# Patient Record
Sex: FEMALE | Race: WHITE | NOT HISPANIC OR LATINO
[De-identification: names, ages, dates, MRNs, and addresses within clinical notes are randomized per-mention and may not be internally consistent; named-entity substitution may affect disease eponyms.]

---

## 2022-10-25 PROBLEM — Z00.00 ENCOUNTER FOR PREVENTIVE HEALTH EXAMINATION: Status: ACTIVE | Noted: 2022-10-25

## 2022-11-02 ENCOUNTER — APPOINTMENT (OUTPATIENT)
Dept: SURGICAL ONCOLOGY | Facility: CLINIC | Age: 47
End: 2022-11-02

## 2022-11-02 VITALS
HEIGHT: 69 IN | RESPIRATION RATE: 16 BRPM | WEIGHT: 144 LBS | SYSTOLIC BLOOD PRESSURE: 120 MMHG | DIASTOLIC BLOOD PRESSURE: 88 MMHG | HEART RATE: 87 BPM | OXYGEN SATURATION: 98 % | BODY MASS INDEX: 21.33 KG/M2

## 2022-11-02 PROCEDURE — 99205 OFFICE O/P NEW HI 60 MIN: CPT

## 2022-11-03 ENCOUNTER — NON-APPOINTMENT (OUTPATIENT)
Age: 47
End: 2022-11-03

## 2022-11-09 ENCOUNTER — APPOINTMENT (OUTPATIENT)
Dept: MRI IMAGING | Facility: CLINIC | Age: 47
End: 2022-11-09

## 2022-11-09 ENCOUNTER — TRANSCRIPTION ENCOUNTER (OUTPATIENT)
Age: 47
End: 2022-11-09

## 2022-11-09 ENCOUNTER — OUTPATIENT (OUTPATIENT)
Dept: OUTPATIENT SERVICES | Facility: HOSPITAL | Age: 47
LOS: 1 days | Discharge: HOME | End: 2022-11-09

## 2022-11-09 DIAGNOSIS — C50.919 MALIGNANT NEOPLASM OF UNSPECIFIED SITE OF UNSPECIFIED FEMALE BREAST: ICD-10-CM

## 2022-11-09 PROCEDURE — 77049 MRI BREAST C-+ W/CAD BI: CPT

## 2022-11-09 PROCEDURE — A9585: CPT | Mod: NC,JW

## 2022-11-10 ENCOUNTER — NON-APPOINTMENT (OUTPATIENT)
Age: 47
End: 2022-11-10

## 2022-11-10 ENCOUNTER — APPOINTMENT (OUTPATIENT)
Dept: HEMATOLOGY ONCOLOGY | Facility: CLINIC | Age: 47
End: 2022-11-10

## 2022-11-10 ENCOUNTER — RESULT REVIEW (OUTPATIENT)
Age: 47
End: 2022-11-10

## 2022-11-10 ENCOUNTER — APPOINTMENT (OUTPATIENT)
Dept: MULTI SPECIALTY CLINIC | Facility: CLINIC | Age: 47
End: 2022-11-10

## 2022-11-10 ENCOUNTER — LABORATORY RESULT (OUTPATIENT)
Age: 47
End: 2022-11-10

## 2022-11-10 DIAGNOSIS — Z87.891 PERSONAL HISTORY OF NICOTINE DEPENDENCE: ICD-10-CM

## 2022-11-10 DIAGNOSIS — Z78.9 OTHER SPECIFIED HEALTH STATUS: ICD-10-CM

## 2022-11-10 LAB
BASOPHILS # BLD AUTO: 0.03 K/UL — SIGNIFICANT CHANGE UP (ref 0–0.2)
BASOPHILS NFR BLD AUTO: 0.7 % — SIGNIFICANT CHANGE UP (ref 0–2)
EOSINOPHIL # BLD AUTO: 0.08 K/UL — SIGNIFICANT CHANGE UP (ref 0–0.5)
EOSINOPHIL NFR BLD AUTO: 1.9 % — SIGNIFICANT CHANGE UP (ref 0–6)
HCT VFR BLD CALC: 40.8 % — SIGNIFICANT CHANGE UP (ref 34.5–45)
HGB BLD-MCNC: 13.1 G/DL — SIGNIFICANT CHANGE UP (ref 11.5–15.5)
IMM GRANULOCYTES NFR BLD AUTO: 0.5 % — SIGNIFICANT CHANGE UP (ref 0–0.9)
LYMPHOCYTES # BLD AUTO: 0.95 K/UL — LOW (ref 1–3.3)
LYMPHOCYTES # BLD AUTO: 23 % — SIGNIFICANT CHANGE UP (ref 13–44)
MCHC RBC-ENTMCNC: 26.7 PG — LOW (ref 27–34)
MCHC RBC-ENTMCNC: 32.1 G/DL — SIGNIFICANT CHANGE UP (ref 32–36)
MCV RBC AUTO: 83.1 FL — SIGNIFICANT CHANGE UP (ref 80–100)
MONOCYTES # BLD AUTO: 0.26 K/UL — SIGNIFICANT CHANGE UP (ref 0–0.9)
MONOCYTES NFR BLD AUTO: 6.3 % — SIGNIFICANT CHANGE UP (ref 2–14)
NEUTROPHILS # BLD AUTO: 2.79 K/UL — SIGNIFICANT CHANGE UP (ref 1.8–7.4)
NEUTROPHILS NFR BLD AUTO: 67.6 % — SIGNIFICANT CHANGE UP (ref 43–77)
NRBC # BLD: 0 /100 WBCS — SIGNIFICANT CHANGE UP (ref 0–0)
PLATELET # BLD AUTO: 262 K/UL — SIGNIFICANT CHANGE UP (ref 150–400)
RBC # BLD: 4.91 M/UL — SIGNIFICANT CHANGE UP (ref 3.8–5.2)
RBC # FLD: 14.7 % — HIGH (ref 10.3–14.5)
WBC # BLD: 4.13 K/UL — SIGNIFICANT CHANGE UP (ref 3.8–10.5)
WBC # FLD AUTO: 4.13 K/UL — SIGNIFICANT CHANGE UP (ref 3.8–10.5)

## 2022-11-10 PROCEDURE — 99205 OFFICE O/P NEW HI 60 MIN: CPT

## 2022-11-10 PROCEDURE — 88321 CONSLTJ&REPRT SLD PREP ELSWR: CPT

## 2022-11-10 PROCEDURE — 99203 OFFICE O/P NEW LOW 30 MIN: CPT | Mod: 25

## 2022-11-11 ENCOUNTER — RESULT REVIEW (OUTPATIENT)
Age: 47
End: 2022-11-11

## 2022-11-14 LAB
ALBUMIN SERPL ELPH-MCNC: 4.9 G/DL
ALP BLD-CCNC: 72 U/L
ALT SERPL-CCNC: 6 U/L
ANION GAP SERPL CALC-SCNC: 11 MMOL/L
AST SERPL-CCNC: 15 U/L
BASOPHILS # BLD AUTO: 0.03 K/UL
BASOPHILS NFR BLD AUTO: 0.7 %
BILIRUB SERPL-MCNC: 0.3 MG/DL
BUN SERPL-MCNC: 8 MG/DL
CALCIUM SERPL-MCNC: 10.1 MG/DL
CHLORIDE SERPL-SCNC: 102 MMOL/L
CO2 SERPL-SCNC: 27 MMOL/L
CREAT SERPL-MCNC: 0.72 MG/DL
EGFR: 103 ML/MIN/1.73M2
EOSINOPHIL # BLD AUTO: 0.09 K/UL
EOSINOPHIL NFR BLD AUTO: 2.1 %
GLUCOSE SERPL-MCNC: 90 MG/DL
HBV CORE IGG+IGM SER QL: NONREACTIVE
HBV CORE IGM SER QL: NONREACTIVE
HBV E AB SER QL: NONREACTIVE
HBV E AG SER QL: NONREACTIVE
HBV SURFACE AB SER QL: NONREACTIVE
HBV SURFACE AG SER QL: NONREACTIVE
HCT VFR BLD CALC: 42.3 %
HCV AB SER QL: NONREACTIVE
HCV S/CO RATIO: 0.25 S/CO
HEPB DNA PCR INT: NOT DETECTED
HEPB DNA PCR LOG: NOT DETECTED LOGIU/ML
HGB BLD-MCNC: 13.3 G/DL
IMM GRANULOCYTES NFR BLD AUTO: 0.2 %
INR PPP: 0.97 RATIO
LYMPHOCYTES # BLD AUTO: 0.95 K/UL
LYMPHOCYTES NFR BLD AUTO: 22.3 %
MAN DIFF?: NORMAL
MCHC RBC-ENTMCNC: 27.1 PG
MCHC RBC-ENTMCNC: 31.4 GM/DL
MCV RBC AUTO: 86.3 FL
MONOCYTES # BLD AUTO: 0.26 K/UL
MONOCYTES NFR BLD AUTO: 6.1 %
NEUTROPHILS # BLD AUTO: 2.92 K/UL
NEUTROPHILS NFR BLD AUTO: 68.6 %
PLATELET # BLD AUTO: 274 K/UL
POTASSIUM SERPL-SCNC: 4 MMOL/L
PROT SERPL-MCNC: 7.5 G/DL
PT BLD: 11.3 SEC
RBC # BLD: 4.9 M/UL
RBC # FLD: 15.4 %
SODIUM SERPL-SCNC: 140 MMOL/L
TSH SERPL-ACNC: 1.21 UIU/ML
WBC # FLD AUTO: 4.26 K/UL

## 2022-11-20 PROBLEM — Z87.891 FORMER SMOKER: Status: ACTIVE | Noted: 2022-11-20

## 2022-11-20 PROBLEM — Z78.9 NO FAMILY HISTORY OF CANCER: Status: ACTIVE | Noted: 2022-11-20

## 2022-11-20 NOTE — HISTORY OF PRESENT ILLNESS
[FreeTextEntry1] : Ms. Sauer is a 46 yo female with newly diagnosed triple negative left breast cancer, who presents to breast multidisciplinary clinic for treatment recommendations.\par \par Her history is significant for benign right breast biopsy in 2018. She palpated a mass in the left breast in May 2022, which she attributed to a strain from yoga. She was evaluated by her doctor in September, who referred her for further work up. \par \par Diagnostic mammogram on 9/19/22 demonstrated a new spiculated mass in the far posterior upper inner quadrant of the left breast, compared to a prior mammogram from 2014. Loosely grouped coarse calcifications were noted in the right breast near a biopsy clip, in the posterior upper central right breast, which appeared increased and indeterminate. There was no axillary adenopathy bilaterally. \par \par Breast ultrasound showed an irregular hypoechoic left breast mass measuring up to 4.1 cm at 10:00-12:00, 7 cm FN. \par Adjacent nodules were present measuring 9 and 1 mm. Multiple cysts were seen in the right breast, with one isoechoic nodule appearing likely benign. \par \par She underwent biopsy of left breast mass on 10/17/22. Pathology showed invasive mammary ductal carcinoma, poorly differentiated, grade 3, involves multiple cores, measuring up to 10 mm. DCIS was also present, high nuclear grade and solid type. The invasive tumor was ER 0%, GA 0%, HER2 1+ negative by FISH, and KI 67 50%. Right breast posterior upper central calcifications biopsy showed benign breast tissue. \par \par MRI breast on 11/9/22 showed an irregular enhancing mass in the left upper inner quadrant with multiple surrounding satellite masses spanning 6.7 cm AP. Satellite masses extended into the far medial breast and abutted the pectoralis major, without enhancement of the muscle. Prominent axillary lymph nodes were noted in the left axilla. There was no suspicious enhancement or adenopathy on the right side.

## 2022-11-20 NOTE — LETTER CLOSING
[Consult Closing:] : Thank you for allowing me to participate in the care of this patient.  If you have any questions, please do not hesitate to contact me. [Sincerely yours,] : Sincerely yours, [FreeTextEntry3] : Olga Pierce MD\par

## 2022-11-20 NOTE — PHYSICAL EXAM
[Sclera] : the sclera and conjunctiva were normal [] : no respiratory distress [Heart Rate And Rhythm] : heart rate and rhythm were normal [No UE Edema] : there is no upper extremity edema [Abdomen Soft] : soft [Nondistended] : nondistended [Supraclavicular Lymph Nodes Enlarged Bilaterally] : supraclavicular [Axillary Lymph Nodes Enlarged Bilaterally] : axillary [Normal] : oriented to person, place and time, the affect was normal, the mood was normal and not anxious [de-identified] : palpable mass in left upper inner quadrant, mobile, no skin involvement, no tethering, approx 4 cm

## 2022-11-21 ENCOUNTER — RESULT REVIEW (OUTPATIENT)
Age: 47
End: 2022-11-21

## 2022-11-21 ENCOUNTER — APPOINTMENT (OUTPATIENT)
Dept: ULTRASOUND IMAGING | Facility: IMAGING CENTER | Age: 47
End: 2022-11-21

## 2022-11-21 ENCOUNTER — OUTPATIENT (OUTPATIENT)
Dept: OUTPATIENT SERVICES | Facility: HOSPITAL | Age: 47
LOS: 1 days | End: 2022-11-21
Payer: COMMERCIAL

## 2022-11-21 DIAGNOSIS — C50.919 MALIGNANT NEOPLASM OF UNSPECIFIED SITE OF UNSPECIFIED FEMALE BREAST: ICD-10-CM

## 2022-11-21 PROCEDURE — 88173 CYTOPATH EVAL FNA REPORT: CPT | Mod: 26

## 2022-11-21 PROCEDURE — 76882 US LMTD JT/FCL EVL NVASC XTR: CPT | Mod: 26,LT

## 2022-11-21 PROCEDURE — 88305 TISSUE EXAM BY PATHOLOGIST: CPT | Mod: 26

## 2022-11-21 PROCEDURE — 19000 PUNCTURE ASPIR CYST BREAST: CPT | Mod: RT

## 2022-11-21 PROCEDURE — 76882 US LMTD JT/FCL EVL NVASC XTR: CPT

## 2022-11-21 PROCEDURE — 76942 ECHO GUIDE FOR BIOPSY: CPT

## 2022-11-21 PROCEDURE — 76942 ECHO GUIDE FOR BIOPSY: CPT | Mod: 26

## 2022-11-21 PROCEDURE — 19000 PUNCTURE ASPIR CYST BREAST: CPT

## 2022-11-21 PROCEDURE — 88305 TISSUE EXAM BY PATHOLOGIST: CPT

## 2022-11-21 PROCEDURE — 88173 CYTOPATH EVAL FNA REPORT: CPT

## 2022-11-23 ENCOUNTER — APPOINTMENT (OUTPATIENT)
Dept: NUCLEAR MEDICINE | Facility: IMAGING CENTER | Age: 47
End: 2022-11-23

## 2022-11-23 ENCOUNTER — APPOINTMENT (OUTPATIENT)
Dept: CT IMAGING | Facility: IMAGING CENTER | Age: 47
End: 2022-11-23

## 2022-11-23 ENCOUNTER — OUTPATIENT (OUTPATIENT)
Dept: OUTPATIENT SERVICES | Facility: HOSPITAL | Age: 47
LOS: 1 days | End: 2022-11-23
Payer: COMMERCIAL

## 2022-11-23 DIAGNOSIS — C50.919 MALIGNANT NEOPLASM OF UNSPECIFIED SITE OF UNSPECIFIED FEMALE BREAST: ICD-10-CM

## 2022-11-23 DIAGNOSIS — Z00.8 ENCOUNTER FOR OTHER GENERAL EXAMINATION: ICD-10-CM

## 2022-11-23 LAB — NON-GYNECOLOGICAL CYTOLOGY STUDY: SIGNIFICANT CHANGE UP

## 2022-11-23 PROCEDURE — 78306 BONE IMAGING WHOLE BODY: CPT | Mod: 26

## 2022-11-23 PROCEDURE — 71260 CT THORAX DX C+: CPT | Mod: 26

## 2022-11-23 PROCEDURE — 74177 CT ABD & PELVIS W/CONTRAST: CPT | Mod: 26

## 2022-11-23 PROCEDURE — 74177 CT ABD & PELVIS W/CONTRAST: CPT

## 2022-11-23 PROCEDURE — 71260 CT THORAX DX C+: CPT

## 2022-11-23 PROCEDURE — A9561: CPT

## 2022-11-23 PROCEDURE — 78306 BONE IMAGING WHOLE BODY: CPT

## 2022-11-25 LAB
DPYD GENOTYPE: NORMAL
DPYD PHENOTYPE: NORMAL
DPYD SPECIMEN: NORMAL
FULL GENE SEQUENCE RESULT: NORMAL
HCV GENTYP BLD NAA+PROBE: NOT DETECTED
INTERPRETATION PGDFRB: NORMAL
Lab: NORMAL
REF LAB TEST METHOD: NORMAL
REVIEWED BY: NORMAL
TA REPEAT RESULT: NORMAL
TEST PERFORMANCE INFO SPEC: NORMAL

## 2022-11-29 ENCOUNTER — APPOINTMENT (OUTPATIENT)
Dept: HEMATOLOGY ONCOLOGY | Facility: CLINIC | Age: 47
End: 2022-11-29

## 2022-11-29 ENCOUNTER — LABORATORY RESULT (OUTPATIENT)
Age: 47
End: 2022-11-29

## 2022-11-29 VITALS
WEIGHT: 142.64 LBS | HEART RATE: 80 BPM | TEMPERATURE: 97.9 F | SYSTOLIC BLOOD PRESSURE: 120 MMHG | RESPIRATION RATE: 16 BRPM | BODY MASS INDEX: 21.06 KG/M2 | OXYGEN SATURATION: 97 % | DIASTOLIC BLOOD PRESSURE: 78 MMHG

## 2022-11-29 PROCEDURE — 99214 OFFICE O/P EST MOD 30 MIN: CPT

## 2022-11-29 PROCEDURE — 93010 ELECTROCARDIOGRAM REPORT: CPT

## 2022-11-29 NOTE — ASSESSMENT
[FreeTextEntry1] : A discussion took place regarding the recommended therapy for the patient who has what appears to be locally invasive left breast triple negative breast cancer which appears to be involving the muscle along with satellite lesion.  The patient has no definite evidence of lymph node involvement.  She also underwent reevaluation of the right breast lesion which on biopsy was benign.\par \par The reason to recommend neoadjuvant therapy is at the tumor mass on imaging measures 4.1 cm as noted on ultrasound and MRI.  There are also satellite nodules measuring 9 mm and 1 mm and the total area of disease measures 6.7 cm.  There is also abutment of the pectoralis major muscle and prominent left axillary lymph nodes.  The biopsy revealed multiple cores of poorly differentiated grade 3 triple negative breast cancer FISH negative and Ki-67 50%.\par \par The plan is to administer combination chemo and immunotherapy as neoadjuvant for her triple negative breast cancer.  This will consist of Taxol and carbo given IV weekly 2 of 12 weeks along with pembrolizumab.  This will be followed with AC and continued immunotherapy.  Side effects were discussed in detail and informed consent was obtained.  The patient will return monthly during therapy or sooner as needed. [Curative] : Goals of care discussed with patient: Curative [Palliative Care Plan] : not applicable at this time

## 2022-11-29 NOTE — HISTORY OF PRESENT ILLNESS
[Disease: _____________________] : Disease: [unfilled] [de-identified] : This is a  47-year-old woman with history of left breast carcinoma who presents for evaluation.  The patient's history dates back to May 2022 when she noted a mass in the left breast.   In 2022  a mammogram and sonogram were performed.  These revealed a mass in the left breast along with calcifications in the right breast.  The biopsy revealed poorly differentiated infiltrating duct carcinoma with Isaias score 8/9, ER [place] -0% and HER2/roxann 1+ negative consistent with triple negative breast cancer.  The Ki-67 was 50%.\par \par There was no obvious left axillary lymph nodes.  The right breast calcifications were biopsied with benign results including fibrocystic changes.  An MRI revealed that the left breast mass with touching the pectoral muscle to the lateral breast area along with satellite.  There was prominence in the left axilla but no definite enlargement of nodes.  The patient has undergone surgical oncology evaluation and the plan is to evaluate patient for neoadjuvant chemotherapy and she is considering bilateral mastectomies.  The patient has undergone genetic testing which is pending.  The patient did have a previous biopsy of the breast in 2018 involving the right breast which showed a benign biopsy.\par \par The patient will undergo targeted reevaluation of the nonspecific foci on the right breast and also left axillary lymph nodes with biopsy to assess for bilateral disease and possible dayna metastases.\par \par Patient's menarche was age 12 and her periods have been regular.  She is  1 para 1 age at first pregnancy was 19.  She denies hormonal therapy or fertility treatments.  The patient will undergo CAT scans and bone scans to evaluate any evidence for systemic disease. [de-identified] : poorly diff IDC ERneg, [place] neg, Her2 neg 1+, Ki67-50%  [de-identified] : Plan neoadjuvant chemo/immunotherapy, Taxol/ carbo/ Pembro AC  [de-identified] : The patient returns today to discuss beginning neoadjuvant chemotherapy for her triple negative breast cancer after having undergone repeat MRI of the right breast which was biopsied because of a previous lesion and this was benign.  The patient also underwent reevaluation of the left axillary lymph node which were felt to be benign.  She returns to discuss side effects and toxicity of the therapy.

## 2022-11-30 NOTE — HISTORY OF PRESENT ILLNESS
[de-identified] : Patient is a 46 y/o female who presents an initial consultation for newly diagnosed triple negative left breast cancer. \par \par Biopsy (10/17/22):\par Site 1: Left ultrasound biopsy irregular mass at 10-12:00 \par -Invasive carcinoma consistent with invasive mammary ductal carcinoma\par Site 2: Right breast posterior upper central calcifications, stereotactic biopsy\par -Benign breast tissue with apocrine/papillary apocrine cysts\par -Calcification associated with ductules, lobules, cystic change, and stroma\par \par Patient underwent sonogram on 9/19/22 which showed highly suspicious palpable hypoechoic left breast mass at 10:00-12:00 for which an us-guided biopsy is recommended. Indeterminate calcifications in the posterior upper central right breast for which stereotactic biopsy is recommended. Probably benign right 3:00 sonographic nodule for which a 6 month follow up targeted right breast ultrasound is recommended. (BI-RADS 5)\par \par Denies any family history of breast cancer.

## 2022-11-30 NOTE — ADDENDUM
[FreeTextEntry1] : I, Lisa Culver, acted solely as a scribe for Dr. Edward Jean on this date 11/02/2022.\par \par

## 2022-11-30 NOTE — PHYSICAL EXAM
[Normal] : supple, no neck mass and thyroid not enlarged [Normal Neck Lymph Nodes] : normal neck lymph nodes  [Normal Supraclavicular Lymph Nodes] : normal supraclavicular lymph nodes [Normal Groin Lymph Nodes] : normal groin lymph nodes [Normal Axillary Lymph Nodes] : normal axillary lymph nodes [Normal] : oriented to person, place and time, with appropriate affect [de-identified] : 4cm mass LUIQ, no other masses, no adenopathy bilterally

## 2022-11-30 NOTE — CONSULT LETTER
[Dear  ___] : Dear  [unfilled], [Consult Letter:] : I had the pleasure of evaluating your patient, [unfilled]. [Please see my note below.] : Please see my note below. [Sincerely,] : Sincerely, [FreeTextEntry3] : Edward Jean MD FACS\par

## 2022-11-30 NOTE — ASSESSMENT
[FreeTextEntry1] : T2 triple negative breast cancer\par I had a long discussion w the pt. regarding her diagnosis, prognosis and all mgmt options\par I have recommended neoadjuvant chemotherapy given T2 triple negative tumor\par Pt. will be seen in [facility]\par Will get breast MRI and staging studies\par Genetic testing performed\par All questions answered\par

## 2022-12-01 ENCOUNTER — OUTPATIENT (OUTPATIENT)
Dept: OUTPATIENT SERVICES | Facility: HOSPITAL | Age: 47
LOS: 1 days | Discharge: HOME | End: 2022-12-01

## 2022-12-01 DIAGNOSIS — C50.919 MALIGNANT NEOPLASM OF UNSPECIFIED SITE OF UNSPECIFIED FEMALE BREAST: ICD-10-CM

## 2022-12-02 ENCOUNTER — TRANSCRIPTION ENCOUNTER (OUTPATIENT)
Age: 47
End: 2022-12-02

## 2022-12-02 ENCOUNTER — OUTPATIENT (OUTPATIENT)
Dept: OUTPATIENT SERVICES | Facility: HOSPITAL | Age: 47
LOS: 1 days | End: 2022-12-02
Payer: COMMERCIAL

## 2022-12-02 ENCOUNTER — RESULT REVIEW (OUTPATIENT)
Age: 47
End: 2022-12-02

## 2022-12-02 VITALS
RESPIRATION RATE: 15 BRPM | HEART RATE: 70 BPM | WEIGHT: 139.99 LBS | TEMPERATURE: 98 F | OXYGEN SATURATION: 99 % | HEIGHT: 69 IN | DIASTOLIC BLOOD PRESSURE: 82 MMHG | SYSTOLIC BLOOD PRESSURE: 108 MMHG

## 2022-12-02 VITALS
RESPIRATION RATE: 19 BRPM | OXYGEN SATURATION: 100 % | SYSTOLIC BLOOD PRESSURE: 104 MMHG | DIASTOLIC BLOOD PRESSURE: 77 MMHG | HEART RATE: 69 BPM

## 2022-12-02 DIAGNOSIS — C50.919 MALIGNANT NEOPLASM OF UNSPECIFIED SITE OF UNSPECIFIED FEMALE BREAST: ICD-10-CM

## 2022-12-02 PROCEDURE — 76937 US GUIDE VASCULAR ACCESS: CPT | Mod: 26

## 2022-12-02 PROCEDURE — C1894: CPT

## 2022-12-02 PROCEDURE — 77001 FLUOROGUIDE FOR VEIN DEVICE: CPT | Mod: 26

## 2022-12-02 PROCEDURE — 77001 FLUOROGUIDE FOR VEIN DEVICE: CPT

## 2022-12-02 PROCEDURE — 76937 US GUIDE VASCULAR ACCESS: CPT

## 2022-12-02 PROCEDURE — C1769: CPT

## 2022-12-02 PROCEDURE — 36561 INSERT TUNNELED CV CATH: CPT

## 2022-12-02 PROCEDURE — C1788: CPT

## 2022-12-02 RX ORDER — FENTANYL CITRATE 50 UG/ML
25 INJECTION INTRAVENOUS
Refills: 0 | Status: DISCONTINUED | OUTPATIENT
Start: 2022-12-02 | End: 2022-12-02

## 2022-12-02 RX ORDER — ONDANSETRON 8 MG/1
4 TABLET, FILM COATED ORAL ONCE
Refills: 0 | Status: DISCONTINUED | OUTPATIENT
Start: 2022-12-02 | End: 2022-12-16

## 2022-12-02 NOTE — PRE-ANESTHESIA EVALUATION ADULT - NSANTHOSAYNRD_GEN_A_CORE
No. ANIBAL screening performed.  STOP BANG Legend: 0-2 = LOW Risk; 3-4 = INTERMEDIATE Risk; 5-8 = HIGH Risk

## 2022-12-02 NOTE — ASU PATIENT PROFILE, ADULT - FALL HARM RISK - UNIVERSAL INTERVENTIONS
Bed in lowest position, wheels locked, appropriate side rails in place/Call bell, personal items and telephone in reach/Instruct patient to call for assistance before getting out of bed or chair/Non-slip footwear when patient is out of bed/North to call system/Physically safe environment - no spills, clutter or unnecessary equipment/Purposeful Proactive Rounding/Room/bathroom lighting operational, light cord in reach

## 2022-12-02 NOTE — PRE PROCEDURE NOTE - PRE PROCEDURE EVALUATION
HPI: 47y Female with left breast cancer requiring port for chemotherapy.    Allergies:   Medications (Abx/Cardiac/Anticoagulation/Blood Products)      Data:    T(C): --  HR: --  BP: --  RR: --  SpO2: --    Imaging: Reviewed    Plan:   -47y Female presents for port placement for chemotherapy.  -Risks/Benefits/alternatives explained with the patient and/or healthcare proxy and witnessed informed consent obtained.

## 2022-12-02 NOTE — ASU DISCHARGE PLAN (ADULT/PEDIATRIC) - NS MD DC FALL RISK RISK
For information on Fall & Injury Prevention, visit: [email or web address] OR  [email or web address] OR  https://www.cdc.gov/steadi/patient.html

## 2022-12-02 NOTE — ASU DISCHARGE PLAN (ADULT/PEDIATRIC) - NURSING INSTRUCTIONS
Please feel free to contact us at [phone] if any problems arise. After 6PM, Monday through Friday, on weekends and on holidays, please call [phone] and ask for the radiology resident on call to be paged.

## 2022-12-06 ENCOUNTER — RESULT REVIEW (OUTPATIENT)
Age: 47
End: 2022-12-06

## 2022-12-06 ENCOUNTER — APPOINTMENT (OUTPATIENT)
Dept: INFUSION THERAPY | Facility: HOSPITAL | Age: 47
End: 2022-12-06

## 2022-12-06 ENCOUNTER — NON-APPOINTMENT (OUTPATIENT)
Age: 47
End: 2022-12-06

## 2022-12-06 LAB
ALBUMIN SERPL ELPH-MCNC: 4.3 G/DL — SIGNIFICANT CHANGE UP (ref 3.3–5)
ALP SERPL-CCNC: 66 U/L — SIGNIFICANT CHANGE UP (ref 40–120)
ALT FLD-CCNC: 8 U/L — LOW (ref 10–45)
ANION GAP SERPL CALC-SCNC: 12 MMOL/L — SIGNIFICANT CHANGE UP (ref 5–17)
APTT BLD: 31.1 SEC
AST SERPL-CCNC: 13 U/L — SIGNIFICANT CHANGE UP (ref 10–40)
BASOPHILS # BLD AUTO: 0 K/UL — SIGNIFICANT CHANGE UP (ref 0–0.2)
BASOPHILS NFR BLD AUTO: 0 % — SIGNIFICANT CHANGE UP (ref 0–2)
BILIRUB SERPL-MCNC: 0.3 MG/DL — SIGNIFICANT CHANGE UP (ref 0.2–1.2)
BUN SERPL-MCNC: 17 MG/DL — SIGNIFICANT CHANGE UP (ref 7–23)
CALCIUM SERPL-MCNC: 9.9 MG/DL — SIGNIFICANT CHANGE UP (ref 8.4–10.5)
CANCER AG27-29 SERPL-ACNC: 27.5 U/ML
CHLORIDE SERPL-SCNC: 107 MMOL/L — SIGNIFICANT CHANGE UP (ref 96–108)
CO2 SERPL-SCNC: 22 MMOL/L — SIGNIFICANT CHANGE UP (ref 22–31)
CREAT SERPL-MCNC: 0.55 MG/DL — SIGNIFICANT CHANGE UP (ref 0.5–1.3)
EGFR: 113 ML/MIN/1.73M2 — SIGNIFICANT CHANGE UP
EOSINOPHIL # BLD AUTO: 0 K/UL — SIGNIFICANT CHANGE UP (ref 0–0.5)
EOSINOPHIL NFR BLD AUTO: 0 % — SIGNIFICANT CHANGE UP (ref 0–6)
GLUCOSE SERPL-MCNC: 140 MG/DL — HIGH (ref 70–99)
HCT VFR BLD CALC: 38 % — SIGNIFICANT CHANGE UP (ref 34.5–45)
HGB BLD-MCNC: 12.4 G/DL — SIGNIFICANT CHANGE UP (ref 11.5–15.5)
IMM GRANULOCYTES NFR BLD AUTO: 0.6 % — SIGNIFICANT CHANGE UP (ref 0–0.9)
LYMPHOCYTES # BLD AUTO: 0.37 K/UL — LOW (ref 1–3.3)
LYMPHOCYTES # BLD AUTO: 10.4 % — LOW (ref 13–44)
MCHC RBC-ENTMCNC: 26.4 PG — LOW (ref 27–34)
MCHC RBC-ENTMCNC: 32.6 G/DL — SIGNIFICANT CHANGE UP (ref 32–36)
MCV RBC AUTO: 81 FL — SIGNIFICANT CHANGE UP (ref 80–100)
MONOCYTES # BLD AUTO: 0.04 K/UL — SIGNIFICANT CHANGE UP (ref 0–0.9)
MONOCYTES NFR BLD AUTO: 1.1 % — LOW (ref 2–14)
NEUTROPHILS # BLD AUTO: 3.12 K/UL — SIGNIFICANT CHANGE UP (ref 1.8–7.4)
NEUTROPHILS NFR BLD AUTO: 87.9 % — HIGH (ref 43–77)
NRBC # BLD: 0 /100 WBCS — SIGNIFICANT CHANGE UP (ref 0–0)
PLATELET # BLD AUTO: 194 K/UL — SIGNIFICANT CHANGE UP (ref 150–400)
POTASSIUM SERPL-MCNC: 4.4 MMOL/L — SIGNIFICANT CHANGE UP (ref 3.5–5.3)
POTASSIUM SERPL-SCNC: 4.4 MMOL/L — SIGNIFICANT CHANGE UP (ref 3.5–5.3)
PROT SERPL-MCNC: 7 G/DL — SIGNIFICANT CHANGE UP (ref 6–8.3)
RBC # BLD: 4.69 M/UL — SIGNIFICANT CHANGE UP (ref 3.8–5.2)
RBC # FLD: 14.3 % — SIGNIFICANT CHANGE UP (ref 10.3–14.5)
SODIUM SERPL-SCNC: 141 MMOL/L — SIGNIFICANT CHANGE UP (ref 135–145)
T3 SERPL-MCNC: 80 NG/DL — SIGNIFICANT CHANGE UP (ref 80–200)
T4 FREE SERPL-MCNC: 1 NG/DL — SIGNIFICANT CHANGE UP (ref 0.9–1.8)
TSH SERPL-MCNC: 0.83 UIU/ML — SIGNIFICANT CHANGE UP (ref 0.27–4.2)
WBC # BLD: 3.55 K/UL — LOW (ref 3.8–10.5)
WBC # FLD AUTO: 3.55 K/UL — LOW (ref 3.8–10.5)

## 2022-12-07 DIAGNOSIS — Z51.11 ENCOUNTER FOR ANTINEOPLASTIC CHEMOTHERAPY: ICD-10-CM

## 2022-12-07 DIAGNOSIS — R11.2 NAUSEA WITH VOMITING, UNSPECIFIED: ICD-10-CM

## 2022-12-07 LAB — CANCER AG27-29 SERPL-ACNC: 26.1 U/ML — SIGNIFICANT CHANGE UP (ref 0–38.6)

## 2022-12-12 DIAGNOSIS — Z45.2 ENCOUNTER FOR ADJUSTMENT AND MANAGEMENT OF VASCULAR ACCESS DEVICE: ICD-10-CM

## 2022-12-12 DIAGNOSIS — C50.919 MALIGNANT NEOPLASM OF UNSPECIFIED SITE OF UNSPECIFIED FEMALE BREAST: ICD-10-CM

## 2022-12-13 ENCOUNTER — APPOINTMENT (OUTPATIENT)
Dept: INFUSION THERAPY | Facility: HOSPITAL | Age: 47
End: 2022-12-13

## 2022-12-13 ENCOUNTER — RESULT REVIEW (OUTPATIENT)
Age: 47
End: 2022-12-13

## 2022-12-13 LAB
ALBUMIN SERPL ELPH-MCNC: 4.1 G/DL — SIGNIFICANT CHANGE UP (ref 3.3–5)
ALP SERPL-CCNC: 56 U/L — SIGNIFICANT CHANGE UP (ref 40–120)
ALT FLD-CCNC: 11 U/L — SIGNIFICANT CHANGE UP (ref 10–45)
ANION GAP SERPL CALC-SCNC: 9 MMOL/L — SIGNIFICANT CHANGE UP (ref 5–17)
AST SERPL-CCNC: 15 U/L — SIGNIFICANT CHANGE UP (ref 10–40)
BASOPHILS # BLD AUTO: 0.03 K/UL — SIGNIFICANT CHANGE UP (ref 0–0.2)
BASOPHILS NFR BLD AUTO: 0.9 % — SIGNIFICANT CHANGE UP (ref 0–2)
BILIRUB SERPL-MCNC: 0.2 MG/DL — SIGNIFICANT CHANGE UP (ref 0.2–1.2)
BUN SERPL-MCNC: 12 MG/DL — SIGNIFICANT CHANGE UP (ref 7–23)
CALCIUM SERPL-MCNC: 9.1 MG/DL — SIGNIFICANT CHANGE UP (ref 8.4–10.5)
CHLORIDE SERPL-SCNC: 102 MMOL/L — SIGNIFICANT CHANGE UP (ref 96–108)
CO2 SERPL-SCNC: 26 MMOL/L — SIGNIFICANT CHANGE UP (ref 22–31)
CREAT SERPL-MCNC: 0.6 MG/DL — SIGNIFICANT CHANGE UP (ref 0.5–1.3)
EGFR: 111 ML/MIN/1.73M2 — SIGNIFICANT CHANGE UP
EOSINOPHIL # BLD AUTO: 0.16 K/UL — SIGNIFICANT CHANGE UP (ref 0–0.5)
EOSINOPHIL NFR BLD AUTO: 4.6 % — SIGNIFICANT CHANGE UP (ref 0–6)
GLUCOSE SERPL-MCNC: 89 MG/DL — SIGNIFICANT CHANGE UP (ref 70–99)
HCT VFR BLD CALC: 37.2 % — SIGNIFICANT CHANGE UP (ref 34.5–45)
HGB BLD-MCNC: 12.4 G/DL — SIGNIFICANT CHANGE UP (ref 11.5–15.5)
IMM GRANULOCYTES NFR BLD AUTO: 1.7 % — HIGH (ref 0–0.9)
LYMPHOCYTES # BLD AUTO: 0.59 K/UL — LOW (ref 1–3.3)
LYMPHOCYTES # BLD AUTO: 17.1 % — SIGNIFICANT CHANGE UP (ref 13–44)
MCHC RBC-ENTMCNC: 27 PG — SIGNIFICANT CHANGE UP (ref 27–34)
MCHC RBC-ENTMCNC: 33.3 G/DL — SIGNIFICANT CHANGE UP (ref 32–36)
MCV RBC AUTO: 81 FL — SIGNIFICANT CHANGE UP (ref 80–100)
MONOCYTES # BLD AUTO: 0.16 K/UL — SIGNIFICANT CHANGE UP (ref 0–0.9)
MONOCYTES NFR BLD AUTO: 4.6 % — SIGNIFICANT CHANGE UP (ref 2–14)
NEUTROPHILS # BLD AUTO: 2.45 K/UL — SIGNIFICANT CHANGE UP (ref 1.8–7.4)
NEUTROPHILS NFR BLD AUTO: 71.1 % — SIGNIFICANT CHANGE UP (ref 43–77)
NRBC # BLD: 0 /100 WBCS — SIGNIFICANT CHANGE UP (ref 0–0)
PLATELET # BLD AUTO: 193 K/UL — SIGNIFICANT CHANGE UP (ref 150–400)
POTASSIUM SERPL-MCNC: 4.3 MMOL/L — SIGNIFICANT CHANGE UP (ref 3.5–5.3)
POTASSIUM SERPL-SCNC: 4.3 MMOL/L — SIGNIFICANT CHANGE UP (ref 3.5–5.3)
PROT SERPL-MCNC: 6.4 G/DL — SIGNIFICANT CHANGE UP (ref 6–8.3)
RBC # BLD: 4.59 M/UL — SIGNIFICANT CHANGE UP (ref 3.8–5.2)
RBC # FLD: 14.4 % — SIGNIFICANT CHANGE UP (ref 10.3–14.5)
SODIUM SERPL-SCNC: 138 MMOL/L — SIGNIFICANT CHANGE UP (ref 135–145)
WBC # BLD: 3.45 K/UL — LOW (ref 3.8–10.5)
WBC # FLD AUTO: 3.45 K/UL — LOW (ref 3.8–10.5)

## 2022-12-13 NOTE — REASON FOR VISIT
[Follow-Up Visit] : a follow-up [Parent] : parent [Family Member] : family member [FreeTextEntry2] : ca of left breast

## 2022-12-13 NOTE — CONSULT LETTER
[Dear  ___] : Dear  [unfilled], [Consult Letter:] : I had the pleasure of evaluating your patient, [unfilled]. [Please see my note below.] : Please see my note below. [Consult Closing:] : Thank you very much for allowing me to participate in the care of this patient.  If you have any questions, please do not hesitate to contact me. [Sincerely,] : Sincerely, [FreeTextEntry2] : Dr. Edward Jean [FreeTextEntry3] : [facility]\par [facility]\par [facility]

## 2022-12-13 NOTE — ASSESSMENT
[Curative] : Goals of care discussed with patient: Curative [FreeTextEntry1] : A discussion took place with the patient along with her sister and mother regarding further management.  Because the patient has triple negative breast cancer which appears to be abutting or infiltrating the pectoral muscle the recommendation is to offer neoadjuvant therapy with the hopes of shrinking the tumor down which will allow her to decide which surgical treatment she wishes to undergo.  She is considering bilateral mastectomies and reconstruction.  Before beginning chemotherapy the patient will undergo repeat breast imaging to assess for the left axillary lymph nodes which appear to be benign but will be evaluated for possible biopsy.  She will also undergo repeat evaluation of the right breast lesions which appear to be benign.\par \par We discussed chemoimmunotherapy including Taxol carbo and pembrolizumab for 12 weeks followed by Adriamycin Cytoxan dose dense support treatments.  The side effects of treatment were discussed in detail and informed consent was obtained.  The side effects would include nausea, vomiting, oc blood counts and life-threatening infection, memory change, hair loss, tearing of the eyes, mouth sores, amenorrhea,  fatigue, diarrhea, neuropathy, change in taste, cardiac effects of Adriamycin flulike reaction, immune reactions with inflammation of multiple organs and other side effects.  The patient would be a candidate for Mediport placement and the  genetic testing is pending.  We will also offer nutrition consultation and psychosocial support during her therapy.  The patient will also be screened for hepatitis.  The patient would also be a candidate for radiation oncology consultation after her surgery.

## 2022-12-13 NOTE — HISTORY OF PRESENT ILLNESS
[Disease: _____________________] : Disease: [unfilled] [de-identified] : This is a  47-year-old woman with history of left breast carcinoma who presents for evaluation.  The patient's history dates back to May 2022 when she noted a mass in the left breast.   In 2022  a mammogram and sonogram were performed.  These revealed a mass in the left breast along with calcifications in the right breast.  The biopsy revealed poorly differentiated infiltrating duct carcinoma with Isaias score 8/9, ER [place] -0% and HER2/roxann 1+ negative consistent with triple negative breast cancer.  The Ki-67 was 50%.\par \par There was no obvious left axillary lymph nodes.  The right breast calcifications were biopsied with benign results including fibrocystic changes.  An MRI revealed that the left breast mass with touching the pectoral muscle to the lateral breast area along with satellite.  There was prominence in the left axilla but no definite enlargement of nodes.  The patient has undergone surgical oncology evaluation and the plan is to evaluate patient for neoadjuvant chemotherapy and she is considering bilateral mastectomies.  The patient has undergone genetic testing which is pending.  The patient did have a previous biopsy of the breast in 2018 involving the right breast which showed a benign biopsy.\par \par The patient will undergo targeted reevaluation of the nonspecific foci on the right breast and also left axillary lymph nodes with biopsy to assess for bilateral disease and possible dayna metastases.\par \par Patient's menarche was age 12 and her periods have been regular.  She is  1 para 1 age at first pregnancy was 19.  She denies hormonal therapy or fertility treatments.  The patient will undergo CAT scans and bone scans to evaluate any evidence for systemic disease. [de-identified] : poorly diff IDC ERneg, [place] neg, Her2 neg 1+, Ki67-50%  [de-identified] : Plan neoadjuvant chemo/immunotherapy, Taxol/ carbo/ Pembro AC

## 2022-12-13 NOTE — PHYSICAL EXAM
[Restricted in physically strenuous activity but ambulatory and able to carry out work of a light or sedentary nature] : Status 1- Restricted in physically strenuous activity but ambulatory and able to carry out work of a light or sedentary nature, e.g., light house work, office work [Normal] : affect appropriate [de-identified] : There is a 4 cm palpable mass in the left upper anterior breast.  No palpable lymph nodes noted.

## 2022-12-14 LAB
CORTICOSTEROID BINDING GLOBULIN RESULT: 1.7 MG/DL — SIGNIFICANT CHANGE UP
CORTIS F/TOTAL MFR SERPL: <3.3 % — SIGNIFICANT CHANGE UP
CORTIS SERPL-MCNC: <1 UG/DL — LOW
CORTISOL, FREE RESULT: <0.03 UG/DL — LOW

## 2022-12-15 ENCOUNTER — NON-APPOINTMENT (OUTPATIENT)
Age: 47
End: 2022-12-15

## 2022-12-20 ENCOUNTER — RESULT REVIEW (OUTPATIENT)
Age: 47
End: 2022-12-20

## 2022-12-20 ENCOUNTER — APPOINTMENT (OUTPATIENT)
Dept: INFUSION THERAPY | Facility: HOSPITAL | Age: 47
End: 2022-12-20

## 2022-12-20 ENCOUNTER — APPOINTMENT (OUTPATIENT)
Dept: HEMATOLOGY ONCOLOGY | Facility: CLINIC | Age: 47
End: 2022-12-20

## 2022-12-20 VITALS
HEART RATE: 84 BPM | RESPIRATION RATE: 16 BRPM | DIASTOLIC BLOOD PRESSURE: 77 MMHG | SYSTOLIC BLOOD PRESSURE: 113 MMHG | TEMPERATURE: 97.7 F | OXYGEN SATURATION: 99 % | BODY MASS INDEX: 21.16 KG/M2 | WEIGHT: 143.3 LBS

## 2022-12-20 LAB
BASOPHILS # BLD AUTO: 0 K/UL — SIGNIFICANT CHANGE UP (ref 0–0.2)
BASOPHILS NFR BLD AUTO: 0 % — SIGNIFICANT CHANGE UP (ref 0–2)
EOSINOPHIL # BLD AUTO: 0.04 K/UL — SIGNIFICANT CHANGE UP (ref 0–0.5)
EOSINOPHIL NFR BLD AUTO: 3 % — SIGNIFICANT CHANGE UP (ref 0–6)
HCT VFR BLD CALC: 36.1 % — SIGNIFICANT CHANGE UP (ref 34.5–45)
HGB BLD-MCNC: 11.9 G/DL — SIGNIFICANT CHANGE UP (ref 11.5–15.5)
LYMPHOCYTES # BLD AUTO: 0.7 K/UL — LOW (ref 1–3.3)
LYMPHOCYTES # BLD AUTO: 48 % — HIGH (ref 13–44)
MCHC RBC-ENTMCNC: 26.9 PG — LOW (ref 27–34)
MCHC RBC-ENTMCNC: 33 G/DL — SIGNIFICANT CHANGE UP (ref 32–36)
MCV RBC AUTO: 81.5 FL — SIGNIFICANT CHANGE UP (ref 80–100)
MONOCYTES # BLD AUTO: 0.19 K/UL — SIGNIFICANT CHANGE UP (ref 0–0.9)
MONOCYTES NFR BLD AUTO: 13 % — SIGNIFICANT CHANGE UP (ref 2–14)
NEUTROPHILS # BLD AUTO: 0.53 K/UL — LOW (ref 1.8–7.4)
NEUTROPHILS NFR BLD AUTO: 36 % — LOW (ref 43–77)
NRBC # BLD: 0 /100 — SIGNIFICANT CHANGE UP (ref 0–0)
NRBC # BLD: SIGNIFICANT CHANGE UP /100 WBCS (ref 0–0)
PLAT MORPH BLD: NORMAL — SIGNIFICANT CHANGE UP
PLATELET # BLD AUTO: 222 K/UL — SIGNIFICANT CHANGE UP (ref 150–400)
RBC # BLD: 4.43 M/UL — SIGNIFICANT CHANGE UP (ref 3.8–5.2)
RBC # FLD: 14.6 % — HIGH (ref 10.3–14.5)
RBC BLD AUTO: SIGNIFICANT CHANGE UP
WBC # BLD: 1.46 K/UL — LOW (ref 3.8–10.5)
WBC # FLD AUTO: 1.46 K/UL — LOW (ref 3.8–10.5)

## 2022-12-20 PROCEDURE — 99214 OFFICE O/P EST MOD 30 MIN: CPT

## 2022-12-20 NOTE — HISTORY OF PRESENT ILLNESS
[Disease: _____________________] : Disease: [unfilled] [de-identified] : This is a  47-year-old woman with history of left breast carcinoma who presents for evaluation.  The patient's history dates back to May 2022 when she noted a mass in the left breast.   In 2022  a mammogram and sonogram were performed.  These revealed a mass in the left breast along with calcifications in the right breast.  The biopsy revealed poorly differentiated infiltrating duct carcinoma with Isaias score 8/9, ER [place] -0% and HER2/roxann 1+ negative consistent with triple negative breast cancer.  The Ki-67 was 50%.\par \par There was no obvious left axillary lymph nodes.  The right breast calcifications were biopsied with benign results including fibrocystic changes.  An MRI revealed that the left breast mass with touching the pectoral muscle to the lateral breast area along with satellite.  There was prominence in the left axilla but no definite enlargement of nodes.  The patient has undergone surgical oncology evaluation and the plan is to evaluate patient for neoadjuvant chemotherapy and she is considering bilateral mastectomies.  The patient has undergone genetic testing which is pending.  The patient did have a previous biopsy of the breast in 2018 involving the right breast which showed a benign biopsy.\par \par The patient will undergo targeted reevaluation of the nonspecific foci on the right breast and also left axillary lymph nodes with biopsy to assess for bilateral disease and possible dayna metastases.\par \par Patient's menarche was age 12 and her periods have been regular.  She is  1 para 1 age at first pregnancy was 19.  She denies hormonal therapy or fertility treatments.  The patient will undergo CAT scans and bone scans to evaluate any evidence for systemic disease. [de-identified] : poorly diff IDC ERneg, [place] neg, Her2 neg 1+, Ki67-50%  [de-identified] : Plan neoadjuvant chemo/immunotherapy, Taxol/ carbo/ Pembro AC  [de-identified] : s/p C2 Taxol/Carboplatin. Patient feels okay She had a reaction to with last treatment and a rapid response was called. She was treated with Benadryl 25 mg IVP, Famotidine 20 mg and Hydrocortisone 100 mg IVP. She was able to complete treatment without any more difficulties. Presently, she has no complaints. Denies any nausea, vomiting, diarrhea, fever or chills. Appetite is okay and no mouth sores. Today, WBC=1.46, ANC=0.53

## 2022-12-20 NOTE — ASSESSMENT
[Curative] : Goals of care discussed with patient: Curative [Palliative Care Plan] : not applicable at this time [FreeTextEntry1] : S/p C1 Keytruda/C2 Taxol/Carboplatin. Will hold chemotherapy today due to low KYS=617\par Will give Zarxio 300 mcg daily x 3 days.\par Will continue to monitor for side effects/toxicities.\par Next treatment Taxol will be lowered from 80 mg/m2 to 60 mg/m2\par Patient will continue Taxol and carbo given IV weekly for a total of  1 weeks along with Keytruda every 3 weeks.  Then, patient will be given Adriamycin/Cytoxan every 3 weeks x 4 with Keytruda. \par After completion of Neoadjuvant chemotherapy, patient will go on to surgery. \par RTO in 2 weeks.

## 2022-12-21 ENCOUNTER — APPOINTMENT (OUTPATIENT)
Dept: INFUSION THERAPY | Facility: HOSPITAL | Age: 47
End: 2022-12-21

## 2022-12-21 DIAGNOSIS — Z51.89 ENCOUNTER FOR OTHER SPECIFIED AFTERCARE: ICD-10-CM

## 2022-12-22 ENCOUNTER — APPOINTMENT (OUTPATIENT)
Dept: INFUSION THERAPY | Facility: HOSPITAL | Age: 47
End: 2022-12-22

## 2022-12-27 ENCOUNTER — RESULT REVIEW (OUTPATIENT)
Age: 47
End: 2022-12-27

## 2022-12-27 ENCOUNTER — APPOINTMENT (OUTPATIENT)
Dept: INFUSION THERAPY | Facility: HOSPITAL | Age: 47
End: 2022-12-27

## 2022-12-27 LAB
ALBUMIN SERPL ELPH-MCNC: 3.9 G/DL — SIGNIFICANT CHANGE UP (ref 3.3–5)
ALP SERPL-CCNC: 78 U/L — SIGNIFICANT CHANGE UP (ref 40–120)
ALT FLD-CCNC: 14 U/L — SIGNIFICANT CHANGE UP (ref 10–45)
ANION GAP SERPL CALC-SCNC: 10 MMOL/L — SIGNIFICANT CHANGE UP (ref 5–17)
AST SERPL-CCNC: 19 U/L — SIGNIFICANT CHANGE UP (ref 10–40)
BASOPHILS # BLD AUTO: 0.03 K/UL — SIGNIFICANT CHANGE UP (ref 0–0.2)
BASOPHILS NFR BLD AUTO: 0.5 % — SIGNIFICANT CHANGE UP (ref 0–2)
BILIRUB SERPL-MCNC: <0.2 MG/DL — SIGNIFICANT CHANGE UP (ref 0.2–1.2)
BUN SERPL-MCNC: 14 MG/DL — SIGNIFICANT CHANGE UP (ref 7–23)
CALCIUM SERPL-MCNC: 9.2 MG/DL — SIGNIFICANT CHANGE UP (ref 8.4–10.5)
CANCER AG27-29 SERPL-ACNC: 29.8 U/ML — SIGNIFICANT CHANGE UP (ref 0–38.6)
CHLORIDE SERPL-SCNC: 103 MMOL/L — SIGNIFICANT CHANGE UP (ref 96–108)
CO2 SERPL-SCNC: 27 MMOL/L — SIGNIFICANT CHANGE UP (ref 22–31)
CREAT SERPL-MCNC: 0.65 MG/DL — SIGNIFICANT CHANGE UP (ref 0.5–1.3)
EGFR: 109 ML/MIN/1.73M2 — SIGNIFICANT CHANGE UP
EOSINOPHIL # BLD AUTO: 0.1 K/UL — SIGNIFICANT CHANGE UP (ref 0–0.5)
EOSINOPHIL NFR BLD AUTO: 2 % — SIGNIFICANT CHANGE UP (ref 0–6)
GLUCOSE SERPL-MCNC: 94 MG/DL — SIGNIFICANT CHANGE UP (ref 70–99)
HCT VFR BLD CALC: 36.6 % — SIGNIFICANT CHANGE UP (ref 34.5–45)
HGB BLD-MCNC: 12.2 G/DL — SIGNIFICANT CHANGE UP (ref 11.5–15.5)
LYMPHOCYTES # BLD AUTO: 0.79 K/UL — LOW (ref 1–3.3)
LYMPHOCYTES # BLD AUTO: 15.5 % — SIGNIFICANT CHANGE UP (ref 13–44)
MCHC RBC-ENTMCNC: 27.1 PG — SIGNIFICANT CHANGE UP (ref 27–34)
MCHC RBC-ENTMCNC: 33.3 G/DL — SIGNIFICANT CHANGE UP (ref 32–36)
MCV RBC AUTO: 81.3 FL — SIGNIFICANT CHANGE UP (ref 80–100)
METAMYELOCYTES # FLD: 1 % — HIGH (ref 0–0)
MONOCYTES # BLD AUTO: 0.72 K/UL — SIGNIFICANT CHANGE UP (ref 0–0.9)
MONOCYTES NFR BLD AUTO: 14 % — SIGNIFICANT CHANGE UP (ref 2–14)
NEUTROPHILS # BLD AUTO: 3.42 K/UL — SIGNIFICANT CHANGE UP (ref 1.8–7.4)
NEUTROPHILS NFR BLD AUTO: 67 % — SIGNIFICANT CHANGE UP (ref 43–77)
NRBC # BLD: 0 /100 — SIGNIFICANT CHANGE UP (ref 0–0)
NRBC # BLD: SIGNIFICANT CHANGE UP /100 WBCS (ref 0–0)
PLAT MORPH BLD: NORMAL — SIGNIFICANT CHANGE UP
PLATELET # BLD AUTO: 186 K/UL — SIGNIFICANT CHANGE UP (ref 150–400)
POTASSIUM SERPL-MCNC: 4.4 MMOL/L — SIGNIFICANT CHANGE UP (ref 3.5–5.3)
POTASSIUM SERPL-SCNC: 4.4 MMOL/L — SIGNIFICANT CHANGE UP (ref 3.5–5.3)
PROT SERPL-MCNC: 6.2 G/DL — SIGNIFICANT CHANGE UP (ref 6–8.3)
RBC # BLD: 4.5 M/UL — SIGNIFICANT CHANGE UP (ref 3.8–5.2)
RBC # FLD: 15.1 % — HIGH (ref 10.3–14.5)
RBC BLD AUTO: SIGNIFICANT CHANGE UP
SODIUM SERPL-SCNC: 140 MMOL/L — SIGNIFICANT CHANGE UP (ref 135–145)
WBC # BLD: 5.11 K/UL — SIGNIFICANT CHANGE UP (ref 3.8–10.5)
WBC # FLD AUTO: 5.11 K/UL — SIGNIFICANT CHANGE UP (ref 3.8–10.5)

## 2023-01-03 ENCOUNTER — RESULT REVIEW (OUTPATIENT)
Age: 48
End: 2023-01-03

## 2023-01-03 ENCOUNTER — APPOINTMENT (OUTPATIENT)
Dept: HEMATOLOGY ONCOLOGY | Facility: CLINIC | Age: 48
End: 2023-01-03
Payer: COMMERCIAL

## 2023-01-03 ENCOUNTER — NON-APPOINTMENT (OUTPATIENT)
Age: 48
End: 2023-01-03

## 2023-01-03 ENCOUNTER — APPOINTMENT (OUTPATIENT)
Dept: INFUSION THERAPY | Facility: HOSPITAL | Age: 48
End: 2023-01-03

## 2023-01-03 VITALS
DIASTOLIC BLOOD PRESSURE: 75 MMHG | HEIGHT: 69 IN | WEIGHT: 146.59 LBS | OXYGEN SATURATION: 99 % | SYSTOLIC BLOOD PRESSURE: 109 MMHG | RESPIRATION RATE: 16 BRPM | HEART RATE: 85 BPM | TEMPERATURE: 97.3 F | BODY MASS INDEX: 21.71 KG/M2

## 2023-01-03 LAB
ALBUMIN SERPL ELPH-MCNC: 3.8 G/DL — SIGNIFICANT CHANGE UP (ref 3.3–5)
ALP SERPL-CCNC: 61 U/L — SIGNIFICANT CHANGE UP (ref 40–120)
ALT FLD-CCNC: 19 U/L — SIGNIFICANT CHANGE UP (ref 10–45)
ANION GAP SERPL CALC-SCNC: 9 MMOL/L — SIGNIFICANT CHANGE UP (ref 5–17)
AST SERPL-CCNC: 23 U/L — SIGNIFICANT CHANGE UP (ref 10–40)
BASOPHILS # BLD AUTO: 0 K/UL — SIGNIFICANT CHANGE UP (ref 0–0.2)
BASOPHILS NFR BLD AUTO: 0 % — SIGNIFICANT CHANGE UP (ref 0–2)
BILIRUB SERPL-MCNC: 0.4 MG/DL — SIGNIFICANT CHANGE UP (ref 0.2–1.2)
BUN SERPL-MCNC: 11 MG/DL — SIGNIFICANT CHANGE UP (ref 7–23)
CALCIUM SERPL-MCNC: 8.7 MG/DL — SIGNIFICANT CHANGE UP (ref 8.4–10.5)
CHLORIDE SERPL-SCNC: 104 MMOL/L — SIGNIFICANT CHANGE UP (ref 96–108)
CO2 SERPL-SCNC: 26 MMOL/L — SIGNIFICANT CHANGE UP (ref 22–31)
CREAT SERPL-MCNC: 0.58 MG/DL — SIGNIFICANT CHANGE UP (ref 0.5–1.3)
EGFR: 112 ML/MIN/1.73M2 — SIGNIFICANT CHANGE UP
EOSINOPHIL # BLD AUTO: 0.05 K/UL — SIGNIFICANT CHANGE UP (ref 0–0.5)
EOSINOPHIL NFR BLD AUTO: 2 % — SIGNIFICANT CHANGE UP (ref 0–6)
GLUCOSE SERPL-MCNC: 89 MG/DL — SIGNIFICANT CHANGE UP (ref 70–99)
HCT VFR BLD CALC: 32.8 % — LOW (ref 34.5–45)
HCT VFR BLD CALC: 34.6 % — SIGNIFICANT CHANGE UP (ref 34.5–45)
HGB BLD-MCNC: 10.8 G/DL — LOW (ref 11.5–15.5)
HGB BLD-MCNC: 11.4 G/DL — LOW (ref 11.5–15.5)
LYMPHOCYTES # BLD AUTO: 0.64 K/UL — LOW (ref 1–3.3)
LYMPHOCYTES # BLD AUTO: 24 % — SIGNIFICANT CHANGE UP (ref 13–44)
MCHC RBC-ENTMCNC: 26.5 PG — LOW (ref 27–34)
MCHC RBC-ENTMCNC: 26.5 PG — LOW (ref 27–34)
MCHC RBC-ENTMCNC: 32.9 G/DL — SIGNIFICANT CHANGE UP (ref 32–36)
MCHC RBC-ENTMCNC: 32.9 G/DL — SIGNIFICANT CHANGE UP (ref 32–36)
MCV RBC AUTO: 80.5 FL — SIGNIFICANT CHANGE UP (ref 80–100)
MCV RBC AUTO: 80.6 FL — SIGNIFICANT CHANGE UP (ref 80–100)
MONOCYTES # BLD AUTO: 0.11 K/UL — SIGNIFICANT CHANGE UP (ref 0–0.9)
MONOCYTES NFR BLD AUTO: 4 % — SIGNIFICANT CHANGE UP (ref 2–14)
NEUTROPHILS # BLD AUTO: 1.88 K/UL — SIGNIFICANT CHANGE UP (ref 1.8–7.4)
NEUTROPHILS NFR BLD AUTO: 70 % — SIGNIFICANT CHANGE UP (ref 43–77)
NRBC # BLD: SIGNIFICANT CHANGE UP /100 WBCS (ref 0–0)
PLAT MORPH BLD: NORMAL — SIGNIFICANT CHANGE UP
PLATELET # BLD AUTO: 159 K/UL — SIGNIFICANT CHANGE UP (ref 150–400)
PLATELET # BLD AUTO: 165 K/UL — SIGNIFICANT CHANGE UP (ref 150–400)
POTASSIUM SERPL-MCNC: 3.9 MMOL/L — SIGNIFICANT CHANGE UP (ref 3.5–5.3)
POTASSIUM SERPL-SCNC: 3.9 MMOL/L — SIGNIFICANT CHANGE UP (ref 3.5–5.3)
PROT SERPL-MCNC: 6.1 G/DL — SIGNIFICANT CHANGE UP (ref 6–8.3)
RBC # BLD: 4.07 M/UL — SIGNIFICANT CHANGE UP (ref 3.8–5.2)
RBC # BLD: 4.3 M/UL — SIGNIFICANT CHANGE UP (ref 3.8–5.2)
RBC # FLD: 14.5 % — SIGNIFICANT CHANGE UP (ref 10.3–14.5)
RBC # FLD: 14.6 % — HIGH (ref 10.3–14.5)
RBC BLD AUTO: SIGNIFICANT CHANGE UP
SODIUM SERPL-SCNC: 138 MMOL/L — SIGNIFICANT CHANGE UP (ref 135–145)
WBC # BLD: 2.68 K/UL — LOW (ref 3.8–10.5)
WBC # BLD: 2.97 K/UL — LOW (ref 3.8–10.5)
WBC # FLD AUTO: 2.68 K/UL — LOW (ref 3.8–10.5)
WBC # FLD AUTO: 2.97 K/UL — LOW (ref 3.8–10.5)

## 2023-01-03 PROCEDURE — 99215 OFFICE O/P EST HI 40 MIN: CPT

## 2023-01-03 PROCEDURE — 99214 OFFICE O/P EST MOD 30 MIN: CPT

## 2023-01-05 ENCOUNTER — RESULT REVIEW (OUTPATIENT)
Age: 48
End: 2023-01-05

## 2023-01-05 ENCOUNTER — APPOINTMENT (OUTPATIENT)
Dept: INFUSION THERAPY | Facility: HOSPITAL | Age: 48
End: 2023-01-05

## 2023-01-05 ENCOUNTER — APPOINTMENT (OUTPATIENT)
Dept: HEMATOLOGY ONCOLOGY | Facility: CLINIC | Age: 48
End: 2023-01-05

## 2023-01-05 LAB
BASOPHILS # BLD AUTO: 0.05 K/UL — SIGNIFICANT CHANGE UP (ref 0–0.2)
BASOPHILS NFR BLD AUTO: 1.6 % — SIGNIFICANT CHANGE UP (ref 0–2)
EOSINOPHIL # BLD AUTO: 0.09 K/UL — SIGNIFICANT CHANGE UP (ref 0–0.5)
EOSINOPHIL NFR BLD AUTO: 2.9 % — SIGNIFICANT CHANGE UP (ref 0–6)
HCT VFR BLD CALC: 33.4 % — LOW (ref 34.5–45)
HGB BLD-MCNC: 11 G/DL — LOW (ref 11.5–15.5)
IMM GRANULOCYTES NFR BLD AUTO: 0.6 % — SIGNIFICANT CHANGE UP (ref 0–0.9)
LYMPHOCYTES # BLD AUTO: 0.7 K/UL — LOW (ref 1–3.3)
LYMPHOCYTES # BLD AUTO: 22.5 % — SIGNIFICANT CHANGE UP (ref 13–44)
MCHC RBC-ENTMCNC: 26.6 PG — LOW (ref 27–34)
MCHC RBC-ENTMCNC: 32.9 G/DL — SIGNIFICANT CHANGE UP (ref 32–36)
MCV RBC AUTO: 80.9 FL — SIGNIFICANT CHANGE UP (ref 80–100)
MONOCYTES # BLD AUTO: 0.14 K/UL — SIGNIFICANT CHANGE UP (ref 0–0.9)
MONOCYTES NFR BLD AUTO: 4.5 % — SIGNIFICANT CHANGE UP (ref 2–14)
NEUTROPHILS # BLD AUTO: 2.11 K/UL — SIGNIFICANT CHANGE UP (ref 1.8–7.4)
NEUTROPHILS NFR BLD AUTO: 67.9 % — SIGNIFICANT CHANGE UP (ref 43–77)
NRBC # BLD: 0 /100 WBCS — SIGNIFICANT CHANGE UP (ref 0–0)
PLATELET # BLD AUTO: 159 K/UL — SIGNIFICANT CHANGE UP (ref 150–400)
RBC # BLD: 4.13 M/UL — SIGNIFICANT CHANGE UP (ref 3.8–5.2)
RBC # FLD: 14.8 % — HIGH (ref 10.3–14.5)
WBC # BLD: 3.11 K/UL — LOW (ref 3.8–10.5)
WBC # FLD AUTO: 3.11 K/UL — LOW (ref 3.8–10.5)

## 2023-01-05 NOTE — HISTORY OF PRESENT ILLNESS
[de-identified] : Ms Sauer, 2 minutes into Taxol c/o chest tightness, SOB and had facial flushing.  She denies itching, or hives., Lungs CTA. She became hypertensive, PO2 93%.  Gave 25mg Benadryl, 100mg solucortef, and 20mg pepcid VS improved, B/p normal and Po2 100%.  Per Dr Cuadra we rechallenged Taxol at 1/2 rate without further reaction.

## 2023-01-06 ENCOUNTER — RESULT REVIEW (OUTPATIENT)
Age: 48
End: 2023-01-06

## 2023-01-06 ENCOUNTER — APPOINTMENT (OUTPATIENT)
Dept: INFUSION THERAPY | Facility: HOSPITAL | Age: 48
End: 2023-01-06

## 2023-01-06 ENCOUNTER — APPOINTMENT (OUTPATIENT)
Dept: HEMATOLOGY ONCOLOGY | Facility: CLINIC | Age: 48
End: 2023-01-06

## 2023-01-06 LAB
BASOPHILS # BLD AUTO: 0.04 K/UL — SIGNIFICANT CHANGE UP (ref 0–0.2)
BASOPHILS NFR BLD AUTO: 1.5 % — SIGNIFICANT CHANGE UP (ref 0–2)
EOSINOPHIL # BLD AUTO: 0.14 K/UL — SIGNIFICANT CHANGE UP (ref 0–0.5)
EOSINOPHIL NFR BLD AUTO: 5.2 % — SIGNIFICANT CHANGE UP (ref 0–6)
HCT VFR BLD CALC: 33.5 % — LOW (ref 34.5–45)
HGB BLD-MCNC: 11.1 G/DL — LOW (ref 11.5–15.5)
IMM GRANULOCYTES NFR BLD AUTO: 1.1 % — HIGH (ref 0–0.9)
LYMPHOCYTES # BLD AUTO: 0.65 K/UL — LOW (ref 1–3.3)
LYMPHOCYTES # BLD AUTO: 24 % — SIGNIFICANT CHANGE UP (ref 13–44)
MCHC RBC-ENTMCNC: 26.6 PG — LOW (ref 27–34)
MCHC RBC-ENTMCNC: 33.1 G/DL — SIGNIFICANT CHANGE UP (ref 32–36)
MCV RBC AUTO: 80.3 FL — SIGNIFICANT CHANGE UP (ref 80–100)
MONOCYTES # BLD AUTO: 0.13 K/UL — SIGNIFICANT CHANGE UP (ref 0–0.9)
MONOCYTES NFR BLD AUTO: 4.8 % — SIGNIFICANT CHANGE UP (ref 2–14)
NEUTROPHILS # BLD AUTO: 1.72 K/UL — LOW (ref 1.8–7.4)
NEUTROPHILS NFR BLD AUTO: 63.4 % — SIGNIFICANT CHANGE UP (ref 43–77)
NRBC # BLD: 0 /100 WBCS — SIGNIFICANT CHANGE UP (ref 0–0)
PLATELET # BLD AUTO: 162 K/UL — SIGNIFICANT CHANGE UP (ref 150–400)
RBC # BLD: 4.17 M/UL — SIGNIFICANT CHANGE UP (ref 3.8–5.2)
RBC # FLD: 14.8 % — HIGH (ref 10.3–14.5)
WBC # BLD: 2.71 K/UL — LOW (ref 3.8–10.5)
WBC # FLD AUTO: 2.71 K/UL — LOW (ref 3.8–10.5)

## 2023-01-07 ENCOUNTER — RESULT REVIEW (OUTPATIENT)
Age: 48
End: 2023-01-07

## 2023-01-07 ENCOUNTER — APPOINTMENT (OUTPATIENT)
Dept: INFUSION THERAPY | Facility: HOSPITAL | Age: 48
End: 2023-01-07

## 2023-01-07 LAB
BASOPHILS # BLD AUTO: 0.05 K/UL — SIGNIFICANT CHANGE UP (ref 0–0.2)
BASOPHILS NFR BLD AUTO: 2.5 % — HIGH (ref 0–2)
EOSINOPHIL # BLD AUTO: 0.15 K/UL — SIGNIFICANT CHANGE UP (ref 0–0.5)
EOSINOPHIL NFR BLD AUTO: 7.4 % — HIGH (ref 0–6)
HCT VFR BLD CALC: 34.5 % — SIGNIFICANT CHANGE UP (ref 34.5–45)
HGB BLD-MCNC: 11.5 G/DL — SIGNIFICANT CHANGE UP (ref 11.5–15.5)
IMM GRANULOCYTES NFR BLD AUTO: 1.5 % — HIGH (ref 0–0.9)
LYMPHOCYTES # BLD AUTO: 0.53 K/UL — LOW (ref 1–3.3)
LYMPHOCYTES # BLD AUTO: 26.1 % — SIGNIFICANT CHANGE UP (ref 13–44)
MCHC RBC-ENTMCNC: 26.8 PG — LOW (ref 27–34)
MCHC RBC-ENTMCNC: 33.3 G/DL — SIGNIFICANT CHANGE UP (ref 32–36)
MCV RBC AUTO: 80.4 FL — SIGNIFICANT CHANGE UP (ref 80–100)
MONOCYTES # BLD AUTO: 0.14 K/UL — SIGNIFICANT CHANGE UP (ref 0–0.9)
MONOCYTES NFR BLD AUTO: 6.9 % — SIGNIFICANT CHANGE UP (ref 2–14)
NEUTROPHILS # BLD AUTO: 1.13 K/UL — LOW (ref 1.8–7.4)
NEUTROPHILS NFR BLD AUTO: 55.6 % — SIGNIFICANT CHANGE UP (ref 43–77)
NRBC # BLD: 0 /100 WBCS — SIGNIFICANT CHANGE UP (ref 0–0)
PLATELET # BLD AUTO: 153 K/UL — SIGNIFICANT CHANGE UP (ref 150–400)
RBC # BLD: 4.29 M/UL — SIGNIFICANT CHANGE UP (ref 3.8–5.2)
RBC # FLD: 14.7 % — HIGH (ref 10.3–14.5)
WBC # BLD: 2.03 K/UL — LOW (ref 3.8–10.5)
WBC # FLD AUTO: 2.03 K/UL — LOW (ref 3.8–10.5)

## 2023-01-08 NOTE — ASSESSMENT
[Curative] : Goals of care discussed with patient: Curative [Palliative Care Plan] : not applicable at this time [FreeTextEntry1] : S/p C2 Keytruda/C3 Taxol/Carboplatin\par Patient is cleared for treatment today. \par She may require  Zarxio 300 mcg daily x 3 days, if ANC <1.51 in 2 days. Will check CBC daily x 3 day.\par Will continue to monitor for side effects/toxicities.\par After completion of Neoadjuvant chemotherapy, patient will go on to surgery. \par RTO in 2 weeks.

## 2023-01-08 NOTE — HISTORY OF PRESENT ILLNESS
[Disease: _____________________] : Disease: [unfilled] [de-identified] : This is a  47-year-old woman with history of left breast carcinoma who presents for evaluation.  The patient's history dates back to May 2022 when she noted a mass in the left breast.   In 2022  a mammogram and sonogram were performed.  These revealed a mass in the left breast along with calcifications in the right breast.  The biopsy revealed poorly differentiated infiltrating duct carcinoma with Isaias score 8/9, ER [place] -0% and HER2/roxann 1+ negative consistent with triple negative breast cancer.  The Ki-67 was 50%.\par \par There was no obvious left axillary lymph nodes.  The right breast calcifications were biopsied with benign results including fibrocystic changes.  An MRI revealed that the left breast mass with touching the pectoral muscle to the lateral breast area along with satellite.  There was prominence in the left axilla but no definite enlargement of nodes.  The patient has undergone surgical oncology evaluation and the plan is to evaluate patient for neoadjuvant chemotherapy and she is considering bilateral mastectomies.  The patient has undergone genetic testing which is pending.  The patient did have a previous biopsy of the breast in 2018 involving the right breast which showed a benign biopsy.\par \par The patient will undergo targeted reevaluation of the nonspecific foci on the right breast and also left axillary lymph nodes with biopsy to assess for bilateral disease and possible dayna metastases.\par \par Patient's menarche was age 12 and her periods have been regular.  She is  1 para 1 age at first pregnancy was 19.  She denies hormonal therapy or fertility treatments.  The patient will undergo CAT scans and bone scans to evaluate any evidence for systemic disease. [de-identified] : poorly diff IDC ERneg, [place] neg, Her2 neg 1+, Ki67-50%  [de-identified] : Plan neoadjuvant chemo/immunotherapy, Taxol/ carbo/ Pembro AC  [de-identified] : s/p C3 Taxol/Carboplatin/C2 Keytruda. Patient feels okay except for some fatigue. Denies any nausea, vomiting, diarrhea, fever or chills. Appetite is okay and no mouth sores.

## 2023-01-09 ENCOUNTER — APPOINTMENT (OUTPATIENT)
Dept: INFUSION THERAPY | Facility: HOSPITAL | Age: 48
End: 2023-01-09

## 2023-01-09 ENCOUNTER — RESULT REVIEW (OUTPATIENT)
Age: 48
End: 2023-01-09

## 2023-01-09 LAB
BASOPHILS # BLD AUTO: 0.05 K/UL — SIGNIFICANT CHANGE UP (ref 0–0.2)
BASOPHILS NFR BLD AUTO: 1.3 % — SIGNIFICANT CHANGE UP (ref 0–2)
EOSINOPHIL # BLD AUTO: 0.2 K/UL — SIGNIFICANT CHANGE UP (ref 0–0.5)
EOSINOPHIL NFR BLD AUTO: 5.2 % — SIGNIFICANT CHANGE UP (ref 0–6)
HCT VFR BLD CALC: 32.3 % — LOW (ref 34.5–45)
HGB BLD-MCNC: 10.6 G/DL — LOW (ref 11.5–15.5)
IMM GRANULOCYTES NFR BLD AUTO: 0.5 % — SIGNIFICANT CHANGE UP (ref 0–0.9)
LYMPHOCYTES # BLD AUTO: 0.56 K/UL — LOW (ref 1–3.3)
LYMPHOCYTES # BLD AUTO: 14.5 % — SIGNIFICANT CHANGE UP (ref 13–44)
MCHC RBC-ENTMCNC: 27 PG — SIGNIFICANT CHANGE UP (ref 27–34)
MCHC RBC-ENTMCNC: 32.8 G/DL — SIGNIFICANT CHANGE UP (ref 32–36)
MCV RBC AUTO: 82.2 FL — SIGNIFICANT CHANGE UP (ref 80–100)
MONOCYTES # BLD AUTO: 0.26 K/UL — SIGNIFICANT CHANGE UP (ref 0–0.9)
MONOCYTES NFR BLD AUTO: 6.7 % — SIGNIFICANT CHANGE UP (ref 2–14)
NEUTROPHILS # BLD AUTO: 2.78 K/UL — SIGNIFICANT CHANGE UP (ref 1.8–7.4)
NEUTROPHILS NFR BLD AUTO: 71.8 % — SIGNIFICANT CHANGE UP (ref 43–77)
NRBC # BLD: 0 /100 WBCS — SIGNIFICANT CHANGE UP (ref 0–0)
PLATELET # BLD AUTO: 153 K/UL — SIGNIFICANT CHANGE UP (ref 150–400)
RBC # BLD: 3.93 M/UL — SIGNIFICANT CHANGE UP (ref 3.8–5.2)
RBC # FLD: 15.2 % — HIGH (ref 10.3–14.5)
WBC # BLD: 3.87 K/UL — SIGNIFICANT CHANGE UP (ref 3.8–10.5)
WBC # FLD AUTO: 3.87 K/UL — SIGNIFICANT CHANGE UP (ref 3.8–10.5)

## 2023-01-10 ENCOUNTER — APPOINTMENT (OUTPATIENT)
Dept: INFUSION THERAPY | Facility: HOSPITAL | Age: 48
End: 2023-01-10

## 2023-01-10 ENCOUNTER — RESULT REVIEW (OUTPATIENT)
Age: 48
End: 2023-01-10

## 2023-01-10 ENCOUNTER — APPOINTMENT (OUTPATIENT)
Dept: HEMATOLOGY ONCOLOGY | Facility: CLINIC | Age: 48
End: 2023-01-10
Payer: COMMERCIAL

## 2023-01-10 ENCOUNTER — APPOINTMENT (OUTPATIENT)
Dept: HEMATOLOGY ONCOLOGY | Facility: CLINIC | Age: 48
End: 2023-01-10

## 2023-01-10 VITALS
SYSTOLIC BLOOD PRESSURE: 115 MMHG | RESPIRATION RATE: 16 BRPM | OXYGEN SATURATION: 98 % | WEIGHT: 147.49 LBS | BODY MASS INDEX: 21.78 KG/M2 | DIASTOLIC BLOOD PRESSURE: 79 MMHG | TEMPERATURE: 96.8 F | HEART RATE: 75 BPM

## 2023-01-10 LAB
BASOPHILS # BLD AUTO: 0.04 K/UL — SIGNIFICANT CHANGE UP (ref 0–0.2)
BASOPHILS NFR BLD AUTO: 1.3 % — SIGNIFICANT CHANGE UP (ref 0–2)
EOSINOPHIL # BLD AUTO: 0.23 K/UL — SIGNIFICANT CHANGE UP (ref 0–0.5)
EOSINOPHIL NFR BLD AUTO: 7.6 % — HIGH (ref 0–6)
HCT VFR BLD CALC: 34.6 % — SIGNIFICANT CHANGE UP (ref 34.5–45)
HGB BLD-MCNC: 11.3 G/DL — LOW (ref 11.5–15.5)
IMM GRANULOCYTES NFR BLD AUTO: 0.7 % — SIGNIFICANT CHANGE UP (ref 0–0.9)
LYMPHOCYTES # BLD AUTO: 0.6 K/UL — LOW (ref 1–3.3)
LYMPHOCYTES # BLD AUTO: 19.8 % — SIGNIFICANT CHANGE UP (ref 13–44)
MCHC RBC-ENTMCNC: 26.9 PG — LOW (ref 27–34)
MCHC RBC-ENTMCNC: 32.7 G/DL — SIGNIFICANT CHANGE UP (ref 32–36)
MCV RBC AUTO: 82.4 FL — SIGNIFICANT CHANGE UP (ref 80–100)
MONOCYTES # BLD AUTO: 0.25 K/UL — SIGNIFICANT CHANGE UP (ref 0–0.9)
MONOCYTES NFR BLD AUTO: 8.3 % — SIGNIFICANT CHANGE UP (ref 2–14)
NEUTROPHILS # BLD AUTO: 1.89 K/UL — SIGNIFICANT CHANGE UP (ref 1.8–7.4)
NEUTROPHILS NFR BLD AUTO: 62.3 % — SIGNIFICANT CHANGE UP (ref 43–77)
NRBC # BLD: 0 /100 WBCS — SIGNIFICANT CHANGE UP (ref 0–0)
PLATELET # BLD AUTO: 153 K/UL — SIGNIFICANT CHANGE UP (ref 150–400)
RBC # BLD: 4.2 M/UL — SIGNIFICANT CHANGE UP (ref 3.8–5.2)
RBC # FLD: 15.6 % — HIGH (ref 10.3–14.5)
WBC # BLD: 3.03 K/UL — LOW (ref 3.8–10.5)
WBC # FLD AUTO: 3.03 K/UL — LOW (ref 3.8–10.5)

## 2023-01-10 PROCEDURE — 99214 OFFICE O/P EST MOD 30 MIN: CPT

## 2023-01-10 NOTE — REVIEW OF SYSTEMS
[SOB on Exertion] : shortness of breath during exertion [Joint Pain] : joint pain [Joint Stiffness] : joint stiffness [Negative] : Allergic/Immunologic

## 2023-01-10 NOTE — HISTORY OF PRESENT ILLNESS
[Disease: _____________________] : Disease: [unfilled] [de-identified] : This is a  47-year-old woman with history of left breast carcinoma who presents for evaluation.  The patient's history dates back to May 2022 when she noted a mass in the left breast.   In 2022  a mammogram and sonogram were performed.  These revealed a mass in the left breast along with calcifications in the right breast.  The biopsy revealed poorly differentiated infiltrating duct carcinoma with Isaias score 8/9, ER [place] -0% and HER2/roxann 1+ negative consistent with triple negative breast cancer.  The Ki-67 was 50%.\par \par There was no obvious left axillary lymph nodes.  The right breast calcifications were biopsied with benign results including fibrocystic changes.  An MRI revealed that the left breast mass with touching the pectoral muscle to the lateral breast area along with satellite.  There was prominence in the left axilla but no definite enlargement of nodes.  The patient has undergone surgical oncology evaluation and the plan is to evaluate patient for neoadjuvant chemotherapy and she is considering bilateral mastectomies.  The patient has undergone genetic testing which is pending.  The patient did have a previous biopsy of the breast in 2018 involving the right breast which showed a benign biopsy.\par \par The patient will undergo targeted reevaluation of the nonspecific foci on the right breast and also left axillary lymph nodes with biopsy to assess for bilateral disease and possible dayna metastases.\par \par Patient's menarche was age 12 and her periods have been regular.  She is  1 para 1 age at first pregnancy was 19.  She denies hormonal therapy or fertility treatments.  The patient will undergo CAT scans and bone scans to evaluate any evidence for systemic disease. [de-identified] : poorly diff IDC ERneg, [place] neg, Her2 neg 1+, Ki67-50%  [de-identified] : Plan neoadjuvant chemo/immunotherapy, Taxol/ carbo/ Pembro AC  [de-identified] : Since the last visit the pt has had reactions to taxol and needs steroids and benadryl.  The patient otherwise is tolerating the treatment well except for some fatigue and some minimal neuropathy in the hands and feet.  She also has noted decrease in the left breast mass since starting treatment.

## 2023-01-10 NOTE — ASSESSMENT
[FreeTextEntry1] : The patient will continue her neoadjuvant treatment with Taxol carboplatin and pembrolizumab for 12 weeks and then switch to Adriamycin Cytoxan dose dense along with pembrolizumab.  She will be monitored for side effects and toxicity.  The patient required dose reduction due to oc blood counts and now we are using Zarxio to prevent further oc counts and infection or fever.  The patient appears to be responding to treatment with decreased and palpable mass in the left breast.  She will return to the office in 1 month or sooner as needed. [Curative] : Goals of care discussed with patient: Curative [Palliative Care Plan] : not applicable at this time

## 2023-01-10 NOTE — PHYSICAL EXAM
[Restricted in physically strenuous activity but ambulatory and able to carry out work of a light or sedentary nature] : Status 1- Restricted in physically strenuous activity but ambulatory and able to carry out work of a light or sedentary nature, e.g., light house work, office work [Normal] : affect appropriate [de-identified] : The breast reveals decreased mass in the left breast with minimal induration in the upper medial aspect of the breast.  There is no palpable adenopathy.

## 2023-01-11 LAB
ALBUMIN SERPL ELPH-MCNC: 4 G/DL — SIGNIFICANT CHANGE UP (ref 3.3–5)
ALP SERPL-CCNC: 68 U/L — SIGNIFICANT CHANGE UP (ref 40–120)
ALT FLD-CCNC: 29 U/L — SIGNIFICANT CHANGE UP (ref 10–45)
ANION GAP SERPL CALC-SCNC: 11 MMOL/L — SIGNIFICANT CHANGE UP (ref 5–17)
AST SERPL-CCNC: 28 U/L — SIGNIFICANT CHANGE UP (ref 10–40)
BCA 255 TISS QL IMSTN: 33 U/ML — HIGH
BILIRUB SERPL-MCNC: 0.2 MG/DL — SIGNIFICANT CHANGE UP (ref 0.2–1.2)
BUN SERPL-MCNC: 9 MG/DL — SIGNIFICANT CHANGE UP (ref 7–23)
CALCIUM SERPL-MCNC: 9 MG/DL — SIGNIFICANT CHANGE UP (ref 8.4–10.5)
CHLORIDE SERPL-SCNC: 104 MMOL/L — SIGNIFICANT CHANGE UP (ref 96–108)
CO2 SERPL-SCNC: 25 MMOL/L — SIGNIFICANT CHANGE UP (ref 22–31)
CREAT SERPL-MCNC: 0.58 MG/DL — SIGNIFICANT CHANGE UP (ref 0.5–1.3)
EGFR: 112 ML/MIN/1.73M2 — SIGNIFICANT CHANGE UP
GLUCOSE SERPL-MCNC: 97 MG/DL — SIGNIFICANT CHANGE UP (ref 70–99)
POTASSIUM SERPL-MCNC: 4.1 MMOL/L — SIGNIFICANT CHANGE UP (ref 3.5–5.3)
POTASSIUM SERPL-SCNC: 4.1 MMOL/L — SIGNIFICANT CHANGE UP (ref 3.5–5.3)
PROT SERPL-MCNC: 6.2 G/DL — SIGNIFICANT CHANGE UP (ref 6–8.3)
SODIUM SERPL-SCNC: 140 MMOL/L — SIGNIFICANT CHANGE UP (ref 135–145)

## 2023-01-12 ENCOUNTER — APPOINTMENT (OUTPATIENT)
Dept: INFUSION THERAPY | Facility: HOSPITAL | Age: 48
End: 2023-01-12

## 2023-01-12 ENCOUNTER — APPOINTMENT (OUTPATIENT)
Dept: HEMATOLOGY ONCOLOGY | Facility: CLINIC | Age: 48
End: 2023-01-12

## 2023-01-13 ENCOUNTER — APPOINTMENT (OUTPATIENT)
Dept: INFUSION THERAPY | Facility: HOSPITAL | Age: 48
End: 2023-01-13

## 2023-01-13 ENCOUNTER — APPOINTMENT (OUTPATIENT)
Dept: HEMATOLOGY ONCOLOGY | Facility: CLINIC | Age: 48
End: 2023-01-13

## 2023-01-14 ENCOUNTER — APPOINTMENT (OUTPATIENT)
Dept: HEMATOLOGY ONCOLOGY | Facility: CLINIC | Age: 48
End: 2023-01-14

## 2023-01-14 ENCOUNTER — APPOINTMENT (OUTPATIENT)
Dept: INFUSION THERAPY | Facility: HOSPITAL | Age: 48
End: 2023-01-14

## 2023-01-17 ENCOUNTER — RESULT REVIEW (OUTPATIENT)
Age: 48
End: 2023-01-17

## 2023-01-17 ENCOUNTER — APPOINTMENT (OUTPATIENT)
Dept: INFUSION THERAPY | Facility: HOSPITAL | Age: 48
End: 2023-01-17

## 2023-01-17 LAB
ALBUMIN SERPL ELPH-MCNC: 3.8 G/DL — SIGNIFICANT CHANGE UP (ref 3.3–5)
ALP SERPL-CCNC: 71 U/L — SIGNIFICANT CHANGE UP (ref 40–120)
ALT FLD-CCNC: 32 U/L — SIGNIFICANT CHANGE UP (ref 10–45)
ANION GAP SERPL CALC-SCNC: 9 MMOL/L — SIGNIFICANT CHANGE UP (ref 5–17)
AST SERPL-CCNC: 32 U/L — SIGNIFICANT CHANGE UP (ref 10–40)
BASOPHILS # BLD AUTO: 0.03 K/UL — SIGNIFICANT CHANGE UP (ref 0–0.2)
BASOPHILS NFR BLD AUTO: 1 % — SIGNIFICANT CHANGE UP (ref 0–2)
BILIRUB SERPL-MCNC: 0.3 MG/DL — SIGNIFICANT CHANGE UP (ref 0.2–1.2)
BUN SERPL-MCNC: 12 MG/DL — SIGNIFICANT CHANGE UP (ref 7–23)
CALCIUM SERPL-MCNC: 8.7 MG/DL — SIGNIFICANT CHANGE UP (ref 8.4–10.5)
CHLORIDE SERPL-SCNC: 105 MMOL/L — SIGNIFICANT CHANGE UP (ref 96–108)
CO2 SERPL-SCNC: 25 MMOL/L — SIGNIFICANT CHANGE UP (ref 22–31)
CREAT SERPL-MCNC: 0.59 MG/DL — SIGNIFICANT CHANGE UP (ref 0.5–1.3)
EGFR: 111 ML/MIN/1.73M2 — SIGNIFICANT CHANGE UP
EOSINOPHIL # BLD AUTO: 0.08 K/UL — SIGNIFICANT CHANGE UP (ref 0–0.5)
EOSINOPHIL NFR BLD AUTO: 3 % — SIGNIFICANT CHANGE UP (ref 0–6)
GLUCOSE SERPL-MCNC: 90 MG/DL — SIGNIFICANT CHANGE UP (ref 70–99)
HCT VFR BLD CALC: 32.4 % — LOW (ref 34.5–45)
HGB BLD-MCNC: 10.7 G/DL — LOW (ref 11.5–15.5)
LYMPHOCYTES # BLD AUTO: 0.59 K/UL — LOW (ref 1–3.3)
LYMPHOCYTES # BLD AUTO: 22 % — SIGNIFICANT CHANGE UP (ref 13–44)
MCHC RBC-ENTMCNC: 26.9 PG — LOW (ref 27–34)
MCHC RBC-ENTMCNC: 33 G/DL — SIGNIFICANT CHANGE UP (ref 32–36)
MCV RBC AUTO: 81.4 FL — SIGNIFICANT CHANGE UP (ref 80–100)
MONOCYTES # BLD AUTO: 0.24 K/UL — SIGNIFICANT CHANGE UP (ref 0–0.9)
MONOCYTES NFR BLD AUTO: 9 % — SIGNIFICANT CHANGE UP (ref 2–14)
NEUTROPHILS # BLD AUTO: 1.73 K/UL — LOW (ref 1.8–7.4)
NEUTROPHILS NFR BLD AUTO: 65 % — SIGNIFICANT CHANGE UP (ref 43–77)
NRBC # BLD: 0 /100 — SIGNIFICANT CHANGE UP (ref 0–0)
NRBC # BLD: SIGNIFICANT CHANGE UP /100 WBCS (ref 0–0)
PLAT MORPH BLD: NORMAL — SIGNIFICANT CHANGE UP
PLATELET # BLD AUTO: 149 K/UL — LOW (ref 150–400)
POTASSIUM SERPL-MCNC: 4.2 MMOL/L — SIGNIFICANT CHANGE UP (ref 3.5–5.3)
POTASSIUM SERPL-SCNC: 4.2 MMOL/L — SIGNIFICANT CHANGE UP (ref 3.5–5.3)
PROT SERPL-MCNC: 5.8 G/DL — LOW (ref 6–8.3)
RBC # BLD: 3.98 M/UL — SIGNIFICANT CHANGE UP (ref 3.8–5.2)
RBC # FLD: 16.2 % — HIGH (ref 10.3–14.5)
RBC BLD AUTO: SIGNIFICANT CHANGE UP
SODIUM SERPL-SCNC: 139 MMOL/L — SIGNIFICANT CHANGE UP (ref 135–145)
WBC # BLD: 2.66 K/UL — LOW (ref 3.8–10.5)
WBC # FLD AUTO: 2.66 K/UL — LOW (ref 3.8–10.5)

## 2023-01-18 ENCOUNTER — NON-APPOINTMENT (OUTPATIENT)
Age: 48
End: 2023-01-18

## 2023-01-18 ENCOUNTER — APPOINTMENT (OUTPATIENT)
Dept: INFUSION THERAPY | Facility: HOSPITAL | Age: 48
End: 2023-01-18

## 2023-01-19 ENCOUNTER — APPOINTMENT (OUTPATIENT)
Dept: INFUSION THERAPY | Facility: HOSPITAL | Age: 48
End: 2023-01-19

## 2023-01-24 ENCOUNTER — APPOINTMENT (OUTPATIENT)
Dept: INFUSION THERAPY | Facility: HOSPITAL | Age: 48
End: 2023-01-24

## 2023-01-24 ENCOUNTER — RESULT REVIEW (OUTPATIENT)
Age: 48
End: 2023-01-24

## 2023-01-24 LAB
ALBUMIN SERPL ELPH-MCNC: 3.5 G/DL — SIGNIFICANT CHANGE UP (ref 3.3–5)
ALP SERPL-CCNC: 76 U/L — SIGNIFICANT CHANGE UP (ref 40–120)
ALT FLD-CCNC: 27 U/L — SIGNIFICANT CHANGE UP (ref 10–45)
ANION GAP SERPL CALC-SCNC: 8 MMOL/L — SIGNIFICANT CHANGE UP (ref 5–17)
AST SERPL-CCNC: 26 U/L — SIGNIFICANT CHANGE UP (ref 10–40)
BASOPHILS # BLD AUTO: 0.03 K/UL — SIGNIFICANT CHANGE UP (ref 0–0.2)
BASOPHILS NFR BLD AUTO: 1.3 % — SIGNIFICANT CHANGE UP (ref 0–2)
BILIRUB SERPL-MCNC: 0.2 MG/DL — SIGNIFICANT CHANGE UP (ref 0.2–1.2)
BUN SERPL-MCNC: 14 MG/DL — SIGNIFICANT CHANGE UP (ref 7–23)
CALCIUM SERPL-MCNC: 8.9 MG/DL — SIGNIFICANT CHANGE UP (ref 8.4–10.5)
CHLORIDE SERPL-SCNC: 106 MMOL/L — SIGNIFICANT CHANGE UP (ref 96–108)
CO2 SERPL-SCNC: 26 MMOL/L — SIGNIFICANT CHANGE UP (ref 22–31)
CREAT SERPL-MCNC: 0.57 MG/DL — SIGNIFICANT CHANGE UP (ref 0.5–1.3)
EGFR: 112 ML/MIN/1.73M2 — SIGNIFICANT CHANGE UP
EOSINOPHIL # BLD AUTO: 0.03 K/UL — SIGNIFICANT CHANGE UP (ref 0–0.5)
EOSINOPHIL NFR BLD AUTO: 1.3 % — SIGNIFICANT CHANGE UP (ref 0–6)
GLUCOSE SERPL-MCNC: 95 MG/DL — SIGNIFICANT CHANGE UP (ref 70–99)
HCT VFR BLD CALC: 32.9 % — LOW (ref 34.5–45)
HGB BLD-MCNC: 10.8 G/DL — LOW (ref 11.5–15.5)
IMM GRANULOCYTES NFR BLD AUTO: 1.3 % — HIGH (ref 0–0.9)
LYMPHOCYTES # BLD AUTO: 0.62 K/UL — LOW (ref 1–3.3)
LYMPHOCYTES # BLD AUTO: 27.3 % — SIGNIFICANT CHANGE UP (ref 13–44)
MCHC RBC-ENTMCNC: 27.3 PG — SIGNIFICANT CHANGE UP (ref 27–34)
MCHC RBC-ENTMCNC: 32.8 G/DL — SIGNIFICANT CHANGE UP (ref 32–36)
MCV RBC AUTO: 83.3 FL — SIGNIFICANT CHANGE UP (ref 80–100)
MONOCYTES # BLD AUTO: 0.33 K/UL — SIGNIFICANT CHANGE UP (ref 0–0.9)
MONOCYTES NFR BLD AUTO: 14.5 % — HIGH (ref 2–14)
NEUTROPHILS # BLD AUTO: 1.23 K/UL — LOW (ref 1.8–7.4)
NEUTROPHILS NFR BLD AUTO: 54.3 % — SIGNIFICANT CHANGE UP (ref 43–77)
NRBC # BLD: 0 /100 WBCS — SIGNIFICANT CHANGE UP (ref 0–0)
PLATELET # BLD AUTO: 147 K/UL — LOW (ref 150–400)
POTASSIUM SERPL-MCNC: 4.2 MMOL/L — SIGNIFICANT CHANGE UP (ref 3.5–5.3)
POTASSIUM SERPL-SCNC: 4.2 MMOL/L — SIGNIFICANT CHANGE UP (ref 3.5–5.3)
PROT SERPL-MCNC: 6.2 G/DL — SIGNIFICANT CHANGE UP (ref 6–8.3)
RBC # BLD: 3.95 M/UL — SIGNIFICANT CHANGE UP (ref 3.8–5.2)
RBC # FLD: 17.1 % — HIGH (ref 10.3–14.5)
SODIUM SERPL-SCNC: 140 MMOL/L — SIGNIFICANT CHANGE UP (ref 135–145)
WBC # BLD: 2.27 K/UL — LOW (ref 3.8–10.5)
WBC # FLD AUTO: 2.27 K/UL — LOW (ref 3.8–10.5)

## 2023-01-25 ENCOUNTER — APPOINTMENT (OUTPATIENT)
Dept: INFUSION THERAPY | Facility: HOSPITAL | Age: 48
End: 2023-01-25

## 2023-01-25 ENCOUNTER — OUTPATIENT (OUTPATIENT)
Dept: OUTPATIENT SERVICES | Facility: HOSPITAL | Age: 48
LOS: 1 days | Discharge: HOME | End: 2023-01-25

## 2023-01-25 DIAGNOSIS — C50.919 MALIGNANT NEOPLASM OF UNSPECIFIED SITE OF UNSPECIFIED FEMALE BREAST: ICD-10-CM

## 2023-01-25 LAB — CANCER AG27-29 SERPL-ACNC: 30.8 U/ML — SIGNIFICANT CHANGE UP (ref 0–38.6)

## 2023-01-26 ENCOUNTER — APPOINTMENT (OUTPATIENT)
Dept: INFUSION THERAPY | Facility: HOSPITAL | Age: 48
End: 2023-01-26

## 2023-01-27 ENCOUNTER — APPOINTMENT (OUTPATIENT)
Dept: INFUSION THERAPY | Facility: HOSPITAL | Age: 48
End: 2023-01-27

## 2023-01-27 DIAGNOSIS — Z51.89 ENCOUNTER FOR OTHER SPECIFIED AFTERCARE: ICD-10-CM

## 2023-01-31 ENCOUNTER — RESULT REVIEW (OUTPATIENT)
Age: 48
End: 2023-01-31

## 2023-01-31 ENCOUNTER — APPOINTMENT (OUTPATIENT)
Dept: HEMATOLOGY ONCOLOGY | Facility: CLINIC | Age: 48
End: 2023-01-31

## 2023-01-31 ENCOUNTER — APPOINTMENT (OUTPATIENT)
Dept: HEMATOLOGY ONCOLOGY | Facility: CLINIC | Age: 48
End: 2023-01-31
Payer: COMMERCIAL

## 2023-01-31 ENCOUNTER — APPOINTMENT (OUTPATIENT)
Dept: INFUSION THERAPY | Facility: HOSPITAL | Age: 48
End: 2023-01-31

## 2023-01-31 LAB
ALBUMIN SERPL ELPH-MCNC: 3.8 G/DL — SIGNIFICANT CHANGE UP (ref 3.3–5)
ALP SERPL-CCNC: 73 U/L — SIGNIFICANT CHANGE UP (ref 40–120)
ALT FLD-CCNC: 47 U/L — HIGH (ref 10–45)
ANION GAP SERPL CALC-SCNC: 9 MMOL/L — SIGNIFICANT CHANGE UP (ref 5–17)
AST SERPL-CCNC: 43 U/L — HIGH (ref 10–40)
BASOPHILS # BLD AUTO: 0.03 K/UL — SIGNIFICANT CHANGE UP (ref 0–0.2)
BASOPHILS NFR BLD AUTO: 1.1 % — SIGNIFICANT CHANGE UP (ref 0–2)
BCA 255 TISS QL IMSTN: 33.9 U/ML — HIGH
BILIRUB SERPL-MCNC: 0.3 MG/DL — SIGNIFICANT CHANGE UP (ref 0.2–1.2)
BUN SERPL-MCNC: 11 MG/DL — SIGNIFICANT CHANGE UP (ref 7–23)
CALCIUM SERPL-MCNC: 9 MG/DL — SIGNIFICANT CHANGE UP (ref 8.4–10.5)
CHLORIDE SERPL-SCNC: 102 MMOL/L — SIGNIFICANT CHANGE UP (ref 96–108)
CO2 SERPL-SCNC: 26 MMOL/L — SIGNIFICANT CHANGE UP (ref 22–31)
CREAT SERPL-MCNC: 0.6 MG/DL — SIGNIFICANT CHANGE UP (ref 0.5–1.3)
EGFR: 111 ML/MIN/1.73M2 — SIGNIFICANT CHANGE UP
EOSINOPHIL # BLD AUTO: 0.04 K/UL — SIGNIFICANT CHANGE UP (ref 0–0.5)
EOSINOPHIL NFR BLD AUTO: 1.5 % — SIGNIFICANT CHANGE UP (ref 0–6)
GLUCOSE SERPL-MCNC: 91 MG/DL — SIGNIFICANT CHANGE UP (ref 70–99)
HCT VFR BLD CALC: 33 % — LOW (ref 34.5–45)
HGB BLD-MCNC: 10.8 G/DL — LOW (ref 11.5–15.5)
IMM GRANULOCYTES NFR BLD AUTO: 1.1 % — HIGH (ref 0–0.9)
LYMPHOCYTES # BLD AUTO: 0.73 K/UL — LOW (ref 1–3.3)
LYMPHOCYTES # BLD AUTO: 27.7 % — SIGNIFICANT CHANGE UP (ref 13–44)
MCHC RBC-ENTMCNC: 27.2 PG — SIGNIFICANT CHANGE UP (ref 27–34)
MCHC RBC-ENTMCNC: 32.7 G/DL — SIGNIFICANT CHANGE UP (ref 32–36)
MCV RBC AUTO: 83.1 FL — SIGNIFICANT CHANGE UP (ref 80–100)
MONOCYTES # BLD AUTO: 0.39 K/UL — SIGNIFICANT CHANGE UP (ref 0–0.9)
MONOCYTES NFR BLD AUTO: 14.8 % — HIGH (ref 2–14)
NEUTROPHILS # BLD AUTO: 1.42 K/UL — LOW (ref 1.8–7.4)
NEUTROPHILS NFR BLD AUTO: 53.8 % — SIGNIFICANT CHANGE UP (ref 43–77)
NRBC # BLD: 0 /100 WBCS — SIGNIFICANT CHANGE UP (ref 0–0)
PLATELET # BLD AUTO: 164 K/UL — SIGNIFICANT CHANGE UP (ref 150–400)
POTASSIUM SERPL-MCNC: 4.1 MMOL/L — SIGNIFICANT CHANGE UP (ref 3.5–5.3)
POTASSIUM SERPL-SCNC: 4.1 MMOL/L — SIGNIFICANT CHANGE UP (ref 3.5–5.3)
PROT SERPL-MCNC: 6 G/DL — SIGNIFICANT CHANGE UP (ref 6–8.3)
RBC # BLD: 3.97 M/UL — SIGNIFICANT CHANGE UP (ref 3.8–5.2)
RBC # FLD: 17.9 % — HIGH (ref 10.3–14.5)
SODIUM SERPL-SCNC: 138 MMOL/L — SIGNIFICANT CHANGE UP (ref 135–145)
WBC # BLD: 2.64 K/UL — LOW (ref 3.8–10.5)
WBC # FLD AUTO: 2.64 K/UL — LOW (ref 3.8–10.5)

## 2023-01-31 PROCEDURE — 99214 OFFICE O/P EST MOD 30 MIN: CPT

## 2023-02-01 ENCOUNTER — APPOINTMENT (OUTPATIENT)
Dept: INFUSION THERAPY | Facility: HOSPITAL | Age: 48
End: 2023-02-01

## 2023-02-01 DIAGNOSIS — Z51.11 ENCOUNTER FOR ANTINEOPLASTIC CHEMOTHERAPY: ICD-10-CM

## 2023-02-01 DIAGNOSIS — R11.2 NAUSEA WITH VOMITING, UNSPECIFIED: ICD-10-CM

## 2023-02-02 ENCOUNTER — APPOINTMENT (OUTPATIENT)
Dept: INFUSION THERAPY | Facility: HOSPITAL | Age: 48
End: 2023-02-02

## 2023-02-02 NOTE — HISTORY OF PRESENT ILLNESS
[Disease: _____________________] : Disease: [unfilled] [de-identified] : This is a  47-year-old woman with history of left breast carcinoma who presents for evaluation.  The patient's history dates back to May 2022 when she noted a mass in the left breast.   In 2022  a mammogram and sonogram were performed.  These revealed a mass in the left breast along with calcifications in the right breast.  The biopsy revealed poorly differentiated infiltrating duct carcinoma with Isaias score 8/9, ER [place] -0% and HER2/roxann 1+ negative consistent with triple negative breast cancer.  The Ki-67 was 50%.\par \par There was no obvious left axillary lymph nodes.  The right breast calcifications were biopsied with benign results including fibrocystic changes.  An MRI revealed that the left breast mass with touching the pectoral muscle to the lateral breast area along with satellite.  There was prominence in the left axilla but no definite enlargement of nodes.  The patient has undergone surgical oncology evaluation and the plan is to evaluate patient for neoadjuvant chemotherapy and she is considering bilateral mastectomies.  The patient has undergone genetic testing which is pending.  The patient did have a previous biopsy of the breast in 2018 involving the right breast which showed a benign biopsy.\par \par The patient will undergo targeted reevaluation of the nonspecific foci on the right breast and also left axillary lymph nodes with biopsy to assess for bilateral disease and possible dayna metastases.\par \par Patient's menarche was age 12 and her periods have been regular.  She is  1 para 1 age at first pregnancy was 19.  She denies hormonal therapy or fertility treatments.  The patient will undergo CAT scans and bone scans to evaluate any evidence for systemic disease. [de-identified] : poorly diff IDC ERneg, [place] neg, Her2 neg 1+, Ki67-50%  [de-identified] : Plan neoadjuvant chemo/immunotherapy, Taxol/ carbo/ Pembro AC  [de-identified] : S/p C8/11 Taxol. Patient was seen in treatment room. Presently, she feel okay except for some fatigue. Also, she has increase nausea 2 days after every treatment. Denies any vomiting, fever, pain, mouth sores or diarrhea. Admits to some constipation with BM every 2 to 3 days.

## 2023-02-02 NOTE — REVIEW OF SYSTEMS
[Fatigue] : fatigue [Constipation] : constipation [Negative] : Allergic/Immunologic [SOB on Exertion] : no shortness of breath during exertion [Joint Pain] : no joint pain [Joint Stiffness] : no joint stiffness

## 2023-02-02 NOTE — ASSESSMENT
[FreeTextEntry1] : S/p C8/11Taxol carboplatin and pembrolizumab for 11weeks and then switch to Adriamycin Cytoxan dose dense along with Keytruda.\par Will continue to monitored for side effects and toxicity.  \par She will continue to receive Zarxio 300 mg x days post chemo to prevent further oc counts to decrease risk of infection. \par Reviewed labs and ordered repeat labs, f/u.\par She will return to the office in 1 month or sooner as needed.

## 2023-02-02 NOTE — PHYSICAL EXAM
[Restricted in physically strenuous activity but ambulatory and able to carry out work of a light or sedentary nature] : Status 1- Restricted in physically strenuous activity but ambulatory and able to carry out work of a light or sedentary nature, e.g., light house work, office work [Normal] : affect appropriate [de-identified] : The breast reveals decreased mass in the left breast with minimal induration in the upper medial aspect of the breast.  There is no palpable adenopathy.

## 2023-02-03 ENCOUNTER — APPOINTMENT (OUTPATIENT)
Dept: INFUSION THERAPY | Facility: HOSPITAL | Age: 48
End: 2023-02-03

## 2023-02-04 ENCOUNTER — APPOINTMENT (OUTPATIENT)
Dept: INFUSION THERAPY | Facility: HOSPITAL | Age: 48
End: 2023-02-04

## 2023-02-07 ENCOUNTER — RESULT REVIEW (OUTPATIENT)
Age: 48
End: 2023-02-07

## 2023-02-07 ENCOUNTER — APPOINTMENT (OUTPATIENT)
Dept: INFUSION THERAPY | Facility: HOSPITAL | Age: 48
End: 2023-02-07

## 2023-02-07 LAB
BASOPHILS # BLD AUTO: 0.07 K/UL — SIGNIFICANT CHANGE UP (ref 0–0.2)
BASOPHILS NFR BLD AUTO: 1.9 % — SIGNIFICANT CHANGE UP (ref 0–2)
EOSINOPHIL # BLD AUTO: 0.03 K/UL — SIGNIFICANT CHANGE UP (ref 0–0.5)
EOSINOPHIL NFR BLD AUTO: 0.8 % — SIGNIFICANT CHANGE UP (ref 0–6)
HCT VFR BLD CALC: 33 % — LOW (ref 34.5–45)
HGB BLD-MCNC: 10.8 G/DL — LOW (ref 11.5–15.5)
IMM GRANULOCYTES NFR BLD AUTO: 4.1 % — HIGH (ref 0–0.9)
LYMPHOCYTES # BLD AUTO: 0.78 K/UL — LOW (ref 1–3.3)
LYMPHOCYTES # BLD AUTO: 21.1 % — SIGNIFICANT CHANGE UP (ref 13–44)
MCHC RBC-ENTMCNC: 27.6 PG — SIGNIFICANT CHANGE UP (ref 27–34)
MCHC RBC-ENTMCNC: 32.7 G/DL — SIGNIFICANT CHANGE UP (ref 32–36)
MCV RBC AUTO: 84.4 FL — SIGNIFICANT CHANGE UP (ref 80–100)
MONOCYTES # BLD AUTO: 0.46 K/UL — SIGNIFICANT CHANGE UP (ref 0–0.9)
MONOCYTES NFR BLD AUTO: 12.4 % — SIGNIFICANT CHANGE UP (ref 2–14)
NEUTROPHILS # BLD AUTO: 2.21 K/UL — SIGNIFICANT CHANGE UP (ref 1.8–7.4)
NEUTROPHILS NFR BLD AUTO: 59.7 % — SIGNIFICANT CHANGE UP (ref 43–77)
NRBC # BLD: 0 /100 WBCS — SIGNIFICANT CHANGE UP (ref 0–0)
PLATELET # BLD AUTO: 188 K/UL — SIGNIFICANT CHANGE UP (ref 150–400)
RBC # BLD: 3.91 M/UL — SIGNIFICANT CHANGE UP (ref 3.8–5.2)
RBC # FLD: 19.2 % — HIGH (ref 10.3–14.5)
WBC # BLD: 3.7 K/UL — LOW (ref 3.8–10.5)
WBC # FLD AUTO: 3.7 K/UL — LOW (ref 3.8–10.5)

## 2023-02-09 ENCOUNTER — APPOINTMENT (OUTPATIENT)
Dept: INFUSION THERAPY | Facility: HOSPITAL | Age: 48
End: 2023-02-09

## 2023-02-10 ENCOUNTER — APPOINTMENT (OUTPATIENT)
Dept: INFUSION THERAPY | Facility: HOSPITAL | Age: 48
End: 2023-02-10

## 2023-02-11 ENCOUNTER — APPOINTMENT (OUTPATIENT)
Dept: INFUSION THERAPY | Facility: HOSPITAL | Age: 48
End: 2023-02-11

## 2023-02-14 ENCOUNTER — RESULT REVIEW (OUTPATIENT)
Age: 48
End: 2023-02-14

## 2023-02-14 ENCOUNTER — APPOINTMENT (OUTPATIENT)
Dept: HEMATOLOGY ONCOLOGY | Facility: CLINIC | Age: 48
End: 2023-02-14

## 2023-02-14 ENCOUNTER — APPOINTMENT (OUTPATIENT)
Dept: INFUSION THERAPY | Facility: HOSPITAL | Age: 48
End: 2023-02-14

## 2023-02-14 ENCOUNTER — APPOINTMENT (OUTPATIENT)
Dept: HEMATOLOGY ONCOLOGY | Facility: CLINIC | Age: 48
End: 2023-02-14
Payer: COMMERCIAL

## 2023-02-14 LAB
ALBUMIN SERPL ELPH-MCNC: 3.8 G/DL — SIGNIFICANT CHANGE UP (ref 3.3–5)
ALP SERPL-CCNC: 82 U/L — SIGNIFICANT CHANGE UP (ref 40–120)
ALT FLD-CCNC: 54 U/L — HIGH (ref 10–45)
ANION GAP SERPL CALC-SCNC: 9 MMOL/L — SIGNIFICANT CHANGE UP (ref 5–17)
AST SERPL-CCNC: 45 U/L — HIGH (ref 10–40)
BASOPHILS # BLD AUTO: 0.04 K/UL — SIGNIFICANT CHANGE UP (ref 0–0.2)
BASOPHILS NFR BLD AUTO: 1.2 % — SIGNIFICANT CHANGE UP (ref 0–2)
BILIRUB SERPL-MCNC: 0.2 MG/DL — SIGNIFICANT CHANGE UP (ref 0.2–1.2)
BUN SERPL-MCNC: 12 MG/DL — SIGNIFICANT CHANGE UP (ref 7–23)
CALCIUM SERPL-MCNC: 8.9 MG/DL — SIGNIFICANT CHANGE UP (ref 8.4–10.5)
CANCER AG27-29 SERPL-ACNC: 27 U/ML — SIGNIFICANT CHANGE UP (ref 0–38.6)
CHLORIDE SERPL-SCNC: 105 MMOL/L — SIGNIFICANT CHANGE UP (ref 96–108)
CO2 SERPL-SCNC: 25 MMOL/L — SIGNIFICANT CHANGE UP (ref 22–31)
CREAT SERPL-MCNC: 0.55 MG/DL — SIGNIFICANT CHANGE UP (ref 0.5–1.3)
EGFR: 113 ML/MIN/1.73M2 — SIGNIFICANT CHANGE UP
EOSINOPHIL # BLD AUTO: 0.01 K/UL — SIGNIFICANT CHANGE UP (ref 0–0.5)
EOSINOPHIL NFR BLD AUTO: 0.3 % — SIGNIFICANT CHANGE UP (ref 0–6)
GLUCOSE SERPL-MCNC: 101 MG/DL — HIGH (ref 70–99)
HCT VFR BLD CALC: 31.3 % — LOW (ref 34.5–45)
HGB BLD-MCNC: 10.3 G/DL — LOW (ref 11.5–15.5)
IMM GRANULOCYTES NFR BLD AUTO: 2.9 % — HIGH (ref 0–0.9)
LYMPHOCYTES # BLD AUTO: 0.69 K/UL — LOW (ref 1–3.3)
LYMPHOCYTES # BLD AUTO: 20.1 % — SIGNIFICANT CHANGE UP (ref 13–44)
MCHC RBC-ENTMCNC: 27.8 PG — SIGNIFICANT CHANGE UP (ref 27–34)
MCHC RBC-ENTMCNC: 32.9 G/DL — SIGNIFICANT CHANGE UP (ref 32–36)
MCV RBC AUTO: 84.4 FL — SIGNIFICANT CHANGE UP (ref 80–100)
MONOCYTES # BLD AUTO: 0.34 K/UL — SIGNIFICANT CHANGE UP (ref 0–0.9)
MONOCYTES NFR BLD AUTO: 9.9 % — SIGNIFICANT CHANGE UP (ref 2–14)
NEUTROPHILS # BLD AUTO: 2.25 K/UL — SIGNIFICANT CHANGE UP (ref 1.8–7.4)
NEUTROPHILS NFR BLD AUTO: 65.6 % — SIGNIFICANT CHANGE UP (ref 43–77)
NRBC # BLD: 0 /100 WBCS — SIGNIFICANT CHANGE UP (ref 0–0)
PLATELET # BLD AUTO: 167 K/UL — SIGNIFICANT CHANGE UP (ref 150–400)
POTASSIUM SERPL-MCNC: 4.1 MMOL/L — SIGNIFICANT CHANGE UP (ref 3.5–5.3)
POTASSIUM SERPL-SCNC: 4.1 MMOL/L — SIGNIFICANT CHANGE UP (ref 3.5–5.3)
PROT SERPL-MCNC: 6.2 G/DL — SIGNIFICANT CHANGE UP (ref 6–8.3)
RBC # BLD: 3.71 M/UL — LOW (ref 3.8–5.2)
RBC # FLD: 19.8 % — HIGH (ref 10.3–14.5)
SODIUM SERPL-SCNC: 139 MMOL/L — SIGNIFICANT CHANGE UP (ref 135–145)
WBC # BLD: 3.43 K/UL — LOW (ref 3.8–10.5)
WBC # FLD AUTO: 3.43 K/UL — LOW (ref 3.8–10.5)

## 2023-02-14 PROCEDURE — 99213 OFFICE O/P EST LOW 20 MIN: CPT

## 2023-02-14 NOTE — HISTORY OF PRESENT ILLNESS
[Disease: _____________________] : Disease: [unfilled] [de-identified] : This is a  47-year-old woman with history of left breast carcinoma who presents for evaluation.  The patient's history dates back to May 2022 when she noted a mass in the left breast.   In 2022  a mammogram and sonogram were performed.  These revealed a mass in the left breast along with calcifications in the right breast.  The biopsy revealed poorly differentiated infiltrating duct carcinoma with Isaias score 8/9, ER [place] -0% and HER2/roxann 1+ negative consistent with triple negative breast cancer.  The Ki-67 was 50%.\par \par There was no obvious left axillary lymph nodes.  The right breast calcifications were biopsied with benign results including fibrocystic changes.  An MRI revealed that the left breast mass with touching the pectoral muscle to the lateral breast area along with satellite.  There was prominence in the left axilla but no definite enlargement of nodes.  The patient has undergone surgical oncology evaluation and the plan is to evaluate patient for neoadjuvant chemotherapy and she is considering bilateral mastectomies.  The patient has undergone genetic testing which is pending.  The patient did have a previous biopsy of the breast in 2018 involving the right breast which showed a benign biopsy.\par \par The patient will undergo targeted reevaluation of the nonspecific foci on the right breast and also left axillary lymph nodes with biopsy to assess for bilateral disease and possible dayna metastases.\par \par Patient's menarche was age 12 and her periods have been regular.  She is  1 para 1 age at first pregnancy was 19.  She denies hormonal therapy or fertility treatments.  The patient will undergo CAT scans and bone scans to evaluate any evidence for systemic disease. [de-identified] : poorly diff IDC ERneg, [place] neg, Her2 neg 1+, Ki67-50%  [de-identified] : Plan neoadjuvant chemo/immunotherapy, Taxol/ carbo/ Pembro AC Keynote 522 [de-identified] : The patient continues on neoadjuvant chemotherapy immunotherapy for stage II triple negative breast cancer involving the left breast.  She is completing 10 cycles of the weekly Taxol carboplatin and every 3-week pembrolizumab.  Overall the patient is tolerating treatment well except for mild tingling of the hands and feet and recently constipation.

## 2023-02-14 NOTE — ASSESSMENT
[Curative] : Goals of care discussed with patient: Curative [Palliative Care Plan] : not applicable at this time [FreeTextEntry1] : Pt seen in treatment room for followup. pt on Prmbro taxol carbo weekly  and has 2 more does to complete 12 cycles. Breast mass has reduced and pt tolerated rx well. Pt has dry cough and mild tingling of hands and feet. Pt to be followed for side effects. Pt has not had BM in 5 days and will start lactulose with  colace. Pt to RTO in 2 weeks .  The patient will be reevaluated in 2 weeks or so

## 2023-02-14 NOTE — REVIEW OF SYSTEMS
[Cough] : cough [SOB on Exertion] : shortness of breath during exertion [Constipation] : constipation [Joint Pain] : joint pain [Negative] : Allergic/Immunologic [FreeTextEntry5] : occ palpitations [de-identified] : has numbness and t tingling in hand and feet

## 2023-02-16 ENCOUNTER — APPOINTMENT (OUTPATIENT)
Dept: INFUSION THERAPY | Facility: HOSPITAL | Age: 48
End: 2023-02-16

## 2023-02-17 ENCOUNTER — APPOINTMENT (OUTPATIENT)
Dept: INFUSION THERAPY | Facility: HOSPITAL | Age: 48
End: 2023-02-17

## 2023-02-18 ENCOUNTER — APPOINTMENT (OUTPATIENT)
Dept: INFUSION THERAPY | Facility: HOSPITAL | Age: 48
End: 2023-02-18

## 2023-02-21 ENCOUNTER — RESULT REVIEW (OUTPATIENT)
Age: 48
End: 2023-02-21

## 2023-02-21 ENCOUNTER — APPOINTMENT (OUTPATIENT)
Dept: INFUSION THERAPY | Facility: HOSPITAL | Age: 48
End: 2023-02-21

## 2023-02-21 ENCOUNTER — APPOINTMENT (OUTPATIENT)
Dept: HEMATOLOGY ONCOLOGY | Facility: CLINIC | Age: 48
End: 2023-02-21

## 2023-02-21 ENCOUNTER — NON-APPOINTMENT (OUTPATIENT)
Age: 48
End: 2023-02-21

## 2023-02-21 LAB
ALBUMIN SERPL ELPH-MCNC: 3.8 G/DL — SIGNIFICANT CHANGE UP (ref 3.3–5)
ALP SERPL-CCNC: 83 U/L — SIGNIFICANT CHANGE UP (ref 40–120)
ALT FLD-CCNC: 100 U/L — HIGH (ref 10–45)
ANION GAP SERPL CALC-SCNC: 11 MMOL/L — SIGNIFICANT CHANGE UP (ref 5–17)
AST SERPL-CCNC: 79 U/L — HIGH (ref 10–40)
BASOPHILS # BLD AUTO: 0.04 K/UL — SIGNIFICANT CHANGE UP (ref 0–0.2)
BASOPHILS NFR BLD AUTO: 1.2 % — SIGNIFICANT CHANGE UP (ref 0–2)
BCA 255 TISS QL IMSTN: 35.5 U/ML — HIGH
BILIRUB SERPL-MCNC: 0.2 MG/DL — SIGNIFICANT CHANGE UP (ref 0.2–1.2)
BUN SERPL-MCNC: 12 MG/DL — SIGNIFICANT CHANGE UP (ref 7–23)
CALCIUM SERPL-MCNC: 9 MG/DL — SIGNIFICANT CHANGE UP (ref 8.4–10.5)
CHLORIDE SERPL-SCNC: 104 MMOL/L — SIGNIFICANT CHANGE UP (ref 96–108)
CO2 SERPL-SCNC: 25 MMOL/L — SIGNIFICANT CHANGE UP (ref 22–31)
CREAT SERPL-MCNC: 0.56 MG/DL — SIGNIFICANT CHANGE UP (ref 0.5–1.3)
EGFR: 113 ML/MIN/1.73M2 — SIGNIFICANT CHANGE UP
EOSINOPHIL # BLD AUTO: 0.02 K/UL — SIGNIFICANT CHANGE UP (ref 0–0.5)
EOSINOPHIL NFR BLD AUTO: 0.6 % — SIGNIFICANT CHANGE UP (ref 0–6)
GLUCOSE SERPL-MCNC: 98 MG/DL — SIGNIFICANT CHANGE UP (ref 70–99)
HCT VFR BLD CALC: 30.1 % — LOW (ref 34.5–45)
HGB BLD-MCNC: 10 G/DL — LOW (ref 11.5–15.5)
IMM GRANULOCYTES NFR BLD AUTO: 1.5 % — HIGH (ref 0–0.9)
LYMPHOCYTES # BLD AUTO: 0.79 K/UL — LOW (ref 1–3.3)
LYMPHOCYTES # BLD AUTO: 24.2 % — SIGNIFICANT CHANGE UP (ref 13–44)
MCHC RBC-ENTMCNC: 28 PG — SIGNIFICANT CHANGE UP (ref 27–34)
MCHC RBC-ENTMCNC: 33.2 G/DL — SIGNIFICANT CHANGE UP (ref 32–36)
MCV RBC AUTO: 84.3 FL — SIGNIFICANT CHANGE UP (ref 80–100)
MONOCYTES # BLD AUTO: 0.32 K/UL — SIGNIFICANT CHANGE UP (ref 0–0.9)
MONOCYTES NFR BLD AUTO: 9.8 % — SIGNIFICANT CHANGE UP (ref 2–14)
NEUTROPHILS # BLD AUTO: 2.04 K/UL — SIGNIFICANT CHANGE UP (ref 1.8–7.4)
NEUTROPHILS NFR BLD AUTO: 62.7 % — SIGNIFICANT CHANGE UP (ref 43–77)
NRBC # BLD: 0 /100 WBCS — SIGNIFICANT CHANGE UP (ref 0–0)
PLATELET # BLD AUTO: 146 K/UL — LOW (ref 150–400)
POTASSIUM SERPL-MCNC: 4.1 MMOL/L — SIGNIFICANT CHANGE UP (ref 3.5–5.3)
POTASSIUM SERPL-SCNC: 4.1 MMOL/L — SIGNIFICANT CHANGE UP (ref 3.5–5.3)
PROT SERPL-MCNC: 6.2 G/DL — SIGNIFICANT CHANGE UP (ref 6–8.3)
RBC # BLD: 3.57 M/UL — LOW (ref 3.8–5.2)
RBC # FLD: 20.2 % — HIGH (ref 10.3–14.5)
SODIUM SERPL-SCNC: 139 MMOL/L — SIGNIFICANT CHANGE UP (ref 135–145)
WBC # BLD: 3.26 K/UL — LOW (ref 3.8–10.5)
WBC # FLD AUTO: 3.26 K/UL — LOW (ref 3.8–10.5)

## 2023-02-23 ENCOUNTER — APPOINTMENT (OUTPATIENT)
Dept: INFUSION THERAPY | Facility: HOSPITAL | Age: 48
End: 2023-02-23

## 2023-02-24 ENCOUNTER — APPOINTMENT (OUTPATIENT)
Dept: INFUSION THERAPY | Facility: HOSPITAL | Age: 48
End: 2023-02-24

## 2023-02-25 ENCOUNTER — APPOINTMENT (OUTPATIENT)
Dept: INFUSION THERAPY | Facility: HOSPITAL | Age: 48
End: 2023-02-25

## 2023-02-28 ENCOUNTER — RESULT REVIEW (OUTPATIENT)
Age: 48
End: 2023-02-28

## 2023-02-28 ENCOUNTER — APPOINTMENT (OUTPATIENT)
Dept: INFUSION THERAPY | Facility: HOSPITAL | Age: 48
End: 2023-02-28

## 2023-02-28 LAB
BASOPHILS # BLD AUTO: 0.03 K/UL — SIGNIFICANT CHANGE UP (ref 0–0.2)
BASOPHILS NFR BLD AUTO: 1 % — SIGNIFICANT CHANGE UP (ref 0–2)
EOSINOPHIL # BLD AUTO: 0 K/UL — SIGNIFICANT CHANGE UP (ref 0–0.5)
EOSINOPHIL NFR BLD AUTO: 0 % — SIGNIFICANT CHANGE UP (ref 0–6)
HCT VFR BLD CALC: 30.6 % — LOW (ref 34.5–45)
HGB BLD-MCNC: 10.2 G/DL — LOW (ref 11.5–15.5)
LYMPHOCYTES # BLD AUTO: 0.55 K/UL — LOW (ref 1–3.3)
LYMPHOCYTES # BLD AUTO: 16 % — SIGNIFICANT CHANGE UP (ref 13–44)
MCHC RBC-ENTMCNC: 28.6 PG — SIGNIFICANT CHANGE UP (ref 27–34)
MCHC RBC-ENTMCNC: 33.3 G/DL — SIGNIFICANT CHANGE UP (ref 32–36)
MCV RBC AUTO: 85.7 FL — SIGNIFICANT CHANGE UP (ref 80–100)
MONOCYTES # BLD AUTO: 0.21 K/UL — SIGNIFICANT CHANGE UP (ref 0–0.9)
MONOCYTES NFR BLD AUTO: 6 % — SIGNIFICANT CHANGE UP (ref 2–14)
NEUTROPHILS # BLD AUTO: 2.63 K/UL — SIGNIFICANT CHANGE UP (ref 1.8–7.4)
NEUTROPHILS NFR BLD AUTO: 77 % — SIGNIFICANT CHANGE UP (ref 43–77)
NRBC # BLD: 0 /100 — SIGNIFICANT CHANGE UP (ref 0–0)
NRBC # BLD: SIGNIFICANT CHANGE UP /100 WBCS (ref 0–0)
PLAT MORPH BLD: NORMAL — SIGNIFICANT CHANGE UP
PLATELET # BLD AUTO: 114 K/UL — LOW (ref 150–400)
RBC # BLD: 3.57 M/UL — LOW (ref 3.8–5.2)
RBC # FLD: 20 % — HIGH (ref 10.3–14.5)
RBC BLD AUTO: SIGNIFICANT CHANGE UP
TOXIC GRANULES BLD QL SMEAR: PRESENT — SIGNIFICANT CHANGE UP
WBC # BLD: 3.42 K/UL — LOW (ref 3.8–10.5)
WBC # FLD AUTO: 3.42 K/UL — LOW (ref 3.8–10.5)

## 2023-03-02 ENCOUNTER — APPOINTMENT (OUTPATIENT)
Dept: INFUSION THERAPY | Facility: HOSPITAL | Age: 48
End: 2023-03-02

## 2023-03-03 ENCOUNTER — APPOINTMENT (OUTPATIENT)
Dept: INFUSION THERAPY | Facility: HOSPITAL | Age: 48
End: 2023-03-03

## 2023-03-04 ENCOUNTER — APPOINTMENT (OUTPATIENT)
Dept: INFUSION THERAPY | Facility: HOSPITAL | Age: 48
End: 2023-03-04

## 2023-03-07 ENCOUNTER — APPOINTMENT (OUTPATIENT)
Dept: CARDIOLOGY | Facility: CLINIC | Age: 48
End: 2023-03-07
Payer: COMMERCIAL

## 2023-03-07 PROCEDURE — 93356 MYOCRD STRAIN IMG SPCKL TRCK: CPT

## 2023-03-07 PROCEDURE — 93306 TTE W/DOPPLER COMPLETE: CPT

## 2023-03-09 ENCOUNTER — APPOINTMENT (OUTPATIENT)
Dept: INFUSION THERAPY | Facility: HOSPITAL | Age: 48
End: 2023-03-09

## 2023-03-10 ENCOUNTER — APPOINTMENT (OUTPATIENT)
Dept: INFUSION THERAPY | Facility: HOSPITAL | Age: 48
End: 2023-03-10

## 2023-03-11 ENCOUNTER — APPOINTMENT (OUTPATIENT)
Dept: INFUSION THERAPY | Facility: HOSPITAL | Age: 48
End: 2023-03-11

## 2023-03-21 ENCOUNTER — RESULT REVIEW (OUTPATIENT)
Age: 48
End: 2023-03-21

## 2023-03-21 ENCOUNTER — APPOINTMENT (OUTPATIENT)
Dept: HEMATOLOGY ONCOLOGY | Facility: CLINIC | Age: 48
End: 2023-03-21
Payer: COMMERCIAL

## 2023-03-21 ENCOUNTER — APPOINTMENT (OUTPATIENT)
Dept: INFUSION THERAPY | Facility: HOSPITAL | Age: 48
End: 2023-03-21

## 2023-03-21 ENCOUNTER — NON-APPOINTMENT (OUTPATIENT)
Age: 48
End: 2023-03-21

## 2023-03-21 VITALS
DIASTOLIC BLOOD PRESSURE: 79 MMHG | RESPIRATION RATE: 16 BRPM | HEART RATE: 81 BPM | WEIGHT: 151.01 LBS | SYSTOLIC BLOOD PRESSURE: 115 MMHG | TEMPERATURE: 97 F | BODY MASS INDEX: 22.3 KG/M2 | OXYGEN SATURATION: 100 %

## 2023-03-21 LAB
ALBUMIN SERPL ELPH-MCNC: 4.2 G/DL — SIGNIFICANT CHANGE UP (ref 3.3–5)
ALP SERPL-CCNC: 86 U/L — SIGNIFICANT CHANGE UP (ref 40–120)
ALT FLD-CCNC: 77 U/L — HIGH (ref 10–45)
ANION GAP SERPL CALC-SCNC: 13 MMOL/L — SIGNIFICANT CHANGE UP (ref 5–17)
AST SERPL-CCNC: 56 U/L — HIGH (ref 10–40)
BASOPHILS # BLD AUTO: 0 K/UL — SIGNIFICANT CHANGE UP (ref 0–0.2)
BASOPHILS NFR BLD AUTO: 0 % — SIGNIFICANT CHANGE UP (ref 0–2)
BILIRUB SERPL-MCNC: 0.4 MG/DL — SIGNIFICANT CHANGE UP (ref 0.2–1.2)
BUN SERPL-MCNC: 12 MG/DL — SIGNIFICANT CHANGE UP (ref 7–23)
CALCIUM SERPL-MCNC: 9.2 MG/DL — SIGNIFICANT CHANGE UP (ref 8.4–10.5)
CHLORIDE SERPL-SCNC: 105 MMOL/L — SIGNIFICANT CHANGE UP (ref 96–108)
CO2 SERPL-SCNC: 23 MMOL/L — SIGNIFICANT CHANGE UP (ref 22–31)
CREAT SERPL-MCNC: 0.57 MG/DL — SIGNIFICANT CHANGE UP (ref 0.5–1.3)
EGFR: 112 ML/MIN/1.73M2 — SIGNIFICANT CHANGE UP
EOSINOPHIL # BLD AUTO: 0.07 K/UL — SIGNIFICANT CHANGE UP (ref 0–0.5)
EOSINOPHIL NFR BLD AUTO: 4 % — SIGNIFICANT CHANGE UP (ref 0–6)
GLUCOSE SERPL-MCNC: 113 MG/DL — HIGH (ref 70–99)
HCT VFR BLD CALC: 30.6 % — LOW (ref 34.5–45)
HGB BLD-MCNC: 10.2 G/DL — LOW (ref 11.5–15.5)
LYMPHOCYTES # BLD AUTO: 0.84 K/UL — LOW (ref 1–3.3)
LYMPHOCYTES # BLD AUTO: 47 % — HIGH (ref 13–44)
MCHC RBC-ENTMCNC: 30.2 PG — SIGNIFICANT CHANGE UP (ref 27–34)
MCHC RBC-ENTMCNC: 33.3 G/DL — SIGNIFICANT CHANGE UP (ref 32–36)
MCV RBC AUTO: 90.5 FL — SIGNIFICANT CHANGE UP (ref 80–100)
MONOCYTES # BLD AUTO: 0.25 K/UL — SIGNIFICANT CHANGE UP (ref 0–0.9)
MONOCYTES NFR BLD AUTO: 14 % — SIGNIFICANT CHANGE UP (ref 2–14)
NEUTROPHILS # BLD AUTO: 0.62 K/UL — LOW (ref 1.8–7.4)
NEUTROPHILS NFR BLD AUTO: 35 % — LOW (ref 43–77)
NRBC # BLD: 0 /100 — SIGNIFICANT CHANGE UP (ref 0–0)
NRBC # BLD: SIGNIFICANT CHANGE UP /100 WBCS (ref 0–0)
PLAT MORPH BLD: NORMAL — SIGNIFICANT CHANGE UP
PLATELET # BLD AUTO: 142 K/UL — LOW (ref 150–400)
POTASSIUM SERPL-MCNC: 4 MMOL/L — SIGNIFICANT CHANGE UP (ref 3.5–5.3)
POTASSIUM SERPL-SCNC: 4 MMOL/L — SIGNIFICANT CHANGE UP (ref 3.5–5.3)
PROT SERPL-MCNC: 6.5 G/DL — SIGNIFICANT CHANGE UP (ref 6–8.3)
RBC # BLD: 3.38 M/UL — LOW (ref 3.8–5.2)
RBC # FLD: 22.1 % — HIGH (ref 10.3–14.5)
RBC BLD AUTO: SIGNIFICANT CHANGE UP
SODIUM SERPL-SCNC: 141 MMOL/L — SIGNIFICANT CHANGE UP (ref 135–145)
T3 SERPL-MCNC: 133 NG/DL — SIGNIFICANT CHANGE UP (ref 80–200)
T4 FREE SERPL-MCNC: 1.1 NG/DL — SIGNIFICANT CHANGE UP (ref 0.9–1.8)
TOXIC GRANULES BLD QL SMEAR: PRESENT — SIGNIFICANT CHANGE UP
TSH SERPL-MCNC: 1.44 UIU/ML — SIGNIFICANT CHANGE UP (ref 0.27–4.2)
WBC # BLD: 1.78 K/UL — LOW (ref 3.8–10.5)
WBC # FLD AUTO: 1.78 K/UL — LOW (ref 3.8–10.5)

## 2023-03-21 PROCEDURE — 99214 OFFICE O/P EST MOD 30 MIN: CPT

## 2023-03-22 ENCOUNTER — APPOINTMENT (OUTPATIENT)
Dept: INFUSION THERAPY | Facility: HOSPITAL | Age: 48
End: 2023-03-22

## 2023-03-22 LAB — CANCER AG27-29 SERPL-ACNC: 31.8 U/ML — SIGNIFICANT CHANGE UP (ref 0–38.6)

## 2023-03-22 NOTE — REVIEW OF SYSTEMS
[Cough] : cough [SOB on Exertion] : shortness of breath during exertion [Constipation] : constipation [Joint Pain] : joint pain [Negative] : Allergic/Immunologic [FreeTextEntry5] : occ palpitations [de-identified] : has numbness and t tingling in hand and feet

## 2023-03-22 NOTE — ASSESSMENT
[Curative] : Goals of care discussed with patient: Curative [Palliative Care Plan] : not applicable at this time [FreeTextEntry1] : S/P C12 Taxol. Patient will receive only Keytruda today. AC will be rescheduled to next week. \par Patient will receive Zarxio 300 mg daily x 3 days to improve neutrophil counts.\par Will continue to monitor for side effects/toxicities.\par RX Cipro 500 m tab twice daily x 7 days was given in case of fever/infection to start, if infection occurs\par Pt to RTO in 2 weeks .

## 2023-03-22 NOTE — HISTORY OF PRESENT ILLNESS
[Disease: _____________________] : Disease: [unfilled] [de-identified] : This is a  47-year-old woman with history of left breast carcinoma who presents for evaluation.  The patient's history dates back to May 2022 when she noted a mass in the left breast.   In 2022  a mammogram and sonogram were performed.  These revealed a mass in the left breast along with calcifications in the right breast.  The biopsy revealed poorly differentiated infiltrating duct carcinoma with Isaias score 8/9, ER [place] -0% and HER2/roxann 1+ negative consistent with triple negative breast cancer.  The Ki-67 was 50%.\par \par There was no obvious left axillary lymph nodes.  The right breast calcifications were biopsied with benign results including fibrocystic changes.  An MRI revealed that the left breast mass with touching the pectoral muscle to the lateral breast area along with satellite.  There was prominence in the left axilla but no definite enlargement of nodes.  The patient has undergone surgical oncology evaluation and the plan is to evaluate patient for neoadjuvant chemotherapy and she is considering bilateral mastectomies.  The patient has undergone genetic testing which is pending.  The patient did have a previous biopsy of the breast in 2018 involving the right breast which showed a benign biopsy.\par \par The patient will undergo targeted reevaluation of the nonspecific foci on the right breast and also left axillary lymph nodes with biopsy to assess for bilateral disease and possible dayna metastases.\par \par Patient's menarche was age 12 and her periods have been regular.  She is  1 para 1 age at first pregnancy was 19.  She denies hormonal therapy or fertility treatments.  The patient will undergo CAT scans and bone scans to evaluate any evidence for systemic disease. [de-identified] : poorly diff IDC ERneg, [place] neg, Her2 neg 1+, Ki67-50%  [de-identified] : Plan neoadjuvant chemo/immunotherapy, Taxol/ carbo/ Pembro AC Keynote 522 [de-identified] : S/p C12 Taxol. Patient is here for evaluation prior to start of AC/Keytruda/Onpro. She feels fine without any new complaints. Denies any fever, chills, nausea, vomiting. Appetite is okay. Patient has no mouth sores, or diarrhea. She still has mild tingling to hands/feet and occasional constipation. Today's labs showed: WBC= 1.87, ANC=0.62. Patient will not be able to receive Adriamycin/Cytoxan today due oc counts.

## 2023-03-23 ENCOUNTER — APPOINTMENT (OUTPATIENT)
Dept: INFUSION THERAPY | Facility: HOSPITAL | Age: 48
End: 2023-03-23

## 2023-03-24 ENCOUNTER — OUTPATIENT (OUTPATIENT)
Dept: OUTPATIENT SERVICES | Facility: HOSPITAL | Age: 48
LOS: 1 days | Discharge: HOME | End: 2023-03-24

## 2023-03-24 DIAGNOSIS — C50.919 MALIGNANT NEOPLASM OF UNSPECIFIED SITE OF UNSPECIFIED FEMALE BREAST: ICD-10-CM

## 2023-03-28 ENCOUNTER — RESULT REVIEW (OUTPATIENT)
Age: 48
End: 2023-03-28

## 2023-03-28 ENCOUNTER — APPOINTMENT (OUTPATIENT)
Dept: HEMATOLOGY ONCOLOGY | Facility: CLINIC | Age: 48
End: 2023-03-28
Payer: COMMERCIAL

## 2023-03-28 ENCOUNTER — APPOINTMENT (OUTPATIENT)
Dept: INFUSION THERAPY | Facility: HOSPITAL | Age: 48
End: 2023-03-28

## 2023-03-28 VITALS
TEMPERATURE: 97 F | HEART RATE: 78 BPM | RESPIRATION RATE: 16 BRPM | SYSTOLIC BLOOD PRESSURE: 105 MMHG | WEIGHT: 149.91 LBS | BODY MASS INDEX: 22.14 KG/M2 | DIASTOLIC BLOOD PRESSURE: 71 MMHG | OXYGEN SATURATION: 98 %

## 2023-03-28 LAB
ALBUMIN SERPL ELPH-MCNC: 4.1 G/DL — SIGNIFICANT CHANGE UP (ref 3.3–5)
ALP SERPL-CCNC: 95 U/L — SIGNIFICANT CHANGE UP (ref 40–120)
ALT FLD-CCNC: 78 U/L — HIGH (ref 10–45)
ANION GAP SERPL CALC-SCNC: 13 MMOL/L — SIGNIFICANT CHANGE UP (ref 5–17)
AST SERPL-CCNC: 57 U/L — HIGH (ref 10–40)
BASOPHILS # BLD AUTO: 0.03 K/UL — SIGNIFICANT CHANGE UP (ref 0–0.2)
BASOPHILS NFR BLD AUTO: 1 % — SIGNIFICANT CHANGE UP (ref 0–2)
BILIRUB SERPL-MCNC: 0.2 MG/DL — SIGNIFICANT CHANGE UP (ref 0.2–1.2)
BUN SERPL-MCNC: 12 MG/DL — SIGNIFICANT CHANGE UP (ref 7–23)
CALCIUM SERPL-MCNC: 9.3 MG/DL — SIGNIFICANT CHANGE UP (ref 8.4–10.5)
CHLORIDE SERPL-SCNC: 102 MMOL/L — SIGNIFICANT CHANGE UP (ref 96–108)
CO2 SERPL-SCNC: 24 MMOL/L — SIGNIFICANT CHANGE UP (ref 22–31)
CREAT SERPL-MCNC: 0.59 MG/DL — SIGNIFICANT CHANGE UP (ref 0.5–1.3)
EGFR: 111 ML/MIN/1.73M2 — SIGNIFICANT CHANGE UP
EOSINOPHIL # BLD AUTO: 0.03 K/UL — SIGNIFICANT CHANGE UP (ref 0–0.5)
EOSINOPHIL NFR BLD AUTO: 1 % — SIGNIFICANT CHANGE UP (ref 0–6)
GLUCOSE SERPL-MCNC: 128 MG/DL — HIGH (ref 70–99)
HCT VFR BLD CALC: 33.2 % — LOW (ref 34.5–45)
HGB BLD-MCNC: 11.1 G/DL — LOW (ref 11.5–15.5)
IMM GRANULOCYTES NFR BLD AUTO: 2.2 % — HIGH (ref 0–0.9)
LYMPHOCYTES # BLD AUTO: 0.94 K/UL — LOW (ref 1–3.3)
LYMPHOCYTES # BLD AUTO: 30 % — SIGNIFICANT CHANGE UP (ref 13–44)
MCHC RBC-ENTMCNC: 30.2 PG — SIGNIFICANT CHANGE UP (ref 27–34)
MCHC RBC-ENTMCNC: 33.4 G/DL — SIGNIFICANT CHANGE UP (ref 32–36)
MCV RBC AUTO: 90.2 FL — SIGNIFICANT CHANGE UP (ref 80–100)
MONOCYTES # BLD AUTO: 0.52 K/UL — SIGNIFICANT CHANGE UP (ref 0–0.9)
MONOCYTES NFR BLD AUTO: 16.6 % — HIGH (ref 2–14)
NEUTROPHILS # BLD AUTO: 1.54 K/UL — LOW (ref 1.8–7.4)
NEUTROPHILS NFR BLD AUTO: 49.2 % — SIGNIFICANT CHANGE UP (ref 43–77)
NRBC # BLD: 0 /100 WBCS — SIGNIFICANT CHANGE UP (ref 0–0)
PLATELET # BLD AUTO: 155 K/UL — SIGNIFICANT CHANGE UP (ref 150–400)
POTASSIUM SERPL-MCNC: 3.8 MMOL/L — SIGNIFICANT CHANGE UP (ref 3.5–5.3)
POTASSIUM SERPL-SCNC: 3.8 MMOL/L — SIGNIFICANT CHANGE UP (ref 3.5–5.3)
PROT SERPL-MCNC: 6.6 G/DL — SIGNIFICANT CHANGE UP (ref 6–8.3)
RBC # BLD: 3.68 M/UL — LOW (ref 3.8–5.2)
RBC # FLD: 20.7 % — HIGH (ref 10.3–14.5)
SODIUM SERPL-SCNC: 139 MMOL/L — SIGNIFICANT CHANGE UP (ref 135–145)
WBC # BLD: 3.13 K/UL — LOW (ref 3.8–10.5)
WBC # FLD AUTO: 3.13 K/UL — LOW (ref 3.8–10.5)

## 2023-03-28 PROCEDURE — 99214 OFFICE O/P EST MOD 30 MIN: CPT

## 2023-03-28 NOTE — REVIEW OF SYSTEMS
[Cough] : cough [SOB on Exertion] : shortness of breath during exertion [Constipation] : constipation [Joint Pain] : joint pain [Negative] : Allergic/Immunologic [FreeTextEntry5] : occ palpitations [de-identified] : has numbness and t tingling in hand and feet

## 2023-03-28 NOTE — HISTORY OF PRESENT ILLNESS
[Disease: _____________________] : Disease: [unfilled] [de-identified] : This is a  47-year-old woman with history of left breast carcinoma who presents for evaluation.  The patient's history dates back to May 2022 when she noted a mass in the left breast.   In 2022  a mammogram and sonogram were performed.  These revealed a mass in the left breast along with calcifications in the right breast.  The biopsy revealed poorly differentiated infiltrating duct carcinoma with Isaias score 8/9, ER [place] -0% and HER2/roxann 1+ negative consistent with triple negative breast cancer.  The Ki-67 was 50%.\par \par There was no obvious left axillary lymph nodes.  The right breast calcifications were biopsied with benign results including fibrocystic changes.  An MRI revealed that the left breast mass with touching the pectoral muscle to the lateral breast area along with satellite.  There was prominence in the left axilla but no definite enlargement of nodes.  The patient has undergone surgical oncology evaluation and the plan is to evaluate patient for neoadjuvant chemotherapy and she is considering bilateral mastectomies.  The patient has undergone genetic testing which is pending.  The patient did have a previous biopsy of the breast in 2018 involving the right breast which showed a benign biopsy.\par \par The patient will undergo targeted reevaluation of the nonspecific foci on the right breast and also left axillary lymph nodes with biopsy to assess for bilateral disease and possible dayna metastases.\par \par Patient's menarche was age 12 and her periods have been regular.  She is  1 para 1 age at first pregnancy was 19.  She denies hormonal therapy or fertility treatments.  The patient will undergo CAT scans and bone scans to evaluate any evidence for systemic disease. [de-identified] : poorly diff IDC ERneg, [place] neg, Her2 neg 1+, Ki67-50%  [de-identified] : Plan neoadjuvant chemo/immunotherapy, Taxol/ carbo/ Pembro AC Keynote 522 [de-identified] : S/p C12 Taxol; S/p C1 Keytruda. Patient comes for evaluation prior to receiving AC/Opro, today.  She feels fine without any new complaints. Denies any fever, chills, nausea, vomiting. Appetite is okay. Patient has no mouth sores, or diarrhea. She has unchanged mild tingling to hands/feet and occasional constipation.

## 2023-03-28 NOTE — ASSESSMENT
[Curative] : Goals of care discussed with patient: Curative [Palliative Care Plan] : not applicable at this time [FreeTextEntry1] : S/P C12 Taxol, S/p C1 Keytruda. .Patient will receive. AC/Onpro today.\par Will continue to monitor for side effects/toxicities. \par After completion of 4 cycles of  AC, CT-Scans of abdomen/pelvis/chest will be done to assess for progression vs regression of disease.\par Reviewed labs and ordered repeat labs, f/u\par Pt to RTO in 2 weeks .

## 2023-03-29 DIAGNOSIS — Z51.11 ENCOUNTER FOR ANTINEOPLASTIC CHEMOTHERAPY: ICD-10-CM

## 2023-03-29 DIAGNOSIS — Z51.89 ENCOUNTER FOR OTHER SPECIFIED AFTERCARE: ICD-10-CM

## 2023-03-29 DIAGNOSIS — R11.2 NAUSEA WITH VOMITING, UNSPECIFIED: ICD-10-CM

## 2023-04-01 LAB
CORTICOSTEROID BINDING GLOBULIN RESULT: 1.6 MG/DL — LOW
CORTIS F/TOTAL MFR SERPL: 5.2 % — SIGNIFICANT CHANGE UP
CORTIS SERPL-MCNC: 4.6 UG/DL — SIGNIFICANT CHANGE UP
CORTISOL, FREE RESULT: 0.24 UG/DL — SIGNIFICANT CHANGE UP

## 2023-04-04 ENCOUNTER — APPOINTMENT (OUTPATIENT)
Dept: HEMATOLOGY ONCOLOGY | Facility: CLINIC | Age: 48
End: 2023-04-04

## 2023-04-04 ENCOUNTER — APPOINTMENT (OUTPATIENT)
Dept: INFUSION THERAPY | Facility: HOSPITAL | Age: 48
End: 2023-04-04

## 2023-04-11 ENCOUNTER — RESULT REVIEW (OUTPATIENT)
Age: 48
End: 2023-04-11

## 2023-04-11 ENCOUNTER — APPOINTMENT (OUTPATIENT)
Dept: INFUSION THERAPY | Facility: HOSPITAL | Age: 48
End: 2023-04-11

## 2023-04-11 ENCOUNTER — APPOINTMENT (OUTPATIENT)
Dept: HEMATOLOGY ONCOLOGY | Facility: CLINIC | Age: 48
End: 2023-04-11
Payer: COMMERCIAL

## 2023-04-11 VITALS
HEIGHT: 68.98 IN | WEIGHT: 153.44 LBS | DIASTOLIC BLOOD PRESSURE: 84 MMHG | RESPIRATION RATE: 16 BRPM | TEMPERATURE: 97.5 F | BODY MASS INDEX: 22.73 KG/M2 | OXYGEN SATURATION: 100 % | HEART RATE: 84 BPM | SYSTOLIC BLOOD PRESSURE: 123 MMHG

## 2023-04-11 LAB
BASOPHILS # BLD AUTO: 0.05 K/UL — SIGNIFICANT CHANGE UP (ref 0–0.2)
BASOPHILS NFR BLD AUTO: 1 % — SIGNIFICANT CHANGE UP (ref 0–2)
DACRYOCYTES BLD QL SMEAR: SLIGHT — SIGNIFICANT CHANGE UP
ELLIPTOCYTES BLD QL SMEAR: SLIGHT — SIGNIFICANT CHANGE UP
EOSINOPHIL # BLD AUTO: 0 K/UL — SIGNIFICANT CHANGE UP (ref 0–0.5)
EOSINOPHIL NFR BLD AUTO: 0 % — SIGNIFICANT CHANGE UP (ref 0–6)
HCT VFR BLD CALC: 29.7 % — LOW (ref 34.5–45)
HGB BLD-MCNC: 9.9 G/DL — LOW (ref 11.5–15.5)
LYMPHOCYTES # BLD AUTO: 1.12 K/UL — SIGNIFICANT CHANGE UP (ref 1–3.3)
LYMPHOCYTES # BLD AUTO: 21 % — SIGNIFICANT CHANGE UP (ref 13–44)
MCHC RBC-ENTMCNC: 30.8 PG — SIGNIFICANT CHANGE UP (ref 27–34)
MCHC RBC-ENTMCNC: 33.3 G/DL — SIGNIFICANT CHANGE UP (ref 32–36)
MCV RBC AUTO: 92.5 FL — SIGNIFICANT CHANGE UP (ref 80–100)
MONOCYTES # BLD AUTO: 0.37 K/UL — SIGNIFICANT CHANGE UP (ref 0–0.9)
MONOCYTES NFR BLD AUTO: 7 % — SIGNIFICANT CHANGE UP (ref 2–14)
MYELOCYTES NFR BLD: 2 % — HIGH (ref 0–0)
NEUTROPHILS # BLD AUTO: 3.66 K/UL — SIGNIFICANT CHANGE UP (ref 1.8–7.4)
NEUTROPHILS NFR BLD AUTO: 69 % — SIGNIFICANT CHANGE UP (ref 43–77)
NRBC # BLD: 1 /100 — HIGH (ref 0–0)
NRBC # BLD: SIGNIFICANT CHANGE UP /100 WBCS (ref 0–0)
PLAT MORPH BLD: NORMAL — SIGNIFICANT CHANGE UP
PLATELET # BLD AUTO: 199 K/UL — SIGNIFICANT CHANGE UP (ref 150–400)
POIKILOCYTOSIS BLD QL AUTO: SLIGHT — SIGNIFICANT CHANGE UP
POLYCHROMASIA BLD QL SMEAR: SLIGHT — SIGNIFICANT CHANGE UP
RBC # BLD: 3.21 M/UL — LOW (ref 3.8–5.2)
RBC # FLD: 17.3 % — HIGH (ref 10.3–14.5)
RBC BLD AUTO: ABNORMAL
TOXIC GRANULES BLD QL SMEAR: PRESENT — SIGNIFICANT CHANGE UP
WBC # BLD: 5.31 K/UL — SIGNIFICANT CHANGE UP (ref 3.8–10.5)
WBC # FLD AUTO: 5.31 K/UL — SIGNIFICANT CHANGE UP (ref 3.8–10.5)

## 2023-04-11 PROCEDURE — 99214 OFFICE O/P EST MOD 30 MIN: CPT

## 2023-04-11 NOTE — PHYSICAL EXAM
[Restricted in physically strenuous activity but ambulatory and able to carry out work of a light or sedentary nature] : Status 1- Restricted in physically strenuous activity but ambulatory and able to carry out work of a light or sedentary nature, e.g., light house work, office work [Normal] : affect appropriate [de-identified] : Exam reveals thickness in the upper medial left breast with no definite mass noted.  There is no axillary adenopathy palpable.

## 2023-04-11 NOTE — ASSESSMENT
[FreeTextEntry1] : The patient will continue her neoadjuvant therapy for her left breast carcinoma which is triple negative.  She has completed the 12 weeks of initial CarboTaxol and pembrolizumab treatment and now has completed 1 cycle of the AC and Pembro chemotherapy.  She will continue to be monitored for side effects and toxicity.  She did develop nausea after the chemotherapy on day 2 which responded to antiemetic therapy.  She continues to have intermittent nausea and weakness which now is resolved.  We also spoke about contacting the surgeon regarding follow-up visit.  The patient will return to the office in 2 weeks or sooner as needed.  We also spoke about repeating scans including CAT scans of the chest abdomen pelvis and also MRI of the breast.  Once we have more information further recommendations will be made. [Curative] : Goals of care discussed with patient: Curative

## 2023-04-11 NOTE — HISTORY OF PRESENT ILLNESS
[Disease: _____________________] : Disease: [unfilled] [de-identified] : This is a  47-year-old woman with history of left breast carcinoma who presents for evaluation.  The patient's history dates back to May 2022 when she noted a mass in the left breast.   In 2022  a mammogram and sonogram were performed.  These revealed a mass in the left breast along with calcifications in the right breast.  The biopsy revealed poorly differentiated infiltrating duct carcinoma with Isaias score 8/9, ER [place] -0% and HER2/roxann 1+ negative consistent with triple negative breast cancer.  The Ki-67 was 50%.\par \par There was no obvious left axillary lymph nodes.  The right breast calcifications were biopsied with benign results including fibrocystic changes.  An MRI revealed that the left breast mass with touching the pectoral muscle to the lateral breast area along with satellite.  There was prominence in the left axilla but no definite enlargement of nodes.  The patient has undergone surgical oncology evaluation and the plan is to evaluate patient for neoadjuvant chemotherapy and she is considering bilateral mastectomies.  The patient has undergone genetic testing which is pending.  The patient did have a previous biopsy of the breast in 2018 involving the right breast which showed a benign biopsy.\par \par The patient will undergo targeted reevaluation of the nonspecific foci on the right breast and also left axillary lymph nodes with biopsy to assess for bilateral disease and possible dayna metastases.\par \par Patient's menarche was age 12 and her periods have been regular.  She is  1 para 1 age at first pregnancy was 19.  She denies hormonal therapy or fertility treatments.  The patient will undergo CAT scans and bone scans to evaluate any evidence for systemic disease. [de-identified] : poorly diff IDC ERneg, [place] neg, Her2 neg 1+, Ki67-50%  TNBC [de-identified] : Plan neoadjuvant chemo/immunotherapy, Taxol/ carbo/ Pembro AC Keynote 522 [de-identified] : Since the  last visit the patient has completed 12 weeks of Taxol carboplatin and pembrolizumab and has completed 1 cycle of Adriamycin and Cytoxan and Pembro which she tolerated well except for increased fatigue and nausea requiring antiemetic therapy.  She continues to have intermittent weakness shortness of breath and cough.

## 2023-04-11 NOTE — REVIEW OF SYSTEMS
[Cough] : cough [SOB on Exertion] : shortness of breath during exertion [Joint Pain] : joint pain [Joint Stiffness] : joint stiffness [Negative] : Allergic/Immunologic [FreeTextEntry7] : has cramping and nausea post chemo [de-identified] : has numbness in feet and dec in hands

## 2023-04-12 DIAGNOSIS — Z79.899 OTHER LONG TERM (CURRENT) DRUG THERAPY: ICD-10-CM

## 2023-04-12 LAB
ALBUMIN SERPL ELPH-MCNC: 4.2 G/DL — SIGNIFICANT CHANGE UP (ref 3.3–5)
ALP SERPL-CCNC: 97 U/L — SIGNIFICANT CHANGE UP (ref 40–120)
ALT FLD-CCNC: 16 U/L — SIGNIFICANT CHANGE UP (ref 10–45)
ANION GAP SERPL CALC-SCNC: 13 MMOL/L — SIGNIFICANT CHANGE UP (ref 5–17)
AST SERPL-CCNC: 19 U/L — SIGNIFICANT CHANGE UP (ref 10–40)
BILIRUB SERPL-MCNC: <0.2 MG/DL — SIGNIFICANT CHANGE UP (ref 0.2–1.2)
BUN SERPL-MCNC: 13 MG/DL — SIGNIFICANT CHANGE UP (ref 7–23)
CALCIUM SERPL-MCNC: 9.3 MG/DL — SIGNIFICANT CHANGE UP (ref 8.4–10.5)
CHLORIDE SERPL-SCNC: 103 MMOL/L — SIGNIFICANT CHANGE UP (ref 96–108)
CO2 SERPL-SCNC: 24 MMOL/L — SIGNIFICANT CHANGE UP (ref 22–31)
CREAT SERPL-MCNC: 0.56 MG/DL — SIGNIFICANT CHANGE UP (ref 0.5–1.3)
EGFR: 113 ML/MIN/1.73M2 — SIGNIFICANT CHANGE UP
GLUCOSE SERPL-MCNC: 129 MG/DL — HIGH (ref 70–99)
POTASSIUM SERPL-MCNC: 3.8 MMOL/L — SIGNIFICANT CHANGE UP (ref 3.5–5.3)
POTASSIUM SERPL-SCNC: 3.8 MMOL/L — SIGNIFICANT CHANGE UP (ref 3.5–5.3)
PROT SERPL-MCNC: 6.6 G/DL — SIGNIFICANT CHANGE UP (ref 6–8.3)
SODIUM SERPL-SCNC: 140 MMOL/L — SIGNIFICANT CHANGE UP (ref 135–145)
T3 SERPL-MCNC: 148 NG/DL — SIGNIFICANT CHANGE UP (ref 80–200)
T4 FREE SERPL-MCNC: 1 NG/DL — SIGNIFICANT CHANGE UP (ref 0.9–1.8)
TSH SERPL-MCNC: 1.44 UIU/ML — SIGNIFICANT CHANGE UP (ref 0.27–4.2)

## 2023-04-17 LAB
CORTICOSTEROID BINDING GLOBULIN RESULT: 1.7 MG/DL — SIGNIFICANT CHANGE UP
CORTIS F/TOTAL MFR SERPL: 8.5 % — SIGNIFICANT CHANGE UP
CORTIS SERPL-MCNC: 8.5 UG/DL — SIGNIFICANT CHANGE UP
CORTISOL, FREE RESULT: 0.72 UG/DL — SIGNIFICANT CHANGE UP

## 2023-04-18 ENCOUNTER — APPOINTMENT (OUTPATIENT)
Dept: HEMATOLOGY ONCOLOGY | Facility: CLINIC | Age: 48
End: 2023-04-18

## 2023-04-25 ENCOUNTER — RESULT REVIEW (OUTPATIENT)
Age: 48
End: 2023-04-25

## 2023-04-25 ENCOUNTER — APPOINTMENT (OUTPATIENT)
Dept: HEMATOLOGY ONCOLOGY | Facility: CLINIC | Age: 48
End: 2023-04-25
Payer: COMMERCIAL

## 2023-04-25 ENCOUNTER — APPOINTMENT (OUTPATIENT)
Dept: INFUSION THERAPY | Facility: HOSPITAL | Age: 48
End: 2023-04-25

## 2023-04-25 VITALS
RESPIRATION RATE: 16 BRPM | TEMPERATURE: 97.2 F | WEIGHT: 154.32 LBS | HEART RATE: 76 BPM | BODY MASS INDEX: 22.8 KG/M2 | OXYGEN SATURATION: 99 % | SYSTOLIC BLOOD PRESSURE: 104 MMHG | DIASTOLIC BLOOD PRESSURE: 70 MMHG

## 2023-04-25 LAB
ALBUMIN SERPL ELPH-MCNC: 4.1 G/DL — SIGNIFICANT CHANGE UP (ref 3.3–5)
ALP SERPL-CCNC: 88 U/L — SIGNIFICANT CHANGE UP (ref 40–120)
ALT FLD-CCNC: 12 U/L — SIGNIFICANT CHANGE UP (ref 10–45)
ANION GAP SERPL CALC-SCNC: 11 MMOL/L — SIGNIFICANT CHANGE UP (ref 5–17)
AST SERPL-CCNC: 16 U/L — SIGNIFICANT CHANGE UP (ref 10–40)
BASOPHILS # BLD AUTO: 0.13 K/UL — SIGNIFICANT CHANGE UP (ref 0–0.2)
BASOPHILS NFR BLD AUTO: 2 % — SIGNIFICANT CHANGE UP (ref 0–2)
BILIRUB SERPL-MCNC: <0.2 MG/DL — SIGNIFICANT CHANGE UP (ref 0.2–1.2)
BUN SERPL-MCNC: 12 MG/DL — SIGNIFICANT CHANGE UP (ref 7–23)
CALCIUM SERPL-MCNC: 9.1 MG/DL — SIGNIFICANT CHANGE UP (ref 8.4–10.5)
CHLORIDE SERPL-SCNC: 105 MMOL/L — SIGNIFICANT CHANGE UP (ref 96–108)
CO2 SERPL-SCNC: 24 MMOL/L — SIGNIFICANT CHANGE UP (ref 22–31)
CREAT SERPL-MCNC: 0.53 MG/DL — SIGNIFICANT CHANGE UP (ref 0.5–1.3)
DACRYOCYTES BLD QL SMEAR: SLIGHT — SIGNIFICANT CHANGE UP
EGFR: 114 ML/MIN/1.73M2 — SIGNIFICANT CHANGE UP
ELLIPTOCYTES BLD QL SMEAR: SLIGHT — SIGNIFICANT CHANGE UP
EOSINOPHIL # BLD AUTO: 0 K/UL — SIGNIFICANT CHANGE UP (ref 0–0.5)
EOSINOPHIL NFR BLD AUTO: 0 % — SIGNIFICANT CHANGE UP (ref 0–6)
GLUCOSE SERPL-MCNC: 107 MG/DL — HIGH (ref 70–99)
HCT VFR BLD CALC: 29.4 % — LOW (ref 34.5–45)
HGB BLD-MCNC: 9.9 G/DL — LOW (ref 11.5–15.5)
LYMPHOCYTES # BLD AUTO: 0.82 K/UL — LOW (ref 1–3.3)
LYMPHOCYTES # BLD AUTO: 13 % — SIGNIFICANT CHANGE UP (ref 13–44)
MCHC RBC-ENTMCNC: 31.3 PG — SIGNIFICANT CHANGE UP (ref 27–34)
MCHC RBC-ENTMCNC: 33.7 G/DL — SIGNIFICANT CHANGE UP (ref 32–36)
MCV RBC AUTO: 93 FL — SIGNIFICANT CHANGE UP (ref 80–100)
METAMYELOCYTES # FLD: 1.5 % — HIGH (ref 0–0)
MONOCYTES # BLD AUTO: 0.73 K/UL — SIGNIFICANT CHANGE UP (ref 0–0.9)
MONOCYTES NFR BLD AUTO: 11.5 % — SIGNIFICANT CHANGE UP (ref 2–14)
MYELOCYTES NFR BLD: 2.5 % — HIGH (ref 0–0)
NEUTROPHILS # BLD AUTO: 4.27 K/UL — SIGNIFICANT CHANGE UP (ref 1.8–7.4)
NEUTROPHILS NFR BLD AUTO: 67.5 % — SIGNIFICANT CHANGE UP (ref 43–77)
NRBC # BLD: 1 /100 — HIGH (ref 0–0)
NRBC # BLD: SIGNIFICANT CHANGE UP /100 WBCS (ref 0–0)
PLAT MORPH BLD: NORMAL — SIGNIFICANT CHANGE UP
PLATELET # BLD AUTO: 142 K/UL — LOW (ref 150–400)
POIKILOCYTOSIS BLD QL AUTO: SLIGHT — SIGNIFICANT CHANGE UP
POLYCHROMASIA BLD QL SMEAR: SLIGHT — SIGNIFICANT CHANGE UP
POTASSIUM SERPL-MCNC: 3.8 MMOL/L — SIGNIFICANT CHANGE UP (ref 3.5–5.3)
POTASSIUM SERPL-SCNC: 3.8 MMOL/L — SIGNIFICANT CHANGE UP (ref 3.5–5.3)
PROMYELOCYTES # FLD: 2 % — HIGH (ref 0–0)
PROT SERPL-MCNC: 6.2 G/DL — SIGNIFICANT CHANGE UP (ref 6–8.3)
RBC # BLD: 3.16 M/UL — LOW (ref 3.8–5.2)
RBC # FLD: 15.7 % — HIGH (ref 10.3–14.5)
RBC BLD AUTO: ABNORMAL
SODIUM SERPL-SCNC: 140 MMOL/L — SIGNIFICANT CHANGE UP (ref 135–145)
WBC # BLD: 6.32 K/UL — SIGNIFICANT CHANGE UP (ref 3.8–10.5)
WBC # FLD AUTO: 6.32 K/UL — SIGNIFICANT CHANGE UP (ref 3.8–10.5)

## 2023-04-25 PROCEDURE — 99214 OFFICE O/P EST MOD 30 MIN: CPT

## 2023-04-25 NOTE — PHYSICAL EXAM
[Restricted in physically strenuous activity but ambulatory and able to carry out work of a light or sedentary nature] : Status 1- Restricted in physically strenuous activity but ambulatory and able to carry out work of a light or sedentary nature, e.g., light house work, office work [Normal] : affect appropriate [de-identified] : Exam reveals thickness in the upper medial left breast with no definite mass noted.  There is no axillary adenopathy palpable.

## 2023-04-25 NOTE — REVIEW OF SYSTEMS
[Negative] : Allergic/Immunologic [Cough] : no cough [Joint Pain] : no joint pain [Joint Stiffness] : no joint stiffness [de-identified] : mild numbness tp feet and hands

## 2023-04-25 NOTE — ASSESSMENT
[Curative] : Goals of care discussed with patient: Curative [FreeTextEntry1] : The patient will continue her neoadjuvant therapy for her left breast carcinoma which is triple negative. \par Patient is s/p C 12 weeks CarboTaxol and Keytruda. \par S/p C2 AC/Keytruda. Patient will continue present treatment.\par She will continue to be monitored for side effects and toxicity.  \par We also spoke about contacting the surgeon regarding follow-up visit.  \par Patient will have repeating scans including CAT scans of the chest abdomen pelvis and also MRI of the breast, after 4 cycle of AC.\par Review labs with CA 27-29, and ordered new labs, f/u.\par Patient will follow up with Dr. Jean to obtain date for surgery.\par The patient will return to the office in 2 weeks or sooner as needed.

## 2023-04-25 NOTE — HISTORY OF PRESENT ILLNESS
[Disease: _____________________] : Disease: [unfilled] [de-identified] : poorly diff IDC ERneg, [place] neg, Her2 neg 1+, Ki67-50%  TNBC [de-identified] : This is a  47-year-old woman with history of left breast carcinoma who presents for evaluation.  The patient's history dates back to May 2022 when she noted a mass in the left breast.   In 2022  a mammogram and sonogram were performed.  These revealed a mass in the left breast along with calcifications in the right breast.  The biopsy revealed poorly differentiated infiltrating duct carcinoma with Isaias score 8/9, ER [place] -0% and HER2/roxann 1+ negative consistent with triple negative breast cancer.  The Ki-67 was 50%.\par \par There was no obvious left axillary lymph nodes.  The right breast calcifications were biopsied with benign results including fibrocystic changes.  An MRI revealed that the left breast mass with touching the pectoral muscle to the lateral breast area along with satellite.  There was prominence in the left axilla but no definite enlargement of nodes.  The patient has undergone surgical oncology evaluation and the plan is to evaluate patient for neoadjuvant chemotherapy and she is considering bilateral mastectomies.  The patient has undergone genetic testing which is pending.  The patient did have a previous biopsy of the breast in 2018 involving the right breast which showed a benign biopsy.\par \par The patient will undergo targeted reevaluation of the nonspecific foci on the right breast and also left axillary lymph nodes with biopsy to assess for bilateral disease and possible dayna metastases.\par \par Patient's menarche was age 12 and her periods have been regular.  She is  1 para 1 age at first pregnancy was 19.  She denies hormonal therapy or fertility treatments.  The patient will undergo CAT scans and bone scans to evaluate any evidence for systemic disease. [de-identified] : Plan neoadjuvant chemo/immunotherapy, s/p C12 Taxol/ carbo/ s/p C2 AC/Pembro -Keynote 522 [de-identified] : s/p C12 Taxol/Carbo/s/p C2 AC/Keytuda. Patient is here for f/u. She feels fine except for mild tingling to feet. Denies any mouth sores, nausea, vomiting, diarrhea or constpation. Appetite is fine with no weight loss.

## 2023-04-26 ENCOUNTER — NON-APPOINTMENT (OUTPATIENT)
Age: 48
End: 2023-04-26

## 2023-04-26 LAB — CANCER AG27-29 SERPL-ACNC: 33.4 U/ML — SIGNIFICANT CHANGE UP (ref 0–38.6)

## 2023-05-02 ENCOUNTER — RESULT REVIEW (OUTPATIENT)
Age: 48
End: 2023-05-02

## 2023-05-02 ENCOUNTER — APPOINTMENT (OUTPATIENT)
Dept: INFUSION THERAPY | Facility: HOSPITAL | Age: 48
End: 2023-05-02

## 2023-05-02 LAB
ALBUMIN SERPL ELPH-MCNC: 4.2 G/DL — SIGNIFICANT CHANGE UP (ref 3.3–5)
ALP SERPL-CCNC: 100 U/L — SIGNIFICANT CHANGE UP (ref 40–120)
ALT FLD-CCNC: 6 U/L — LOW (ref 10–45)
ANION GAP SERPL CALC-SCNC: 10 MMOL/L — SIGNIFICANT CHANGE UP (ref 5–17)
ANISOCYTOSIS BLD QL: SLIGHT — SIGNIFICANT CHANGE UP
AST SERPL-CCNC: 14 U/L — SIGNIFICANT CHANGE UP (ref 10–40)
BASOPHILS # BLD AUTO: 0 K/UL — SIGNIFICANT CHANGE UP (ref 0–0.2)
BASOPHILS NFR BLD AUTO: 0 % — SIGNIFICANT CHANGE UP (ref 0–2)
BILIRUB SERPL-MCNC: 0.3 MG/DL — SIGNIFICANT CHANGE UP (ref 0.2–1.2)
BUN SERPL-MCNC: 19 MG/DL — SIGNIFICANT CHANGE UP (ref 7–23)
CALCIUM SERPL-MCNC: 9.2 MG/DL — SIGNIFICANT CHANGE UP (ref 8.4–10.5)
CHLORIDE SERPL-SCNC: 103 MMOL/L — SIGNIFICANT CHANGE UP (ref 96–108)
CO2 SERPL-SCNC: 25 MMOL/L — SIGNIFICANT CHANGE UP (ref 22–31)
CREAT SERPL-MCNC: 0.52 MG/DL — SIGNIFICANT CHANGE UP (ref 0.5–1.3)
DACRYOCYTES BLD QL SMEAR: SLIGHT — SIGNIFICANT CHANGE UP
EGFR: 115 ML/MIN/1.73M2 — SIGNIFICANT CHANGE UP
ELLIPTOCYTES BLD QL SMEAR: SLIGHT — SIGNIFICANT CHANGE UP
EOSINOPHIL # BLD AUTO: 0.02 K/UL — SIGNIFICANT CHANGE UP (ref 0–0.5)
EOSINOPHIL NFR BLD AUTO: 1 % — SIGNIFICANT CHANGE UP (ref 0–6)
GLUCOSE SERPL-MCNC: 94 MG/DL — SIGNIFICANT CHANGE UP (ref 70–99)
HCT VFR BLD CALC: 24.6 % — LOW (ref 34.5–45)
HGB BLD-MCNC: 8.4 G/DL — LOW (ref 11.5–15.5)
LYMPHOCYTES # BLD AUTO: 0.31 K/UL — LOW (ref 1–3.3)
LYMPHOCYTES # BLD AUTO: 18 % — SIGNIFICANT CHANGE UP (ref 13–44)
MCHC RBC-ENTMCNC: 31.5 PG — SIGNIFICANT CHANGE UP (ref 27–34)
MCHC RBC-ENTMCNC: 34.1 G/DL — SIGNIFICANT CHANGE UP (ref 32–36)
MCV RBC AUTO: 92.1 FL — SIGNIFICANT CHANGE UP (ref 80–100)
MONOCYTES # BLD AUTO: 0.03 K/UL — SIGNIFICANT CHANGE UP (ref 0–0.9)
MONOCYTES NFR BLD AUTO: 2 % — SIGNIFICANT CHANGE UP (ref 2–14)
MYELOCYTES NFR BLD: 1 % — HIGH (ref 0–0)
NEUTROPHILS # BLD AUTO: 1.36 K/UL — LOW (ref 1.8–7.4)
NEUTROPHILS NFR BLD AUTO: 78 % — HIGH (ref 43–77)
NRBC # BLD: 0 /100 — SIGNIFICANT CHANGE UP (ref 0–0)
NRBC # BLD: SIGNIFICANT CHANGE UP /100 WBCS (ref 0–0)
PLAT MORPH BLD: NORMAL — SIGNIFICANT CHANGE UP
PLATELET # BLD AUTO: 102 K/UL — LOW (ref 150–400)
POIKILOCYTOSIS BLD QL AUTO: SLIGHT — SIGNIFICANT CHANGE UP
POLYCHROMASIA BLD QL SMEAR: SLIGHT — SIGNIFICANT CHANGE UP
POTASSIUM SERPL-MCNC: 3.9 MMOL/L — SIGNIFICANT CHANGE UP (ref 3.5–5.3)
POTASSIUM SERPL-SCNC: 3.9 MMOL/L — SIGNIFICANT CHANGE UP (ref 3.5–5.3)
PROT SERPL-MCNC: 6.4 G/DL — SIGNIFICANT CHANGE UP (ref 6–8.3)
RBC # BLD: 2.67 M/UL — LOW (ref 3.8–5.2)
RBC # FLD: 14.9 % — HIGH (ref 10.3–14.5)
RBC BLD AUTO: ABNORMAL
SODIUM SERPL-SCNC: 139 MMOL/L — SIGNIFICANT CHANGE UP (ref 135–145)
T3 SERPL-MCNC: 116 NG/DL — SIGNIFICANT CHANGE UP (ref 80–200)
T3FREE SERPL-MCNC: 2.72 PG/ML — SIGNIFICANT CHANGE UP (ref 2–4.4)
T4 FREE SERPL-MCNC: 0.9 NG/DL — SIGNIFICANT CHANGE UP (ref 0.9–1.8)
TSH SERPL-MCNC: 2.17 UIU/ML — SIGNIFICANT CHANGE UP (ref 0.27–4.2)
WBC # BLD: 1.74 K/UL — LOW (ref 3.8–10.5)
WBC # FLD AUTO: 1.74 K/UL — LOW (ref 3.8–10.5)

## 2023-05-09 ENCOUNTER — APPOINTMENT (OUTPATIENT)
Dept: INFUSION THERAPY | Facility: HOSPITAL | Age: 48
End: 2023-05-09

## 2023-05-09 ENCOUNTER — RESULT REVIEW (OUTPATIENT)
Age: 48
End: 2023-05-09

## 2023-05-09 ENCOUNTER — APPOINTMENT (OUTPATIENT)
Dept: HEMATOLOGY ONCOLOGY | Facility: CLINIC | Age: 48
End: 2023-05-09
Payer: COMMERCIAL

## 2023-05-09 VITALS
HEIGHT: 68.98 IN | SYSTOLIC BLOOD PRESSURE: 113 MMHG | HEART RATE: 78 BPM | BODY MASS INDEX: 22.53 KG/M2 | WEIGHT: 152.1 LBS | TEMPERATURE: 97.9 F | RESPIRATION RATE: 16 BRPM | DIASTOLIC BLOOD PRESSURE: 76 MMHG | OXYGEN SATURATION: 100 %

## 2023-05-09 LAB
BASOPHILS # BLD AUTO: 0.06 K/UL — SIGNIFICANT CHANGE UP (ref 0–0.2)
BASOPHILS NFR BLD AUTO: 1 % — SIGNIFICANT CHANGE UP (ref 0–2)
DACRYOCYTES BLD QL SMEAR: SLIGHT — SIGNIFICANT CHANGE UP
ELLIPTOCYTES BLD QL SMEAR: SLIGHT — SIGNIFICANT CHANGE UP
EOSINOPHIL # BLD AUTO: 0 K/UL — SIGNIFICANT CHANGE UP (ref 0–0.5)
EOSINOPHIL NFR BLD AUTO: 0 % — SIGNIFICANT CHANGE UP (ref 0–6)
HCT VFR BLD CALC: 27.3 % — LOW (ref 34.5–45)
HGB BLD-MCNC: 9.2 G/DL — LOW (ref 11.5–15.5)
LYMPHOCYTES # BLD AUTO: 0.54 K/UL — LOW (ref 1–3.3)
LYMPHOCYTES # BLD AUTO: 9 % — LOW (ref 13–44)
MCHC RBC-ENTMCNC: 31.7 PG — SIGNIFICANT CHANGE UP (ref 27–34)
MCHC RBC-ENTMCNC: 33.7 G/DL — SIGNIFICANT CHANGE UP (ref 32–36)
MCV RBC AUTO: 94.1 FL — SIGNIFICANT CHANGE UP (ref 80–100)
METAMYELOCYTES # FLD: 1 % — HIGH (ref 0–0)
MONOCYTES # BLD AUTO: 0.36 K/UL — SIGNIFICANT CHANGE UP (ref 0–0.9)
MONOCYTES NFR BLD AUTO: 6 % — SIGNIFICANT CHANGE UP (ref 2–14)
MYELOCYTES NFR BLD: 5 % — HIGH (ref 0–0)
NEUTROPHILS # BLD AUTO: 4.72 K/UL — SIGNIFICANT CHANGE UP (ref 1.8–7.4)
NEUTROPHILS NFR BLD AUTO: 78 % — HIGH (ref 43–77)
NRBC # BLD: 0 /100 — SIGNIFICANT CHANGE UP (ref 0–0)
NRBC # BLD: SIGNIFICANT CHANGE UP /100 WBCS (ref 0–0)
PLAT MORPH BLD: NORMAL — SIGNIFICANT CHANGE UP
PLATELET # BLD AUTO: 162 K/UL — SIGNIFICANT CHANGE UP (ref 150–400)
POIKILOCYTOSIS BLD QL AUTO: SLIGHT — SIGNIFICANT CHANGE UP
POLYCHROMASIA BLD QL SMEAR: SLIGHT — SIGNIFICANT CHANGE UP
RBC # BLD: 2.9 M/UL — LOW (ref 3.8–5.2)
RBC # FLD: 16.4 % — HIGH (ref 10.3–14.5)
RBC BLD AUTO: ABNORMAL
SCHISTOCYTES BLD QL AUTO: SLIGHT — SIGNIFICANT CHANGE UP
TOXIC GRANULES BLD QL SMEAR: PRESENT — SIGNIFICANT CHANGE UP
WBC # BLD: 6.05 K/UL — SIGNIFICANT CHANGE UP (ref 3.8–10.5)
WBC # FLD AUTO: 6.05 K/UL — SIGNIFICANT CHANGE UP (ref 3.8–10.5)

## 2023-05-09 PROCEDURE — 99213 OFFICE O/P EST LOW 20 MIN: CPT

## 2023-05-09 NOTE — REVIEW OF SYSTEMS
[Diarrhea: Grade 0] : Diarrhea: Grade 0 [Negative] : Allergic/Immunologic [FreeTextEntry9] : lower legs tired [de-identified] : has tingling of feet

## 2023-05-09 NOTE — HISTORY OF PRESENT ILLNESS
[Disease: _____________________] : Disease: [unfilled] [de-identified] : This is a  47-year-old woman with history of left breast carcinoma who presents for evaluation.  The patient's history dates back to May 2022 when she noted a mass in the left breast.   In 2022  a mammogram and sonogram were performed.  These revealed a mass in the left breast along with calcifications in the right breast.  The biopsy revealed poorly differentiated infiltrating duct carcinoma with Isaias score 8/9, ER [place] -0% and HER2/roxann 1+ negative consistent with triple negative breast cancer.  The Ki-67 was 50%.\par \par There was no obvious left axillary lymph nodes.  The right breast calcifications were biopsied with benign results including fibrocystic changes.  An MRI revealed that the left breast mass with touching the pectoral muscle to the lateral breast area along with satellite.  There was prominence in the left axilla but no definite enlargement of nodes.  The patient has undergone surgical oncology evaluation and the plan is to evaluate patient for neoadjuvant chemotherapy and she is considering bilateral mastectomies.  The patient has undergone genetic testing which is pending.  The patient did have a previous biopsy of the breast in 2018 involving the right breast which showed a benign biopsy.\par \par The patient will undergo targeted reevaluation of the nonspecific foci on the right breast and also left axillary lymph nodes with biopsy to assess for bilateral disease and possible dayna metastases.\par \par Patient's menarche was age 12 and her periods have been regular.  She is  1 para 1 age at first pregnancy was 19.  She denies hormonal therapy or fertility treatments.  The patient will undergo CAT scans and bone scans to evaluate any evidence for systemic disease. [de-identified] : poorly diff IDC ERneg, IA neg, Her2 neg 1+, Ki67-50%  TNBC [de-identified] : Plan neoadjuvant chemo/immunotherapy, Taxol/ carbo/ Pembro AC Keynote 522 [de-identified] : Since the last visit the patient remains well but still has nonproductive cough intermittently along with weakness in her legs.  The patient will be completing her neoadjuvant chemoimmunotherapy today.  She will undergo surgical evaluation with Dr. Jean.

## 2023-05-09 NOTE — ASSESSMENT
[FreeTextEntry1] : The patient will complete her neoadjuvant chemoimmunotherapy regimen including Taxol carboplatin pembrolizumab followed by AC pembrolizumab.  The patient will then contact her surgeon  for follow-up and scheduling tests and surgical treatment.  Because of persistent cough will recommend patient undergo repeat CAT scan of the chest to evaluate for inflammatory change from her chemoimmunotherapy. [Curative] : Goals of care discussed with patient: Curative [Palliative Care Plan] : not applicable at this time

## 2023-05-09 NOTE — PHYSICAL EXAM
[Restricted in physically strenuous activity but ambulatory and able to carry out work of a light or sedentary nature] : Status 1- Restricted in physically strenuous activity but ambulatory and able to carry out work of a light or sedentary nature, e.g., light house work, office work [Normal] : affect appropriate [de-identified] : minimal residual right mid breasdt thiskness but no palpable mass in breast or axilla

## 2023-05-10 LAB
ALBUMIN SERPL ELPH-MCNC: 4.3 G/DL — SIGNIFICANT CHANGE UP (ref 3.3–5)
ALP SERPL-CCNC: 90 U/L — SIGNIFICANT CHANGE UP (ref 40–120)
ALT FLD-CCNC: 10 U/L — SIGNIFICANT CHANGE UP (ref 10–45)
ANION GAP SERPL CALC-SCNC: 11 MMOL/L — SIGNIFICANT CHANGE UP (ref 5–17)
AST SERPL-CCNC: 14 U/L — SIGNIFICANT CHANGE UP (ref 10–40)
BILIRUB SERPL-MCNC: 0.2 MG/DL — SIGNIFICANT CHANGE UP (ref 0.2–1.2)
BUN SERPL-MCNC: 16 MG/DL — SIGNIFICANT CHANGE UP (ref 7–23)
CALCIUM SERPL-MCNC: 9.2 MG/DL — SIGNIFICANT CHANGE UP (ref 8.4–10.5)
CANCER AG27-29 SERPL-ACNC: 37.6 U/ML — SIGNIFICANT CHANGE UP (ref 0–38.6)
CHLORIDE SERPL-SCNC: 106 MMOL/L — SIGNIFICANT CHANGE UP (ref 96–108)
CO2 SERPL-SCNC: 25 MMOL/L — SIGNIFICANT CHANGE UP (ref 22–31)
CORTICOSTEROID BINDING GLOBULIN RESULT: 1.8 MG/DL — SIGNIFICANT CHANGE UP
CORTIS F/TOTAL MFR SERPL: 5.7 % — SIGNIFICANT CHANGE UP
CORTIS SERPL-MCNC: 6.8 UG/DL — SIGNIFICANT CHANGE UP
CORTISOL, FREE RESULT: 0.39 UG/DL — SIGNIFICANT CHANGE UP
CREAT SERPL-MCNC: 0.58 MG/DL — SIGNIFICANT CHANGE UP (ref 0.5–1.3)
EGFR: 112 ML/MIN/1.73M2 — SIGNIFICANT CHANGE UP
GLUCOSE SERPL-MCNC: 107 MG/DL — HIGH (ref 70–99)
POTASSIUM SERPL-MCNC: 4 MMOL/L — SIGNIFICANT CHANGE UP (ref 3.5–5.3)
POTASSIUM SERPL-SCNC: 4 MMOL/L — SIGNIFICANT CHANGE UP (ref 3.5–5.3)
PROT SERPL-MCNC: 7 G/DL — SIGNIFICANT CHANGE UP (ref 6–8.3)
SODIUM SERPL-SCNC: 142 MMOL/L — SIGNIFICANT CHANGE UP (ref 135–145)

## 2023-05-13 ENCOUNTER — APPOINTMENT (OUTPATIENT)
Dept: CT IMAGING | Facility: IMAGING CENTER | Age: 48
End: 2023-05-13
Payer: COMMERCIAL

## 2023-05-13 ENCOUNTER — OUTPATIENT (OUTPATIENT)
Dept: OUTPATIENT SERVICES | Facility: HOSPITAL | Age: 48
LOS: 1 days | End: 2023-05-13
Payer: COMMERCIAL

## 2023-05-13 DIAGNOSIS — Z00.8 ENCOUNTER FOR OTHER GENERAL EXAMINATION: ICD-10-CM

## 2023-05-13 PROCEDURE — 71260 CT THORAX DX C+: CPT | Mod: 26

## 2023-05-13 PROCEDURE — 71260 CT THORAX DX C+: CPT

## 2023-05-16 ENCOUNTER — APPOINTMENT (OUTPATIENT)
Dept: INFUSION THERAPY | Facility: HOSPITAL | Age: 48
End: 2023-05-16

## 2023-05-17 ENCOUNTER — APPOINTMENT (OUTPATIENT)
Dept: SURGICAL ONCOLOGY | Facility: CLINIC | Age: 48
End: 2023-05-17
Payer: COMMERCIAL

## 2023-05-17 VITALS
WEIGHT: 152 LBS | RESPIRATION RATE: 16 BRPM | SYSTOLIC BLOOD PRESSURE: 128 MMHG | OXYGEN SATURATION: 99 % | DIASTOLIC BLOOD PRESSURE: 73 MMHG | HEIGHT: 68.98 IN | BODY MASS INDEX: 22.51 KG/M2 | HEART RATE: 102 BPM

## 2023-05-17 PROCEDURE — 99215 OFFICE O/P EST HI 40 MIN: CPT

## 2023-05-17 NOTE — HISTORY OF PRESENT ILLNESS
[de-identified] : Patient is a 46 y/o female who presents for a follow up visit for triple negative left breast cancer.  \par Consensus at [facility] was to treat with neoadjuvant chemo/immunotherapy, Taxol/ carbo/ Pembro AC Keynote 522. She completed chemotherapy on 5/9/23.\par She is here for surgical consultation. \par \par 11/23/22- NM bone scan negative for osseous metastasis.  CT C/AP negative for distant metastatic disease.\par \par 11/21/22- s/p USG FNA right breast 1:00, negative for malignant cells and consistent with cyst contents.  US evaluation of the left axilla demonstrates normal-sized lymph nodes with benign morphology.  Evaluation of the right breast 3:00 demonstrates normal fatty lobule in the area of initial concern.  \par \par Breast MRI 11/9/22- left breast malignancy with multiple satellite masses, overall greatest AP dimension of 6.7 cm.  Mildly prominent lymph nodes in the left axilla for which targeted evaluation and possible USGB is recommended.  Probably benign mass right breast 3:00, targeted re evaluation is recommended for confirmation with USGB if warranted.   Indeterminate mass right breast 1:00, targeted re evaluation is recommended for confirmation with USGB if warranted.\par \par Biopsy (10/17/22):\par Site 1: Left ultrasound biopsy irregular mass at 10-12:00 \par -Invasive carcinoma consistent with invasive mammary ductal carcinoma\par Site 2: Right breast posterior upper central calcifications, stereotactic biopsy\par -Benign breast tissue with apocrine/papillary apocrine cysts\par -Calcification associated with ductules, lobules, cystic change, and stroma\par \par Patient underwent sonogram on 9/19/22 which showed highly suspicious palpable hypoechoic left breast mass at 10:00-12:00 for which an us-guided biopsy is recommended. Indeterminate calcifications in the posterior upper central right breast for which stereotactic biopsy is recommended. Probably benign right 3:00 sonographic nodule for which a 6 month follow up targeted right breast ultrasound is recommended. (BI-RADS 5)\par \par Denies any family history of breast cancer. 
yellow

## 2023-05-17 NOTE — ASSESSMENT
[FreeTextEntry1] : T3 triple negative left breast cancer\par Completed neoadjuvant chemotherapy May 2023 with excellent clinical response\par I had a long discussion with the patient regarding all management options including breast conservation vs bilateral nipple sparing mastectomy with reconstruction\par Will get breast MRI now to evaluate response to therapy and to confirm whether patient is a candidate for breast conservation\par Patient will make an appointment to see Dr. Thompson of plastic surgery to discuss reconstruction options \par We will schedule surgery once patient has made her surgical decision\par All questions answered \par \par \par \par

## 2023-05-17 NOTE — PHYSICAL EXAM
[Normal] : supple, no neck mass and thyroid not enlarged [Normal Neck Lymph Nodes] : normal neck lymph nodes  [Normal Supraclavicular Lymph Nodes] : normal supraclavicular lymph nodes [Normal Groin Lymph Nodes] : normal groin lymph nodes [Normal Axillary Lymph Nodes] : normal axillary lymph nodes [Normal] : oriented to person, place and time, with appropriate affect [de-identified] : No masses or adenopathy bilaterally, ultrasound shows questionable small cysts left upper inner quadrant at site of cancer, no obvious solid mass

## 2023-05-17 NOTE — ADDENDUM
[FreeTextEntry1] : I, Lisa Culver, acted solely as a scribe for Dr. Edward Jean on this date 05/17/2023.

## 2023-05-17 NOTE — CONSULT LETTER
[Dear  ___] : Dear  [unfilled], [Please see my note below.] : Please see my note below. [Sincerely,] : Sincerely, [FreeTextEntry3] : Edward Jean MD FACS  [Courtesy Letter:] : I had the pleasure of seeing your patient, [unfilled], in my office today. [DrShiraz  ___] : Dr. COLE

## 2023-05-20 ENCOUNTER — OUTPATIENT (OUTPATIENT)
Dept: OUTPATIENT SERVICES | Facility: HOSPITAL | Age: 48
LOS: 1 days | End: 2023-05-20
Payer: COMMERCIAL

## 2023-05-20 ENCOUNTER — APPOINTMENT (OUTPATIENT)
Dept: MRI IMAGING | Facility: IMAGING CENTER | Age: 48
End: 2023-05-20
Payer: COMMERCIAL

## 2023-05-20 DIAGNOSIS — C50.919 MALIGNANT NEOPLASM OF UNSPECIFIED SITE OF UNSPECIFIED FEMALE BREAST: ICD-10-CM

## 2023-05-20 PROCEDURE — C8908: CPT

## 2023-05-20 PROCEDURE — 77049 MRI BREAST C-+ W/CAD BI: CPT | Mod: 26

## 2023-05-20 PROCEDURE — A9585: CPT

## 2023-05-20 PROCEDURE — C8937: CPT

## 2023-05-22 ENCOUNTER — APPOINTMENT (OUTPATIENT)
Dept: PLASTIC SURGERY | Facility: CLINIC | Age: 48
End: 2023-05-22
Payer: COMMERCIAL

## 2023-05-22 VITALS
RESPIRATION RATE: 16 BRPM | WEIGHT: 154.5 LBS | HEIGHT: 69 IN | OXYGEN SATURATION: 100 % | HEART RATE: 82 BPM | SYSTOLIC BLOOD PRESSURE: 103 MMHG | BODY MASS INDEX: 22.88 KG/M2 | DIASTOLIC BLOOD PRESSURE: 71 MMHG

## 2023-05-22 PROCEDURE — 99205 OFFICE O/P NEW HI 60 MIN: CPT

## 2023-05-23 ENCOUNTER — RESULT REVIEW (OUTPATIENT)
Age: 48
End: 2023-05-23

## 2023-05-23 ENCOUNTER — APPOINTMENT (OUTPATIENT)
Dept: INFUSION THERAPY | Facility: HOSPITAL | Age: 48
End: 2023-05-23

## 2023-05-23 LAB
ALBUMIN SERPL ELPH-MCNC: 4.2 G/DL — SIGNIFICANT CHANGE UP (ref 3.3–5)
ALP SERPL-CCNC: 89 U/L — SIGNIFICANT CHANGE UP (ref 40–120)
ALT FLD-CCNC: 7 U/L — LOW (ref 10–45)
ANION GAP SERPL CALC-SCNC: 14 MMOL/L — SIGNIFICANT CHANGE UP (ref 5–17)
AST SERPL-CCNC: 13 U/L — SIGNIFICANT CHANGE UP (ref 10–40)
BASOPHILS # BLD AUTO: 0.04 K/UL — SIGNIFICANT CHANGE UP (ref 0–0.2)
BASOPHILS NFR BLD AUTO: 0.9 % — SIGNIFICANT CHANGE UP (ref 0–2)
BILIRUB SERPL-MCNC: 0.2 MG/DL — SIGNIFICANT CHANGE UP (ref 0.2–1.2)
BUN SERPL-MCNC: 16 MG/DL — SIGNIFICANT CHANGE UP (ref 7–23)
CALCIUM SERPL-MCNC: 8.9 MG/DL — SIGNIFICANT CHANGE UP (ref 8.4–10.5)
CHLORIDE SERPL-SCNC: 103 MMOL/L — SIGNIFICANT CHANGE UP (ref 96–108)
CO2 SERPL-SCNC: 25 MMOL/L — SIGNIFICANT CHANGE UP (ref 22–31)
CREAT SERPL-MCNC: 0.57 MG/DL — SIGNIFICANT CHANGE UP (ref 0.5–1.3)
EGFR: 112 ML/MIN/1.73M2 — SIGNIFICANT CHANGE UP
EOSINOPHIL # BLD AUTO: 0.01 K/UL — SIGNIFICANT CHANGE UP (ref 0–0.5)
EOSINOPHIL NFR BLD AUTO: 0.2 % — SIGNIFICANT CHANGE UP (ref 0–6)
GLUCOSE SERPL-MCNC: 106 MG/DL — HIGH (ref 70–99)
HCT VFR BLD CALC: 24.6 % — LOW (ref 34.5–45)
HGB BLD-MCNC: 8.2 G/DL — LOW (ref 11.5–15.5)
IMM GRANULOCYTES NFR BLD AUTO: 3.3 % — HIGH (ref 0–0.9)
LYMPHOCYTES # BLD AUTO: 0.55 K/UL — LOW (ref 1–3.3)
LYMPHOCYTES # BLD AUTO: 12.1 % — LOW (ref 13–44)
MCHC RBC-ENTMCNC: 31.9 PG — SIGNIFICANT CHANGE UP (ref 27–34)
MCHC RBC-ENTMCNC: 33.3 G/DL — SIGNIFICANT CHANGE UP (ref 32–36)
MCV RBC AUTO: 95.7 FL — SIGNIFICANT CHANGE UP (ref 80–100)
MONOCYTES # BLD AUTO: 0.57 K/UL — SIGNIFICANT CHANGE UP (ref 0–0.9)
MONOCYTES NFR BLD AUTO: 12.5 % — SIGNIFICANT CHANGE UP (ref 2–14)
NEUTROPHILS # BLD AUTO: 3.24 K/UL — SIGNIFICANT CHANGE UP (ref 1.8–7.4)
NEUTROPHILS NFR BLD AUTO: 71 % — SIGNIFICANT CHANGE UP (ref 43–77)
NRBC # BLD: 0 /100 WBCS — SIGNIFICANT CHANGE UP (ref 0–0)
PLATELET # BLD AUTO: 104 K/UL — LOW (ref 150–400)
POTASSIUM SERPL-MCNC: 3.9 MMOL/L — SIGNIFICANT CHANGE UP (ref 3.5–5.3)
POTASSIUM SERPL-SCNC: 3.9 MMOL/L — SIGNIFICANT CHANGE UP (ref 3.5–5.3)
PROT SERPL-MCNC: 6.5 G/DL — SIGNIFICANT CHANGE UP (ref 6–8.3)
RBC # BLD: 2.57 M/UL — LOW (ref 3.8–5.2)
RBC # FLD: 17.1 % — HIGH (ref 10.3–14.5)
SODIUM SERPL-SCNC: 141 MMOL/L — SIGNIFICANT CHANGE UP (ref 135–145)
T3 SERPL-MCNC: 156 NG/DL — SIGNIFICANT CHANGE UP (ref 80–200)
T4 FREE SERPL-MCNC: 1 NG/DL — SIGNIFICANT CHANGE UP (ref 0.9–1.8)
TSH SERPL-MCNC: 1.69 UIU/ML — SIGNIFICANT CHANGE UP (ref 0.27–4.2)
WBC # BLD: 4.56 K/UL — SIGNIFICANT CHANGE UP (ref 3.8–10.5)
WBC # FLD AUTO: 4.56 K/UL — SIGNIFICANT CHANGE UP (ref 3.8–10.5)

## 2023-05-26 ENCOUNTER — OUTPATIENT (OUTPATIENT)
Dept: OUTPATIENT SERVICES | Facility: HOSPITAL | Age: 48
LOS: 1 days | Discharge: HOME | End: 2023-05-26

## 2023-05-26 ENCOUNTER — OUTPATIENT (OUTPATIENT)
Dept: OUTPATIENT SERVICES | Facility: HOSPITAL | Age: 48
LOS: 1 days | End: 2023-05-26
Payer: COMMERCIAL

## 2023-05-26 VITALS
TEMPERATURE: 98 F | OXYGEN SATURATION: 99 % | SYSTOLIC BLOOD PRESSURE: 107 MMHG | WEIGHT: 152.12 LBS | DIASTOLIC BLOOD PRESSURE: 72 MMHG | HEIGHT: 68 IN

## 2023-05-26 DIAGNOSIS — Z42.1 ENCOUNTER FOR BREAST RECONSTRUCTION FOLLOWING MASTECTOMY: ICD-10-CM

## 2023-05-26 DIAGNOSIS — C50.919 MALIGNANT NEOPLASM OF UNSPECIFIED SITE OF UNSPECIFIED FEMALE BREAST: ICD-10-CM

## 2023-05-26 DIAGNOSIS — Z90.13 ACQUIRED ABSENCE OF BILATERAL BREASTS AND NIPPLES: ICD-10-CM

## 2023-05-26 DIAGNOSIS — Z79.899 OTHER LONG TERM (CURRENT) DRUG THERAPY: ICD-10-CM

## 2023-05-26 DIAGNOSIS — Z98.891 HISTORY OF UTERINE SCAR FROM PREVIOUS SURGERY: Chronic | ICD-10-CM

## 2023-05-26 DIAGNOSIS — Z01.818 ENCOUNTER FOR OTHER PREPROCEDURAL EXAMINATION: ICD-10-CM

## 2023-05-26 LAB — BLD GP AB SCN SERPL QL: SIGNIFICANT CHANGE UP

## 2023-05-26 PROCEDURE — 93010 ELECTROCARDIOGRAM REPORT: CPT | Mod: NC

## 2023-05-26 NOTE — H&P PST ADULT - PROBLEM SELECTOR PLAN 1
Patient provided with pre-operative instructions and verbalized understanding.  Patient will be NPO on day of surgery. Patient will stop NSAIDs, aspirin, herbal supplements or vitamins 1 week prior to surgery.  Chlorhexidine wash provided with instructions.  Pending medical clearance as per surgeon

## 2023-05-26 NOTE — H&P PST ADULT - HISTORY OF PRESENT ILLNESS
This is a 46 y/o female with pre-operative diagnosis of left breast cancer.  Pt noted lump in left breast.  Biopsy done in 11/2022 confirmed malignancy.  s/p chemo which was completed in 5/9/23.  Otherwise patient feels well today and denies any acute symptoms.

## 2023-05-30 ENCOUNTER — RESULT REVIEW (OUTPATIENT)
Age: 48
End: 2023-05-30

## 2023-05-30 ENCOUNTER — APPOINTMENT (OUTPATIENT)
Dept: HEMATOLOGY ONCOLOGY | Facility: CLINIC | Age: 48
End: 2023-05-30

## 2023-05-30 ENCOUNTER — OUTPATIENT (OUTPATIENT)
Dept: OUTPATIENT SERVICES | Facility: HOSPITAL | Age: 48
LOS: 1 days | End: 2023-05-30
Payer: COMMERCIAL

## 2023-05-30 DIAGNOSIS — C50.919 MALIGNANT NEOPLASM OF UNSPECIFIED SITE OF UNSPECIFIED FEMALE BREAST: ICD-10-CM

## 2023-05-30 DIAGNOSIS — Z98.891 HISTORY OF UTERINE SCAR FROM PREVIOUS SURGERY: Chronic | ICD-10-CM

## 2023-05-30 LAB
BASOPHILS # BLD AUTO: 0.03 K/UL — SIGNIFICANT CHANGE UP (ref 0–0.2)
BASOPHILS NFR BLD AUTO: 0.9 % — SIGNIFICANT CHANGE UP (ref 0–2)
CORTICOSTEROID BINDING GLOBULIN RESULT: 2.1 MG/DL — SIGNIFICANT CHANGE UP
CORTIS F/TOTAL MFR SERPL: 4.2 % — SIGNIFICANT CHANGE UP
CORTIS SERPL-MCNC: 6.4 UG/DL — SIGNIFICANT CHANGE UP
CORTISOL, FREE RESULT: 0.27 UG/DL — SIGNIFICANT CHANGE UP
EOSINOPHIL # BLD AUTO: 0.02 K/UL — SIGNIFICANT CHANGE UP (ref 0–0.5)
EOSINOPHIL NFR BLD AUTO: 0.6 % — SIGNIFICANT CHANGE UP (ref 0–6)
HCT VFR BLD CALC: 27.1 % — LOW (ref 34.5–45)
HGB BLD-MCNC: 8.9 G/DL — LOW (ref 11.5–15.5)
IMM GRANULOCYTES NFR BLD AUTO: 0.6 % — SIGNIFICANT CHANGE UP (ref 0–0.9)
LYMPHOCYTES # BLD AUTO: 0.52 K/UL — LOW (ref 1–3.3)
LYMPHOCYTES # BLD AUTO: 15.2 % — SIGNIFICANT CHANGE UP (ref 13–44)
MCHC RBC-ENTMCNC: 32 PG — SIGNIFICANT CHANGE UP (ref 27–34)
MCHC RBC-ENTMCNC: 32.8 G/DL — SIGNIFICANT CHANGE UP (ref 32–36)
MCV RBC AUTO: 97.5 FL — SIGNIFICANT CHANGE UP (ref 80–100)
MONOCYTES # BLD AUTO: 0.38 K/UL — SIGNIFICANT CHANGE UP (ref 0–0.9)
MONOCYTES NFR BLD AUTO: 11.1 % — SIGNIFICANT CHANGE UP (ref 2–14)
NEUTROPHILS # BLD AUTO: 2.46 K/UL — SIGNIFICANT CHANGE UP (ref 1.8–7.4)
NEUTROPHILS NFR BLD AUTO: 71.6 % — SIGNIFICANT CHANGE UP (ref 43–77)
NRBC # BLD: 0 /100 WBCS — SIGNIFICANT CHANGE UP (ref 0–0)
PLATELET # BLD AUTO: 164 K/UL — SIGNIFICANT CHANGE UP (ref 150–400)
RBC # BLD: 2.78 M/UL — LOW (ref 3.8–5.2)
RBC # FLD: 18.1 % — HIGH (ref 10.3–14.5)
WBC # BLD: 3.43 K/UL — LOW (ref 3.8–10.5)
WBC # FLD AUTO: 3.43 K/UL — LOW (ref 3.8–10.5)

## 2023-05-30 PROCEDURE — 86900 BLOOD TYPING SEROLOGIC ABO: CPT

## 2023-05-30 PROCEDURE — 86850 RBC ANTIBODY SCREEN: CPT

## 2023-05-30 PROCEDURE — 86901 BLOOD TYPING SEROLOGIC RH(D): CPT

## 2023-06-02 ENCOUNTER — APPOINTMENT (OUTPATIENT)
Dept: MRI IMAGING | Facility: CLINIC | Age: 48
End: 2023-06-02
Payer: COMMERCIAL

## 2023-06-02 PROCEDURE — A9585: CPT | Mod: JW

## 2023-06-02 PROCEDURE — 74185 MRA ABD W OR W/O CNTRST: CPT

## 2023-06-02 PROCEDURE — 72198 MR ANGIO PELVIS W/O & W/DYE: CPT

## 2023-06-07 ENCOUNTER — NON-APPOINTMENT (OUTPATIENT)
Age: 48
End: 2023-06-07

## 2023-06-07 ENCOUNTER — APPOINTMENT (OUTPATIENT)
Dept: INTERNAL MEDICINE | Facility: CLINIC | Age: 48
End: 2023-06-07
Payer: COMMERCIAL

## 2023-06-07 VITALS
DIASTOLIC BLOOD PRESSURE: 78 MMHG | HEIGHT: 69 IN | HEART RATE: 85 BPM | SYSTOLIC BLOOD PRESSURE: 114 MMHG | WEIGHT: 155 LBS | BODY MASS INDEX: 22.96 KG/M2

## 2023-06-07 DIAGNOSIS — D64.9 ANEMIA, UNSPECIFIED: ICD-10-CM

## 2023-06-07 DIAGNOSIS — Z01.818 ENCOUNTER FOR OTHER PREPROCEDURAL EXAMINATION: ICD-10-CM

## 2023-06-07 PROCEDURE — 99214 OFFICE O/P EST MOD 30 MIN: CPT

## 2023-06-07 RX ORDER — LACTULOSE 10 G/15ML
10 SOLUTION ORAL TWICE DAILY
Qty: 1 | Refills: 3 | Status: DISCONTINUED | COMMUNITY
Start: 2023-02-14 | End: 2023-06-07

## 2023-06-07 RX ORDER — LIDOCAINE AND PRILOCAINE 25; 25 MG/G; MG/G
2.5-2.5 CREAM TOPICAL
Qty: 1 | Refills: 4 | Status: DISCONTINUED | COMMUNITY
Start: 2022-12-09 | End: 2023-06-07

## 2023-06-07 RX ORDER — CIPROFLOXACIN HYDROCHLORIDE 500 MG/1
500 TABLET, FILM COATED ORAL TWICE DAILY
Qty: 14 | Refills: 1 | Status: DISCONTINUED | COMMUNITY
Start: 2023-03-21 | End: 2023-06-07

## 2023-06-07 RX ORDER — IBUPROFEN 800 MG/1
800 TABLET, FILM COATED ORAL EVERY 6 HOURS
Qty: 15 | Refills: 0 | Status: DISCONTINUED | COMMUNITY
Start: 2023-05-31 | End: 2023-06-07

## 2023-06-07 RX ORDER — DEXAMETHASONE 4 MG/1
4 TABLET ORAL
Qty: 10 | Refills: 6 | Status: DISCONTINUED | COMMUNITY
Start: 2022-11-29 | End: 2023-06-07

## 2023-06-07 RX ORDER — CEFADROXIL 500 MG/1
500 CAPSULE ORAL
Qty: 14 | Refills: 0 | Status: DISCONTINUED | COMMUNITY
Start: 2023-05-31 | End: 2023-06-07

## 2023-06-07 RX ORDER — METOCLOPRAMIDE 10 MG/1
10 TABLET ORAL EVERY 6 HOURS
Qty: 120 | Refills: 3 | Status: DISCONTINUED | COMMUNITY
Start: 2022-11-29 | End: 2023-06-07

## 2023-06-08 ENCOUNTER — TRANSCRIPTION ENCOUNTER (OUTPATIENT)
Age: 48
End: 2023-06-08

## 2023-06-08 ENCOUNTER — APPOINTMENT (OUTPATIENT)
Dept: PLASTIC SURGERY | Facility: CLINIC | Age: 48
End: 2023-06-08
Payer: COMMERCIAL

## 2023-06-08 VITALS
DIASTOLIC BLOOD PRESSURE: 72 MMHG | HEIGHT: 69 IN | WEIGHT: 155 LBS | SYSTOLIC BLOOD PRESSURE: 106 MMHG | HEART RATE: 90 BPM | TEMPERATURE: 98.3 F | OXYGEN SATURATION: 100 % | BODY MASS INDEX: 22.96 KG/M2

## 2023-06-08 PROCEDURE — ZZZZZ: CPT

## 2023-06-08 NOTE — PATIENT PROFILE ADULT - FALL HARM RISK - FALLEN IN PAST
No

## 2023-06-08 NOTE — PATIENT PROFILE ADULT - IS PATIENT POST-MENOPAUSAL?
[facility]  [facility]  [address] [place] [address]      Flores Garcia :1991 MRN:[id number]    11/3/2020 Time Session Began: 1600  Time Session Ended: 1645    Due to COVID-19 precautions, this visit was performed via live interactive two-way Video visit with patient's verbal consent.   Clinician Location:Home.  Patient Location: Home.  Verified patient identity:  [x] Yes    Session Type:45 Minute Therapy (14506)    Others Present: none    Intervention: Behavioral, Cognitive    Suicide/Homicide/Violence Ideation: No    If Yes, explain: n/a    Current Outpatient Medications   Medication Sig   • LORazepam (ATIVAN) 0.5 MG tablet Take 1 tablet by mouth daily as needed (Panic Attack).   • sertraline (ZOLOFT) 50 MG tablet Take 1 tablet by mouth daily.     No current facility-administered medications for this visit.        Change in Medication(s) Reported: N/A  If Yes, explain: n/a    Patient/Family Education Provided: Yes  Patient/Family Displays Understanding: Yes    If No, explain: n/a    Chief complaint in patient's own words: \"I'm having a lot of anxiety.\"    Progress Note containing chief complaint and symptoms/problems related to the complaint:    (Data/Action/Response/Plan)    D: Discussed recent experiences with anxiety and panic. Discussed development of these symptoms over time and impact of trauma on her functioning  A: Patient and provider discussed trauma and impact on relationships and anxiety experiences. Discussed coping strategies, and began to work on treatment planning.   R: Patient was engaged in session and seemed receptive to psychoeducation and feedback.  P: Patient will follow up for individual psychotherapy and is encouraged to contact me as needed before the next session.    Need for Community Resources Assessed: Yes    Resources Needed: No    If Yes, what resources: n/a    Primary Diagnosis: Post-Traumatic Stress  Disorder      Treatment Plan: Treatment plan established this visit    Discharge Plan: Strategies Discussed to Maintain Gains    Next Appointment: 2 weeks  Ena Birch PSYD   no

## 2023-06-08 NOTE — PATIENT PROFILE ADULT - FALL HARM RISK - UNIVERSAL INTERVENTIONS
Bed in lowest position, wheels locked, appropriate side rails in place/Call bell, personal items and telephone in reach/Instruct patient to call for assistance before getting out of bed or chair/Non-slip footwear when patient is out of bed/Lancing to call system/Physically safe environment - no spills, clutter or unnecessary equipment/Purposeful Proactive Rounding/Room/bathroom lighting operational, light cord in reach

## 2023-06-08 NOTE — HISTORY OF PRESENT ILLNESS
[FreeTextEntry1] : preop exam [de-identified] : 47yr old F presenting for preop exam at the request of Dr Thompson. Pt has h/o left breast cancer, s/p neoadjuvent chemo. She is going in for breast reconstruction on 6/9/2023\par Pt denies any other PMH- no h/o heart disease, chronic renal or liver issues, no h/o seizures, no history of lung disease\par Pt has had a c/s in the past, otherwise has never had surgery\par She currently denies any acute complaints and states she feels good\par of note, she  does have anemia, however her H&H are trending up.

## 2023-06-08 NOTE — PHYSICAL EXAM
[No Acute Distress] : no acute distress [Well Nourished] : well nourished [Well Developed] : well developed [Well-Appearing] : well-appearing [Normal Sclera/Conjunctiva] : normal sclera/conjunctiva [PERRL] : pupils equal round and reactive to light [EOMI] : extraocular movements intact [Normal Outer Ear/Nose] : the outer ears and nose were normal in appearance [Normal Oropharynx] : the oropharynx was normal [No JVD] : no jugular venous distention [No Lymphadenopathy] : no lymphadenopathy [Supple] : supple [Thyroid Normal, No Nodules] : the thyroid was normal and there were no nodules present [No Respiratory Distress] : no respiratory distress  [No Accessory Muscle Use] : no accessory muscle use [Clear to Auscultation] : lungs were clear to auscultation bilaterally [Normal Rate] : normal rate  [Regular Rhythm] : with a regular rhythm [Normal S1, S2] : normal S1 and S2 [No Murmur] : no murmur heard [No Carotid Bruits] : no carotid bruits [No Abdominal Bruit] : a ~M bruit was not heard ~T in the abdomen [No Varicosities] : no varicosities [Pedal Pulses Present] : the pedal pulses are present [No Edema] : there was no peripheral edema [No Palpable Aorta] : no palpable aorta [No Extremity Clubbing/Cyanosis] : no extremity clubbing/cyanosis [Soft] : abdomen soft [Non Tender] : non-tender [Non-distended] : non-distended [No Masses] : no abdominal mass palpated [No HSM] : no HSM [Normal Bowel Sounds] : normal bowel sounds [Normal Posterior Cervical Nodes] : no posterior cervical lymphadenopathy [Normal Anterior Cervical Nodes] : no anterior cervical lymphadenopathy [No CVA Tenderness] : no CVA  tenderness [No Spinal Tenderness] : no spinal tenderness [No Joint Swelling] : no joint swelling [Grossly Normal Strength/Tone] : grossly normal strength/tone [No Rash] : no rash [Coordination Grossly Intact] : coordination grossly intact [No Focal Deficits] : no focal deficits [Normal Gait] : normal gait [Deep Tendon Reflexes (DTR)] : deep tendon reflexes were 2+ and symmetric [Normal Affect] : the affect was normal [Normal Insight/Judgement] : insight and judgment were intact [de-identified] : port present - right chest wall

## 2023-06-09 ENCOUNTER — INPATIENT (INPATIENT)
Facility: HOSPITAL | Age: 48
LOS: 1 days | Discharge: HOME | DRG: 580 | End: 2023-06-11
Attending: PLASTIC SURGERY | Admitting: SPECIALIST
Payer: COMMERCIAL

## 2023-06-09 ENCOUNTER — APPOINTMENT (OUTPATIENT)
Dept: PLASTIC SURGERY | Facility: HOSPITAL | Age: 48
End: 2023-06-09
Payer: COMMERCIAL

## 2023-06-09 ENCOUNTER — APPOINTMENT (OUTPATIENT)
Dept: SURGICAL ONCOLOGY | Facility: HOSPITAL | Age: 48
End: 2023-06-09

## 2023-06-09 ENCOUNTER — RESULT REVIEW (OUTPATIENT)
Age: 48
End: 2023-06-09

## 2023-06-09 VITALS
HEART RATE: 79 BPM | WEIGHT: 154.76 LBS | RESPIRATION RATE: 13 BRPM | TEMPERATURE: 98 F | DIASTOLIC BLOOD PRESSURE: 76 MMHG | HEIGHT: 68 IN | OXYGEN SATURATION: 99 % | SYSTOLIC BLOOD PRESSURE: 121 MMHG

## 2023-06-09 DIAGNOSIS — Z98.890 OTHER SPECIFIED POSTPROCEDURAL STATES: Chronic | ICD-10-CM

## 2023-06-09 DIAGNOSIS — Z98.891 HISTORY OF UTERINE SCAR FROM PREVIOUS SURGERY: Chronic | ICD-10-CM

## 2023-06-09 DIAGNOSIS — Z95.828 PRESENCE OF OTHER VASCULAR IMPLANTS AND GRAFTS: Chronic | ICD-10-CM

## 2023-06-09 DIAGNOSIS — Z79.899 OTHER LONG TERM (CURRENT) DRUG THERAPY: ICD-10-CM

## 2023-06-09 LAB — ABO RH CONFIRMATION: SIGNIFICANT CHANGE UP

## 2023-06-09 PROCEDURE — 21600 PARTIAL REMOVAL OF RIB: CPT | Mod: LT,59

## 2023-06-09 PROCEDURE — S2068: CPT | Mod: LT

## 2023-06-09 PROCEDURE — 19307 MAST MOD RAD: CPT | Mod: 50

## 2023-06-09 PROCEDURE — 15777 ACELLULAR DERM MATRIX IMPLT: CPT | Mod: RT

## 2023-06-09 PROCEDURE — 86901 BLOOD TYPING SEROLOGIC RH(D): CPT

## 2023-06-09 PROCEDURE — S2068: CPT | Mod: RT

## 2023-06-09 PROCEDURE — 21600 PARTIAL REMOVAL OF RIB: CPT | Mod: RT,59

## 2023-06-09 PROCEDURE — 38792 RA TRACER ID OF SENTINL NODE: CPT | Mod: 59

## 2023-06-09 PROCEDURE — 86923 COMPATIBILITY TEST ELECTRIC: CPT

## 2023-06-09 PROCEDURE — 19340 INSJ BREAST IMPLT SM D MAST: CPT | Mod: LT

## 2023-06-09 PROCEDURE — 86900 BLOOD TYPING SEROLOGIC ABO: CPT

## 2023-06-09 PROCEDURE — 15877 SUCTION LIPECTOMY TRUNK: CPT

## 2023-06-09 PROCEDURE — 88307 TISSUE EXAM BY PATHOLOGIST: CPT | Mod: 26

## 2023-06-09 PROCEDURE — 38530 BIOPSY/REMOVAL LYMPH NODES: CPT | Mod: RT

## 2023-06-09 PROCEDURE — 19340 INSJ BREAST IMPLT SM D MAST: CPT | Mod: RT

## 2023-06-09 PROCEDURE — 15777 ACELLULAR DERM MATRIX IMPLT: CPT | Mod: LT

## 2023-06-09 PROCEDURE — 36415 COLL VENOUS BLD VENIPUNCTURE: CPT

## 2023-06-09 PROCEDURE — 19303 MAST SIMPLE COMPLETE: CPT | Mod: AS

## 2023-06-09 PROCEDURE — 88305 TISSUE EXAM BY PATHOLOGIST: CPT | Mod: 26

## 2023-06-09 PROCEDURE — 88342 IMHCHEM/IMCYTCHM 1ST ANTB: CPT | Mod: 26

## 2023-06-09 PROCEDURE — 19307 MAST MOD RAD: CPT | Mod: 82,50

## 2023-06-09 PROCEDURE — 38530 BIOPSY/REMOVAL LYMPH NODES: CPT | Mod: LT

## 2023-06-09 PROCEDURE — 86850 RBC ANTIBODY SCREEN: CPT

## 2023-06-09 PROCEDURE — 38900 IO MAP OF SENT LYMPH NODE: CPT

## 2023-06-09 PROCEDURE — G0463: CPT

## 2023-06-09 PROCEDURE — 93005 ELECTROCARDIOGRAM TRACING: CPT

## 2023-06-09 PROCEDURE — 19303 MAST SIMPLE COMPLETE: CPT | Mod: RT

## 2023-06-09 DEVICE — IMPLANTABLE DEVICE: Type: IMPLANTABLE DEVICE | Site: BILATERAL | Status: FUNCTIONAL

## 2023-06-09 DEVICE — COUPLER VESSEL ANASTOMOTIC 3MM: Type: IMPLANTABLE DEVICE | Site: BILATERAL | Status: FUNCTIONAL

## 2023-06-09 DEVICE — MESH PHASIX 2.4X6.3IN: Type: IMPLANTABLE DEVICE | Site: BILATERAL | Status: FUNCTIONAL

## 2023-06-09 DEVICE — CLIP APPLIER ETHICON LIGACLIP 11.5" MEDIUM: Type: IMPLANTABLE DEVICE | Site: BILATERAL | Status: FUNCTIONAL

## 2023-06-09 DEVICE — COUPLER VESSEL ANASTOMOTIC 2.5MM: Type: IMPLANTABLE DEVICE | Site: BILATERAL | Status: FUNCTIONAL

## 2023-06-09 DEVICE — CARTRIDGE MICROCLIP 30: Type: IMPLANTABLE DEVICE | Site: BILATERAL | Status: FUNCTIONAL

## 2023-06-09 DEVICE — CLIP APPLIER ETHICON LIGACLIP 9 3/8" SMALL: Type: IMPLANTABLE DEVICE | Site: BILATERAL | Status: FUNCTIONAL

## 2023-06-09 RX ORDER — OXYCODONE HYDROCHLORIDE 5 MG/1
5 TABLET ORAL EVERY 4 HOURS
Refills: 0 | Status: DISCONTINUED | OUTPATIENT
Start: 2023-06-09 | End: 2023-06-11

## 2023-06-09 RX ORDER — SODIUM CHLORIDE 9 MG/ML
1000 INJECTION, SOLUTION INTRAVENOUS
Refills: 0 | Status: DISCONTINUED | OUTPATIENT
Start: 2023-06-09 | End: 2023-06-09

## 2023-06-09 RX ORDER — OXYCODONE HYDROCHLORIDE 5 MG/1
10 TABLET ORAL EVERY 4 HOURS
Refills: 0 | Status: DISCONTINUED | OUTPATIENT
Start: 2023-06-09 | End: 2023-06-11

## 2023-06-09 RX ORDER — CEFAZOLIN SODIUM 1 G
2000 VIAL (EA) INJECTION EVERY 8 HOURS
Refills: 0 | Status: COMPLETED | OUTPATIENT
Start: 2023-06-09 | End: 2023-06-10

## 2023-06-09 RX ORDER — KETOROLAC TROMETHAMINE 30 MG/ML
15 SYRINGE (ML) INJECTION EVERY 6 HOURS
Refills: 0 | Status: DISCONTINUED | OUTPATIENT
Start: 2023-06-09 | End: 2023-06-11

## 2023-06-09 RX ORDER — ONDANSETRON 8 MG/1
4 TABLET, FILM COATED ORAL ONCE
Refills: 0 | Status: DISCONTINUED | OUTPATIENT
Start: 2023-06-09 | End: 2023-06-09

## 2023-06-09 RX ORDER — SODIUM CHLORIDE 9 MG/ML
1000 INJECTION, SOLUTION INTRAVENOUS
Refills: 0 | Status: DISCONTINUED | OUTPATIENT
Start: 2023-06-09 | End: 2023-06-11

## 2023-06-09 RX ORDER — HYDROMORPHONE HYDROCHLORIDE 2 MG/ML
1 INJECTION INTRAMUSCULAR; INTRAVENOUS; SUBCUTANEOUS
Refills: 0 | Status: DISCONTINUED | OUTPATIENT
Start: 2023-06-09 | End: 2023-06-09

## 2023-06-09 RX ORDER — ENOXAPARIN SODIUM 100 MG/ML
40 INJECTION SUBCUTANEOUS EVERY 24 HOURS
Refills: 0 | Status: DISCONTINUED | OUTPATIENT
Start: 2023-06-10 | End: 2023-06-11

## 2023-06-09 RX ORDER — ACETAMINOPHEN 500 MG
650 TABLET ORAL EVERY 6 HOURS
Refills: 0 | Status: DISCONTINUED | OUTPATIENT
Start: 2023-06-09 | End: 2023-06-11

## 2023-06-09 RX ORDER — ACETAMINOPHEN 500 MG
1000 TABLET ORAL EVERY 8 HOURS
Refills: 0 | Status: COMPLETED | OUTPATIENT
Start: 2023-06-09 | End: 2023-06-10

## 2023-06-09 RX ORDER — HYDROMORPHONE HYDROCHLORIDE 2 MG/ML
0.5 INJECTION INTRAMUSCULAR; INTRAVENOUS; SUBCUTANEOUS
Refills: 0 | Status: DISCONTINUED | OUTPATIENT
Start: 2023-06-09 | End: 2023-06-09

## 2023-06-09 RX ORDER — ONDANSETRON 8 MG/1
4 TABLET, FILM COATED ORAL EVERY 6 HOURS
Refills: 0 | Status: DISCONTINUED | OUTPATIENT
Start: 2023-06-09 | End: 2023-06-11

## 2023-06-09 RX ADMIN — SODIUM CHLORIDE 50 MILLILITER(S): 9 INJECTION, SOLUTION INTRAVENOUS at 06:03

## 2023-06-09 RX ADMIN — Medication 400 MILLIGRAM(S): at 18:31

## 2023-06-09 RX ADMIN — OXYCODONE HYDROCHLORIDE 5 MILLIGRAM(S): 5 TABLET ORAL at 23:00

## 2023-06-09 RX ADMIN — Medication 15 MILLIGRAM(S): at 23:52

## 2023-06-09 RX ADMIN — Medication 100 MILLIGRAM(S): at 21:57

## 2023-06-09 RX ADMIN — Medication 15 MILLIGRAM(S): at 18:53

## 2023-06-09 RX ADMIN — OXYCODONE HYDROCHLORIDE 5 MILLIGRAM(S): 5 TABLET ORAL at 22:00

## 2023-06-09 NOTE — BRIEF OPERATIVE NOTE - OPERATION/FINDINGS
right prophylactic mastectomy done for hx of left breast cancer
see operative report
Immediate bilateral hybrid breast reconstruction with deep inferior epigastric artery  flaps and Sientra REF [id number] implants with acellular dermal matrix (Alloderm REF [id number]) following bilateral nipple-sparing mastectomies.

## 2023-06-09 NOTE — BRIEF OPERATIVE NOTE - NSICDXBRIEFPROCEDURE_GEN_ALL_CORE_FT
PROCEDURES:  Mastectomy, left, total 09-Jun-2023 16:23:00  Salma Jenkins  
PROCEDURES:  Breast reconstruction with JOHN free flap 09-Jun-2023 16:11:11  Fatoumata Isaacs  
PROCEDURES:  Prophylactic right mastectomy 09-Jun-2023 13:33:01  Alicia Cullen

## 2023-06-09 NOTE — CONSULT NOTE ADULT - ASSESSMENT
47y Female PMH of Breast CA, now POD# 0from b/l Mastectomy, with JOHN/free flap placement, as well as VIOPTIX tissue/flap oxygenation monitoring.     1. Post op JOHN  2. Anemia       Plan  pain control with Tylenol, Ofirmev, Toradol, oxycodone   Keep MAP above 65  Sp02 stable on NC  NPO for now, to give zofran for nausea   strict i/o, renal med dosing , LR at 75cc/hr  olga-op ancef   Did receive 1 unit of PRBC olga-op   to follow repeat cbc in morning   SQ lovenox for dvt ppx   Q1 flap checks  Will notify Plastics if drop in 10% of vioptix from stated above   or if changes in physical exam

## 2023-06-09 NOTE — CONSULT NOTE ADULT - SUBJECTIVE AND OBJECTIVE BOX
HPI:    47y Female PMH of Breast CA, now POD# 0from b/l Mastectomy, with JOHN/free flap placement, as well as VIOPTIX tissue/flap oxygenation monitoring.     Received 1 units of PRBC for low H/H pre op     Current Vioptix:    Right Breast: 71  Left Breast:70    Current Signal Strength    Right Breast: 91  Left Breast: 99      Interval Hx:  Received post op endorsing some nausea     PAST MEDICAL & SURGICAL HISTORY:  Breast CA  left breast      History of chemotherapy  completed 23      S/P   X1       H/O bilateral breast biopsy  markers both breasts      Port-A-Cath in place  right chest          MEDICATIONS  (STANDING):  acetaminophen     Tablet .. 650 milliGRAM(s) Oral every 6 hours  acetaminophen   IVPB .. 1000 milliGRAM(s) IV Intermittent every 8 hours  ceFAZolin   IVPB 2000 milliGRAM(s) IV Intermittent every 8 hours  ketorolac   Injectable 15 milliGRAM(s) IV Push every 6 hours  lactated ringers. 1000 milliLiter(s) (75 mL/Hr) IV Continuous <Continuous>    MEDICATIONS  (PRN):  ondansetron Injectable 4 milliGRAM(s) IV Push every 6 hours PRN Nausea and/or Vomiting  oxyCODONE    IR 10 milliGRAM(s) Oral every 4 hours PRN Severe Pain (7 - 10)  oxyCODONE    IR 5 milliGRAM(s) Oral every 4 hours PRN Moderate Pain (4 - 6)      Review of Systems:  CONSTITUTIONAL: No fever, chills, or fatigue  EYES: No eye pain, visual disturbances, or discharge  ENMT:  No difficulty hearing, tinnitus, vertigo; No sinus or throat pain  NECK: No pain or stiffness  RESPIRATORY: No cough, wheezing, chills or hemoptysis; No shortness of breath  CARDIOVASCULAR: No chest pain, palpitations, dizziness, or leg swelling  GASTROINTESTINAL: No abdominal or epigastric pain. + nausea, no vomiting, or hematemesis; No diarrhea or constipation. No melena or hematochezia.  GENITOURINARY: No dysuria, frequency, hematuria, or incontinence  NEUROLOGICAL: No headaches, memory loss, loss of strength, numbness, or tremors  SKIN: No itching, burning, rashes, or lesions   MUSCULOSKELETAL: No joint pain or swelling; No muscle, back, or extremity pain  PSYCHIATRIC: No depression, anxiety, mood swings, or difficulty sleeping      ICU Vital Signs Last 24 Hrs  T(C): 36.4 (2023 18:52), Max: 36.9 (2023 15:52)  T(F): 97.5 (2023 18:52), Max: 98.4 (2023 15:52)  HR: 84 (2023 18:52) (74 - 92)  BP: 110/64 (2023 18:52) (98/63 - 121/76)  BP(mean): 82 (2023 18:37) (75 - 82)  ABP: --  ABP(mean): --  RR: 14 (2023 18:52) (12 - 18)  SpO2: 100% (2023 18:52) (99% - 100%)    O2 Parameters below as of 2023 18:52  Patient On (Oxygen Delivery Method): nasal cannula  O2 Flow (L/min): 4                            Physical Examination:    General: No acute distress.  Alert, oriented, interactive, nonfocal    BREAST: breasts appears symmetrical, no signs of hematoma, soft to palpation, L breast more full than R non tender, well perfused, turgor maintained,cap refill adequate. Viotpix in place functioning well. ROYER drains in place with serosanguinous drainage.     HEENT: Pupils equal, reactive to light.  Symmetric.    PULM: Clear to auscultation bilaterally, no significant sputum production    CVS: Regular rate and rhythm, no murmurs, rubs, or gallops    ABD: Soft, nondistended, nontender, normoactive bowel sounds, no masses. Flap removal site well approximated, staples in palce. No ative drainage, bleeding, or signs of infection.    EXT: No edema, nontender    SKIN: Warm and well perfused, no rashes noted.        Time spent on this patient encounter, which includes documenting this note in the electronic medical record, was 37 minutes including assessing the presenting problems with associated risks, reviewing the medical record to prepare for the encounter, and meeting face to face with the patient to obtain additional history.  I have also performed an appropriate physical exam, made interventions listed and ordered and interpreted appropriate diagnostic studies as documented.     To improve communication and patient safety, I have coordinated care with the multidisciplinary team including the bedside nurse, appropriate attending of record and consultants as needed.       HPI:    47y Female PMH of Breast CA, now POD# 0from b/l Mastectomy, with JOHN/free flap placement, as well as VIOPTIX tissue/flap oxygenation monitoring.     Received 1 units of PRBC for low H/H pre op     Current Vioptix:    Right Breast: 71  Left Breast:70    Current Signal Strength    Right Breast: 91  Left Breast: 99      Interval Hx:  Received post op endorsing some nausea     PAST MEDICAL & SURGICAL HISTORY:  Breast CA  left breast      History of chemotherapy  completed 23      S/P   X1       H/O bilateral breast biopsy  markers both breasts      Port-A-Cath in place  right chest          MEDICATIONS  (STANDING):  acetaminophen     Tablet .. 650 milliGRAM(s) Oral every 6 hours  acetaminophen   IVPB .. 1000 milliGRAM(s) IV Intermittent every 8 hours  ceFAZolin   IVPB 2000 milliGRAM(s) IV Intermittent every 8 hours  ketorolac   Injectable 15 milliGRAM(s) IV Push every 6 hours  lactated ringers. 1000 milliLiter(s) (75 mL/Hr) IV Continuous <Continuous>    MEDICATIONS  (PRN):  ondansetron Injectable 4 milliGRAM(s) IV Push every 6 hours PRN Nausea and/or Vomiting  oxyCODONE    IR 10 milliGRAM(s) Oral every 4 hours PRN Severe Pain (7 - 10)  oxyCODONE    IR 5 milliGRAM(s) Oral every 4 hours PRN Moderate Pain (4 - 6)      Review of Systems:  CONSTITUTIONAL: No fever, chills, or fatigue  EYES: No eye pain, visual disturbances, or discharge  ENMT:  No difficulty hearing, tinnitus, vertigo; No sinus or throat pain  NECK: No pain or stiffness  RESPIRATORY: No cough, wheezing, chills or hemoptysis; No shortness of breath  CARDIOVASCULAR: No chest pain, palpitations, dizziness, or leg swelling  GASTROINTESTINAL: No abdominal or epigastric pain. + nausea, no vomiting, or hematemesis; No diarrhea or constipation. No melena or hematochezia.  GENITOURINARY: No dysuria, frequency, hematuria, or incontinence  NEUROLOGICAL: No headaches, memory loss, loss of strength, numbness, or tremors  SKIN: No itching, burning, rashes, or lesions   MUSCULOSKELETAL: No joint pain or swelling; No muscle, back, or extremity pain  PSYCHIATRIC: No depression, anxiety, mood swings, or difficulty sleeping      ICU Vital Signs Last 24 Hrs  T(C): 36.4 (2023 18:52), Max: 36.9 (2023 15:52)  T(F): 97.5 (2023 18:52), Max: 98.4 (2023 15:52)  HR: 84 (2023 18:52) (74 - 92)  BP: 110/64 (2023 18:52) (98/63 - 121/76)  BP(mean): 82 (2023 18:37) (75 - 82)  ABP: --  ABP(mean): --  RR: 14 (2023 18:52) (12 - 18)  SpO2: 100% (2023 18:52) (99% - 100%)    O2 Parameters below as of 2023 18:52  Patient On (Oxygen Delivery Method): nasal cannula  O2 Flow (L/min): 4                            Physical Examination:    General: No acute distress.  Alert, oriented, interactive, nonfocal    BREAST: breasts appears symmetrical, no signs of hematoma, soft to palpation, L breast more full than R non tender, well perfused, turgor maintained,cap refill adequate. Viotpix in place functioning well. ROYER drains in place with serosanguinous drainage.     HEENT: Pupils equal, reactive to light.  Symmetric.    PULM: Clear to auscultation bilaterally, no significant sputum production    CVS: Regular rate and rhythm, no murmurs, rubs, or gallops    ABD: Soft, nondistended, nontender, normoactive bowel sounds, no masses. Flap removal site well approximated, staples in palce. No ative drainage, bleeding, or signs of infection.    EXT: No edema, nontender    SKIN: Warm and well perfused, no rashes noted.        Time spent on this patient encounter, which includes documenting this note in the electronic medical record, was 57 minutes including assessing the presenting problems with associated risks, reviewing the medical record to prepare for the encounter, and meeting face to face with the patient to obtain additional history.  I have also performed an appropriate physical exam, made interventions listed and ordered and interpreted appropriate diagnostic studies as documented.     To improve communication and patient safety, I have coordinated care with the multidisciplinary team including the bedside nurse, appropriate attending of record and consultants as needed.

## 2023-06-09 NOTE — BRIEF OPERATIVE NOTE - NSICDXBRIEFPREOP_GEN_ALL_CORE_FT
PRE-OP DIAGNOSIS:  Breast cancer, left 09-Jun-2023 13:29:08  Alicia Cullen  

## 2023-06-09 NOTE — BRIEF OPERATIVE NOTE - NSICDXBRIEFPOSTOP_GEN_ALL_CORE_FT
POST-OP DIAGNOSIS:  Breast cancer, left 09-Jun-2023 13:29:19  Alicia Cullen  

## 2023-06-10 ENCOUNTER — TRANSCRIPTION ENCOUNTER (OUTPATIENT)
Age: 48
End: 2023-06-10

## 2023-06-10 LAB
ANION GAP SERPL CALC-SCNC: 4 MMOL/L — LOW (ref 5–17)
BUN SERPL-MCNC: 12 MG/DL — SIGNIFICANT CHANGE UP (ref 7–23)
CALCIUM SERPL-MCNC: 8.2 MG/DL — LOW (ref 8.4–10.5)
CHLORIDE SERPL-SCNC: 106 MMOL/L — SIGNIFICANT CHANGE UP (ref 96–108)
CO2 SERPL-SCNC: 31 MMOL/L — SIGNIFICANT CHANGE UP (ref 22–31)
CREAT SERPL-MCNC: 0.76 MG/DL — SIGNIFICANT CHANGE UP (ref 0.5–1.3)
EGFR: 96 ML/MIN/1.73M2 — SIGNIFICANT CHANGE UP
GLUCOSE SERPL-MCNC: 111 MG/DL — HIGH (ref 70–99)
HCT VFR BLD CALC: 28.5 % — LOW (ref 34.5–45)
HGB BLD-MCNC: 9.3 G/DL — LOW (ref 11.5–15.5)
MCHC RBC-ENTMCNC: 32.1 PG — SIGNIFICANT CHANGE UP (ref 27–34)
MCHC RBC-ENTMCNC: 32.6 GM/DL — SIGNIFICANT CHANGE UP (ref 32–36)
MCV RBC AUTO: 98.3 FL — SIGNIFICANT CHANGE UP (ref 80–100)
NRBC # BLD: 0 /100 WBCS — SIGNIFICANT CHANGE UP (ref 0–0)
PLATELET # BLD AUTO: 114 K/UL — LOW (ref 150–400)
POTASSIUM SERPL-MCNC: 4 MMOL/L — SIGNIFICANT CHANGE UP (ref 3.5–5.3)
POTASSIUM SERPL-SCNC: 4 MMOL/L — SIGNIFICANT CHANGE UP (ref 3.5–5.3)
RBC # BLD: 2.9 M/UL — LOW (ref 3.8–5.2)
RBC # FLD: 15.9 % — HIGH (ref 10.3–14.5)
SODIUM SERPL-SCNC: 141 MMOL/L — SIGNIFICANT CHANGE UP (ref 135–145)
WBC # BLD: 6.61 K/UL — SIGNIFICANT CHANGE UP (ref 3.8–10.5)
WBC # FLD AUTO: 6.61 K/UL — SIGNIFICANT CHANGE UP (ref 3.8–10.5)

## 2023-06-10 RX ORDER — SODIUM CHLORIDE 9 MG/ML
500 INJECTION INTRAMUSCULAR; INTRAVENOUS; SUBCUTANEOUS ONCE
Refills: 0 | Status: COMPLETED | OUTPATIENT
Start: 2023-06-10 | End: 2023-06-10

## 2023-06-10 RX ADMIN — Medication 15 MILLIGRAM(S): at 12:00

## 2023-06-10 RX ADMIN — OXYCODONE HYDROCHLORIDE 10 MILLIGRAM(S): 5 TABLET ORAL at 09:00

## 2023-06-10 RX ADMIN — OXYCODONE HYDROCHLORIDE 5 MILLIGRAM(S): 5 TABLET ORAL at 12:00

## 2023-06-10 RX ADMIN — OXYCODONE HYDROCHLORIDE 10 MILLIGRAM(S): 5 TABLET ORAL at 13:00

## 2023-06-10 RX ADMIN — Medication 15 MILLIGRAM(S): at 17:00

## 2023-06-10 RX ADMIN — OXYCODONE HYDROCHLORIDE 10 MILLIGRAM(S): 5 TABLET ORAL at 03:05

## 2023-06-10 RX ADMIN — Medication 400 MILLIGRAM(S): at 13:19

## 2023-06-10 RX ADMIN — OXYCODONE HYDROCHLORIDE 10 MILLIGRAM(S): 5 TABLET ORAL at 09:04

## 2023-06-10 RX ADMIN — Medication 15 MILLIGRAM(S): at 00:08

## 2023-06-10 RX ADMIN — Medication 1000 MILLIGRAM(S): at 13:00

## 2023-06-10 RX ADMIN — OXYCODONE HYDROCHLORIDE 5 MILLIGRAM(S): 5 TABLET ORAL at 07:59

## 2023-06-10 RX ADMIN — OXYCODONE HYDROCHLORIDE 10 MILLIGRAM(S): 5 TABLET ORAL at 21:23

## 2023-06-10 RX ADMIN — OXYCODONE HYDROCHLORIDE 5 MILLIGRAM(S): 5 TABLET ORAL at 16:57

## 2023-06-10 RX ADMIN — SODIUM CHLORIDE 1000 MILLILITER(S): 9 INJECTION INTRAMUSCULAR; INTRAVENOUS; SUBCUTANEOUS at 20:48

## 2023-06-10 RX ADMIN — OXYCODONE HYDROCHLORIDE 5 MILLIGRAM(S): 5 TABLET ORAL at 17:00

## 2023-06-10 RX ADMIN — Medication 400 MILLIGRAM(S): at 05:27

## 2023-06-10 RX ADMIN — Medication 15 MILLIGRAM(S): at 23:46

## 2023-06-10 RX ADMIN — Medication 100 MILLIGRAM(S): at 05:47

## 2023-06-10 RX ADMIN — OXYCODONE HYDROCHLORIDE 5 MILLIGRAM(S): 5 TABLET ORAL at 12:08

## 2023-06-10 RX ADMIN — ENOXAPARIN SODIUM 40 MILLIGRAM(S): 100 INJECTION SUBCUTANEOUS at 06:01

## 2023-06-10 RX ADMIN — OXYCODONE HYDROCHLORIDE 5 MILLIGRAM(S): 5 TABLET ORAL at 08:00

## 2023-06-10 RX ADMIN — Medication 1000 MILLIGRAM(S): at 22:00

## 2023-06-10 RX ADMIN — Medication 15 MILLIGRAM(S): at 12:08

## 2023-06-10 RX ADMIN — OXYCODONE HYDROCHLORIDE 10 MILLIGRAM(S): 5 TABLET ORAL at 13:19

## 2023-06-10 RX ADMIN — Medication 15 MILLIGRAM(S): at 06:22

## 2023-06-10 RX ADMIN — OXYCODONE HYDROCHLORIDE 10 MILLIGRAM(S): 5 TABLET ORAL at 18:57

## 2023-06-10 RX ADMIN — Medication 400 MILLIGRAM(S): at 21:17

## 2023-06-10 RX ADMIN — OXYCODONE HYDROCHLORIDE 10 MILLIGRAM(S): 5 TABLET ORAL at 02:05

## 2023-06-10 NOTE — PROGRESS NOTE ADULT - SUBJECTIVE AND OBJECTIVE BOX
S/P Bilateral Mastectomies /Left axillary sentinel nodes / JOHN Flap reconstruction POD#1    Patient was seen and examined by me this am .  This morning she did c/o surgical discomfort 8/10 .   She was feeling much better by mid afternoon.    She was OOB to chair, OOB  to void, tolerated  sitting in chair most of afternoon.   Patient denies CP or SOB.       Vital Signs Last 24 Hrs  T(C): 36.7 (10 Arvind 2023 12:00), Max: 37.2 (10 Arvind 2023 06:00)  T(F): 98 (10 Arvind 2023 12:00), Max: 99 (10 Arvind 2023 06:00)  HR: 78 (10 Arvind 2023 12:00) (74 - 86)  BP: 101/65 (10 Arvind 2023 12:00) (89/52 - 110/64)  BP(mean): 82 (2023 18:37) (75 - 82)  RR: 18 (10 Arvind 2023 12:) (14 - 19)  SpO2: 100% (10 Arvind 2023 12:00) (100% - 100%)    Parameters below as of 10 Arvind 2023 12:00  Patient On (Oxygen Delivery Method): room air    PAST MEDICAL & SURGICAL HISTORY:  Breast CA  left breast  History of chemotherapy  completed 23  S/P   X1   H/O bilateral breast biopsy  markers both breasts  Port-A-Cath in place  right chest      PE:  Alert and oriented X 3  Lungs:  CTA   Breasts soft, flaps viable , vioptix stable , mastectomy skin with some bruising but no skin ischemia   Good CR . , incisions c/d/i, drains with appropriate drainage   abd:  incision clean and dry, umbilicus viable , drains patent   ext :  no edema, no calf discomfort                           9.3    6.61  )-----------( 114      ( 10 Arvind 2023 05:08 )             28.5   06-10    141  |  106  |  12  ----------------------------<  111<H>  4.0   |  31  |  0.76    Ca    8.2<L>      10 Arvind 2023 05:08

## 2023-06-10 NOTE — PROVIDER CONTACT NOTE (OTHER) - ASSESSMENT
pt left side vioptix dropped to 50%, I was told to notify surgical team at this reading per Dr. Thompson

## 2023-06-10 NOTE — DISCHARGE NOTE PROVIDER - NSDCFUSCHEDAPPT_GEN_ALL_CORE_FT
Kael Thompson  [facility]  [address]  Scheduled Appointment: 06/15/2023    [facility]  [facility]  Scheduled Appointment: 06/20/2023    [facility]  [facility]  Scheduled Appointment: 07/11/2023    [facility]  [facility]  Scheduled Appointment: 08/01/2023

## 2023-06-10 NOTE — PROGRESS NOTE ADULT - SUBJECTIVE AND OBJECTIVE BOX
Feels well today, surgical pain under control.  OOB chair, tolerating PO, voiding with no dysuria.      No chest tightness, abdominal tightness, nausea, vomiting, lightheadedness, dizziness.    Vital Signs Last 24 Hrs  T(C): 36.7 (10 Arvind 2023 12:00), Max: 37.2 (10 Arvind 2023 06:00)  T(F): 98 (10 Arvind 2023 12:00), Max: 99 (10 Avrind 2023 06:00)  HR: 78 (10 Arvind 2023 12:00) (74 - 92)  BP: 101/65 (10 Arvind 2023 12:00) (89/52 - 110/64)  BP(mean): 82 (09 Jun 2023 18:37) (75 - 82)  RR: 18 (10 Arvind 2023 12:00) (12 - 19)  SpO2: 100% RA (10 Arvind 2023 12:00) (99% - 100%)    Labs                        9.3    6.61  )-----------( 114      ( 10 Arvind 2023 05:08 )             28.5     06-10    141  |  106  |  12  ----------------------------<  111<H>  4.0   |  31  |  0.76    Ca    8.2<L>      10 Arvind 2023 05:08    Current Medications  acetaminophen     Tablet .. 650 milliGRAM(s) Oral every 6 hours  acetaminophen   IVPB .. 1000 milliGRAM(s) IV Intermittent every 8 hours  enoxaparin Injectable 40 milliGRAM(s) SubCutaneous every 24 hours  ketorolac   Injectable 15 milliGRAM(s) IV Push every 6 hours  lactated ringers. 1000 milliLiter(s) (75 mL/Hr) IV Continuous <Continuous>  ondansetron Injectable 4 milliGRAM(s) IV Push every 6 hours PRN Nausea and/or Vomiting  oxyCODONE    IR 10 milliGRAM(s) Oral every 4 hours PRN Severe Pain (7 - 10)  oxyCODONE    IR 5 milliGRAM(s) Oral every 4 hours PRN Moderate Pain (4 - 6)    Physical Exam  Gen:  WN/WD Female resting in bed, NAD  ENT:  NC/AT, no JVD noted  Thorax:  Symmetric, no retractions.  Breast soft, incisions C/D/I  Lung:  CTA b/l  CV:  S1, S2. RRR  Abd:  Soft, NT/ND.  BS normoactive, no masses to palp.  Surgical incision BALBIR, C/D/I  Extrem:  No C/C/E, DP/radial pulses +2  Neuro:  No gross motor/sensory deficits  Psych:  Alert and oriented x3, calm and cooperative HPI:  This is a 48 y/o female with pre-operative diagnosis of left breast cancer.  Pt noted lump in left breast.  Biopsy done in 2022 confirmed malignancy.  s/p chemo which was completed in 23.  Underwent JOHN 2023    Today   Feels well today, surgical pain under control.  OOB chair, tolerating PO, voiding with no dysuria.      No chest tightness, abdominal tightness, nausea, vomiting, lightheadedness, dizziness.    PAST MEDICAL & SURGICAL HISTORY:  Breast CA  left breast  History of chemotherapy completed 23  S/P  X1   H/O bilateral breast biopsy markers both breasts  Port-A-Cath in place right chest    FAMILY HISTORY: denies  Social History: denies smoking, drinking, drug use  Home Medications: none  Allergies   No Known Allergies      Vital Signs Last 24 Hrs  T(C): 36.7 (10 Arvind 2023 12:00), Max: 37.2 (10 Arvind 2023 06:00)  T(F): 98 (10 Arvind 2023 12:00), Max: 99 (10 Arvind 2023 06:00)  HR: 78 (10 Arvind 2023 12:00) (74 - 92)  BP: 101/65 (10 Arvind 2023 12:00) (89/52 - 110/64)  BP(mean): 82 (2023 18:37) (75 - 82)  RR: 18 (10 Arvind 2023 12:00) (12 - 19)  SpO2: 100% RA (10 Arvind 2023 12:00) (99% - 100%)    Labs                        9.3    6.61  )-----------( 114      ( 10 Arvind 2023 05:08 )             28.5     06-10    141  |  106  |  12  ----------------------------<  111<H>  4.0   |  31  |  0.76    Ca    8.2<L>      10 Arvind 2023 05:08    Current Medications  acetaminophen     Tablet .. 650 milliGRAM(s) Oral every 6 hours  acetaminophen   IVPB .. 1000 milliGRAM(s) IV Intermittent every 8 hours  enoxaparin Injectable 40 milliGRAM(s) SubCutaneous every 24 hours  ketorolac   Injectable 15 milliGRAM(s) IV Push every 6 hours  lactated ringers. 1000 milliLiter(s) (75 mL/Hr) IV Continuous <Continuous>  ondansetron Injectable 4 milliGRAM(s) IV Push every 6 hours PRN Nausea and/or Vomiting  oxyCODONE    IR 10 milliGRAM(s) Oral every 4 hours PRN Severe Pain (7 - 10)  oxyCODONE    IR 5 milliGRAM(s) Oral every 4 hours PRN Moderate Pain (4 - 6)    Physical Exam  Gen:  WN/WD Female resting in bed, NAD  ENT:  NC/AT, no JVD noted  Thorax:  Symmetric, no retractions.  Breast soft, incisions C/D/I  Lung:  CTA b/l  CV:  S1, S2. RRR  Abd:  Soft, NT/ND.  BS normoactive, no masses to palp.  Surgical incision BALBIR, C/D/I  Extrem:  No C/C/E, DP/radial pulses +2  Neuro:  No gross motor/sensory deficits  Psych:  Alert and oriented x3, calm and cooperative

## 2023-06-10 NOTE — PROGRESS NOTE ADULT - NS ATTEND AMEND GEN_ALL_CORE FT
pt seen and examined  continue current care  can transition care from Critical care to surgical team today  d/w Surgical ACP

## 2023-06-10 NOTE — PROGRESS NOTE ADULT - SUBJECTIVE AND OBJECTIVE BOX
doing well  no cp, no sob, no fevers, no calf pain  on exam, both breasts soft. NAC viable. no mastectomy skin flap ischemia.  incisions intact. JOHN flap with good color and cap refill. stable vioptix.  abdomen soft. incisions intact. drain output serosang   umbilicus viable.   no evidence of hematoma at all surgical sites

## 2023-06-10 NOTE — PROVIDER CONTACT NOTE (CHANGE IN STATUS NOTIFICATION) - BACKGROUND
pt is s/p JOHN mastectomy reconstruction with implants, vioptix baseline right breast: 74/95, left breast: 71/94, four ROYER drains draining minimal drainage, pt pain controlled with oxycodone and Toradol

## 2023-06-10 NOTE — DISCHARGE NOTE PROVIDER - CARE PROVIDER_API CALL
Kael Thompson  Plastic Surgery  [address] [place] [address], [address]  [place] [address]  Phone: [phone]  Fax: [phone]  Scheduled Appointment: 06/15/2023    Edward Jean  Surgery  [address] [place] [id number]  Phone: [phone]  Fax: [phone]  Follow Up Time: 2 weeks

## 2023-06-10 NOTE — PROVIDER CONTACT NOTE (CHANGE IN STATUS NOTIFICATION) - SITUATION
pt is s/p JOHN mastectomy reconstruction with implants, vioptix baseline for left breast dropped by 10; from 71% to 61% and fluctuating to 60 or 59% with a signal quality of 99.

## 2023-06-10 NOTE — DISCHARGE NOTE PROVIDER - PROVIDER TOKENS
PROVIDER:[TOKEN:[[id number]],SCHEDULEDAPPT:[06/15/2023]],PROVIDER:[TOKEN:[[id number]],FOLLOWUP:[2 weeks]]

## 2023-06-10 NOTE — DISCHARGE NOTE PROVIDER - NSDCCPTREATMENT_GEN_ALL_CORE_FT
PRINCIPAL PROCEDURE  Procedure: Mastectomy, bilateral, nipple sparing, with breast reconstruction  Findings and Treatment: and left axillary sentinel node bx for breast ca

## 2023-06-10 NOTE — PROVIDER CONTACT NOTE (CHANGE IN STATUS NOTIFICATION) - ASSESSMENT
Last vitals, temp: 97.9, HR 84, BP 97/57, RR 19, SPo2 99 on 4 L NC  Left breast warm to touch, soft non tender, cannot assess flap due to sensor coverage

## 2023-06-10 NOTE — DISCHARGE NOTE PROVIDER - NSDCFUADDINST_GEN_ALL_CORE_FT
SLEEP ON BACK   EMPTY AND RECORD DRAINAGE TWICE A DAY OR AS NEEDED   INCENTIVE SPIROMETER SEVERAL TIMES A DAY   DRINK PLENTY OF WATER   WEAR BRA AT ALL TIMES   TAKE PAIN MEDICATION AS PRESCRIBED BY DR TOBIAS S OFFICE   TAKE A LAXATIVE IF NO BM IN 48 HOURS TO AVOID CONSTIPATION   tAKE A BABY asa 81 MG DAILY FOR 30 DAYS TO AVOID CLOTS

## 2023-06-10 NOTE — PROGRESS NOTE ADULT - SUBJECTIVE AND OBJECTIVE BOX
HPI:    47y Female PMH of Breast CA, now POD#___1__ from b/l Mastectomy, with JOHN/free flap placement, as well as VIOPTIX tissue/flap oxygenation monitoring.     Current Vioptix:    Right Breast: 72%  Left Breast: 62%    Current Signal Strength    Right Breast: 90  Left Breast:  86            PAST MEDICAL & SURGICAL HISTORY:  Breast CA  left breast      History of chemotherapy  completed 23      S/P   X1       H/O bilateral breast biopsy  markers both breasts      Port-A-Cath in place  right chest          MEDICATIONS  (STANDING):  acetaminophen     Tablet .. 650 milliGRAM(s) Oral every 6 hours  acetaminophen   IVPB .. 1000 milliGRAM(s) IV Intermittent every 8 hours  enoxaparin Injectable 40 milliGRAM(s) SubCutaneous every 24 hours  ketorolac   Injectable 15 milliGRAM(s) IV Push every 6 hours  lactated ringers. 1000 milliLiter(s) (75 mL/Hr) IV Continuous <Continuous>    MEDICATIONS  (PRN):  ondansetron Injectable 4 milliGRAM(s) IV Push every 6 hours PRN Nausea and/or Vomiting  oxyCODONE    IR 5 milliGRAM(s) Oral every 4 hours PRN Moderate Pain (4 - 6)  oxyCODONE    IR 10 milliGRAM(s) Oral every 4 hours PRN Severe Pain (7 - 10)      Review of Systems:  CONSTITUTIONAL: No fever, chills, or fatigue  EYES: No eye pain, visual disturbances, or discharge  ENMT:  No difficulty hearing, tinnitus, vertigo; No sinus or throat pain  NECK: No pain or stiffness  RESPIRATORY: No cough, wheezing, chills or hemoptysis; No shortness of breath  CARDIOVASCULAR: No chest pain, palpitations, dizziness, or leg swelling  GASTROINTESTINAL: No abdominal or epigastric pain. No nausea, vomiting, or hematemesis; No diarrhea or constipation. No melena or hematochezia.  GENITOURINARY: No dysuria, frequency, hematuria, or incontinence  NEUROLOGICAL: No headaches, memory loss, loss of strength, numbness, or tremors  SKIN: No itching, burning, rashes, or lesions   MUSCULOSKELETAL: No joint pain or swelling; No muscle, back, or extremity pain  PSYCHIATRIC: No depression, anxiety, mood swings, or difficulty sleeping      ICU Vital Signs Last 24 Hrs  T(C): 36.7 (10 Arvind 2023 12:00), Max: 37.2 (10 Arvind 2023 06:00)  T(F): 98 (10 Arvind 2023 12:00), Max: 99 (10 Arvind 2023 06:00)  HR: 78 (10 Arvind 2023 12:) (78 - 84)  BP: 101/65 (10 Arvind 2023 12:) (89/52 - 101/65)  BP(mean): --  ABP: --  ABP(mean): --  RR: 18 (10 Arvind 2023 12:) (18 - 19)  SpO2: 100% (10 Arvind 2023 12:) (100% - 100%)    O2 Parameters below as of 10 Arvind 2023 12:  Patient On (Oxygen Delivery Method): room air                                    9.3    6.61  )-----------( 114      ( 10 Arvind 2023 05:08 )             28.5       06-10    141  |  106  |  12  ----------------------------<  111<H>  4.0   |  31  |  0.76    Ca    8.2<L>      10 Arvind 2023 05:08            Physical Examination:    General: No acute distress.  Alert, oriented, interactive, nonfocal    BREAST: breasts appears symmetrical, no signs of hematoma, soft to palpation, non tender, well perfused, turgor maintained,cap refill adequate. Viotpix in place functioning well. ROYER drains in place with serosanguinous drainage.     HEENT: Pupils equal, reactive to light.  Symmetric.    PULM: Clear to auscultation bilaterally, no significant sputum production    CVS: Regular rate and rhythm, no murmurs, rubs, or gallops    ABD: Soft, nondistended, nontender, normoactive bowel sounds, no masses. Flap removal site well approximated, staples in palce. No ative drainage, bleeding, or signs of infection.    EXT: No edema, nontender    SKIN: Warm and well perfused, no rashes noted.

## 2023-06-10 NOTE — DISCHARGE NOTE PROVIDER - NSDCCPCAREPLAN_GEN_ALL_CORE_FT
PRINCIPAL DISCHARGE DIAGNOSIS  Diagnosis: Breast cancer, left  Assessment and Plan of Treatment: for Bilateral nipple sparing mastectomies/ left axillary sentinel node bx /JOHN flap recon

## 2023-06-11 ENCOUNTER — TRANSCRIPTION ENCOUNTER (OUTPATIENT)
Age: 48
End: 2023-06-11

## 2023-06-11 VITALS
DIASTOLIC BLOOD PRESSURE: 62 MMHG | HEART RATE: 99 BPM | OXYGEN SATURATION: 98 % | RESPIRATION RATE: 18 BRPM | TEMPERATURE: 98 F | SYSTOLIC BLOOD PRESSURE: 104 MMHG

## 2023-06-11 PROCEDURE — A9541: CPT

## 2023-06-11 PROCEDURE — C1889: CPT

## 2023-06-11 PROCEDURE — C1789: CPT

## 2023-06-11 PROCEDURE — C9399: CPT

## 2023-06-11 PROCEDURE — 80048 BASIC METABOLIC PNL TOTAL CA: CPT

## 2023-06-11 PROCEDURE — 88342 IMHCHEM/IMCYTCHM 1ST ANTB: CPT

## 2023-06-11 PROCEDURE — 36430 TRANSFUSION BLD/BLD COMPNT: CPT

## 2023-06-11 PROCEDURE — 88307 TISSUE EXAM BY PATHOLOGIST: CPT

## 2023-06-11 PROCEDURE — P9040: CPT

## 2023-06-11 PROCEDURE — C1781: CPT

## 2023-06-11 PROCEDURE — 36415 COLL VENOUS BLD VENIPUNCTURE: CPT

## 2023-06-11 PROCEDURE — 88305 TISSUE EXAM BY PATHOLOGIST: CPT

## 2023-06-11 PROCEDURE — 85027 COMPLETE CBC AUTOMATED: CPT

## 2023-06-11 PROCEDURE — 99261: CPT

## 2023-06-11 RX ADMIN — OXYCODONE HYDROCHLORIDE 10 MILLIGRAM(S): 5 TABLET ORAL at 08:20

## 2023-06-11 RX ADMIN — OXYCODONE HYDROCHLORIDE 5 MILLIGRAM(S): 5 TABLET ORAL at 02:18

## 2023-06-11 RX ADMIN — OXYCODONE HYDROCHLORIDE 5 MILLIGRAM(S): 5 TABLET ORAL at 03:18

## 2023-06-11 RX ADMIN — Medication 15 MILLIGRAM(S): at 11:09

## 2023-06-11 RX ADMIN — Medication 15 MILLIGRAM(S): at 11:25

## 2023-06-11 RX ADMIN — OXYCODONE HYDROCHLORIDE 10 MILLIGRAM(S): 5 TABLET ORAL at 15:02

## 2023-06-11 RX ADMIN — Medication 15 MILLIGRAM(S): at 06:07

## 2023-06-11 RX ADMIN — OXYCODONE HYDROCHLORIDE 10 MILLIGRAM(S): 5 TABLET ORAL at 07:26

## 2023-06-11 RX ADMIN — OXYCODONE HYDROCHLORIDE 10 MILLIGRAM(S): 5 TABLET ORAL at 14:02

## 2023-06-11 RX ADMIN — ENOXAPARIN SODIUM 40 MILLIGRAM(S): 100 INJECTION SUBCUTANEOUS at 06:07

## 2023-06-11 RX ADMIN — Medication 15 MILLIGRAM(S): at 06:35

## 2023-06-11 RX ADMIN — Medication 15 MILLIGRAM(S): at 00:20

## 2023-06-11 NOTE — PROGRESS NOTE ADULT - ASSESSMENT
s/p bilateral nipple sparing mastectomy and JOHN flap, prepectoral implant, and ADM (acellular dermal matrix), POD 2  plan:  d/c home  d/c vioptix  sleep on back  no shower  patient has appt for 06/15  no heavy lifting  no strenous activity  empty drains bid  encourage water and incentive spirometer
s/p bilateral nipple sparing mastectomy and hybrid microsurgical breast reconstruction with JOHN flap, prepectoral implant, and acellular dermal matrix, POD 1  plan:  oob to chair  regular diet  d/c hart  wean oxgyen  continue DVT chemoppx  continue vioptix  encourage water intake and incentive spirometer  sleep on back or sit along back  post-surgical mammary garment
47 year old female POD #1 bilateral nipple sparing mastectomy with immediate JOHN flap reconstruction with implants     Plan  Monitor VS, drain outputs, bioptics  Encourage activity and incentive spirometry  Analgesia/antiemetics PRN  Labs and wound care as per surgical service  DVT ppx    
47 year old female with hx of breast cancer is stable s/p Bilateral mastectomies/left axillary sentinel nodes /JOHN flap reconstruction  - - anemia from acute blood loss /chemo  - - s/p 1 intra op unit PRBC     Plan :  Discussed with Dr Jena/Donna Don             d/brandon hart             pain management             continue vioptix 
PLAN:    1) 47y Female PMH of Breast CA, now POD#___1__ from b/l Mastectomy, with JOHN/free flap placement, as well as VIOPTIX tissue/flap oxygenation monitoring.     -Q1H Vioptix tissue oxygenation monitoring. Baseline vioptix as noted in HPI.  If VIOPTIX noted to drop by 20%  or more in a 30 minute time frame will alert primary surgical team  -ensure vioptix signal strength >80%  -serial flap assessment for signs of perfusion  -skin assessment for color, firmness, signs of hematoma or other changes  -abdominal incisions site  monitoring  -hourly checks on ROYER drain output  -pain control  -Morning LABS  -have discussed case with eICU team, including attending physician  -any and all changes or concerns regarding JOHN procedure, flap, etc will be immedietly addressed with primary surgical team

## 2023-06-11 NOTE — PROGRESS NOTE ADULT - SUBJECTIVE AND OBJECTIVE BOX
patient is doing well  no cp, no sob, no fevers, no calf pain  on exam, both breast soft.  JOHN flap with good color, cap refill, stable vioptix.  abdomen soft. incisions intact. umbilicus viable.

## 2023-06-11 NOTE — DISCHARGE NOTE NURSING/CASE MANAGEMENT/SOCIAL WORK - NSDCPEFALRISK_GEN_ALL_CORE
For information on Fall & Injury Prevention, visit: [email or web address] OR  [email or web address] OR  https://www.cdc.gov/steadi/patient.html

## 2023-06-11 NOTE — DISCHARGE NOTE NURSING/CASE MANAGEMENT/SOCIAL WORK - NSSCNAMETXT_GEN_ALL_CORE
[facility] (formerly [facility])  Phone: [phone]

## 2023-06-11 NOTE — DISCHARGE NOTE NURSING/CASE MANAGEMENT/SOCIAL WORK - PATIENT PORTAL LINK FT
You can access the FollowMyHealth Patient Portal offered by [facility] by registering at the following website: [email or web address]. By joining [email or web address]’s FollowMyHealth portal, you will also be able to view your health information using other applications (apps) compatible with our system.

## 2023-06-12 ENCOUNTER — NON-APPOINTMENT (OUTPATIENT)
Age: 48
End: 2023-06-12

## 2023-06-12 PROBLEM — C50.919 MALIGNANT NEOPLASM OF UNSPECIFIED SITE OF UNSPECIFIED FEMALE BREAST: Chronic | Status: ACTIVE | Noted: 2022-12-02

## 2023-06-13 ENCOUNTER — NON-APPOINTMENT (OUTPATIENT)
Age: 48
End: 2023-06-13

## 2023-06-15 ENCOUNTER — APPOINTMENT (OUTPATIENT)
Dept: PLASTIC SURGERY | Facility: CLINIC | Age: 48
End: 2023-06-15
Payer: COMMERCIAL

## 2023-06-15 VITALS
SYSTOLIC BLOOD PRESSURE: 118 MMHG | HEART RATE: 79 BPM | HEIGHT: 69 IN | RESPIRATION RATE: 15 BRPM | BODY MASS INDEX: 22.96 KG/M2 | OXYGEN SATURATION: 97 % | DIASTOLIC BLOOD PRESSURE: 78 MMHG | WEIGHT: 155 LBS

## 2023-06-15 PROBLEM — Z92.21 PERSONAL HISTORY OF ANTINEOPLASTIC CHEMOTHERAPY: Chronic | Status: ACTIVE | Noted: 2023-06-08

## 2023-06-15 PROCEDURE — 99024 POSTOP FOLLOW-UP VISIT: CPT

## 2023-06-19 ENCOUNTER — APPOINTMENT (OUTPATIENT)
Dept: PLASTIC SURGERY | Facility: CLINIC | Age: 48
End: 2023-06-19
Payer: COMMERCIAL

## 2023-06-19 VITALS
TEMPERATURE: 98 F | WEIGHT: 155 LBS | DIASTOLIC BLOOD PRESSURE: 79 MMHG | BODY MASS INDEX: 22.96 KG/M2 | OXYGEN SATURATION: 98 % | HEART RATE: 99 BPM | HEIGHT: 69 IN | SYSTOLIC BLOOD PRESSURE: 123 MMHG

## 2023-06-19 PROCEDURE — 99024 POSTOP FOLLOW-UP VISIT: CPT

## 2023-06-23 ENCOUNTER — APPOINTMENT (OUTPATIENT)
Dept: SURGICAL ONCOLOGY | Facility: CLINIC | Age: 48
End: 2023-06-23
Payer: COMMERCIAL

## 2023-06-23 ENCOUNTER — APPOINTMENT (OUTPATIENT)
Dept: PLASTIC SURGERY | Facility: CLINIC | Age: 48
End: 2023-06-23
Payer: COMMERCIAL

## 2023-06-23 VITALS
OXYGEN SATURATION: 98 % | RESPIRATION RATE: 17 BRPM | HEART RATE: 79 BPM | DIASTOLIC BLOOD PRESSURE: 67 MMHG | SYSTOLIC BLOOD PRESSURE: 98 MMHG | HEIGHT: 69 IN | WEIGHT: 155 LBS | BODY MASS INDEX: 22.96 KG/M2

## 2023-06-23 VITALS
HEIGHT: 69 IN | BODY MASS INDEX: 22.96 KG/M2 | WEIGHT: 155 LBS | SYSTOLIC BLOOD PRESSURE: 117 MMHG | HEART RATE: 100 BPM | RESPIRATION RATE: 17 BRPM | DIASTOLIC BLOOD PRESSURE: 82 MMHG | OXYGEN SATURATION: 99 %

## 2023-06-23 PROCEDURE — 99024 POSTOP FOLLOW-UP VISIT: CPT

## 2023-06-23 NOTE — ASSESSMENT
[FreeTextEntry1] : T3 triple negative left breast cancer\par Completed neoadjuvant chemotherapy May 2023 with excellent clinical response\par Status post bilateral nipple sparing mastectomy with reconstruction\par Follow up with pathology results \par Continue follow up with Dr. Thompson of plastic surgery \par Continue Keytruda as per medical oncology \par RTO 3 months \par \par \par \par

## 2023-06-23 NOTE — ADDENDUM
[FreeTextEntry1] : I, Lisa Culver, acted solely as a scribe for Dr. Edward Jean on this date 06/23/2023.\par

## 2023-06-23 NOTE — HISTORY OF PRESENT ILLNESS
[de-identified] : Patient is a 48 y/o female who presents for a follow up visit for triple negative left breast cancer.  \par Consensus at [facility] was to treat with neoadjuvant chemo/immunotherapy, Taxol/ carbo/ Pembro AC Keynote 522. She completed chemotherapy on 5/9/23.\par Today she is s/p bilateral breast reconstruction with JOHN flaps, prepectoral implants and ADM on 6/9/23. Pathology report pending. 2 drains left in place. She is scheduled to follow up with Dr. Kael Thompson of plastic surgery later today. \par \par 11/23/22- NM bone scan negative for osseous metastasis.  CT C/AP negative for distant metastatic disease.\par \par 11/21/22- s/p USG FNA right breast 1:00, negative for malignant cells and consistent with cyst contents.  US evaluation of the left axilla demonstrates normal-sized lymph nodes with benign morphology.  Evaluation of the right breast 3:00 demonstrates normal fatty lobule in the area of initial concern.  \par \par Breast MRI 11/9/22- left breast malignancy with multiple satellite masses, overall greatest AP dimension of 6.7 cm.  Mildly prominent lymph nodes in the left axilla for which targeted evaluation and possible USGB is recommended.  Probably benign mass right breast 3:00, targeted re evaluation is recommended for confirmation with USGB if warranted.   Indeterminate mass right breast 1:00, targeted re evaluation is recommended for confirmation with USGB if warranted.\par \par Biopsy (10/17/22):\par Site 1: Left ultrasound biopsy irregular mass at 10-12:00 \par -Invasive carcinoma consistent with invasive mammary ductal carcinoma\par Site 2: Right breast posterior upper central calcifications, stereotactic biopsy\par -Benign breast tissue with apocrine/papillary apocrine cysts\par -Calcification associated with ductules, lobules, cystic change, and stroma\par \par Patient underwent sonogram on 9/19/22 which showed highly suspicious palpable hypoechoic left breast mass at 10:00-12:00 for which an us-guided biopsy is recommended. Indeterminate calcifications in the posterior upper central right breast for which stereotactic biopsy is recommended. Probably benign right 3:00 sonographic nodule for which a 6 month follow up targeted right breast ultrasound is recommended. (BI-RADS 5)\par \par Denies any family history of breast cancer.

## 2023-06-23 NOTE — CONSULT LETTER
[Dear  ___] : Dear  [unfilled], [Courtesy Letter:] : I had the pleasure of seeing your patient, [unfilled], in my office today. [Please see my note below.] : Please see my note below. [Sincerely,] : Sincerely, [DrShiraz  ___] : Dr. COLE [FreeTextEntry3] : Edward Jean MD FACS

## 2023-06-26 ENCOUNTER — APPOINTMENT (OUTPATIENT)
Dept: PLASTIC SURGERY | Facility: CLINIC | Age: 48
End: 2023-06-26
Payer: COMMERCIAL

## 2023-06-26 VITALS
TEMPERATURE: 98 F | WEIGHT: 155 LBS | DIASTOLIC BLOOD PRESSURE: 75 MMHG | OXYGEN SATURATION: 98 % | HEIGHT: 69 IN | BODY MASS INDEX: 22.96 KG/M2 | SYSTOLIC BLOOD PRESSURE: 115 MMHG | HEART RATE: 83 BPM

## 2023-06-26 PROCEDURE — 99024 POSTOP FOLLOW-UP VISIT: CPT

## 2023-06-27 ENCOUNTER — RESULT REVIEW (OUTPATIENT)
Age: 48
End: 2023-06-27

## 2023-06-27 ENCOUNTER — APPOINTMENT (OUTPATIENT)
Dept: INFUSION THERAPY | Facility: HOSPITAL | Age: 48
End: 2023-06-27

## 2023-06-27 LAB
BASOPHILS # BLD AUTO: 0 K/UL — SIGNIFICANT CHANGE UP (ref 0–0.2)
BASOPHILS NFR BLD AUTO: 0 % — SIGNIFICANT CHANGE UP (ref 0–2)
DACRYOCYTES BLD QL SMEAR: SLIGHT — SIGNIFICANT CHANGE UP
EOSINOPHIL # BLD AUTO: 1.11 K/UL — HIGH (ref 0–0.5)
EOSINOPHIL NFR BLD AUTO: 24 % — HIGH (ref 0–6)
HCT VFR BLD CALC: 31.9 % — LOW (ref 34.5–45)
HGB BLD-MCNC: 10.3 G/DL — LOW (ref 11.5–15.5)
LYMPHOCYTES # BLD AUTO: 0.6 K/UL — LOW (ref 1–3.3)
LYMPHOCYTES # BLD AUTO: 13 % — SIGNIFICANT CHANGE UP (ref 13–44)
MCHC RBC-ENTMCNC: 29.9 PG — SIGNIFICANT CHANGE UP (ref 27–34)
MCHC RBC-ENTMCNC: 32.3 G/DL — SIGNIFICANT CHANGE UP (ref 32–36)
MCV RBC AUTO: 92.5 FL — SIGNIFICANT CHANGE UP (ref 80–100)
MONOCYTES # BLD AUTO: 0.18 K/UL — SIGNIFICANT CHANGE UP (ref 0–0.9)
MONOCYTES NFR BLD AUTO: 4 % — SIGNIFICANT CHANGE UP (ref 2–14)
NEUTROPHILS # BLD AUTO: 2.72 K/UL — SIGNIFICANT CHANGE UP (ref 1.8–7.4)
NEUTROPHILS NFR BLD AUTO: 59 % — SIGNIFICANT CHANGE UP (ref 43–77)
NRBC # BLD: 0 /100 — SIGNIFICANT CHANGE UP (ref 0–0)
NRBC # BLD: SIGNIFICANT CHANGE UP /100 WBCS (ref 0–0)
PLAT MORPH BLD: NORMAL — SIGNIFICANT CHANGE UP
PLATELET # BLD AUTO: 198 K/UL — SIGNIFICANT CHANGE UP (ref 150–400)
POIKILOCYTOSIS BLD QL AUTO: SLIGHT — SIGNIFICANT CHANGE UP
RBC # BLD: 3.45 M/UL — LOW (ref 3.8–5.2)
RBC # FLD: 13.4 % — SIGNIFICANT CHANGE UP (ref 10.3–14.5)
RBC BLD AUTO: ABNORMAL
SURGICAL PATHOLOGY STUDY: SIGNIFICANT CHANGE UP
WBC # BLD: 4.61 K/UL — SIGNIFICANT CHANGE UP (ref 3.8–10.5)
WBC # FLD AUTO: 4.61 K/UL — SIGNIFICANT CHANGE UP (ref 3.8–10.5)

## 2023-06-28 ENCOUNTER — NON-APPOINTMENT (OUTPATIENT)
Age: 48
End: 2023-06-28

## 2023-06-28 DIAGNOSIS — Z51.11 ENCOUNTER FOR ANTINEOPLASTIC CHEMOTHERAPY: ICD-10-CM

## 2023-06-28 LAB
ALBUMIN SERPL ELPH-MCNC: 3.9 G/DL — SIGNIFICANT CHANGE UP (ref 3.3–5)
ALP SERPL-CCNC: 90 U/L — SIGNIFICANT CHANGE UP (ref 40–120)
ALT FLD-CCNC: 9 U/L — LOW (ref 10–45)
ANION GAP SERPL CALC-SCNC: 13 MMOL/L — SIGNIFICANT CHANGE UP (ref 5–17)
AST SERPL-CCNC: 20 U/L — SIGNIFICANT CHANGE UP (ref 10–40)
BILIRUB SERPL-MCNC: 0.2 MG/DL — SIGNIFICANT CHANGE UP (ref 0.2–1.2)
BUN SERPL-MCNC: 14 MG/DL — SIGNIFICANT CHANGE UP (ref 7–23)
CALCIUM SERPL-MCNC: 9.6 MG/DL — SIGNIFICANT CHANGE UP (ref 8.4–10.5)
CHLORIDE SERPL-SCNC: 104 MMOL/L — SIGNIFICANT CHANGE UP (ref 96–108)
CO2 SERPL-SCNC: 25 MMOL/L — SIGNIFICANT CHANGE UP (ref 22–31)
CREAT SERPL-MCNC: 0.64 MG/DL — SIGNIFICANT CHANGE UP (ref 0.5–1.3)
EGFR: 109 ML/MIN/1.73M2 — SIGNIFICANT CHANGE UP
GLUCOSE SERPL-MCNC: 90 MG/DL — SIGNIFICANT CHANGE UP (ref 70–99)
POTASSIUM SERPL-MCNC: 4.2 MMOL/L — SIGNIFICANT CHANGE UP (ref 3.5–5.3)
POTASSIUM SERPL-SCNC: 4.2 MMOL/L — SIGNIFICANT CHANGE UP (ref 3.5–5.3)
PROT SERPL-MCNC: 6.4 G/DL — SIGNIFICANT CHANGE UP (ref 6–8.3)
SODIUM SERPL-SCNC: 142 MMOL/L — SIGNIFICANT CHANGE UP (ref 135–145)

## 2023-06-29 ENCOUNTER — APPOINTMENT (OUTPATIENT)
Dept: HEMATOLOGY ONCOLOGY | Facility: CLINIC | Age: 48
End: 2023-06-29
Payer: COMMERCIAL

## 2023-06-29 VITALS
SYSTOLIC BLOOD PRESSURE: 112 MMHG | DIASTOLIC BLOOD PRESSURE: 78 MMHG | TEMPERATURE: 96.8 F | OXYGEN SATURATION: 99 % | HEART RATE: 80 BPM | RESPIRATION RATE: 18 BRPM | BODY MASS INDEX: 22.46 KG/M2 | WEIGHT: 152.12 LBS

## 2023-06-29 PROCEDURE — 99214 OFFICE O/P EST MOD 30 MIN: CPT

## 2023-06-30 ENCOUNTER — APPOINTMENT (OUTPATIENT)
Dept: HEMATOLOGY ONCOLOGY | Facility: CLINIC | Age: 48
End: 2023-06-30
Payer: COMMERCIAL

## 2023-06-30 ENCOUNTER — APPOINTMENT (OUTPATIENT)
Dept: PLASTIC SURGERY | Facility: CLINIC | Age: 48
End: 2023-06-30
Payer: COMMERCIAL

## 2023-06-30 PROCEDURE — 99024 POSTOP FOLLOW-UP VISIT: CPT

## 2023-06-30 PROCEDURE — 99213 OFFICE O/P EST LOW 20 MIN: CPT

## 2023-06-30 NOTE — HISTORY OF PRESENT ILLNESS
[Disease: _____________________] : Disease: [unfilled] [de-identified] : This is a  47-year-old woman with history of left breast carcinoma who presents for evaluation.  The patient's history dates back to May 2022 when she noted a mass in the left breast.   In 2022  a mammogram and sonogram were performed.  These revealed a mass in the left breast along with calcifications in the right breast.  The biopsy revealed poorly differentiated infiltrating duct carcinoma with Isaias score 8/9, ER GA -0% and HER2/roxann 1+ negative consistent with triple negative breast cancer.  The Ki-67 was 50%.\par \par There was no obvious left axillary lymph nodes.  The right breast calcifications were biopsied with benign results including fibrocystic changes.  An MRI revealed that the left breast mass with touching the pectoral muscle to the lateral breast area along with satellite.  There was prominence in the left axilla but no definite enlargement of nodes.  The patient has undergone surgical oncology evaluation and the plan is to evaluate patient for neoadjuvant chemotherapy and she is considering bilateral mastectomies.  The patient has undergone genetic testing which is pending.  The patient did have a previous biopsy of the breast in 2018 involving the right breast which showed a benign biopsy.\par \par The patient will undergo targeted reevaluation of the nonspecific foci on the right breast and also left axillary lymph nodes with biopsy to assess for bilateral disease and possible dayna metastases.\par \par Patient's menarche was age 12 and her periods have been regular.  She is  1 para 1 age at first pregnancy was 19.  She denies hormonal therapy or fertility treatments.  The patient will undergo CAT scans and bone scans to evaluate any evidence for systemic disease. [de-identified] : poorly diff IDC ERneg, AR neg, Her2 neg 1+, Ki67-50%  TNBC, MRI mass touching pectoral muscle, [de-identified] : Preop left breast mass ,no axillary nodes but tumor touching pectoral muscle.Plan neoadjuvant chemo/immunotherapy, Taxol/ carbo/ Pembro AC Keynote 522\par Post op  minimal residual 3 foci of residual malignancy maximal size 1.1 mm.3 nodes neg.\par Plan - continue prmbro to complete 1 year, add xeloda x 6 months , if genetics are BRCA pos consider switch to parp.

## 2023-06-30 NOTE — ASSESSMENT
[Curative] : Goals of care discussed with patient: Curative [Palliative Care Plan] : not applicable at this time [FreeTextEntry1] : The patient continues to do well postoperatively post bilateral mastectomies and TRAM flap reconstruction.  The the wound is healing well although the patient still has discomfort 3 weeks after her surgery.  Review of the pathology revealed 3 foci of residual carcinoma which initiated discussion regarding adding Xeloda to the Pembro treatment she is receiving.  Side effects and toxicity were discussed in detail and informed consent was obtained.  We also zonia genetics testing to see if patient is BRCA positive in which case there would be discussion replacing the Xeloda with PARP inhibitor.  I ordered genetic testing today, f/u. The patient will continue to heal and return in 3 weeks for follow-up evaluation. pt s/p mechanical fall a wk ago, c/o L knee pain - states that it "locks" occasionally, no breaks in skin or discolorations, ambulatory w/o assistance, given ibuprofen, xray     , ace wrap applied for ambulatory comfort / support, to RICE and f/u w/ortho, rx for voltaren, pt understands and agrees w/plan, strict return precautions given pt s/p mechanical fall a wk ago, c/o L knee pain - states that it "locks" occasionally, no breaks in skin or discolorations, ambulatory w/o assistance, given ibuprofen, xray w/djd, ace wrap applied for ambulatory comfort / support, to RICE and f/u w/ortho, rx for voltaren, pt understands and agrees w/plan, strict return precautions given

## 2023-06-30 NOTE — ASSESSMENT
[FreeTextEntry1] : The patient continues to do well postoperatively post bilateral mastectomies and TRAM flap reconstruction.  The the wound is healing well although the patient still has discomfort 3 weeks after her surgery.  Review of the pathology revealed 3 foci of residual carcinoma which initiated discussion regarding adding Xeloda to the Pembro treatment she is receiving.  Side effects and toxicity were discussed in detail and informed consent was obtained.  We also zonia genetics testing to see if patient is BRCA positive in which case there would be discussion replacing the Xeloda with PARP inhibitor.  The patient will continue to heal and return in 3 weeks for follow-up evaluation. [Curative] : Goals of care discussed with patient: Curative

## 2023-06-30 NOTE — REVIEW OF SYSTEMS
[Diarrhea: Grade 0] : Diarrhea: Grade 0 [Negative] : Allergic/Immunologic [FreeTextEntry9] : lower legs tired [de-identified] : has tingling of feet

## 2023-06-30 NOTE — PHYSICAL EXAM
[Restricted in physically strenuous activity but ambulatory and able to carry out work of a light or sedentary nature] : Status 1- Restricted in physically strenuous activity but ambulatory and able to carry out work of a light or sedentary nature, e.g., light house work, office work [Normal] : affect appropriate [de-identified] : +surgical bra in place.

## 2023-06-30 NOTE — HISTORY OF PRESENT ILLNESS
[Disease: _____________________] : Disease: [unfilled] [de-identified] : This is a  47-year-old woman with history of left breast carcinoma who presents for evaluation.  The patient's history dates back to May 2022 when she noted a mass in the left breast.   In 2022  a mammogram and sonogram were performed.  These revealed a mass in the left breast along with calcifications in the right breast.  The biopsy revealed poorly differentiated infiltrating duct carcinoma with Isaias score 8/9, ER [place] -0% and HER2/roxann 1+ negative consistent with triple negative breast cancer.  The Ki-67 was 50%.\par \par There was no obvious left axillary lymph nodes.  The right breast calcifications were biopsied with benign results including fibrocystic changes.  An MRI revealed that the left breast mass with touching the pectoral muscle to the lateral breast area along with satellite.  There was prominence in the left axilla but no definite enlargement of nodes.  The patient has undergone surgical oncology evaluation and the plan is to evaluate patient for neoadjuvant chemotherapy and she is considering bilateral mastectomies.  The patient has undergone genetic testing which is pending.  The patient did have a previous biopsy of the breast in 2018 involving the right breast which showed a benign biopsy.\par \par The patient will undergo targeted reevaluation of the nonspecific foci on the right breast and also left axillary lymph nodes with biopsy to assess for bilateral disease and possible dayna metastases.\par \par Patient's menarche was age 12 and her periods have been regular.  She is  1 para 1 age at first pregnancy was 19.  She denies hormonal therapy or fertility treatments.  The patient will undergo CAT scans and bone scans to evaluate any evidence for systemic disease. [de-identified] : poorly diff IDC ERneg, PA neg, Her2 neg 1+, Ki67-50%  TNBC [de-identified] : Plan neoadjuvant chemo/immunotherapy, Taxol/ carbo/ Pembro AC Keynote 522 [de-identified] : Patient is here for genetic testing. She is s/p bilateral breast reconstruction with JOHN flaps, prepectoral implants, and acellular dermal matrix 06/09/2023. She c/o discomfort/tenderness with walking to lower abdominal area. \par

## 2023-06-30 NOTE — PHYSICAL EXAM
[Restricted in physically strenuous activity but ambulatory and able to carry out work of a light or sedentary nature] : Status 1- Restricted in physically strenuous activity but ambulatory and able to carry out work of a light or sedentary nature, e.g., light house work, office work [Normal] : affect appropriate [de-identified] : bilat mastectomies and recon [de-identified] : abdominal incision healing well

## 2023-07-13 ENCOUNTER — APPOINTMENT (OUTPATIENT)
Dept: PLASTIC SURGERY | Facility: CLINIC | Age: 48
End: 2023-07-13
Payer: COMMERCIAL

## 2023-07-13 VITALS
SYSTOLIC BLOOD PRESSURE: 118 MMHG | OXYGEN SATURATION: 98 % | HEIGHT: 69 IN | HEART RATE: 89 BPM | DIASTOLIC BLOOD PRESSURE: 77 MMHG | RESPIRATION RATE: 18 BRPM | WEIGHT: 152 LBS | BODY MASS INDEX: 22.51 KG/M2

## 2023-07-13 PROCEDURE — 99024 POSTOP FOLLOW-UP VISIT: CPT

## 2023-07-18 ENCOUNTER — RESULT REVIEW (OUTPATIENT)
Age: 48
End: 2023-07-18

## 2023-07-18 ENCOUNTER — APPOINTMENT (OUTPATIENT)
Dept: INFUSION THERAPY | Facility: HOSPITAL | Age: 48
End: 2023-07-18

## 2023-07-18 ENCOUNTER — NON-APPOINTMENT (OUTPATIENT)
Age: 48
End: 2023-07-18

## 2023-07-18 LAB
BASOPHILS # BLD AUTO: 0.03 K/UL — SIGNIFICANT CHANGE UP (ref 0–0.2)
BASOPHILS NFR BLD AUTO: 1 % — SIGNIFICANT CHANGE UP (ref 0–2)
DACRYOCYTES BLD QL SMEAR: SLIGHT — SIGNIFICANT CHANGE UP
ELLIPTOCYTES BLD QL SMEAR: SLIGHT — SIGNIFICANT CHANGE UP
EOSINOPHIL # BLD AUTO: 0.34 K/UL — SIGNIFICANT CHANGE UP (ref 0–0.5)
EOSINOPHIL NFR BLD AUTO: 12 % — HIGH (ref 0–6)
HCT VFR BLD CALC: 32.7 % — LOW (ref 34.5–45)
HGB BLD-MCNC: 10.3 G/DL — LOW (ref 11.5–15.5)
HYPOCHROMIA BLD QL: SLIGHT — SIGNIFICANT CHANGE UP
LYMPHOCYTES # BLD AUTO: 0.82 K/UL — LOW (ref 1–3.3)
LYMPHOCYTES # BLD AUTO: 29 % — SIGNIFICANT CHANGE UP (ref 13–44)
MCHC RBC-ENTMCNC: 28.4 PG — SIGNIFICANT CHANGE UP (ref 27–34)
MCHC RBC-ENTMCNC: 31.5 G/DL — LOW (ref 32–36)
MCV RBC AUTO: 90.1 FL — SIGNIFICANT CHANGE UP (ref 80–100)
MONOCYTES # BLD AUTO: 0.06 K/UL — SIGNIFICANT CHANGE UP (ref 0–0.9)
MONOCYTES NFR BLD AUTO: 2 % — SIGNIFICANT CHANGE UP (ref 2–14)
NEUTROPHILS # BLD AUTO: 1.59 K/UL — LOW (ref 1.8–7.4)
NEUTROPHILS NFR BLD AUTO: 56 % — SIGNIFICANT CHANGE UP (ref 43–77)
NRBC # BLD: 0 /100 — SIGNIFICANT CHANGE UP (ref 0–0)
NRBC # BLD: SIGNIFICANT CHANGE UP /100 WBCS (ref 0–0)
PLAT MORPH BLD: NORMAL — SIGNIFICANT CHANGE UP
PLATELET # BLD AUTO: 149 K/UL — LOW (ref 150–400)
POIKILOCYTOSIS BLD QL AUTO: SLIGHT — SIGNIFICANT CHANGE UP
RBC # BLD: 3.63 M/UL — LOW (ref 3.8–5.2)
RBC # FLD: 13 % — SIGNIFICANT CHANGE UP (ref 10.3–14.5)
RBC BLD AUTO: ABNORMAL
WBC # BLD: 2.84 K/UL — LOW (ref 3.8–10.5)
WBC # FLD AUTO: 2.84 K/UL — LOW (ref 3.8–10.5)

## 2023-07-19 LAB
ALBUMIN SERPL ELPH-MCNC: 4.1 G/DL — SIGNIFICANT CHANGE UP (ref 3.3–5)
ALP SERPL-CCNC: 86 U/L — SIGNIFICANT CHANGE UP (ref 40–120)
ALT FLD-CCNC: 8 U/L — LOW (ref 10–45)
ANION GAP SERPL CALC-SCNC: 12 MMOL/L — SIGNIFICANT CHANGE UP (ref 5–17)
AST SERPL-CCNC: 19 U/L — SIGNIFICANT CHANGE UP (ref 10–40)
BILIRUB SERPL-MCNC: 0.2 MG/DL — SIGNIFICANT CHANGE UP (ref 0.2–1.2)
BUN SERPL-MCNC: 12 MG/DL — SIGNIFICANT CHANGE UP (ref 7–23)
CALCIUM SERPL-MCNC: 9.4 MG/DL — SIGNIFICANT CHANGE UP (ref 8.4–10.5)
CHLORIDE SERPL-SCNC: 103 MMOL/L — SIGNIFICANT CHANGE UP (ref 96–108)
CO2 SERPL-SCNC: 24 MMOL/L — SIGNIFICANT CHANGE UP (ref 22–31)
CREAT SERPL-MCNC: 0.55 MG/DL — SIGNIFICANT CHANGE UP (ref 0.5–1.3)
EGFR: 113 ML/MIN/1.73M2 — SIGNIFICANT CHANGE UP
GLUCOSE SERPL-MCNC: 94 MG/DL — SIGNIFICANT CHANGE UP (ref 70–99)
MISCELLANEOUS TEST: NORMAL
POTASSIUM SERPL-MCNC: 4 MMOL/L — SIGNIFICANT CHANGE UP (ref 3.5–5.3)
POTASSIUM SERPL-SCNC: 4 MMOL/L — SIGNIFICANT CHANGE UP (ref 3.5–5.3)
PROC NAME: NORMAL
PROT SERPL-MCNC: 6.5 G/DL — SIGNIFICANT CHANGE UP (ref 6–8.3)
SODIUM SERPL-SCNC: 139 MMOL/L — SIGNIFICANT CHANGE UP (ref 135–145)

## 2023-07-20 ENCOUNTER — APPOINTMENT (OUTPATIENT)
Dept: PLASTIC SURGERY | Facility: CLINIC | Age: 48
End: 2023-07-20
Payer: COMMERCIAL

## 2023-07-20 VITALS
OXYGEN SATURATION: 97 % | WEIGHT: 152 LBS | TEMPERATURE: 97.3 F | HEART RATE: 86 BPM | HEIGHT: 69 IN | BODY MASS INDEX: 22.51 KG/M2 | SYSTOLIC BLOOD PRESSURE: 110 MMHG | DIASTOLIC BLOOD PRESSURE: 76 MMHG

## 2023-07-20 PROCEDURE — 99024 POSTOP FOLLOW-UP VISIT: CPT

## 2023-07-28 ENCOUNTER — OUTPATIENT (OUTPATIENT)
Dept: OUTPATIENT SERVICES | Facility: HOSPITAL | Age: 48
LOS: 1 days | Discharge: HOME | End: 2023-07-28

## 2023-07-28 DIAGNOSIS — Z95.828 PRESENCE OF OTHER VASCULAR IMPLANTS AND GRAFTS: Chronic | ICD-10-CM

## 2023-07-28 DIAGNOSIS — C50.919 MALIGNANT NEOPLASM OF UNSPECIFIED SITE OF UNSPECIFIED FEMALE BREAST: ICD-10-CM

## 2023-07-28 DIAGNOSIS — Z98.891 HISTORY OF UTERINE SCAR FROM PREVIOUS SURGERY: Chronic | ICD-10-CM

## 2023-07-28 DIAGNOSIS — Z98.890 OTHER SPECIFIED POSTPROCEDURAL STATES: Chronic | ICD-10-CM

## 2023-07-31 ENCOUNTER — APPOINTMENT (OUTPATIENT)
Dept: PLASTIC SURGERY | Facility: CLINIC | Age: 48
End: 2023-07-31
Payer: COMMERCIAL

## 2023-07-31 VITALS
SYSTOLIC BLOOD PRESSURE: 128 MMHG | RESPIRATION RATE: 17 BRPM | BODY MASS INDEX: 29.84 KG/M2 | HEART RATE: 78 BPM | WEIGHT: 152 LBS | OXYGEN SATURATION: 98 % | DIASTOLIC BLOOD PRESSURE: 80 MMHG | HEIGHT: 60 IN

## 2023-07-31 PROCEDURE — 99024 POSTOP FOLLOW-UP VISIT: CPT

## 2023-08-08 ENCOUNTER — RESULT REVIEW (OUTPATIENT)
Age: 48
End: 2023-08-08

## 2023-08-08 ENCOUNTER — APPOINTMENT (OUTPATIENT)
Dept: INFUSION THERAPY | Facility: HOSPITAL | Age: 48
End: 2023-08-08

## 2023-08-08 ENCOUNTER — APPOINTMENT (OUTPATIENT)
Dept: HEMATOLOGY ONCOLOGY | Facility: CLINIC | Age: 48
End: 2023-08-08
Payer: COMMERCIAL

## 2023-08-08 VITALS
TEMPERATURE: 97.9 F | SYSTOLIC BLOOD PRESSURE: 112 MMHG | OXYGEN SATURATION: 99 % | BODY MASS INDEX: 29.28 KG/M2 | HEART RATE: 84 BPM | WEIGHT: 149.91 LBS | RESPIRATION RATE: 16 BRPM | DIASTOLIC BLOOD PRESSURE: 77 MMHG

## 2023-08-08 LAB
ALBUMIN SERPL ELPH-MCNC: 4.3 G/DL — SIGNIFICANT CHANGE UP (ref 3.3–5)
ALP SERPL-CCNC: 94 U/L — SIGNIFICANT CHANGE UP (ref 40–120)
ALT FLD-CCNC: 12 U/L — SIGNIFICANT CHANGE UP (ref 10–45)
ANION GAP SERPL CALC-SCNC: 11 MMOL/L — SIGNIFICANT CHANGE UP (ref 5–17)
AST SERPL-CCNC: 31 U/L — SIGNIFICANT CHANGE UP (ref 10–40)
BASOPHILS # BLD AUTO: 0.03 K/UL — SIGNIFICANT CHANGE UP (ref 0–0.2)
BASOPHILS NFR BLD AUTO: 1.1 % — SIGNIFICANT CHANGE UP (ref 0–2)
BILIRUB SERPL-MCNC: 0.2 MG/DL — SIGNIFICANT CHANGE UP (ref 0.2–1.2)
BUN SERPL-MCNC: 13 MG/DL — SIGNIFICANT CHANGE UP (ref 7–23)
CALCIUM SERPL-MCNC: 9.6 MG/DL — SIGNIFICANT CHANGE UP (ref 8.4–10.5)
CHLORIDE SERPL-SCNC: 101 MMOL/L — SIGNIFICANT CHANGE UP (ref 96–108)
CO2 SERPL-SCNC: 28 MMOL/L — SIGNIFICANT CHANGE UP (ref 22–31)
CREAT SERPL-MCNC: 0.54 MG/DL — SIGNIFICANT CHANGE UP (ref 0.5–1.3)
EGFR: 114 ML/MIN/1.73M2 — SIGNIFICANT CHANGE UP
EOSINOPHIL # BLD AUTO: 0.33 K/UL — SIGNIFICANT CHANGE UP (ref 0–0.5)
EOSINOPHIL NFR BLD AUTO: 11.6 % — HIGH (ref 0–6)
GLUCOSE SERPL-MCNC: 115 MG/DL — HIGH (ref 70–99)
HCT VFR BLD CALC: 34.3 % — LOW (ref 34.5–45)
HGB BLD-MCNC: 11.2 G/DL — LOW (ref 11.5–15.5)
IMM GRANULOCYTES NFR BLD AUTO: 0 % — SIGNIFICANT CHANGE UP (ref 0–0.9)
LYMPHOCYTES # BLD AUTO: 0.74 K/UL — LOW (ref 1–3.3)
LYMPHOCYTES # BLD AUTO: 26.1 % — SIGNIFICANT CHANGE UP (ref 13–44)
MCHC RBC-ENTMCNC: 27.7 PG — SIGNIFICANT CHANGE UP (ref 27–34)
MCHC RBC-ENTMCNC: 32.7 G/DL — SIGNIFICANT CHANGE UP (ref 32–36)
MCV RBC AUTO: 84.7 FL — SIGNIFICANT CHANGE UP (ref 80–100)
MONOCYTES # BLD AUTO: 0.31 K/UL — SIGNIFICANT CHANGE UP (ref 0–0.9)
MONOCYTES NFR BLD AUTO: 10.9 % — SIGNIFICANT CHANGE UP (ref 2–14)
NEUTROPHILS # BLD AUTO: 1.43 K/UL — LOW (ref 1.8–7.4)
NEUTROPHILS NFR BLD AUTO: 50.3 % — SIGNIFICANT CHANGE UP (ref 43–77)
NRBC # BLD: 0 /100 WBCS — SIGNIFICANT CHANGE UP (ref 0–0)
PLATELET # BLD AUTO: 159 K/UL — SIGNIFICANT CHANGE UP (ref 150–400)
POTASSIUM SERPL-MCNC: 3.8 MMOL/L — SIGNIFICANT CHANGE UP (ref 3.5–5.3)
POTASSIUM SERPL-SCNC: 3.8 MMOL/L — SIGNIFICANT CHANGE UP (ref 3.5–5.3)
PROT SERPL-MCNC: 7.1 G/DL — SIGNIFICANT CHANGE UP (ref 6–8.3)
RBC # BLD: 4.05 M/UL — SIGNIFICANT CHANGE UP (ref 3.8–5.2)
RBC # FLD: 12.5 % — SIGNIFICANT CHANGE UP (ref 10.3–14.5)
SODIUM SERPL-SCNC: 140 MMOL/L — SIGNIFICANT CHANGE UP (ref 135–145)
T3 SERPL-MCNC: 130 NG/DL — SIGNIFICANT CHANGE UP (ref 80–200)
T4 FREE SERPL-MCNC: 1 NG/DL — SIGNIFICANT CHANGE UP (ref 0.9–1.8)
TSH SERPL-MCNC: 0.94 UIU/ML — SIGNIFICANT CHANGE UP (ref 0.27–4.2)
WBC # BLD: 2.84 K/UL — LOW (ref 3.8–10.5)
WBC # FLD AUTO: 2.84 K/UL — LOW (ref 3.8–10.5)

## 2023-08-08 PROCEDURE — 99214 OFFICE O/P EST MOD 30 MIN: CPT

## 2023-08-08 NOTE — PHYSICAL EXAM
[Restricted in physically strenuous activity but ambulatory and able to carry out work of a light or sedentary nature] : Status 1- Restricted in physically strenuous activity but ambulatory and able to carry out work of a light or sedentary nature, e.g., light house work, office work [Normal] : affect appropriate [de-identified] : bilateral mastectomies and recon

## 2023-08-08 NOTE — HISTORY OF PRESENT ILLNESS
[Disease: _____________________] : Disease: [unfilled] [de-identified] : This is a  47-year-old woman with history of left breast carcinoma who presents for evaluation.  The patient's history dates back to May 2022 when she noted a mass in the left breast.   In 2022  a mammogram and sonogram were performed.  These revealed a mass in the left breast along with calcifications in the right breast.  The biopsy revealed poorly differentiated infiltrating duct carcinoma with Isaias score 8/9, ER [place] -0% and HER2/roxann 1+ negative consistent with triple negative breast cancer.  The Ki-67 was 50%.\par  \par  There was no obvious left axillary lymph nodes.  The right breast calcifications were biopsied with benign results including fibrocystic changes.  An MRI revealed that the left breast mass with touching the pectoral muscle to the lateral breast area along with satellite.  There was prominence in the left axilla but no definite enlargement of nodes.  The patient has undergone surgical oncology evaluation and the plan is to evaluate patient for neoadjuvant chemotherapy and she is considering bilateral mastectomies.  The patient has undergone genetic testing which is pending.  The patient did have a previous biopsy of the breast in 2018 involving the right breast which showed a benign biopsy.\par  \par  The patient will undergo targeted reevaluation of the nonspecific foci on the right breast and also left axillary lymph nodes with biopsy to assess for bilateral disease and possible dayna metastases.\par  \par  Patient's menarche was age 12 and her periods have been regular.  She is  1 para 1 age at first pregnancy was 19.  She denies hormonal therapy or fertility treatments.  The patient will undergo CAT scans and bone scans to evaluate any evidence for systemic disease. [de-identified] : poorly diff IDC ERneg, [place] neg, Her2 neg 1+, Ki67-50%  TNBC, MRI mass touching pectoral muscle, [de-identified] : Preop left breast mass ,no axillary nodes but tumor touching pectoral muscle.Plan neoadjuvant chemo/immunotherapy, Taxol/ carbo/ Pembro AC Keynote 522\par  Post op  minimal residual 3 foci of residual malignancy maximal size 1.1 mm.3 nodes neg.\par  Plan - continue prmbro to complete 1 year, add xeloda x 6 months , if genetics are BRCA pos consider switch to parp. [de-identified] : Since the last visit the pt remains well and healing with reconstruction. Pt to start xeloda.  The patient had genetic testing which was negative for mutation

## 2023-08-08 NOTE — ASSESSMENT
[FreeTextEntry1] : Discussion took place with the patient who came alone regarding further management.  Since she has triple negative breast cancer and continues on pembrolizumab immunotherapy.  Because there was minimal residual disease we discussed going on adjuvant capecitabine since the genetic testing revealed no mutations suggesting BRCA disease.  The side effects of Xeloda were again discussed in detail.  The patient will begin treatment with 500 mg capecitabine 3 pills twice a day 14 days on 7 days off and be monitored for side effects and toxicity.  The patient was cautioned to have antiemetic therapy and Imodium to prevent nausea and diarrhea from the treatment.  She has undergone DPD testing which was negative for mutation.  The patient will return in 3 weeks or sooner and continue immunotherapy along with the Xeloda chemotherapy. [Curative] : Goals of care discussed with patient: Curative

## 2023-08-08 NOTE — REVIEW OF SYSTEMS
[Diarrhea: Grade 0] : Diarrhea: Grade 0 [Joint Pain] : joint pain [Joint Stiffness] : joint stiffness [Negative] : Allergic/Immunologic

## 2023-08-09 DIAGNOSIS — Z51.11 ENCOUNTER FOR ANTINEOPLASTIC CHEMOTHERAPY: ICD-10-CM

## 2023-08-09 LAB — CANCER AG27-29 SERPL-ACNC: 23.3 U/ML — SIGNIFICANT CHANGE UP (ref 0–38.6)

## 2023-08-14 LAB
CORTICOSTEROID BINDING GLOBULIN RESULT: 2.1 MG/DL — SIGNIFICANT CHANGE UP
CORTIS F/TOTAL MFR SERPL: 6.6 % — SIGNIFICANT CHANGE UP
CORTIS SERPL-MCNC: 10 UG/DL — SIGNIFICANT CHANGE UP
CORTISOL, FREE RESULT: 0.66 UG/DL — SIGNIFICANT CHANGE UP

## 2023-08-17 ENCOUNTER — APPOINTMENT (OUTPATIENT)
Dept: HEMATOLOGY ONCOLOGY | Facility: CLINIC | Age: 48
End: 2023-08-17

## 2023-08-17 ENCOUNTER — RESULT REVIEW (OUTPATIENT)
Age: 48
End: 2023-08-17

## 2023-08-17 LAB
BASOPHILS # BLD AUTO: 0.03 K/UL — SIGNIFICANT CHANGE UP (ref 0–0.2)
BASOPHILS NFR BLD AUTO: 1.3 % — SIGNIFICANT CHANGE UP (ref 0–2)
EOSINOPHIL # BLD AUTO: 0.3 K/UL — SIGNIFICANT CHANGE UP (ref 0–0.5)
EOSINOPHIL NFR BLD AUTO: 12.8 % — HIGH (ref 0–6)
HCT VFR BLD CALC: 33.9 % — LOW (ref 34.5–45)
HGB BLD-MCNC: 11.1 G/DL — LOW (ref 11.5–15.5)
IMM GRANULOCYTES NFR BLD AUTO: 0.4 % — SIGNIFICANT CHANGE UP (ref 0–0.9)
LYMPHOCYTES # BLD AUTO: 0.68 K/UL — LOW (ref 1–3.3)
LYMPHOCYTES # BLD AUTO: 29.1 % — SIGNIFICANT CHANGE UP (ref 13–44)
MCHC RBC-ENTMCNC: 26.9 PG — LOW (ref 27–34)
MCHC RBC-ENTMCNC: 32.7 G/DL — SIGNIFICANT CHANGE UP (ref 32–36)
MCV RBC AUTO: 82.1 FL — SIGNIFICANT CHANGE UP (ref 80–100)
MONOCYTES # BLD AUTO: 0.22 K/UL — SIGNIFICANT CHANGE UP (ref 0–0.9)
MONOCYTES NFR BLD AUTO: 9.4 % — SIGNIFICANT CHANGE UP (ref 2–14)
NEUTROPHILS # BLD AUTO: 1.1 K/UL — LOW (ref 1.8–7.4)
NEUTROPHILS NFR BLD AUTO: 47 % — SIGNIFICANT CHANGE UP (ref 43–77)
NRBC # BLD: 0 /100 WBCS — SIGNIFICANT CHANGE UP (ref 0–0)
PLATELET # BLD AUTO: 151 K/UL — SIGNIFICANT CHANGE UP (ref 150–400)
RBC # BLD: 4.13 M/UL — SIGNIFICANT CHANGE UP (ref 3.8–5.2)
RBC # FLD: 12.7 % — SIGNIFICANT CHANGE UP (ref 10.3–14.5)
WBC # BLD: 2.34 K/UL — LOW (ref 3.8–10.5)
WBC # FLD AUTO: 2.34 K/UL — LOW (ref 3.8–10.5)

## 2023-08-23 ENCOUNTER — RESULT REVIEW (OUTPATIENT)
Age: 48
End: 2023-08-23

## 2023-08-23 ENCOUNTER — APPOINTMENT (OUTPATIENT)
Dept: HEMATOLOGY ONCOLOGY | Facility: CLINIC | Age: 48
End: 2023-08-23

## 2023-08-23 LAB
ALBUMIN SERPL ELPH-MCNC: 4.4 G/DL
ALP BLD-CCNC: 114 U/L
ALT SERPL-CCNC: 25 U/L
ANION GAP SERPL CALC-SCNC: 12 MMOL/L
AST SERPL-CCNC: 36 U/L
BASOPHILS # BLD AUTO: 0.03 K/UL — SIGNIFICANT CHANGE UP (ref 0–0.2)
BASOPHILS NFR BLD AUTO: 1 % — SIGNIFICANT CHANGE UP (ref 0–2)
BILIRUB SERPL-MCNC: 0.2 MG/DL
BUN SERPL-MCNC: 12 MG/DL
CALCIUM SERPL-MCNC: 9.8 MG/DL
CHLORIDE SERPL-SCNC: 102 MMOL/L
CO2 SERPL-SCNC: 25 MMOL/L
CREAT SERPL-MCNC: 0.63 MG/DL
EGFR: 109 ML/MIN/1.73M2
EOSINOPHIL # BLD AUTO: 0.54 K/UL — HIGH (ref 0–0.5)
EOSINOPHIL NFR BLD AUTO: 17.7 % — HIGH (ref 0–6)
GLUCOSE SERPL-MCNC: 89 MG/DL
HCT VFR BLD CALC: 36.2 % — SIGNIFICANT CHANGE UP (ref 34.5–45)
HGB BLD-MCNC: 11.6 G/DL — SIGNIFICANT CHANGE UP (ref 11.5–15.5)
IMM GRANULOCYTES NFR BLD AUTO: 0.3 % — SIGNIFICANT CHANGE UP (ref 0–0.9)
LYMPHOCYTES # BLD AUTO: 0.84 K/UL — LOW (ref 1–3.3)
LYMPHOCYTES # BLD AUTO: 27.5 % — SIGNIFICANT CHANGE UP (ref 13–44)
MCHC RBC-ENTMCNC: 26.7 PG — LOW (ref 27–34)
MCHC RBC-ENTMCNC: 32 G/DL — SIGNIFICANT CHANGE UP (ref 32–36)
MCV RBC AUTO: 83.2 FL — SIGNIFICANT CHANGE UP (ref 80–100)
MONOCYTES # BLD AUTO: 0.25 K/UL — SIGNIFICANT CHANGE UP (ref 0–0.9)
MONOCYTES NFR BLD AUTO: 8.2 % — SIGNIFICANT CHANGE UP (ref 2–14)
NEUTROPHILS # BLD AUTO: 1.38 K/UL — LOW (ref 1.8–7.4)
NEUTROPHILS NFR BLD AUTO: 45.3 % — SIGNIFICANT CHANGE UP (ref 43–77)
NRBC # BLD: 0 /100 WBCS — SIGNIFICANT CHANGE UP (ref 0–0)
PLATELET # BLD AUTO: 154 K/UL — SIGNIFICANT CHANGE UP (ref 150–400)
POTASSIUM SERPL-SCNC: 4.9 MMOL/L
PROT SERPL-MCNC: 6.9 G/DL
RBC # BLD: 4.35 M/UL — SIGNIFICANT CHANGE UP (ref 3.8–5.2)
RBC # FLD: 13 % — SIGNIFICANT CHANGE UP (ref 10.3–14.5)
SODIUM SERPL-SCNC: 139 MMOL/L
WBC # BLD: 3.05 K/UL — LOW (ref 3.8–10.5)
WBC # FLD AUTO: 3.05 K/UL — LOW (ref 3.8–10.5)

## 2023-08-28 ENCOUNTER — APPOINTMENT (OUTPATIENT)
Dept: RADIATION ONCOLOGY | Facility: CLINIC | Age: 48
End: 2023-08-28
Payer: COMMERCIAL

## 2023-08-28 VITALS
HEIGHT: 60 IN | OXYGEN SATURATION: 100 % | HEART RATE: 70 BPM | DIASTOLIC BLOOD PRESSURE: 86 MMHG | RESPIRATION RATE: 16 BRPM | SYSTOLIC BLOOD PRESSURE: 121 MMHG | WEIGHT: 159.75 LBS | BODY MASS INDEX: 31.36 KG/M2 | TEMPERATURE: 98.2 F

## 2023-08-28 PROCEDURE — 99213 OFFICE O/P EST LOW 20 MIN: CPT

## 2023-08-28 RX ORDER — IBUPROFEN 800 MG/1
800 TABLET, FILM COATED ORAL 3 TIMES DAILY
Qty: 21 | Refills: 0 | Status: DISCONTINUED | COMMUNITY
Start: 2023-06-15 | End: 2023-08-28

## 2023-08-28 RX ORDER — OXYCODONE AND ACETAMINOPHEN 5; 325 MG/1; MG/1
5-325 TABLET ORAL
Qty: 12 | Refills: 0 | Status: DISCONTINUED | COMMUNITY
Start: 2023-06-15 | End: 2023-08-28

## 2023-08-28 RX ORDER — OXYCODONE AND ACETAMINOPHEN 5; 325 MG/1; MG/1
5-325 TABLET ORAL
Qty: 20 | Refills: 0 | Status: DISCONTINUED | COMMUNITY
Start: 2023-05-31 | End: 2023-08-28

## 2023-08-28 RX ORDER — IBUPROFEN 800 MG/1
800 TABLET, FILM COATED ORAL 3 TIMES DAILY
Qty: 21 | Refills: 0 | Status: DISCONTINUED | COMMUNITY
Start: 2023-06-19 | End: 2023-08-28

## 2023-08-28 NOTE — REVIEW OF SYSTEMS
[Negative] : Allergic/Immunologic [Pruritus: Grade 0] : Pruritus: Grade 0 [Skin Atrophy: Grade 0] : Skin Atrophy: Grade 0 [Skin Hyperpigmentation: Grade 0] : Skin Hyperpigmentation: Grade 0 [Dermatitis Radiation: Grade 0] : Dermatitis Radiation: Grade 0

## 2023-08-29 ENCOUNTER — RESULT REVIEW (OUTPATIENT)
Age: 48
End: 2023-08-29

## 2023-08-29 ENCOUNTER — APPOINTMENT (OUTPATIENT)
Dept: HEMATOLOGY ONCOLOGY | Facility: CLINIC | Age: 48
End: 2023-08-29
Payer: COMMERCIAL

## 2023-08-29 ENCOUNTER — APPOINTMENT (OUTPATIENT)
Dept: INFUSION THERAPY | Facility: HOSPITAL | Age: 48
End: 2023-08-29

## 2023-08-29 VITALS
SYSTOLIC BLOOD PRESSURE: 112 MMHG | BODY MASS INDEX: 30.14 KG/M2 | RESPIRATION RATE: 15 BRPM | OXYGEN SATURATION: 99 % | HEART RATE: 80 BPM | WEIGHT: 154.32 LBS | TEMPERATURE: 97.2 F | DIASTOLIC BLOOD PRESSURE: 77 MMHG

## 2023-08-29 LAB
ALBUMIN SERPL ELPH-MCNC: 4.1 G/DL — SIGNIFICANT CHANGE UP (ref 3.3–5)
ALP SERPL-CCNC: 93 U/L — SIGNIFICANT CHANGE UP (ref 40–120)
ALT FLD-CCNC: 9 U/L — LOW (ref 10–45)
ANION GAP SERPL CALC-SCNC: 9 MMOL/L — SIGNIFICANT CHANGE UP (ref 5–17)
AST SERPL-CCNC: 29 U/L — SIGNIFICANT CHANGE UP (ref 10–40)
BASOPHILS # BLD AUTO: 0.02 K/UL — SIGNIFICANT CHANGE UP (ref 0–0.2)
BASOPHILS NFR BLD AUTO: 0.7 % — SIGNIFICANT CHANGE UP (ref 0–2)
BILIRUB SERPL-MCNC: 0.3 MG/DL — SIGNIFICANT CHANGE UP (ref 0.2–1.2)
BUN SERPL-MCNC: 11 MG/DL — SIGNIFICANT CHANGE UP (ref 7–23)
CALCIUM SERPL-MCNC: 9.1 MG/DL — SIGNIFICANT CHANGE UP (ref 8.4–10.5)
CANCER AG27-29 SERPL-ACNC: 21.5 U/ML — SIGNIFICANT CHANGE UP (ref 0–38.6)
CHLORIDE SERPL-SCNC: 104 MMOL/L — SIGNIFICANT CHANGE UP (ref 96–108)
CO2 SERPL-SCNC: 27 MMOL/L — SIGNIFICANT CHANGE UP (ref 22–31)
CREAT SERPL-MCNC: 0.57 MG/DL — SIGNIFICANT CHANGE UP (ref 0.5–1.3)
EGFR: 112 ML/MIN/1.73M2 — SIGNIFICANT CHANGE UP
EOSINOPHIL # BLD AUTO: 0.41 K/UL — SIGNIFICANT CHANGE UP (ref 0–0.5)
EOSINOPHIL NFR BLD AUTO: 14.3 % — HIGH (ref 0–6)
GLUCOSE SERPL-MCNC: 100 MG/DL — HIGH (ref 70–99)
HCT VFR BLD CALC: 32.2 % — LOW (ref 34.5–45)
HGB BLD-MCNC: 10.6 G/DL — LOW (ref 11.5–15.5)
IMM GRANULOCYTES NFR BLD AUTO: 0.3 % — SIGNIFICANT CHANGE UP (ref 0–0.9)
LYMPHOCYTES # BLD AUTO: 0.74 K/UL — LOW (ref 1–3.3)
LYMPHOCYTES # BLD AUTO: 25.8 % — SIGNIFICANT CHANGE UP (ref 13–44)
MCHC RBC-ENTMCNC: 26.6 PG — LOW (ref 27–34)
MCHC RBC-ENTMCNC: 32.9 G/DL — SIGNIFICANT CHANGE UP (ref 32–36)
MCV RBC AUTO: 80.7 FL — SIGNIFICANT CHANGE UP (ref 80–100)
MONOCYTES # BLD AUTO: 0.25 K/UL — SIGNIFICANT CHANGE UP (ref 0–0.9)
MONOCYTES NFR BLD AUTO: 8.7 % — SIGNIFICANT CHANGE UP (ref 2–14)
NEUTROPHILS # BLD AUTO: 1.44 K/UL — LOW (ref 1.8–7.4)
NEUTROPHILS NFR BLD AUTO: 50.2 % — SIGNIFICANT CHANGE UP (ref 43–77)
NRBC # BLD: 0 /100 WBCS — SIGNIFICANT CHANGE UP (ref 0–0)
PLATELET # BLD AUTO: 134 K/UL — LOW (ref 150–400)
POTASSIUM SERPL-MCNC: 3.9 MMOL/L — SIGNIFICANT CHANGE UP (ref 3.5–5.3)
POTASSIUM SERPL-SCNC: 3.9 MMOL/L — SIGNIFICANT CHANGE UP (ref 3.5–5.3)
PROT SERPL-MCNC: 6.7 G/DL — SIGNIFICANT CHANGE UP (ref 6–8.3)
RBC # BLD: 3.99 M/UL — SIGNIFICANT CHANGE UP (ref 3.8–5.2)
RBC # FLD: 13.2 % — SIGNIFICANT CHANGE UP (ref 10.3–14.5)
SODIUM SERPL-SCNC: 140 MMOL/L — SIGNIFICANT CHANGE UP (ref 135–145)
T3 SERPL-MCNC: 129 NG/DL — SIGNIFICANT CHANGE UP (ref 80–200)
T4 FREE SERPL-MCNC: 1 NG/DL — SIGNIFICANT CHANGE UP (ref 0.9–1.8)
TSH SERPL-MCNC: 1.6 UIU/ML — SIGNIFICANT CHANGE UP (ref 0.27–4.2)
WBC # BLD: 2.87 K/UL — LOW (ref 3.8–10.5)
WBC # FLD AUTO: 2.87 K/UL — LOW (ref 3.8–10.5)

## 2023-08-29 PROCEDURE — 99213 OFFICE O/P EST LOW 20 MIN: CPT

## 2023-08-29 NOTE — ASSESSMENT
[FreeTextEntry1] : A discussion took place with the patient regarding the recommendation for radiation therapy.  This was based on the 6 cm area of tumor including a 4 cm primary lesion and satellite lesions preoperatively.  She has undergone neoadjuvant chemoimmunotherapy with the keynote 522 protocol.  This has included Taxol carboplatin pembrolizumab followed by Adriamycin Cytoxan and pembrolizumab.  The patient also has undergone bilateral mastectomies and reconstruction.  She is continuing maintenance completion pembrolizumab to complete 1 year of therapy.  The patient also had minimal residual disease consisting of three 1 mm areas of persistent carcinoma which overall been resected.  We have been recommending that she go on Xeloda however her counts have been oc posttreatment so we are holding this at this time.  A discussion took place at the breast tumor board suggesting small benefit from radiation therapy.  However the patient feels strongly she does not want to go through radiation therapy because of its side effects.  As a result she will be monitored and followed.  She will return in 2 weeks or sooner as needed to repeat the CBC to see whether or not she can start capecitabine maintenance adjuvant therapy. [Curative] : Goals of care discussed with patient: Curative

## 2023-08-29 NOTE — HISTORY OF PRESENT ILLNESS
[Disease: _____________________] : Disease: [unfilled] [de-identified] : This is a  47-year-old woman with history of left breast carcinoma who presents for evaluation.  The patient's history dates back to May 2022 when she noted a mass in the left breast.   In 2022  a mammogram and sonogram were performed.  These revealed a mass in the left breast along with calcifications in the right breast.  The biopsy revealed poorly differentiated infiltrating duct carcinoma with Isaias score 8/9, ER [place] -0% and HER2/roxann 1+ negative consistent with triple negative breast cancer.  The Ki-67 was 50%.\par  \par  There was no obvious left axillary lymph nodes.  The right breast calcifications were biopsied with benign results including fibrocystic changes.  An MRI revealed that the left breast mass with touching the pectoral muscle to the lateral breast area along with satellite.  There was prominence in the left axilla but no definite enlargement of nodes.  The patient has undergone surgical oncology evaluation and the plan is to evaluate patient for neoadjuvant chemotherapy and she is considering bilateral mastectomies.  The patient has undergone genetic testing which is pending.  The patient did have a previous biopsy of the breast in 2018 involving the right breast which showed a benign biopsy.\par  \par  The patient will undergo targeted reevaluation of the nonspecific foci on the right breast and also left axillary lymph nodes with biopsy to assess for bilateral disease and possible dayna metastases.\par  \par  Patient's menarche was age 12 and her periods have been regular.  She is  1 para 1 age at first pregnancy was 19.  She denies hormonal therapy or fertility treatments.  The patient will undergo CAT scans and bone scans to evaluate any evidence for systemic disease. [de-identified] : poorly diff IDC ERneg, [place] neg, Her2 neg 1+, Ki67-50%  TNBC, MRI mass touching pectoral muscle, [de-identified] : Preop left breast mass ,no axillary nodes but tumor touching pectoral muscle.Plan neoadjuvant chemo/immunotherapy, Taxol/ carbo/ Pembro AC Keynote 522\par  Post op  minimal residual 3 foci of residual malignancy maximal size 1.1 mm.3 nodes neg.\par  Plan - continue prmbro to complete 1 year, add xeloda x 6 months , if genetics are BRCA pos consider switch to parp. [de-identified] : Pt receiving 3rd cycle of Folfox and has loose BM and feels like he has neuropathy, and has left lef tendons burning. Pt  eating and following diabetic diet.

## 2023-08-29 NOTE — HISTORY OF PRESENT ILLNESS
[Disease: _____________________] : Disease: [unfilled] [de-identified] : This is a  47-year-old woman with history of left breast carcinoma who presents for evaluation.  The patient's history dates back to May 2022 when she noted a mass in the left breast.   In 2022  a mammogram and sonogram were performed.  These revealed a mass in the left breast along with calcifications in the right breast.  The biopsy revealed poorly differentiated infiltrating duct carcinoma with Isaias score 8/9, ER [place] -0% and HER2/roxann 1+ negative consistent with triple negative breast cancer.  The Ki-67 was 50%.\par  \par  There was no obvious left axillary lymph nodes.  The right breast calcifications were biopsied with benign results including fibrocystic changes.  An MRI revealed that the left breast mass with touching the pectoral muscle to the lateral breast area along with satellite.  There was prominence in the left axilla but no definite enlargement of nodes.  The patient has undergone surgical oncology evaluation and the plan is to evaluate patient for neoadjuvant chemotherapy and she is considering bilateral mastectomies.  The patient has undergone genetic testing which is pending.  The patient did have a previous biopsy of the breast in 2018 involving the right breast which showed a benign biopsy.\par  \par  The patient will undergo targeted reevaluation of the nonspecific foci on the right breast and also left axillary lymph nodes with biopsy to assess for bilateral disease and possible dayna metastases.\par  \par  Patient's menarche was age 12 and her periods have been regular.  She is  1 para 1 age at first pregnancy was 19.  She denies hormonal therapy or fertility treatments.  The patient will undergo CAT scans and bone scans to evaluate any evidence for systemic disease. [de-identified] : poorly diff IDC ERneg, AK neg, Her2 neg 1+, Ki67-50%  TNBC, MRI mass touching pectoral muscle, [de-identified] : Preop left breast mass ,no axillary nodes but tumor touching pectoral muscle.Plan neoadjuvant chemo/immunotherapy, Taxol/ carbo/ Pembro AC Keynote 522\par  Post op  minimal residual 3 foci of residual malignancy maximal size 1.1 mm.3 nodes neg.\par  Plan - continue prmbro to complete 1 year, add xeloda x 6 months , if genetics are BRCA pos consider switch to parp. [de-identified] : Pt receiving 3rd cycle of Folfox and has loose BM and feels like he has neuropathy, and has left lef tendons burning. Pt  eating and following diabetic diet.

## 2023-09-01 NOTE — REASON FOR VISIT
[Consideration of Curative Therapy] : consideration of curative therapy for [Breast Cancer] : breast cancer [Family Member] : family member [Re-evaluation] : re-evaluation for

## 2023-09-04 LAB
CORTICOSTEROID BINDING GLOBULIN RESULT: 2.1 MG/DL — SIGNIFICANT CHANGE UP
CORTIS F/TOTAL MFR SERPL: 5.4 % — SIGNIFICANT CHANGE UP
CORTIS SERPL-MCNC: 8.6 UG/DL — SIGNIFICANT CHANGE UP
CORTISOL, FREE RESULT: 0.47 UG/DL — SIGNIFICANT CHANGE UP

## 2023-09-04 NOTE — VITALS
[90: Able to carry normal activity; minor signs or symptoms of disease.] : 90: Able to carry normal activity; minor signs or symptoms of disease.  [Maximal Pain Intensity: 1/10] : 1/10 [Least Pain Intensity: 0/10] : 0/10 [80: Normal activity with effort; some signs or symptoms of disease.] : 80: Normal activity with effort; some signs or symptoms of disease.

## 2023-09-04 NOTE — HISTORY OF PRESENT ILLNESS
[FreeTextEntry1] : Ms. Sauer is a 48 y/o female with triple negative left breast cancer who received neoadjuvant chemo and BL mastectomy with JOHN reconstruction, presenting for re-evaluation.   Her history is significant for benign right breast biopsy in 2018. She palpated a mass in the left breast in May 2022, which she attributed to a strain from yoga. She was evaluated by her doctor in September, who referred her for further work up.   Diagnostic mammogram on 9/19/22 demonstrated a new spiculated mass in the far posterior upper inner quadrant of the left breast, compared to a prior mammogram from 2014. Loosely grouped coarse calcifications were noted in the right breast near a biopsy clip, in the posterior upper central right breast, which appeared increased and indeterminate. There was no axillary adenopathy bilaterally.   Breast ultrasound showed an irregular hypoechoic left breast mass measuring up to 4.1 cm at 10:00-12:00, 7 cm FN. Adjacent nodules were present measuring 9 and 1 mm. Multiple cysts were seen in the right breast, with one isoechoic nodule appearing likely benign.   She underwent biopsy of left breast mass on 10/17/22. Pathology showed invasive mammary ductal carcinoma, poorly differentiated, grade 3, involves multiple cores, measuring up to 10 mm. DCIS was also present, high nuclear grade and solid type. The invasive tumor was ER 0%, AL 0%, HER2 1+ negative by FISH, and KI 67 50%. Right breast posterior upper central calcifications biopsy showed benign breast tissue.   MRI breast on 11/9/22 showed an irregular enhancing mass in the left upper inner quadrant with multiple surrounding satellite masses spanning 6.7 cm AP. Satellite masses extended into the far medial breast and abutted the pectoralis major, without enhancement of the muscle. Prominent axillary lymph nodes were noted in the left axilla. There was no suspicious enhancement or adenopathy on the right side. Targeted ultrasound of the left axilla on 11/21/22 showed normal-sized and benign-appearing lymph nodes. No suspicious lymph nodes were identified.   She underwent neoadjuvant chemotherapy with pembrolizumab, completed in May 2023. Re-imaging with CT chest on 11/13/22 showed resolution of the breast mass and no new findings.   She underwent bilateral nipple sparing mastectomies on 6/9/23. Pathology showed foci of residual infiltrating ductal, Grade 2 (7/9), arising in a fibrotic tumor bed, with the largest focus measuring 1.1 mm. Margins were negative. Three left axillary sentinel lymph nodes showed histiocyte aggregates and no tumor was identified. One left internal mammary lymph node was benign.  The right breast was benign.   She continues on Keytruda which she reports will be ongoing until December.  Xeloda started and stopped due to low blood counts.   Pt denies pain but reports getting physical therapy twice per week and occasionally has discofort.

## 2023-09-04 NOTE — ADDENDUM
[FreeTextEntry1] : 9/28/23 Her case was presented at tumor board and the consensus was to offer PMRT. She was notified and she said that she needed some time to think about it. She was encouraged to contact me with any questions.  LL

## 2023-09-04 NOTE — PHYSICAL EXAM
[Heart Rate And Rhythm] : heart rate and rhythm were normal [Supraclavicular Lymph Nodes Enlarged Bilaterally] : supraclavicular [Axillary Lymph Nodes Enlarged Bilaterally] : axillary [Breast Abnormal Lactation (Galactorrhea)] : no nipple discharge [No UE Edema] : there is no upper extremity edema [Normal] : normal skin color and pigmentation and no rash [Sclera] : the sclera and conjunctiva were normal [] : no respiratory distress [Abdomen Soft] : soft [Nondistended] : nondistended [de-identified] : bilateral incisions are clean and dry, no discharge, no skin lesions or soft tissue nodules

## 2023-09-05 ENCOUNTER — APPOINTMENT (OUTPATIENT)
Dept: INFUSION THERAPY | Facility: HOSPITAL | Age: 48
End: 2023-09-05

## 2023-09-06 ENCOUNTER — APPOINTMENT (OUTPATIENT)
Dept: SURGICAL ONCOLOGY | Facility: CLINIC | Age: 48
End: 2023-09-06
Payer: COMMERCIAL

## 2023-09-06 VITALS
RESPIRATION RATE: 17 BRPM | WEIGHT: 154 LBS | HEIGHT: 69 IN | DIASTOLIC BLOOD PRESSURE: 79 MMHG | BODY MASS INDEX: 22.81 KG/M2 | HEART RATE: 68 BPM | SYSTOLIC BLOOD PRESSURE: 113 MMHG | OXYGEN SATURATION: 99 %

## 2023-09-06 PROCEDURE — 99024 POSTOP FOLLOW-UP VISIT: CPT

## 2023-09-06 NOTE — PHYSICAL EXAM
[Normal] : supple, no neck mass and thyroid not enlarged [Normal Neck Lymph Nodes] : normal neck lymph nodes  [Normal Supraclavicular Lymph Nodes] : normal supraclavicular lymph nodes [Normal Groin Lymph Nodes] : normal groin lymph nodes [Normal Axillary Lymph Nodes] : normal axillary lymph nodes [Normal] : oriented to person, place and time, with appropriate affect [de-identified] : bilateral reconstruction breasts well healed.

## 2023-09-06 NOTE — HISTORY OF PRESENT ILLNESS
[de-identified] : Patient is a 48 y/o female who presents for a follow up visit for triple negative left breast cancer.   Consensus at [facility] was to treat with neoadjuvant chemo/immunotherapy, Taxol/ carbo/ Pembro AC Keynote 522. She completed chemotherapy on 5/9/23. S/p bilateral breast reconstruction with JOHN flaps, prepectoral implants and ADM on 6/9/23.  To continue pembro x 1 year w/ Dr. Cuadra who would also like to add Xeloda, however, blood counts have prohibited initiation of Xeloda thus far.  In addition, she was recommended for adjuvant RT by Dr. Pierce, however, she declined.   Final pathology (6/9/23): 1. Portion of right areola ducts, excision:-Breast tissue with  lactiferous ducts, no abnormality seen 2. Right breast, nipple sparing mastectomy:-Fibroadenomatoid nodules, stromal fibrosis and cysts with apocrine metaplasia and duct ectasia -Microcalcifications identified 3. Right retro areolar tissue, excision:-Breast tissue with  lactiferous duct, no abnormality seen 4. Left axillary sentinel lymph nodes, biopsy:-Three (3) lymph nodes with histiocyte aggregates -No tumor identified 5. Left breast, nipple sparing mastectomy:-Foci of residual infiltrating ductal, Grade 2, arising in a fibrotic tumor bed -The largest focus measures 1.1 mm -Margins of resection, no tumor seen -Biopsy site reaction with fibrosis and chronic inflammation -Cysts with apocrine metaplasia and adenosis with microcalcifications 6. Right internal mammary lymph node, biopsy:-Fibroadipose tissue 7. Left internal mammary lymph node, biopsy:-One (1) lymph node, no tumor identified  11/23/22- NM bone scan negative for osseous metastasis.  CT C/AP negative for distant metastatic disease.  11/21/22- s/p USG FNA right breast 1:00, negative for malignant cells and consistent with cyst contents.  US evaluation of the left axilla demonstrates normal-sized lymph nodes with benign morphology.  Evaluation of the right breast 3:00 demonstrates normal fatty lobule in the area of initial concern.    Breast MRI 11/9/22- left breast malignancy with multiple satellite masses, overall greatest AP dimension of 6.7 cm.  Mildly prominent lymph nodes in the left axilla for which targeted evaluation and possible USGB is recommended.  Probably benign mass right breast 3:00, targeted re evaluation is recommended for confirmation with USGB if warranted.   Indeterminate mass right breast 1:00, targeted re evaluation is recommended for confirmation with USGB if warranted.  Biopsy (10/17/22): Site 1: Left ultrasound biopsy irregular mass at 10-12:00  -Invasive carcinoma consistent with invasive mammary ductal carcinoma Site 2: Right breast posterior upper central calcifications, stereotactic biopsy -Benign breast tissue with apocrine/papillary apocrine cysts -Calcification associated with ductules, lobules, cystic change, and stroma  Patient underwent sonogram on 9/19/22 which showed highly suspicious palpable hypoechoic left breast mass at 10:00-12:00 for which an us-guided biopsy is recommended. Indeterminate calcifications in the posterior upper central right breast for which stereotactic biopsy is recommended. Probably benign right 3:00 sonographic nodule for which a 6 month follow up targeted right breast ultrasound is recommended. (BI-RADS 5)  Denies any family history of breast cancer.

## 2023-09-06 NOTE — ADDENDUM
[FreeTextEntry1] : I, Lisa Culver, acted solely as a scribe for Dr. Edward Jean on this date 09/06/2023.

## 2023-09-06 NOTE — ASSESSMENT
[FreeTextEntry1] : T3 triple negative left breast cancer Completed neoadjuvant chemotherapy May 2023 with excellent clinical response Status post bilateral nipple sparing mastectomy with reconstruction Continue follow up with Dr. Thompson of plastic surgery  Continue Pembro as per medical oncology  RTO 3 months

## 2023-09-11 ENCOUNTER — APPOINTMENT (OUTPATIENT)
Dept: HEMATOLOGY ONCOLOGY | Facility: CLINIC | Age: 48
End: 2023-09-11

## 2023-09-19 ENCOUNTER — RESULT REVIEW (OUTPATIENT)
Age: 48
End: 2023-09-19

## 2023-09-19 ENCOUNTER — APPOINTMENT (OUTPATIENT)
Dept: INFUSION THERAPY | Facility: HOSPITAL | Age: 48
End: 2023-09-19

## 2023-09-19 ENCOUNTER — APPOINTMENT (OUTPATIENT)
Dept: HEMATOLOGY ONCOLOGY | Facility: CLINIC | Age: 48
End: 2023-09-19
Payer: COMMERCIAL

## 2023-09-19 VITALS
OXYGEN SATURATION: 99 % | DIASTOLIC BLOOD PRESSURE: 80 MMHG | RESPIRATION RATE: 15 BRPM | TEMPERATURE: 97.2 F | BODY MASS INDEX: 22.79 KG/M2 | SYSTOLIC BLOOD PRESSURE: 112 MMHG | WEIGHT: 154.32 LBS | HEART RATE: 82 BPM

## 2023-09-19 LAB
ALBUMIN SERPL ELPH-MCNC: 4.5 G/DL — SIGNIFICANT CHANGE UP (ref 3.3–5)
ALP SERPL-CCNC: 102 U/L — SIGNIFICANT CHANGE UP (ref 40–120)
ALT FLD-CCNC: 17 U/L — SIGNIFICANT CHANGE UP (ref 10–45)
ANION GAP SERPL CALC-SCNC: 11 MMOL/L — SIGNIFICANT CHANGE UP (ref 5–17)
AST SERPL-CCNC: 33 U/L — SIGNIFICANT CHANGE UP (ref 10–40)
BASOPHILS # BLD AUTO: 0.02 K/UL — SIGNIFICANT CHANGE UP (ref 0–0.2)
BASOPHILS NFR BLD AUTO: 0.8 % — SIGNIFICANT CHANGE UP (ref 0–2)
BILIRUB SERPL-MCNC: 0.5 MG/DL — SIGNIFICANT CHANGE UP (ref 0.2–1.2)
BUN SERPL-MCNC: 10 MG/DL — SIGNIFICANT CHANGE UP (ref 7–23)
CALCIUM SERPL-MCNC: 9.6 MG/DL — SIGNIFICANT CHANGE UP (ref 8.4–10.5)
CANCER AG27-29 SERPL-ACNC: 22.5 U/ML — SIGNIFICANT CHANGE UP (ref 0–38.6)
CHLORIDE SERPL-SCNC: 101 MMOL/L — SIGNIFICANT CHANGE UP (ref 96–108)
CO2 SERPL-SCNC: 26 MMOL/L — SIGNIFICANT CHANGE UP (ref 22–31)
CREAT SERPL-MCNC: 0.59 MG/DL — SIGNIFICANT CHANGE UP (ref 0.5–1.3)
EGFR: 111 ML/MIN/1.73M2 — SIGNIFICANT CHANGE UP
EOSINOPHIL # BLD AUTO: 0.18 K/UL — SIGNIFICANT CHANGE UP (ref 0–0.5)
EOSINOPHIL NFR BLD AUTO: 7.6 % — HIGH (ref 0–6)
GLUCOSE SERPL-MCNC: 93 MG/DL — SIGNIFICANT CHANGE UP (ref 70–99)
HCT VFR BLD CALC: 36.7 % — SIGNIFICANT CHANGE UP (ref 34.5–45)
HGB BLD-MCNC: 11.6 G/DL — SIGNIFICANT CHANGE UP (ref 11.5–15.5)
IMM GRANULOCYTES NFR BLD AUTO: 0 % — SIGNIFICANT CHANGE UP (ref 0–0.9)
LYMPHOCYTES # BLD AUTO: 0.71 K/UL — LOW (ref 1–3.3)
LYMPHOCYTES # BLD AUTO: 29.8 % — SIGNIFICANT CHANGE UP (ref 13–44)
MCHC RBC-ENTMCNC: 24.9 PG — LOW (ref 27–34)
MCHC RBC-ENTMCNC: 31.6 G/DL — LOW (ref 32–36)
MCV RBC AUTO: 78.9 FL — LOW (ref 80–100)
MONOCYTES # BLD AUTO: 0.28 K/UL — SIGNIFICANT CHANGE UP (ref 0–0.9)
MONOCYTES NFR BLD AUTO: 11.8 % — SIGNIFICANT CHANGE UP (ref 2–14)
NEUTROPHILS # BLD AUTO: 1.19 K/UL — LOW (ref 1.8–7.4)
NEUTROPHILS NFR BLD AUTO: 50 % — SIGNIFICANT CHANGE UP (ref 43–77)
NRBC # BLD: 0 /100 WBCS — SIGNIFICANT CHANGE UP (ref 0–0)
PLATELET # BLD AUTO: 155 K/UL — SIGNIFICANT CHANGE UP (ref 150–400)
POTASSIUM SERPL-MCNC: 4 MMOL/L — SIGNIFICANT CHANGE UP (ref 3.5–5.3)
POTASSIUM SERPL-SCNC: 4 MMOL/L — SIGNIFICANT CHANGE UP (ref 3.5–5.3)
PROT SERPL-MCNC: 7.2 G/DL — SIGNIFICANT CHANGE UP (ref 6–8.3)
RBC # BLD: 4.65 M/UL — SIGNIFICANT CHANGE UP (ref 3.8–5.2)
RBC # FLD: 14.3 % — SIGNIFICANT CHANGE UP (ref 10.3–14.5)
SODIUM SERPL-SCNC: 139 MMOL/L — SIGNIFICANT CHANGE UP (ref 135–145)
T3 SERPL-MCNC: 111 NG/DL — SIGNIFICANT CHANGE UP (ref 80–200)
T4 FREE SERPL-MCNC: 1 NG/DL — SIGNIFICANT CHANGE UP (ref 0.9–1.8)
TSH SERPL-MCNC: 1.09 UIU/ML — SIGNIFICANT CHANGE UP (ref 0.27–4.2)
WBC # BLD: 2.38 K/UL — LOW (ref 3.8–10.5)
WBC # FLD AUTO: 2.38 K/UL — LOW (ref 3.8–10.5)

## 2023-09-19 PROCEDURE — 99213 OFFICE O/P EST LOW 20 MIN: CPT

## 2023-09-26 ENCOUNTER — APPOINTMENT (OUTPATIENT)
Dept: INFUSION THERAPY | Facility: HOSPITAL | Age: 48
End: 2023-09-26

## 2023-09-27 ENCOUNTER — OUTPATIENT (OUTPATIENT)
Dept: OUTPATIENT SERVICES | Facility: HOSPITAL | Age: 48
LOS: 1 days | Discharge: HOME | End: 2023-09-27

## 2023-09-27 DIAGNOSIS — Z95.828 PRESENCE OF OTHER VASCULAR IMPLANTS AND GRAFTS: Chronic | ICD-10-CM

## 2023-09-27 DIAGNOSIS — Z98.891 HISTORY OF UTERINE SCAR FROM PREVIOUS SURGERY: Chronic | ICD-10-CM

## 2023-09-27 DIAGNOSIS — C50.919 MALIGNANT NEOPLASM OF UNSPECIFIED SITE OF UNSPECIFIED FEMALE BREAST: ICD-10-CM

## 2023-09-27 DIAGNOSIS — Z98.890 OTHER SPECIFIED POSTPROCEDURAL STATES: Chronic | ICD-10-CM

## 2023-09-30 LAB
CORTICOSTEROID BINDING GLOBULIN RESULT: 2.3 MG/DL — SIGNIFICANT CHANGE UP
CORTIS F/TOTAL MFR SERPL: 5 % — SIGNIFICANT CHANGE UP
CORTIS SERPL-MCNC: 9.4 UG/DL — SIGNIFICANT CHANGE UP
CORTISOL, FREE RESULT: 0.47 UG/DL — SIGNIFICANT CHANGE UP

## 2023-10-02 ENCOUNTER — APPOINTMENT (OUTPATIENT)
Dept: PLASTIC SURGERY | Facility: CLINIC | Age: 48
End: 2023-10-02
Payer: COMMERCIAL

## 2023-10-02 PROCEDURE — 99212 OFFICE O/P EST SF 10 MIN: CPT

## 2023-10-10 ENCOUNTER — APPOINTMENT (OUTPATIENT)
Dept: INFUSION THERAPY | Facility: HOSPITAL | Age: 48
End: 2023-10-10

## 2023-10-10 ENCOUNTER — RESULT REVIEW (OUTPATIENT)
Age: 48
End: 2023-10-10

## 2023-10-10 ENCOUNTER — APPOINTMENT (OUTPATIENT)
Dept: HEMATOLOGY ONCOLOGY | Facility: CLINIC | Age: 48
End: 2023-10-10
Payer: COMMERCIAL

## 2023-10-10 VITALS
TEMPERATURE: 97.2 F | WEIGHT: 159.59 LBS | OXYGEN SATURATION: 98 % | SYSTOLIC BLOOD PRESSURE: 114 MMHG | HEIGHT: 68.98 IN | DIASTOLIC BLOOD PRESSURE: 76 MMHG | BODY MASS INDEX: 23.64 KG/M2 | RESPIRATION RATE: 16 BRPM | HEART RATE: 64 BPM

## 2023-10-10 LAB
ALBUMIN SERPL ELPH-MCNC: 4.2 G/DL — SIGNIFICANT CHANGE UP (ref 3.3–5)
ALP SERPL-CCNC: 97 U/L — SIGNIFICANT CHANGE UP (ref 40–120)
ALT FLD-CCNC: 6 U/L — LOW (ref 10–45)
ANION GAP SERPL CALC-SCNC: 9 MMOL/L — SIGNIFICANT CHANGE UP (ref 5–17)
AST SERPL-CCNC: 21 U/L — SIGNIFICANT CHANGE UP (ref 10–40)
BASOPHILS # BLD AUTO: 0.02 K/UL — SIGNIFICANT CHANGE UP (ref 0–0.2)
BASOPHILS NFR BLD AUTO: 0.7 % — SIGNIFICANT CHANGE UP (ref 0–2)
BILIRUB SERPL-MCNC: 0.4 MG/DL — SIGNIFICANT CHANGE UP (ref 0.2–1.2)
BUN SERPL-MCNC: 14 MG/DL — SIGNIFICANT CHANGE UP (ref 7–23)
CALCIUM SERPL-MCNC: 9.4 MG/DL — SIGNIFICANT CHANGE UP (ref 8.4–10.5)
CHLORIDE SERPL-SCNC: 104 MMOL/L — SIGNIFICANT CHANGE UP (ref 96–108)
CO2 SERPL-SCNC: 27 MMOL/L — SIGNIFICANT CHANGE UP (ref 22–31)
CREAT SERPL-MCNC: 0.55 MG/DL — SIGNIFICANT CHANGE UP (ref 0.5–1.3)
EGFR: 112 ML/MIN/1.73M2 — SIGNIFICANT CHANGE UP
EOSINOPHIL # BLD AUTO: 0.13 K/UL — SIGNIFICANT CHANGE UP (ref 0–0.5)
EOSINOPHIL NFR BLD AUTO: 4.5 % — SIGNIFICANT CHANGE UP (ref 0–6)
GLUCOSE SERPL-MCNC: 96 MG/DL — SIGNIFICANT CHANGE UP (ref 70–99)
HCT VFR BLD CALC: 35.4 % — SIGNIFICANT CHANGE UP (ref 34.5–45)
HGB BLD-MCNC: 11 G/DL — LOW (ref 11.5–15.5)
IMM GRANULOCYTES NFR BLD AUTO: 0.3 % — SIGNIFICANT CHANGE UP (ref 0–0.9)
LYMPHOCYTES # BLD AUTO: 0.81 K/UL — LOW (ref 1–3.3)
LYMPHOCYTES # BLD AUTO: 28.1 % — SIGNIFICANT CHANGE UP (ref 13–44)
MCHC RBC-ENTMCNC: 24.6 PG — LOW (ref 27–34)
MCHC RBC-ENTMCNC: 31.1 G/DL — LOW (ref 32–36)
MCV RBC AUTO: 79 FL — LOW (ref 80–100)
MONOCYTES # BLD AUTO: 0.28 K/UL — SIGNIFICANT CHANGE UP (ref 0–0.9)
MONOCYTES NFR BLD AUTO: 9.7 % — SIGNIFICANT CHANGE UP (ref 2–14)
NEUTROPHILS # BLD AUTO: 1.63 K/UL — LOW (ref 1.8–7.4)
NEUTROPHILS NFR BLD AUTO: 56.7 % — SIGNIFICANT CHANGE UP (ref 43–77)
NRBC # BLD: 0 /100 WBCS — SIGNIFICANT CHANGE UP (ref 0–0)
PLATELET # BLD AUTO: 156 K/UL — SIGNIFICANT CHANGE UP (ref 150–400)
POTASSIUM SERPL-MCNC: 4 MMOL/L — SIGNIFICANT CHANGE UP (ref 3.5–5.3)
POTASSIUM SERPL-SCNC: 4 MMOL/L — SIGNIFICANT CHANGE UP (ref 3.5–5.3)
PROT SERPL-MCNC: 6.7 G/DL — SIGNIFICANT CHANGE UP (ref 6–8.3)
RBC # BLD: 4.48 M/UL — SIGNIFICANT CHANGE UP (ref 3.8–5.2)
RBC # FLD: 15.9 % — HIGH (ref 10.3–14.5)
SODIUM SERPL-SCNC: 140 MMOL/L — SIGNIFICANT CHANGE UP (ref 135–145)
T3 SERPL-MCNC: 100 NG/DL — SIGNIFICANT CHANGE UP (ref 80–200)
T4 FREE SERPL-MCNC: 1.1 NG/DL — SIGNIFICANT CHANGE UP (ref 0.9–1.8)
TSH SERPL-MCNC: 1.1 UIU/ML — SIGNIFICANT CHANGE UP (ref 0.27–4.2)
WBC # BLD: 2.88 K/UL — LOW (ref 3.8–10.5)
WBC # FLD AUTO: 2.88 K/UL — LOW (ref 3.8–10.5)

## 2023-10-10 PROCEDURE — 99214 OFFICE O/P EST MOD 30 MIN: CPT

## 2023-10-11 DIAGNOSIS — Z51.11 ENCOUNTER FOR ANTINEOPLASTIC CHEMOTHERAPY: ICD-10-CM

## 2023-10-11 LAB — CANCER AG27-29 SERPL-ACNC: 19.4 U/ML — SIGNIFICANT CHANGE UP (ref 0–38.6)

## 2023-10-16 LAB
CORTICOSTEROID BINDING GLOBULIN RESULT: 1.9 MG/DL — SIGNIFICANT CHANGE UP
CORTICOSTEROID BINDING GLOBULIN RESULT: 1.9 MG/DL — SIGNIFICANT CHANGE UP
CORTIS F/TOTAL MFR SERPL: 15 % — SIGNIFICANT CHANGE UP
CORTIS F/TOTAL MFR SERPL: 15 % — SIGNIFICANT CHANGE UP
CORTIS SERPL-MCNC: 13 UG/DL — SIGNIFICANT CHANGE UP
CORTIS SERPL-MCNC: 13 UG/DL — SIGNIFICANT CHANGE UP
CORTISOL, FREE RESULT: 2 UG/DL — HIGH
CORTISOL, FREE RESULT: 2 UG/DL — HIGH

## 2023-10-17 ENCOUNTER — APPOINTMENT (OUTPATIENT)
Dept: INFUSION THERAPY | Facility: HOSPITAL | Age: 48
End: 2023-10-17

## 2023-10-25 ENCOUNTER — APPOINTMENT (OUTPATIENT)
Dept: SURGICAL ONCOLOGY | Facility: CLINIC | Age: 48
End: 2023-10-25
Payer: COMMERCIAL

## 2023-10-25 VITALS
SYSTOLIC BLOOD PRESSURE: 112 MMHG | RESPIRATION RATE: 16 BRPM | DIASTOLIC BLOOD PRESSURE: 77 MMHG | BODY MASS INDEX: 23.4 KG/M2 | HEIGHT: 69 IN | HEART RATE: 72 BPM | WEIGHT: 158 LBS | OXYGEN SATURATION: 96 %

## 2023-10-25 PROCEDURE — 10005 FNA BX W/US GDN 1ST LES: CPT

## 2023-10-31 ENCOUNTER — RESULT REVIEW (OUTPATIENT)
Age: 48
End: 2023-10-31

## 2023-10-31 ENCOUNTER — APPOINTMENT (OUTPATIENT)
Dept: INFUSION THERAPY | Facility: HOSPITAL | Age: 48
End: 2023-10-31

## 2023-10-31 ENCOUNTER — APPOINTMENT (OUTPATIENT)
Dept: HEMATOLOGY ONCOLOGY | Facility: CLINIC | Age: 48
End: 2023-10-31
Payer: COMMERCIAL

## 2023-10-31 VITALS
RESPIRATION RATE: 16 BRPM | HEART RATE: 65 BPM | BODY MASS INDEX: 23.63 KG/M2 | SYSTOLIC BLOOD PRESSURE: 116 MMHG | WEIGHT: 160 LBS | TEMPERATURE: 97.7 F | OXYGEN SATURATION: 99 % | DIASTOLIC BLOOD PRESSURE: 79 MMHG

## 2023-10-31 LAB
ALBUMIN SERPL ELPH-MCNC: 4.3 G/DL — SIGNIFICANT CHANGE UP (ref 3.3–5)
ALBUMIN SERPL ELPH-MCNC: 4.3 G/DL — SIGNIFICANT CHANGE UP (ref 3.3–5)
ALP SERPL-CCNC: 114 U/L — SIGNIFICANT CHANGE UP (ref 40–120)
ALP SERPL-CCNC: 114 U/L — SIGNIFICANT CHANGE UP (ref 40–120)
ALT FLD-CCNC: 9 U/L — LOW (ref 10–45)
ALT FLD-CCNC: 9 U/L — LOW (ref 10–45)
ANION GAP SERPL CALC-SCNC: 9 MMOL/L — SIGNIFICANT CHANGE UP (ref 5–17)
ANION GAP SERPL CALC-SCNC: 9 MMOL/L — SIGNIFICANT CHANGE UP (ref 5–17)
AST SERPL-CCNC: 20 U/L — SIGNIFICANT CHANGE UP (ref 10–40)
AST SERPL-CCNC: 20 U/L — SIGNIFICANT CHANGE UP (ref 10–40)
BASOPHILS # BLD AUTO: 0.03 K/UL — SIGNIFICANT CHANGE UP (ref 0–0.2)
BASOPHILS # BLD AUTO: 0.03 K/UL — SIGNIFICANT CHANGE UP (ref 0–0.2)
BASOPHILS NFR BLD AUTO: 0.9 % — SIGNIFICANT CHANGE UP (ref 0–2)
BASOPHILS NFR BLD AUTO: 0.9 % — SIGNIFICANT CHANGE UP (ref 0–2)
BILIRUB SERPL-MCNC: 0.3 MG/DL — SIGNIFICANT CHANGE UP (ref 0.2–1.2)
BILIRUB SERPL-MCNC: 0.3 MG/DL — SIGNIFICANT CHANGE UP (ref 0.2–1.2)
BUN SERPL-MCNC: 21 MG/DL — SIGNIFICANT CHANGE UP (ref 7–23)
BUN SERPL-MCNC: 21 MG/DL — SIGNIFICANT CHANGE UP (ref 7–23)
CALCIUM SERPL-MCNC: 9.4 MG/DL — SIGNIFICANT CHANGE UP (ref 8.4–10.5)
CALCIUM SERPL-MCNC: 9.4 MG/DL — SIGNIFICANT CHANGE UP (ref 8.4–10.5)
CHLORIDE SERPL-SCNC: 100 MMOL/L — SIGNIFICANT CHANGE UP (ref 96–108)
CHLORIDE SERPL-SCNC: 100 MMOL/L — SIGNIFICANT CHANGE UP (ref 96–108)
CO2 SERPL-SCNC: 28 MMOL/L — SIGNIFICANT CHANGE UP (ref 22–31)
CO2 SERPL-SCNC: 28 MMOL/L — SIGNIFICANT CHANGE UP (ref 22–31)
CREAT SERPL-MCNC: 0.6 MG/DL — SIGNIFICANT CHANGE UP (ref 0.5–1.3)
CREAT SERPL-MCNC: 0.6 MG/DL — SIGNIFICANT CHANGE UP (ref 0.5–1.3)
EGFR: 110 ML/MIN/1.73M2 — SIGNIFICANT CHANGE UP
EGFR: 110 ML/MIN/1.73M2 — SIGNIFICANT CHANGE UP
EOSINOPHIL # BLD AUTO: 0.08 K/UL — SIGNIFICANT CHANGE UP (ref 0–0.5)
EOSINOPHIL # BLD AUTO: 0.08 K/UL — SIGNIFICANT CHANGE UP (ref 0–0.5)
EOSINOPHIL NFR BLD AUTO: 2.5 % — SIGNIFICANT CHANGE UP (ref 0–6)
EOSINOPHIL NFR BLD AUTO: 2.5 % — SIGNIFICANT CHANGE UP (ref 0–6)
GLUCOSE SERPL-MCNC: 106 MG/DL — HIGH (ref 70–99)
GLUCOSE SERPL-MCNC: 106 MG/DL — HIGH (ref 70–99)
HCT VFR BLD CALC: 34.7 % — SIGNIFICANT CHANGE UP (ref 34.5–45)
HCT VFR BLD CALC: 34.7 % — SIGNIFICANT CHANGE UP (ref 34.5–45)
HGB BLD-MCNC: 11.3 G/DL — LOW (ref 11.5–15.5)
HGB BLD-MCNC: 11.3 G/DL — LOW (ref 11.5–15.5)
IMM GRANULOCYTES NFR BLD AUTO: 0.6 % — SIGNIFICANT CHANGE UP (ref 0–0.9)
IMM GRANULOCYTES NFR BLD AUTO: 0.6 % — SIGNIFICANT CHANGE UP (ref 0–0.9)
LYMPHOCYTES # BLD AUTO: 0.73 K/UL — LOW (ref 1–3.3)
LYMPHOCYTES # BLD AUTO: 0.73 K/UL — LOW (ref 1–3.3)
LYMPHOCYTES # BLD AUTO: 22.9 % — SIGNIFICANT CHANGE UP (ref 13–44)
LYMPHOCYTES # BLD AUTO: 22.9 % — SIGNIFICANT CHANGE UP (ref 13–44)
MCHC RBC-ENTMCNC: 25.5 PG — LOW (ref 27–34)
MCHC RBC-ENTMCNC: 25.5 PG — LOW (ref 27–34)
MCHC RBC-ENTMCNC: 32.6 G/DL — SIGNIFICANT CHANGE UP (ref 32–36)
MCHC RBC-ENTMCNC: 32.6 G/DL — SIGNIFICANT CHANGE UP (ref 32–36)
MCV RBC AUTO: 78.2 FL — LOW (ref 80–100)
MCV RBC AUTO: 78.2 FL — LOW (ref 80–100)
MONOCYTES # BLD AUTO: 0.26 K/UL — SIGNIFICANT CHANGE UP (ref 0–0.9)
MONOCYTES # BLD AUTO: 0.26 K/UL — SIGNIFICANT CHANGE UP (ref 0–0.9)
MONOCYTES NFR BLD AUTO: 8.2 % — SIGNIFICANT CHANGE UP (ref 2–14)
MONOCYTES NFR BLD AUTO: 8.2 % — SIGNIFICANT CHANGE UP (ref 2–14)
NEUTROPHILS # BLD AUTO: 2.07 K/UL — SIGNIFICANT CHANGE UP (ref 1.8–7.4)
NEUTROPHILS # BLD AUTO: 2.07 K/UL — SIGNIFICANT CHANGE UP (ref 1.8–7.4)
NEUTROPHILS NFR BLD AUTO: 64.9 % — SIGNIFICANT CHANGE UP (ref 43–77)
NEUTROPHILS NFR BLD AUTO: 64.9 % — SIGNIFICANT CHANGE UP (ref 43–77)
NRBC # BLD: 0 /100 WBCS — SIGNIFICANT CHANGE UP (ref 0–0)
NRBC # BLD: 0 /100 WBCS — SIGNIFICANT CHANGE UP (ref 0–0)
PLATELET # BLD AUTO: 169 K/UL — SIGNIFICANT CHANGE UP (ref 150–400)
PLATELET # BLD AUTO: 169 K/UL — SIGNIFICANT CHANGE UP (ref 150–400)
POTASSIUM SERPL-MCNC: 4 MMOL/L — SIGNIFICANT CHANGE UP (ref 3.5–5.3)
POTASSIUM SERPL-MCNC: 4 MMOL/L — SIGNIFICANT CHANGE UP (ref 3.5–5.3)
POTASSIUM SERPL-SCNC: 4 MMOL/L — SIGNIFICANT CHANGE UP (ref 3.5–5.3)
POTASSIUM SERPL-SCNC: 4 MMOL/L — SIGNIFICANT CHANGE UP (ref 3.5–5.3)
PROT SERPL-MCNC: 7 G/DL — SIGNIFICANT CHANGE UP (ref 6–8.3)
PROT SERPL-MCNC: 7 G/DL — SIGNIFICANT CHANGE UP (ref 6–8.3)
RBC # BLD: 4.44 M/UL — SIGNIFICANT CHANGE UP (ref 3.8–5.2)
RBC # BLD: 4.44 M/UL — SIGNIFICANT CHANGE UP (ref 3.8–5.2)
RBC # FLD: 17.5 % — HIGH (ref 10.3–14.5)
RBC # FLD: 17.5 % — HIGH (ref 10.3–14.5)
SODIUM SERPL-SCNC: 137 MMOL/L — SIGNIFICANT CHANGE UP (ref 135–145)
SODIUM SERPL-SCNC: 137 MMOL/L — SIGNIFICANT CHANGE UP (ref 135–145)
T3 SERPL-MCNC: 66 NG/DL — LOW (ref 80–200)
T3 SERPL-MCNC: 66 NG/DL — LOW (ref 80–200)
T3FREE SERPL-MCNC: 2.03 PG/ML — SIGNIFICANT CHANGE UP (ref 2–4.4)
T3FREE SERPL-MCNC: 2.03 PG/ML — SIGNIFICANT CHANGE UP (ref 2–4.4)
T4 FREE SERPL-MCNC: 0.9 NG/DL — SIGNIFICANT CHANGE UP (ref 0.9–1.8)
T4 FREE SERPL-MCNC: 0.9 NG/DL — SIGNIFICANT CHANGE UP (ref 0.9–1.8)
TSH SERPL-MCNC: 0.91 UIU/ML — SIGNIFICANT CHANGE UP (ref 0.27–4.2)
TSH SERPL-MCNC: 0.91 UIU/ML — SIGNIFICANT CHANGE UP (ref 0.27–4.2)
WBC # BLD: 3.19 K/UL — LOW (ref 3.8–10.5)
WBC # BLD: 3.19 K/UL — LOW (ref 3.8–10.5)
WBC # FLD AUTO: 3.19 K/UL — LOW (ref 3.8–10.5)
WBC # FLD AUTO: 3.19 K/UL — LOW (ref 3.8–10.5)

## 2023-10-31 PROCEDURE — 99214 OFFICE O/P EST MOD 30 MIN: CPT

## 2023-11-01 LAB
CANCER AG27-29 SERPL-ACNC: 28 U/ML — SIGNIFICANT CHANGE UP (ref 0–38.6)
CANCER AG27-29 SERPL-ACNC: 28 U/ML — SIGNIFICANT CHANGE UP (ref 0–38.6)

## 2023-11-21 ENCOUNTER — APPOINTMENT (OUTPATIENT)
Dept: INFUSION THERAPY | Facility: HOSPITAL | Age: 48
End: 2023-11-21

## 2023-11-21 ENCOUNTER — RESULT REVIEW (OUTPATIENT)
Age: 48
End: 2023-11-21

## 2023-11-21 ENCOUNTER — APPOINTMENT (OUTPATIENT)
Dept: HEMATOLOGY ONCOLOGY | Facility: CLINIC | Age: 48
End: 2023-11-21
Payer: COMMERCIAL

## 2023-11-21 VITALS
SYSTOLIC BLOOD PRESSURE: 95 MMHG | RESPIRATION RATE: 17 BRPM | BODY MASS INDEX: 24.58 KG/M2 | DIASTOLIC BLOOD PRESSURE: 67 MMHG | TEMPERATURE: 98.2 F | HEART RATE: 84 BPM | OXYGEN SATURATION: 100 % | WEIGHT: 166.45 LBS

## 2023-11-21 LAB
ALBUMIN SERPL ELPH-MCNC: 4.1 G/DL — SIGNIFICANT CHANGE UP (ref 3.3–5)
ALBUMIN SERPL ELPH-MCNC: 4.1 G/DL — SIGNIFICANT CHANGE UP (ref 3.3–5)
ALP SERPL-CCNC: 150 U/L — HIGH (ref 40–120)
ALP SERPL-CCNC: 150 U/L — HIGH (ref 40–120)
ALT FLD-CCNC: 9 U/L — LOW (ref 10–45)
ALT FLD-CCNC: 9 U/L — LOW (ref 10–45)
ANION GAP SERPL CALC-SCNC: 8 MMOL/L — SIGNIFICANT CHANGE UP (ref 5–17)
ANION GAP SERPL CALC-SCNC: 8 MMOL/L — SIGNIFICANT CHANGE UP (ref 5–17)
AST SERPL-CCNC: 24 U/L — SIGNIFICANT CHANGE UP (ref 10–40)
AST SERPL-CCNC: 24 U/L — SIGNIFICANT CHANGE UP (ref 10–40)
BASOPHILS # BLD AUTO: 0.01 K/UL — SIGNIFICANT CHANGE UP (ref 0–0.2)
BASOPHILS # BLD AUTO: 0.01 K/UL — SIGNIFICANT CHANGE UP (ref 0–0.2)
BASOPHILS NFR BLD AUTO: 0.4 % — SIGNIFICANT CHANGE UP (ref 0–2)
BASOPHILS NFR BLD AUTO: 0.4 % — SIGNIFICANT CHANGE UP (ref 0–2)
BILIRUB SERPL-MCNC: 0.4 MG/DL — SIGNIFICANT CHANGE UP (ref 0.2–1.2)
BILIRUB SERPL-MCNC: 0.4 MG/DL — SIGNIFICANT CHANGE UP (ref 0.2–1.2)
BUN SERPL-MCNC: 17 MG/DL — SIGNIFICANT CHANGE UP (ref 7–23)
BUN SERPL-MCNC: 17 MG/DL — SIGNIFICANT CHANGE UP (ref 7–23)
CALCIUM SERPL-MCNC: 9.4 MG/DL — SIGNIFICANT CHANGE UP (ref 8.4–10.5)
CALCIUM SERPL-MCNC: 9.4 MG/DL — SIGNIFICANT CHANGE UP (ref 8.4–10.5)
CHLORIDE SERPL-SCNC: 103 MMOL/L — SIGNIFICANT CHANGE UP (ref 96–108)
CHLORIDE SERPL-SCNC: 103 MMOL/L — SIGNIFICANT CHANGE UP (ref 96–108)
CO2 SERPL-SCNC: 28 MMOL/L — SIGNIFICANT CHANGE UP (ref 22–31)
CO2 SERPL-SCNC: 28 MMOL/L — SIGNIFICANT CHANGE UP (ref 22–31)
CREAT SERPL-MCNC: 0.67 MG/DL — SIGNIFICANT CHANGE UP (ref 0.5–1.3)
CREAT SERPL-MCNC: 0.67 MG/DL — SIGNIFICANT CHANGE UP (ref 0.5–1.3)
EGFR: 107 ML/MIN/1.73M2 — SIGNIFICANT CHANGE UP
EGFR: 107 ML/MIN/1.73M2 — SIGNIFICANT CHANGE UP
EOSINOPHIL # BLD AUTO: 0.07 K/UL — SIGNIFICANT CHANGE UP (ref 0–0.5)
EOSINOPHIL # BLD AUTO: 0.07 K/UL — SIGNIFICANT CHANGE UP (ref 0–0.5)
EOSINOPHIL NFR BLD AUTO: 2.9 % — SIGNIFICANT CHANGE UP (ref 0–6)
EOSINOPHIL NFR BLD AUTO: 2.9 % — SIGNIFICANT CHANGE UP (ref 0–6)
GLUCOSE SERPL-MCNC: 93 MG/DL — SIGNIFICANT CHANGE UP (ref 70–99)
GLUCOSE SERPL-MCNC: 93 MG/DL — SIGNIFICANT CHANGE UP (ref 70–99)
HCT VFR BLD CALC: 34.8 % — SIGNIFICANT CHANGE UP (ref 34.5–45)
HCT VFR BLD CALC: 34.8 % — SIGNIFICANT CHANGE UP (ref 34.5–45)
HGB BLD-MCNC: 11.2 G/DL — LOW (ref 11.5–15.5)
HGB BLD-MCNC: 11.2 G/DL — LOW (ref 11.5–15.5)
IMM GRANULOCYTES NFR BLD AUTO: 0.4 % — SIGNIFICANT CHANGE UP (ref 0–0.9)
IMM GRANULOCYTES NFR BLD AUTO: 0.4 % — SIGNIFICANT CHANGE UP (ref 0–0.9)
LYMPHOCYTES # BLD AUTO: 0.88 K/UL — LOW (ref 1–3.3)
LYMPHOCYTES # BLD AUTO: 0.88 K/UL — LOW (ref 1–3.3)
LYMPHOCYTES # BLD AUTO: 36.4 % — SIGNIFICANT CHANGE UP (ref 13–44)
LYMPHOCYTES # BLD AUTO: 36.4 % — SIGNIFICANT CHANGE UP (ref 13–44)
MCHC RBC-ENTMCNC: 25.8 PG — LOW (ref 27–34)
MCHC RBC-ENTMCNC: 25.8 PG — LOW (ref 27–34)
MCHC RBC-ENTMCNC: 32.2 G/DL — SIGNIFICANT CHANGE UP (ref 32–36)
MCHC RBC-ENTMCNC: 32.2 G/DL — SIGNIFICANT CHANGE UP (ref 32–36)
MCV RBC AUTO: 80.2 FL — SIGNIFICANT CHANGE UP (ref 80–100)
MCV RBC AUTO: 80.2 FL — SIGNIFICANT CHANGE UP (ref 80–100)
MONOCYTES # BLD AUTO: 0.28 K/UL — SIGNIFICANT CHANGE UP (ref 0–0.9)
MONOCYTES # BLD AUTO: 0.28 K/UL — SIGNIFICANT CHANGE UP (ref 0–0.9)
MONOCYTES NFR BLD AUTO: 11.6 % — SIGNIFICANT CHANGE UP (ref 2–14)
MONOCYTES NFR BLD AUTO: 11.6 % — SIGNIFICANT CHANGE UP (ref 2–14)
NEUTROPHILS # BLD AUTO: 1.17 K/UL — LOW (ref 1.8–7.4)
NEUTROPHILS # BLD AUTO: 1.17 K/UL — LOW (ref 1.8–7.4)
NEUTROPHILS NFR BLD AUTO: 48.3 % — SIGNIFICANT CHANGE UP (ref 43–77)
NEUTROPHILS NFR BLD AUTO: 48.3 % — SIGNIFICANT CHANGE UP (ref 43–77)
NRBC # BLD: 0 /100 WBCS — SIGNIFICANT CHANGE UP (ref 0–0)
NRBC # BLD: 0 /100 WBCS — SIGNIFICANT CHANGE UP (ref 0–0)
PLATELET # BLD AUTO: 151 K/UL — SIGNIFICANT CHANGE UP (ref 150–400)
PLATELET # BLD AUTO: 151 K/UL — SIGNIFICANT CHANGE UP (ref 150–400)
POTASSIUM SERPL-MCNC: 3.9 MMOL/L — SIGNIFICANT CHANGE UP (ref 3.5–5.3)
POTASSIUM SERPL-MCNC: 3.9 MMOL/L — SIGNIFICANT CHANGE UP (ref 3.5–5.3)
POTASSIUM SERPL-SCNC: 3.9 MMOL/L — SIGNIFICANT CHANGE UP (ref 3.5–5.3)
POTASSIUM SERPL-SCNC: 3.9 MMOL/L — SIGNIFICANT CHANGE UP (ref 3.5–5.3)
PROT SERPL-MCNC: 6.9 G/DL — SIGNIFICANT CHANGE UP (ref 6–8.3)
PROT SERPL-MCNC: 6.9 G/DL — SIGNIFICANT CHANGE UP (ref 6–8.3)
RBC # BLD: 4.34 M/UL — SIGNIFICANT CHANGE UP (ref 3.8–5.2)
RBC # BLD: 4.34 M/UL — SIGNIFICANT CHANGE UP (ref 3.8–5.2)
RBC # FLD: 20.6 % — HIGH (ref 10.3–14.5)
RBC # FLD: 20.6 % — HIGH (ref 10.3–14.5)
SODIUM SERPL-SCNC: 138 MMOL/L — SIGNIFICANT CHANGE UP (ref 135–145)
SODIUM SERPL-SCNC: 138 MMOL/L — SIGNIFICANT CHANGE UP (ref 135–145)
T3 SERPL-MCNC: 136 NG/DL — SIGNIFICANT CHANGE UP (ref 80–200)
T3 SERPL-MCNC: 136 NG/DL — SIGNIFICANT CHANGE UP (ref 80–200)
T4 FREE SERPL-MCNC: 1.1 NG/DL — SIGNIFICANT CHANGE UP (ref 0.9–1.8)
T4 FREE SERPL-MCNC: 1.1 NG/DL — SIGNIFICANT CHANGE UP (ref 0.9–1.8)
TSH SERPL-MCNC: 1.51 UIU/ML — SIGNIFICANT CHANGE UP (ref 0.27–4.2)
TSH SERPL-MCNC: 1.51 UIU/ML — SIGNIFICANT CHANGE UP (ref 0.27–4.2)
WBC # BLD: 2.42 K/UL — LOW (ref 3.8–10.5)
WBC # BLD: 2.42 K/UL — LOW (ref 3.8–10.5)
WBC # FLD AUTO: 2.42 K/UL — LOW (ref 3.8–10.5)
WBC # FLD AUTO: 2.42 K/UL — LOW (ref 3.8–10.5)

## 2023-11-21 PROCEDURE — 99214 OFFICE O/P EST MOD 30 MIN: CPT

## 2023-11-21 RX ORDER — CAPECITABINE 500 MG/1
500 TABLET ORAL
Qty: 56 | Refills: 1 | Status: ACTIVE | COMMUNITY
Start: 2023-08-08 | End: 1900-01-01

## 2023-11-22 ENCOUNTER — OUTPATIENT (OUTPATIENT)
Dept: OUTPATIENT SERVICES | Facility: HOSPITAL | Age: 48
LOS: 1 days | Discharge: HOME | End: 2023-11-22

## 2023-11-22 DIAGNOSIS — Z98.890 OTHER SPECIFIED POSTPROCEDURAL STATES: Chronic | ICD-10-CM

## 2023-11-22 DIAGNOSIS — Z95.828 PRESENCE OF OTHER VASCULAR IMPLANTS AND GRAFTS: Chronic | ICD-10-CM

## 2023-11-22 DIAGNOSIS — C50.919 MALIGNANT NEOPLASM OF UNSPECIFIED SITE OF UNSPECIFIED FEMALE BREAST: ICD-10-CM

## 2023-11-22 DIAGNOSIS — Z98.891 HISTORY OF UTERINE SCAR FROM PREVIOUS SURGERY: Chronic | ICD-10-CM

## 2023-11-22 LAB
CANCER AG27-29 SERPL-ACNC: 25 U/ML — SIGNIFICANT CHANGE UP (ref 0–38.6)
CANCER AG27-29 SERPL-ACNC: 25 U/ML — SIGNIFICANT CHANGE UP (ref 0–38.6)

## 2023-11-27 ENCOUNTER — APPOINTMENT (OUTPATIENT)
Dept: HEMATOLOGY ONCOLOGY | Facility: CLINIC | Age: 48
End: 2023-11-27
Payer: COMMERCIAL

## 2023-11-27 ENCOUNTER — RESULT REVIEW (OUTPATIENT)
Age: 48
End: 2023-11-27

## 2023-11-27 ENCOUNTER — APPOINTMENT (OUTPATIENT)
Dept: INFUSION THERAPY | Facility: HOSPITAL | Age: 48
End: 2023-11-27

## 2023-11-27 VITALS
SYSTOLIC BLOOD PRESSURE: 105 MMHG | RESPIRATION RATE: 18 BRPM | HEART RATE: 96 BPM | WEIGHT: 166.89 LBS | DIASTOLIC BLOOD PRESSURE: 75 MMHG | TEMPERATURE: 96.9 F | BODY MASS INDEX: 24.65 KG/M2 | OXYGEN SATURATION: 96 %

## 2023-11-27 LAB
ALBUMIN SERPL ELPH-MCNC: 4.1 G/DL — SIGNIFICANT CHANGE UP (ref 3.3–5)
ALBUMIN SERPL ELPH-MCNC: 4.1 G/DL — SIGNIFICANT CHANGE UP (ref 3.3–5)
ALP SERPL-CCNC: 115 U/L — SIGNIFICANT CHANGE UP (ref 40–120)
ALP SERPL-CCNC: 115 U/L — SIGNIFICANT CHANGE UP (ref 40–120)
ALT FLD-CCNC: 28 U/L — SIGNIFICANT CHANGE UP (ref 10–45)
ALT FLD-CCNC: 28 U/L — SIGNIFICANT CHANGE UP (ref 10–45)
ANION GAP SERPL CALC-SCNC: 8 MMOL/L — SIGNIFICANT CHANGE UP (ref 5–17)
ANION GAP SERPL CALC-SCNC: 8 MMOL/L — SIGNIFICANT CHANGE UP (ref 5–17)
AST SERPL-CCNC: 36 U/L — SIGNIFICANT CHANGE UP (ref 10–40)
AST SERPL-CCNC: 36 U/L — SIGNIFICANT CHANGE UP (ref 10–40)
BASOPHILS # BLD AUTO: 0.01 K/UL — SIGNIFICANT CHANGE UP (ref 0–0.2)
BASOPHILS # BLD AUTO: 0.01 K/UL — SIGNIFICANT CHANGE UP (ref 0–0.2)
BASOPHILS NFR BLD AUTO: 0.4 % — SIGNIFICANT CHANGE UP (ref 0–2)
BASOPHILS NFR BLD AUTO: 0.4 % — SIGNIFICANT CHANGE UP (ref 0–2)
BILIRUB SERPL-MCNC: 0.5 MG/DL — SIGNIFICANT CHANGE UP (ref 0.2–1.2)
BILIRUB SERPL-MCNC: 0.5 MG/DL — SIGNIFICANT CHANGE UP (ref 0.2–1.2)
BUN SERPL-MCNC: 7 MG/DL — SIGNIFICANT CHANGE UP (ref 7–23)
BUN SERPL-MCNC: 7 MG/DL — SIGNIFICANT CHANGE UP (ref 7–23)
CALCIUM SERPL-MCNC: 9.3 MG/DL — SIGNIFICANT CHANGE UP (ref 8.4–10.5)
CALCIUM SERPL-MCNC: 9.3 MG/DL — SIGNIFICANT CHANGE UP (ref 8.4–10.5)
CHLORIDE SERPL-SCNC: 102 MMOL/L — SIGNIFICANT CHANGE UP (ref 96–108)
CHLORIDE SERPL-SCNC: 102 MMOL/L — SIGNIFICANT CHANGE UP (ref 96–108)
CO2 SERPL-SCNC: 28 MMOL/L — SIGNIFICANT CHANGE UP (ref 22–31)
CO2 SERPL-SCNC: 28 MMOL/L — SIGNIFICANT CHANGE UP (ref 22–31)
CREAT SERPL-MCNC: 0.63 MG/DL — SIGNIFICANT CHANGE UP (ref 0.5–1.3)
CREAT SERPL-MCNC: 0.63 MG/DL — SIGNIFICANT CHANGE UP (ref 0.5–1.3)
EGFR: 109 ML/MIN/1.73M2 — SIGNIFICANT CHANGE UP
EGFR: 109 ML/MIN/1.73M2 — SIGNIFICANT CHANGE UP
EOSINOPHIL # BLD AUTO: 0.11 K/UL — SIGNIFICANT CHANGE UP (ref 0–0.5)
EOSINOPHIL # BLD AUTO: 0.11 K/UL — SIGNIFICANT CHANGE UP (ref 0–0.5)
EOSINOPHIL NFR BLD AUTO: 3.9 % — SIGNIFICANT CHANGE UP (ref 0–6)
EOSINOPHIL NFR BLD AUTO: 3.9 % — SIGNIFICANT CHANGE UP (ref 0–6)
GLUCOSE SERPL-MCNC: 80 MG/DL — SIGNIFICANT CHANGE UP (ref 70–99)
GLUCOSE SERPL-MCNC: 80 MG/DL — SIGNIFICANT CHANGE UP (ref 70–99)
HCT VFR BLD CALC: 35.8 % — SIGNIFICANT CHANGE UP (ref 34.5–45)
HCT VFR BLD CALC: 35.8 % — SIGNIFICANT CHANGE UP (ref 34.5–45)
HGB BLD-MCNC: 11.9 G/DL — SIGNIFICANT CHANGE UP (ref 11.5–15.5)
HGB BLD-MCNC: 11.9 G/DL — SIGNIFICANT CHANGE UP (ref 11.5–15.5)
IMM GRANULOCYTES NFR BLD AUTO: 0.4 % — SIGNIFICANT CHANGE UP (ref 0–0.9)
IMM GRANULOCYTES NFR BLD AUTO: 0.4 % — SIGNIFICANT CHANGE UP (ref 0–0.9)
LYMPHOCYTES # BLD AUTO: 1.14 K/UL — SIGNIFICANT CHANGE UP (ref 1–3.3)
LYMPHOCYTES # BLD AUTO: 1.14 K/UL — SIGNIFICANT CHANGE UP (ref 1–3.3)
LYMPHOCYTES # BLD AUTO: 40.6 % — SIGNIFICANT CHANGE UP (ref 13–44)
LYMPHOCYTES # BLD AUTO: 40.6 % — SIGNIFICANT CHANGE UP (ref 13–44)
MCHC RBC-ENTMCNC: 26.7 PG — LOW (ref 27–34)
MCHC RBC-ENTMCNC: 26.7 PG — LOW (ref 27–34)
MCHC RBC-ENTMCNC: 33.2 G/DL — SIGNIFICANT CHANGE UP (ref 32–36)
MCHC RBC-ENTMCNC: 33.2 G/DL — SIGNIFICANT CHANGE UP (ref 32–36)
MCV RBC AUTO: 80.4 FL — SIGNIFICANT CHANGE UP (ref 80–100)
MCV RBC AUTO: 80.4 FL — SIGNIFICANT CHANGE UP (ref 80–100)
MONOCYTES # BLD AUTO: 0.33 K/UL — SIGNIFICANT CHANGE UP (ref 0–0.9)
MONOCYTES # BLD AUTO: 0.33 K/UL — SIGNIFICANT CHANGE UP (ref 0–0.9)
MONOCYTES NFR BLD AUTO: 11.7 % — SIGNIFICANT CHANGE UP (ref 2–14)
MONOCYTES NFR BLD AUTO: 11.7 % — SIGNIFICANT CHANGE UP (ref 2–14)
NEUTROPHILS # BLD AUTO: 1.21 K/UL — LOW (ref 1.8–7.4)
NEUTROPHILS # BLD AUTO: 1.21 K/UL — LOW (ref 1.8–7.4)
NEUTROPHILS NFR BLD AUTO: 43 % — SIGNIFICANT CHANGE UP (ref 43–77)
NEUTROPHILS NFR BLD AUTO: 43 % — SIGNIFICANT CHANGE UP (ref 43–77)
NRBC # BLD: 0 /100 WBCS — SIGNIFICANT CHANGE UP (ref 0–0)
NRBC # BLD: 0 /100 WBCS — SIGNIFICANT CHANGE UP (ref 0–0)
PLATELET # BLD AUTO: 125 K/UL — LOW (ref 150–400)
PLATELET # BLD AUTO: 125 K/UL — LOW (ref 150–400)
POTASSIUM SERPL-MCNC: 3.9 MMOL/L — SIGNIFICANT CHANGE UP (ref 3.5–5.3)
POTASSIUM SERPL-MCNC: 3.9 MMOL/L — SIGNIFICANT CHANGE UP (ref 3.5–5.3)
POTASSIUM SERPL-SCNC: 3.9 MMOL/L — SIGNIFICANT CHANGE UP (ref 3.5–5.3)
POTASSIUM SERPL-SCNC: 3.9 MMOL/L — SIGNIFICANT CHANGE UP (ref 3.5–5.3)
PROT SERPL-MCNC: 6.9 G/DL — SIGNIFICANT CHANGE UP (ref 6–8.3)
PROT SERPL-MCNC: 6.9 G/DL — SIGNIFICANT CHANGE UP (ref 6–8.3)
RBC # BLD: 4.45 M/UL — SIGNIFICANT CHANGE UP (ref 3.8–5.2)
RBC # BLD: 4.45 M/UL — SIGNIFICANT CHANGE UP (ref 3.8–5.2)
RBC # FLD: 20.4 % — HIGH (ref 10.3–14.5)
RBC # FLD: 20.4 % — HIGH (ref 10.3–14.5)
SODIUM SERPL-SCNC: 137 MMOL/L — SIGNIFICANT CHANGE UP (ref 135–145)
SODIUM SERPL-SCNC: 137 MMOL/L — SIGNIFICANT CHANGE UP (ref 135–145)
WBC # BLD: 2.81 K/UL — LOW (ref 3.8–10.5)
WBC # BLD: 2.81 K/UL — LOW (ref 3.8–10.5)
WBC # FLD AUTO: 2.81 K/UL — LOW (ref 3.8–10.5)
WBC # FLD AUTO: 2.81 K/UL — LOW (ref 3.8–10.5)

## 2023-11-27 PROCEDURE — 99213 OFFICE O/P EST LOW 20 MIN: CPT

## 2023-11-29 ENCOUNTER — APPOINTMENT (OUTPATIENT)
Dept: SURGICAL ONCOLOGY | Facility: CLINIC | Age: 48
End: 2023-11-29
Payer: COMMERCIAL

## 2023-11-29 VITALS
DIASTOLIC BLOOD PRESSURE: 78 MMHG | WEIGHT: 164 LBS | BODY MASS INDEX: 24.29 KG/M2 | RESPIRATION RATE: 16 BRPM | HEART RATE: 98 BPM | HEIGHT: 69 IN | SYSTOLIC BLOOD PRESSURE: 114 MMHG | OXYGEN SATURATION: 98 %

## 2023-11-29 LAB — FNA, BREAST: NORMAL

## 2023-11-29 PROCEDURE — 10005 FNA BX W/US GDN 1ST LES: CPT

## 2023-11-29 PROCEDURE — 99215 OFFICE O/P EST HI 40 MIN: CPT | Mod: 25

## 2023-12-06 ENCOUNTER — APPOINTMENT (OUTPATIENT)
Dept: INFUSION THERAPY | Facility: HOSPITAL | Age: 48
End: 2023-12-06

## 2023-12-06 ENCOUNTER — RESULT REVIEW (OUTPATIENT)
Age: 48
End: 2023-12-06

## 2023-12-06 ENCOUNTER — APPOINTMENT (OUTPATIENT)
Dept: HEMATOLOGY ONCOLOGY | Facility: CLINIC | Age: 48
End: 2023-12-06
Payer: COMMERCIAL

## 2023-12-06 VITALS
RESPIRATION RATE: 16 BRPM | BODY MASS INDEX: 24.72 KG/M2 | HEART RATE: 98 BPM | SYSTOLIC BLOOD PRESSURE: 99 MMHG | OXYGEN SATURATION: 98 % | DIASTOLIC BLOOD PRESSURE: 72 MMHG | HEIGHT: 69 IN | TEMPERATURE: 98.7 F | WEIGHT: 166.89 LBS

## 2023-12-06 LAB
ALBUMIN SERPL ELPH-MCNC: 4 G/DL — SIGNIFICANT CHANGE UP (ref 3.3–5)
ALBUMIN SERPL ELPH-MCNC: 4 G/DL — SIGNIFICANT CHANGE UP (ref 3.3–5)
ALP SERPL-CCNC: 108 U/L — SIGNIFICANT CHANGE UP (ref 40–120)
ALP SERPL-CCNC: 108 U/L — SIGNIFICANT CHANGE UP (ref 40–120)
ALT FLD-CCNC: 39 U/L — SIGNIFICANT CHANGE UP (ref 10–45)
ALT FLD-CCNC: 39 U/L — SIGNIFICANT CHANGE UP (ref 10–45)
ANION GAP SERPL CALC-SCNC: 11 MMOL/L — SIGNIFICANT CHANGE UP (ref 5–17)
ANION GAP SERPL CALC-SCNC: 11 MMOL/L — SIGNIFICANT CHANGE UP (ref 5–17)
AST SERPL-CCNC: 50 U/L — HIGH (ref 10–40)
AST SERPL-CCNC: 50 U/L — HIGH (ref 10–40)
BASOPHILS # BLD AUTO: 0.03 K/UL — SIGNIFICANT CHANGE UP (ref 0–0.2)
BASOPHILS # BLD AUTO: 0.03 K/UL — SIGNIFICANT CHANGE UP (ref 0–0.2)
BASOPHILS NFR BLD AUTO: 1 % — SIGNIFICANT CHANGE UP (ref 0–2)
BASOPHILS NFR BLD AUTO: 1 % — SIGNIFICANT CHANGE UP (ref 0–2)
BILIRUB SERPL-MCNC: 0.5 MG/DL — SIGNIFICANT CHANGE UP (ref 0.2–1.2)
BILIRUB SERPL-MCNC: 0.5 MG/DL — SIGNIFICANT CHANGE UP (ref 0.2–1.2)
BUN SERPL-MCNC: 7 MG/DL — SIGNIFICANT CHANGE UP (ref 7–23)
BUN SERPL-MCNC: 7 MG/DL — SIGNIFICANT CHANGE UP (ref 7–23)
CALCIUM SERPL-MCNC: 9.2 MG/DL — SIGNIFICANT CHANGE UP (ref 8.4–10.5)
CALCIUM SERPL-MCNC: 9.2 MG/DL — SIGNIFICANT CHANGE UP (ref 8.4–10.5)
CHLORIDE SERPL-SCNC: 100 MMOL/L — SIGNIFICANT CHANGE UP (ref 96–108)
CHLORIDE SERPL-SCNC: 100 MMOL/L — SIGNIFICANT CHANGE UP (ref 96–108)
CO2 SERPL-SCNC: 26 MMOL/L — SIGNIFICANT CHANGE UP (ref 22–31)
CO2 SERPL-SCNC: 26 MMOL/L — SIGNIFICANT CHANGE UP (ref 22–31)
CREAT SERPL-MCNC: 0.58 MG/DL — SIGNIFICANT CHANGE UP (ref 0.5–1.3)
CREAT SERPL-MCNC: 0.58 MG/DL — SIGNIFICANT CHANGE UP (ref 0.5–1.3)
EGFR: 111 ML/MIN/1.73M2 — SIGNIFICANT CHANGE UP
EGFR: 111 ML/MIN/1.73M2 — SIGNIFICANT CHANGE UP
EOSINOPHIL # BLD AUTO: 0.13 K/UL — SIGNIFICANT CHANGE UP (ref 0–0.5)
EOSINOPHIL # BLD AUTO: 0.13 K/UL — SIGNIFICANT CHANGE UP (ref 0–0.5)
EOSINOPHIL NFR BLD AUTO: 4.3 % — SIGNIFICANT CHANGE UP (ref 0–6)
EOSINOPHIL NFR BLD AUTO: 4.3 % — SIGNIFICANT CHANGE UP (ref 0–6)
FNA, BREAST: NORMAL
GLUCOSE SERPL-MCNC: 91 MG/DL — SIGNIFICANT CHANGE UP (ref 70–99)
GLUCOSE SERPL-MCNC: 91 MG/DL — SIGNIFICANT CHANGE UP (ref 70–99)
HCT VFR BLD CALC: 33.6 % — LOW (ref 34.5–45)
HCT VFR BLD CALC: 33.6 % — LOW (ref 34.5–45)
HGB BLD-MCNC: 11.2 G/DL — LOW (ref 11.5–15.5)
HGB BLD-MCNC: 11.2 G/DL — LOW (ref 11.5–15.5)
IMM GRANULOCYTES NFR BLD AUTO: 1.3 % — HIGH (ref 0–0.9)
IMM GRANULOCYTES NFR BLD AUTO: 1.3 % — HIGH (ref 0–0.9)
LYMPHOCYTES # BLD AUTO: 1.07 K/UL — SIGNIFICANT CHANGE UP (ref 1–3.3)
LYMPHOCYTES # BLD AUTO: 1.07 K/UL — SIGNIFICANT CHANGE UP (ref 1–3.3)
LYMPHOCYTES # BLD AUTO: 35.3 % — SIGNIFICANT CHANGE UP (ref 13–44)
LYMPHOCYTES # BLD AUTO: 35.3 % — SIGNIFICANT CHANGE UP (ref 13–44)
MCHC RBC-ENTMCNC: 26.4 PG — LOW (ref 27–34)
MCHC RBC-ENTMCNC: 26.4 PG — LOW (ref 27–34)
MCHC RBC-ENTMCNC: 33.3 G/DL — SIGNIFICANT CHANGE UP (ref 32–36)
MCHC RBC-ENTMCNC: 33.3 G/DL — SIGNIFICANT CHANGE UP (ref 32–36)
MCV RBC AUTO: 79.2 FL — LOW (ref 80–100)
MCV RBC AUTO: 79.2 FL — LOW (ref 80–100)
MONOCYTES # BLD AUTO: 0.38 K/UL — SIGNIFICANT CHANGE UP (ref 0–0.9)
MONOCYTES # BLD AUTO: 0.38 K/UL — SIGNIFICANT CHANGE UP (ref 0–0.9)
MONOCYTES NFR BLD AUTO: 12.5 % — SIGNIFICANT CHANGE UP (ref 2–14)
MONOCYTES NFR BLD AUTO: 12.5 % — SIGNIFICANT CHANGE UP (ref 2–14)
NEUTROPHILS # BLD AUTO: 1.38 K/UL — LOW (ref 1.8–7.4)
NEUTROPHILS # BLD AUTO: 1.38 K/UL — LOW (ref 1.8–7.4)
NEUTROPHILS NFR BLD AUTO: 45.6 % — SIGNIFICANT CHANGE UP (ref 43–77)
NEUTROPHILS NFR BLD AUTO: 45.6 % — SIGNIFICANT CHANGE UP (ref 43–77)
NRBC # BLD: 0 /100 WBCS — SIGNIFICANT CHANGE UP (ref 0–0)
NRBC # BLD: 0 /100 WBCS — SIGNIFICANT CHANGE UP (ref 0–0)
PLATELET # BLD AUTO: 160 K/UL — SIGNIFICANT CHANGE UP (ref 150–400)
PLATELET # BLD AUTO: 160 K/UL — SIGNIFICANT CHANGE UP (ref 150–400)
POTASSIUM SERPL-MCNC: 3.9 MMOL/L — SIGNIFICANT CHANGE UP (ref 3.5–5.3)
POTASSIUM SERPL-MCNC: 3.9 MMOL/L — SIGNIFICANT CHANGE UP (ref 3.5–5.3)
POTASSIUM SERPL-SCNC: 3.9 MMOL/L — SIGNIFICANT CHANGE UP (ref 3.5–5.3)
POTASSIUM SERPL-SCNC: 3.9 MMOL/L — SIGNIFICANT CHANGE UP (ref 3.5–5.3)
PROT SERPL-MCNC: 6.7 G/DL — SIGNIFICANT CHANGE UP (ref 6–8.3)
PROT SERPL-MCNC: 6.7 G/DL — SIGNIFICANT CHANGE UP (ref 6–8.3)
RBC # BLD: 4.24 M/UL — SIGNIFICANT CHANGE UP (ref 3.8–5.2)
RBC # BLD: 4.24 M/UL — SIGNIFICANT CHANGE UP (ref 3.8–5.2)
RBC # FLD: 19.3 % — HIGH (ref 10.3–14.5)
RBC # FLD: 19.3 % — HIGH (ref 10.3–14.5)
SODIUM SERPL-SCNC: 137 MMOL/L — SIGNIFICANT CHANGE UP (ref 135–145)
SODIUM SERPL-SCNC: 137 MMOL/L — SIGNIFICANT CHANGE UP (ref 135–145)
WBC # BLD: 3.03 K/UL — LOW (ref 3.8–10.5)
WBC # BLD: 3.03 K/UL — LOW (ref 3.8–10.5)
WBC # FLD AUTO: 3.03 K/UL — LOW (ref 3.8–10.5)
WBC # FLD AUTO: 3.03 K/UL — LOW (ref 3.8–10.5)

## 2023-12-06 PROCEDURE — 99213 OFFICE O/P EST LOW 20 MIN: CPT

## 2023-12-07 LAB
CANCER AG27-29 SERPL-ACNC: 18 U/ML — SIGNIFICANT CHANGE UP (ref 0–38.6)
CANCER AG27-29 SERPL-ACNC: 18 U/ML — SIGNIFICANT CHANGE UP (ref 0–38.6)

## 2023-12-28 ENCOUNTER — EMERGENCY (EMERGENCY)
Facility: HOSPITAL | Age: 48
LOS: 1 days | Discharge: HOME | End: 2023-12-28
Attending: EMERGENCY MEDICINE
Payer: COMMERCIAL

## 2023-12-28 VITALS
WEIGHT: 156.97 LBS | HEIGHT: 69 IN | TEMPERATURE: 97 F | SYSTOLIC BLOOD PRESSURE: 94 MMHG | RESPIRATION RATE: 27 BRPM | DIASTOLIC BLOOD PRESSURE: 71 MMHG | OXYGEN SATURATION: 99 % | HEART RATE: 121 BPM

## 2023-12-28 DIAGNOSIS — Z98.891 HISTORY OF UTERINE SCAR FROM PREVIOUS SURGERY: Chronic | ICD-10-CM

## 2023-12-28 DIAGNOSIS — Z98.890 OTHER SPECIFIED POSTPROCEDURAL STATES: Chronic | ICD-10-CM

## 2023-12-28 DIAGNOSIS — Z95.828 PRESENCE OF OTHER VASCULAR IMPLANTS AND GRAFTS: Chronic | ICD-10-CM

## 2023-12-28 LAB
ALBUMIN SERPL ELPH-MCNC: 3 G/DL — LOW (ref 3.5–5)
ALBUMIN SERPL ELPH-MCNC: 3 G/DL — LOW (ref 3.5–5)
ALP SERPL-CCNC: 61 U/L — SIGNIFICANT CHANGE UP (ref 40–120)
ALP SERPL-CCNC: 61 U/L — SIGNIFICANT CHANGE UP (ref 40–120)
ALT FLD-CCNC: 24 U/L DA — SIGNIFICANT CHANGE UP (ref 10–60)
ALT FLD-CCNC: 24 U/L DA — SIGNIFICANT CHANGE UP (ref 10–60)
ANION GAP SERPL CALC-SCNC: 9 MMOL/L — SIGNIFICANT CHANGE UP (ref 5–17)
ANION GAP SERPL CALC-SCNC: 9 MMOL/L — SIGNIFICANT CHANGE UP (ref 5–17)
AST SERPL-CCNC: 33 U/L — SIGNIFICANT CHANGE UP (ref 10–40)
AST SERPL-CCNC: 33 U/L — SIGNIFICANT CHANGE UP (ref 10–40)
BASOPHILS # BLD AUTO: 0.02 K/UL — SIGNIFICANT CHANGE UP (ref 0–0.2)
BASOPHILS # BLD AUTO: 0.02 K/UL — SIGNIFICANT CHANGE UP (ref 0–0.2)
BASOPHILS NFR BLD AUTO: 0.6 % — SIGNIFICANT CHANGE UP (ref 0–2)
BASOPHILS NFR BLD AUTO: 0.6 % — SIGNIFICANT CHANGE UP (ref 0–2)
BILIRUB SERPL-MCNC: 0.8 MG/DL — SIGNIFICANT CHANGE UP (ref 0.2–1.2)
BILIRUB SERPL-MCNC: 0.8 MG/DL — SIGNIFICANT CHANGE UP (ref 0.2–1.2)
BUN SERPL-MCNC: 9 MG/DL — SIGNIFICANT CHANGE UP (ref 7–18)
BUN SERPL-MCNC: 9 MG/DL — SIGNIFICANT CHANGE UP (ref 7–18)
CALCIUM SERPL-MCNC: 8.7 MG/DL — SIGNIFICANT CHANGE UP (ref 8.4–10.5)
CALCIUM SERPL-MCNC: 8.7 MG/DL — SIGNIFICANT CHANGE UP (ref 8.4–10.5)
CHLORIDE SERPL-SCNC: 101 MMOL/L — SIGNIFICANT CHANGE UP (ref 96–108)
CHLORIDE SERPL-SCNC: 101 MMOL/L — SIGNIFICANT CHANGE UP (ref 96–108)
CO2 SERPL-SCNC: 25 MMOL/L — SIGNIFICANT CHANGE UP (ref 22–31)
CO2 SERPL-SCNC: 25 MMOL/L — SIGNIFICANT CHANGE UP (ref 22–31)
CREAT SERPL-MCNC: 0.46 MG/DL — LOW (ref 0.5–1.3)
CREAT SERPL-MCNC: 0.46 MG/DL — LOW (ref 0.5–1.3)
EGFR: 117 ML/MIN/1.73M2 — SIGNIFICANT CHANGE UP
EGFR: 117 ML/MIN/1.73M2 — SIGNIFICANT CHANGE UP
EOSINOPHIL # BLD AUTO: 0.18 K/UL — SIGNIFICANT CHANGE UP (ref 0–0.5)
EOSINOPHIL # BLD AUTO: 0.18 K/UL — SIGNIFICANT CHANGE UP (ref 0–0.5)
EOSINOPHIL NFR BLD AUTO: 5.8 % — SIGNIFICANT CHANGE UP (ref 0–6)
EOSINOPHIL NFR BLD AUTO: 5.8 % — SIGNIFICANT CHANGE UP (ref 0–6)
GLUCOSE SERPL-MCNC: 62 MG/DL — LOW (ref 70–99)
GLUCOSE SERPL-MCNC: 62 MG/DL — LOW (ref 70–99)
HCG SERPL-ACNC: <1 MIU/ML — SIGNIFICANT CHANGE UP
HCG SERPL-ACNC: <1 MIU/ML — SIGNIFICANT CHANGE UP
HCT VFR BLD CALC: 34.8 % — SIGNIFICANT CHANGE UP (ref 34.5–45)
HCT VFR BLD CALC: 34.8 % — SIGNIFICANT CHANGE UP (ref 34.5–45)
HGB BLD-MCNC: 11.5 G/DL — SIGNIFICANT CHANGE UP (ref 11.5–15.5)
HGB BLD-MCNC: 11.5 G/DL — SIGNIFICANT CHANGE UP (ref 11.5–15.5)
IMM GRANULOCYTES NFR BLD AUTO: 0.3 % — SIGNIFICANT CHANGE UP (ref 0–0.9)
IMM GRANULOCYTES NFR BLD AUTO: 0.3 % — SIGNIFICANT CHANGE UP (ref 0–0.9)
LYMPHOCYTES # BLD AUTO: 0.96 K/UL — LOW (ref 1–3.3)
LYMPHOCYTES # BLD AUTO: 0.96 K/UL — LOW (ref 1–3.3)
LYMPHOCYTES # BLD AUTO: 31.1 % — SIGNIFICANT CHANGE UP (ref 13–44)
LYMPHOCYTES # BLD AUTO: 31.1 % — SIGNIFICANT CHANGE UP (ref 13–44)
MCHC RBC-ENTMCNC: 26.7 PG — LOW (ref 27–34)
MCHC RBC-ENTMCNC: 26.7 PG — LOW (ref 27–34)
MCHC RBC-ENTMCNC: 33 GM/DL — SIGNIFICANT CHANGE UP (ref 32–36)
MCHC RBC-ENTMCNC: 33 GM/DL — SIGNIFICANT CHANGE UP (ref 32–36)
MCV RBC AUTO: 80.7 FL — SIGNIFICANT CHANGE UP (ref 80–100)
MCV RBC AUTO: 80.7 FL — SIGNIFICANT CHANGE UP (ref 80–100)
MONOCYTES # BLD AUTO: 0.4 K/UL — SIGNIFICANT CHANGE UP (ref 0–0.9)
MONOCYTES # BLD AUTO: 0.4 K/UL — SIGNIFICANT CHANGE UP (ref 0–0.9)
MONOCYTES NFR BLD AUTO: 12.9 % — SIGNIFICANT CHANGE UP (ref 2–14)
MONOCYTES NFR BLD AUTO: 12.9 % — SIGNIFICANT CHANGE UP (ref 2–14)
NEUTROPHILS # BLD AUTO: 1.52 K/UL — LOW (ref 1.8–7.4)
NEUTROPHILS # BLD AUTO: 1.52 K/UL — LOW (ref 1.8–7.4)
NEUTROPHILS NFR BLD AUTO: 49.3 % — SIGNIFICANT CHANGE UP (ref 43–77)
NEUTROPHILS NFR BLD AUTO: 49.3 % — SIGNIFICANT CHANGE UP (ref 43–77)
NRBC # BLD: 0 /100 WBCS — SIGNIFICANT CHANGE UP (ref 0–0)
NRBC # BLD: 0 /100 WBCS — SIGNIFICANT CHANGE UP (ref 0–0)
PLATELET # BLD AUTO: 152 K/UL — SIGNIFICANT CHANGE UP (ref 150–400)
PLATELET # BLD AUTO: 152 K/UL — SIGNIFICANT CHANGE UP (ref 150–400)
POTASSIUM SERPL-MCNC: 3.6 MMOL/L — SIGNIFICANT CHANGE UP (ref 3.5–5.3)
POTASSIUM SERPL-MCNC: 3.6 MMOL/L — SIGNIFICANT CHANGE UP (ref 3.5–5.3)
POTASSIUM SERPL-SCNC: 3.6 MMOL/L — SIGNIFICANT CHANGE UP (ref 3.5–5.3)
POTASSIUM SERPL-SCNC: 3.6 MMOL/L — SIGNIFICANT CHANGE UP (ref 3.5–5.3)
PROT SERPL-MCNC: 6.3 G/DL — SIGNIFICANT CHANGE UP (ref 6–8.3)
PROT SERPL-MCNC: 6.3 G/DL — SIGNIFICANT CHANGE UP (ref 6–8.3)
RBC # BLD: 4.31 M/UL — SIGNIFICANT CHANGE UP (ref 3.8–5.2)
RBC # BLD: 4.31 M/UL — SIGNIFICANT CHANGE UP (ref 3.8–5.2)
RBC # FLD: 17.1 % — HIGH (ref 10.3–14.5)
RBC # FLD: 17.1 % — HIGH (ref 10.3–14.5)
SODIUM SERPL-SCNC: 135 MMOL/L — SIGNIFICANT CHANGE UP (ref 135–145)
SODIUM SERPL-SCNC: 135 MMOL/L — SIGNIFICANT CHANGE UP (ref 135–145)
TROPONIN I, HIGH SENSITIVITY RESULT: 25.8 NG/L — SIGNIFICANT CHANGE UP
TROPONIN I, HIGH SENSITIVITY RESULT: 25.8 NG/L — SIGNIFICANT CHANGE UP
WBC # BLD: 3.09 K/UL — LOW (ref 3.8–10.5)
WBC # BLD: 3.09 K/UL — LOW (ref 3.8–10.5)
WBC # FLD AUTO: 3.09 K/UL — LOW (ref 3.8–10.5)
WBC # FLD AUTO: 3.09 K/UL — LOW (ref 3.8–10.5)

## 2023-12-28 PROCEDURE — 99285 EMERGENCY DEPT VISIT HI MDM: CPT

## 2023-12-28 PROCEDURE — 71275 CT ANGIOGRAPHY CHEST: CPT | Mod: 26,MA

## 2023-12-28 PROCEDURE — 74177 CT ABD & PELVIS W/CONTRAST: CPT | Mod: 26,MA

## 2023-12-28 RX ORDER — SODIUM CHLORIDE 9 MG/ML
1000 INJECTION INTRAMUSCULAR; INTRAVENOUS; SUBCUTANEOUS ONCE
Refills: 0 | Status: COMPLETED | OUTPATIENT
Start: 2023-12-28 | End: 2023-12-28

## 2023-12-28 RX ADMIN — SODIUM CHLORIDE 1000 MILLILITER(S): 9 INJECTION INTRAMUSCULAR; INTRAVENOUS; SUBCUTANEOUS at 21:50

## 2023-12-28 NOTE — ED PROVIDER NOTE - OBJECTIVE STATEMENT
48 yr old female with hx of b/l mastectomy and reconstruction on 6/9/23 presents to ed c/o sob x 1 week with myalgia, fatigue, weakness. no fever, no nv, no cp, no focal weakness. sent from urgent care for eval.

## 2023-12-28 NOTE — ED PROVIDER NOTE - PATIENT PORTAL LINK FT
You can access the FollowMyHealth Patient Portal offered by [facility] by registering at the following website: [email or web address]. By joining [employer]s FollowMyHealth portal, you will also be able to view your health information using other applications (apps) compatible with our system. You can access the FollowMyHealth Patient Portal offered by [facility] by registering at the following website: [email or web address]. By joining [employer]’s FollowMyHealth portal, you will also be able to view your health information using other applications (apps) compatible with our system.

## 2023-12-28 NOTE — ED ADULT NURSE NOTE - NSFALLUNIVINTERV_ED_ALL_ED
Bed/Stretcher in lowest position, wheels locked, appropriate side rails in place/Call bell, personal items and telephone in reach/Instruct patient to call for assistance before getting out of bed/chair/stretcher/Non-slip footwear applied when patient is off stretcher/[place] to call system/Physically safe environment - no spills, clutter or unnecessary equipment/Purposeful proactive rounding/Room/bathroom lighting operational, light cord in reach Bed/Stretcher in lowest position, wheels locked, appropriate side rails in place/Call bell, personal items and telephone in reach/Instruct patient to call for assistance before getting out of bed/chair/stretcher/Non-slip footwear applied when patient is off stretcher/[place] to call system/Physically safe environment - no spills, clutter or unnecessary equipment/Purposeful proactive rounding/Room/bathroom lighting operational, light cord in reach

## 2023-12-28 NOTE — ED PROVIDER NOTE - CLINICAL SUMMARY MEDICAL DECISION MAKING FREE TEXT BOX
48 yr old female with hx of b/l mastectomy and reconstruction on 6/9/23 presents to ed c/o sob x 1 week with myalgia, fatigue, weakness. no fever, no nv, no cp, no focal weakness. sent from urgent care for eval.    r/o pe vs anemia vs atypical acs vs myocardititis -labs, ct, fluids, re-assess

## 2023-12-28 NOTE — ED ADULT TRIAGE NOTE - CHIEF COMPLAINT QUOTE
shortness of breath x 2 days , nausea   Sent from Urgent Care for r/o PE/NSTEMI  Hx of BL mastectomy/s/p chemo

## 2023-12-29 ENCOUNTER — EMERGENCY (EMERGENCY)
Facility: HOSPITAL | Age: 48
LOS: 1 days | Discharge: HOME | End: 2023-12-29
Attending: EMERGENCY MEDICINE
Payer: COMMERCIAL

## 2023-12-29 VITALS
SYSTOLIC BLOOD PRESSURE: 108 MMHG | HEIGHT: 69 IN | TEMPERATURE: 98 F | HEART RATE: 112 BPM | WEIGHT: 156.09 LBS | RESPIRATION RATE: 25 BRPM | OXYGEN SATURATION: 100 % | DIASTOLIC BLOOD PRESSURE: 72 MMHG

## 2023-12-29 VITALS
SYSTOLIC BLOOD PRESSURE: 104 MMHG | DIASTOLIC BLOOD PRESSURE: 69 MMHG | HEART RATE: 82 BPM | RESPIRATION RATE: 18 BRPM | OXYGEN SATURATION: 100 % | TEMPERATURE: 97 F

## 2023-12-29 VITALS
HEART RATE: 98 BPM | SYSTOLIC BLOOD PRESSURE: 117 MMHG | OXYGEN SATURATION: 99 % | TEMPERATURE: 98 F | DIASTOLIC BLOOD PRESSURE: 73 MMHG | RESPIRATION RATE: 20 BRPM

## 2023-12-29 DIAGNOSIS — Z98.890 OTHER SPECIFIED POSTPROCEDURAL STATES: Chronic | ICD-10-CM

## 2023-12-29 DIAGNOSIS — Z90.13 ACQUIRED ABSENCE OF BILATERAL BREASTS AND NIPPLES: Chronic | ICD-10-CM

## 2023-12-29 DIAGNOSIS — Z95.828 PRESENCE OF OTHER VASCULAR IMPLANTS AND GRAFTS: Chronic | ICD-10-CM

## 2023-12-29 DIAGNOSIS — Z98.891 HISTORY OF UTERINE SCAR FROM PREVIOUS SURGERY: Chronic | ICD-10-CM

## 2023-12-29 LAB
ALBUMIN SERPL ELPH-MCNC: 2.8 G/DL — LOW (ref 3.5–5)
ALBUMIN SERPL ELPH-MCNC: 2.8 G/DL — LOW (ref 3.5–5)
ALP SERPL-CCNC: 56 U/L — SIGNIFICANT CHANGE UP (ref 40–120)
ALP SERPL-CCNC: 56 U/L — SIGNIFICANT CHANGE UP (ref 40–120)
ALT FLD-CCNC: 26 U/L DA — SIGNIFICANT CHANGE UP (ref 10–60)
ALT FLD-CCNC: 26 U/L DA — SIGNIFICANT CHANGE UP (ref 10–60)
ANION GAP SERPL CALC-SCNC: 7 MMOL/L — SIGNIFICANT CHANGE UP (ref 5–17)
ANION GAP SERPL CALC-SCNC: 7 MMOL/L — SIGNIFICANT CHANGE UP (ref 5–17)
AST SERPL-CCNC: 37 U/L — SIGNIFICANT CHANGE UP (ref 10–40)
AST SERPL-CCNC: 37 U/L — SIGNIFICANT CHANGE UP (ref 10–40)
BASOPHILS # BLD AUTO: 0.02 K/UL — SIGNIFICANT CHANGE UP (ref 0–0.2)
BASOPHILS # BLD AUTO: 0.02 K/UL — SIGNIFICANT CHANGE UP (ref 0–0.2)
BASOPHILS NFR BLD AUTO: 0.6 % — SIGNIFICANT CHANGE UP (ref 0–2)
BASOPHILS NFR BLD AUTO: 0.6 % — SIGNIFICANT CHANGE UP (ref 0–2)
BILIRUB SERPL-MCNC: 0.7 MG/DL — SIGNIFICANT CHANGE UP (ref 0.2–1.2)
BILIRUB SERPL-MCNC: 0.7 MG/DL — SIGNIFICANT CHANGE UP (ref 0.2–1.2)
BUN SERPL-MCNC: 6 MG/DL — LOW (ref 7–18)
BUN SERPL-MCNC: 6 MG/DL — LOW (ref 7–18)
CALCIUM SERPL-MCNC: 8.6 MG/DL — SIGNIFICANT CHANGE UP (ref 8.4–10.5)
CALCIUM SERPL-MCNC: 8.6 MG/DL — SIGNIFICANT CHANGE UP (ref 8.4–10.5)
CHLORIDE SERPL-SCNC: 106 MMOL/L — SIGNIFICANT CHANGE UP (ref 96–108)
CHLORIDE SERPL-SCNC: 106 MMOL/L — SIGNIFICANT CHANGE UP (ref 96–108)
CO2 SERPL-SCNC: 23 MMOL/L — SIGNIFICANT CHANGE UP (ref 22–31)
CO2 SERPL-SCNC: 23 MMOL/L — SIGNIFICANT CHANGE UP (ref 22–31)
CREAT SERPL-MCNC: 0.48 MG/DL — LOW (ref 0.5–1.3)
CREAT SERPL-MCNC: 0.48 MG/DL — LOW (ref 0.5–1.3)
EGFR: 116 ML/MIN/1.73M2 — SIGNIFICANT CHANGE UP
EGFR: 116 ML/MIN/1.73M2 — SIGNIFICANT CHANGE UP
EOSINOPHIL # BLD AUTO: 0.19 K/UL — SIGNIFICANT CHANGE UP (ref 0–0.5)
EOSINOPHIL # BLD AUTO: 0.19 K/UL — SIGNIFICANT CHANGE UP (ref 0–0.5)
EOSINOPHIL NFR BLD AUTO: 6 % — SIGNIFICANT CHANGE UP (ref 0–6)
EOSINOPHIL NFR BLD AUTO: 6 % — SIGNIFICANT CHANGE UP (ref 0–6)
FLUAV AG NPH QL: SIGNIFICANT CHANGE UP
FLUAV AG NPH QL: SIGNIFICANT CHANGE UP
FLUBV AG NPH QL: SIGNIFICANT CHANGE UP
FLUBV AG NPH QL: SIGNIFICANT CHANGE UP
GLUCOSE SERPL-MCNC: 62 MG/DL — LOW (ref 70–99)
GLUCOSE SERPL-MCNC: 62 MG/DL — LOW (ref 70–99)
HCT VFR BLD CALC: 33.6 % — LOW (ref 34.5–45)
HCT VFR BLD CALC: 33.6 % — LOW (ref 34.5–45)
HGB BLD-MCNC: 10.9 G/DL — LOW (ref 11.5–15.5)
HGB BLD-MCNC: 10.9 G/DL — LOW (ref 11.5–15.5)
IMM GRANULOCYTES NFR BLD AUTO: 0.6 % — SIGNIFICANT CHANGE UP (ref 0–0.9)
IMM GRANULOCYTES NFR BLD AUTO: 0.6 % — SIGNIFICANT CHANGE UP (ref 0–0.9)
LYMPHOCYTES # BLD AUTO: 0.67 K/UL — LOW (ref 1–3.3)
LYMPHOCYTES # BLD AUTO: 0.67 K/UL — LOW (ref 1–3.3)
LYMPHOCYTES # BLD AUTO: 21.3 % — SIGNIFICANT CHANGE UP (ref 13–44)
LYMPHOCYTES # BLD AUTO: 21.3 % — SIGNIFICANT CHANGE UP (ref 13–44)
MCHC RBC-ENTMCNC: 26.3 PG — LOW (ref 27–34)
MCHC RBC-ENTMCNC: 26.3 PG — LOW (ref 27–34)
MCHC RBC-ENTMCNC: 32.4 GM/DL — SIGNIFICANT CHANGE UP (ref 32–36)
MCHC RBC-ENTMCNC: 32.4 GM/DL — SIGNIFICANT CHANGE UP (ref 32–36)
MCV RBC AUTO: 81.2 FL — SIGNIFICANT CHANGE UP (ref 80–100)
MCV RBC AUTO: 81.2 FL — SIGNIFICANT CHANGE UP (ref 80–100)
MONOCYTES # BLD AUTO: 0.34 K/UL — SIGNIFICANT CHANGE UP (ref 0–0.9)
MONOCYTES # BLD AUTO: 0.34 K/UL — SIGNIFICANT CHANGE UP (ref 0–0.9)
MONOCYTES NFR BLD AUTO: 10.8 % — SIGNIFICANT CHANGE UP (ref 2–14)
MONOCYTES NFR BLD AUTO: 10.8 % — SIGNIFICANT CHANGE UP (ref 2–14)
NEUTROPHILS # BLD AUTO: 1.91 K/UL — SIGNIFICANT CHANGE UP (ref 1.8–7.4)
NEUTROPHILS # BLD AUTO: 1.91 K/UL — SIGNIFICANT CHANGE UP (ref 1.8–7.4)
NEUTROPHILS NFR BLD AUTO: 60.7 % — SIGNIFICANT CHANGE UP (ref 43–77)
NEUTROPHILS NFR BLD AUTO: 60.7 % — SIGNIFICANT CHANGE UP (ref 43–77)
NRBC # BLD: 0 /100 WBCS — SIGNIFICANT CHANGE UP (ref 0–0)
NRBC # BLD: 0 /100 WBCS — SIGNIFICANT CHANGE UP (ref 0–0)
PLATELET # BLD AUTO: 146 K/UL — LOW (ref 150–400)
PLATELET # BLD AUTO: 146 K/UL — LOW (ref 150–400)
POTASSIUM SERPL-MCNC: 3.9 MMOL/L — SIGNIFICANT CHANGE UP (ref 3.5–5.3)
POTASSIUM SERPL-MCNC: 3.9 MMOL/L — SIGNIFICANT CHANGE UP (ref 3.5–5.3)
POTASSIUM SERPL-SCNC: 3.9 MMOL/L — SIGNIFICANT CHANGE UP (ref 3.5–5.3)
POTASSIUM SERPL-SCNC: 3.9 MMOL/L — SIGNIFICANT CHANGE UP (ref 3.5–5.3)
PROT SERPL-MCNC: 5.6 G/DL — LOW (ref 6–8.3)
PROT SERPL-MCNC: 5.6 G/DL — LOW (ref 6–8.3)
RBC # BLD: 4.14 M/UL — SIGNIFICANT CHANGE UP (ref 3.8–5.2)
RBC # BLD: 4.14 M/UL — SIGNIFICANT CHANGE UP (ref 3.8–5.2)
RBC # FLD: 16.9 % — HIGH (ref 10.3–14.5)
RBC # FLD: 16.9 % — HIGH (ref 10.3–14.5)
SARS-COV-2 RNA SPEC QL NAA+PROBE: SIGNIFICANT CHANGE UP
SARS-COV-2 RNA SPEC QL NAA+PROBE: SIGNIFICANT CHANGE UP
SODIUM SERPL-SCNC: 136 MMOL/L — SIGNIFICANT CHANGE UP (ref 135–145)
SODIUM SERPL-SCNC: 136 MMOL/L — SIGNIFICANT CHANGE UP (ref 135–145)
WBC # BLD: 3.15 K/UL — LOW (ref 3.8–10.5)
WBC # BLD: 3.15 K/UL — LOW (ref 3.8–10.5)
WBC # FLD AUTO: 3.15 K/UL — LOW (ref 3.8–10.5)
WBC # FLD AUTO: 3.15 K/UL — LOW (ref 3.8–10.5)

## 2023-12-29 PROCEDURE — 36415 COLL VENOUS BLD VENIPUNCTURE: CPT

## 2023-12-29 PROCEDURE — 80053 COMPREHEN METABOLIC PANEL: CPT

## 2023-12-29 PROCEDURE — 85025 COMPLETE CBC W/AUTO DIFF WBC: CPT

## 2023-12-29 PROCEDURE — 87637 SARSCOV2&INF A&B&RSV AMP PRB: CPT

## 2023-12-29 PROCEDURE — 84702 CHORIONIC GONADOTROPIN TEST: CPT

## 2023-12-29 PROCEDURE — 84484 ASSAY OF TROPONIN QUANT: CPT

## 2023-12-29 PROCEDURE — 93005 ELECTROCARDIOGRAM TRACING: CPT

## 2023-12-29 PROCEDURE — 74177 CT ABD & PELVIS W/CONTRAST: CPT | Mod: MA

## 2023-12-29 PROCEDURE — 71275 CT ANGIOGRAPHY CHEST: CPT | Mod: MA

## 2023-12-29 PROCEDURE — 99285 EMERGENCY DEPT VISIT HI MDM: CPT | Mod: 25

## 2023-12-29 PROCEDURE — 99283 EMERGENCY DEPT VISIT LOW MDM: CPT

## 2023-12-29 PROCEDURE — 99284 EMERGENCY DEPT VISIT MOD MDM: CPT

## 2023-12-29 RX ORDER — SODIUM CHLORIDE 9 MG/ML
2000 INJECTION INTRAMUSCULAR; INTRAVENOUS; SUBCUTANEOUS ONCE
Refills: 0 | Status: COMPLETED | OUTPATIENT
Start: 2023-12-29 | End: 2023-12-29

## 2023-12-29 RX ORDER — SODIUM CHLORIDE 9 MG/ML
1000 INJECTION INTRAMUSCULAR; INTRAVENOUS; SUBCUTANEOUS ONCE
Refills: 0 | Status: COMPLETED | OUTPATIENT
Start: 2023-12-29 | End: 2023-12-29

## 2023-12-29 RX ORDER — ONDANSETRON 8 MG/1
4 TABLET, FILM COATED ORAL ONCE
Refills: 0 | Status: COMPLETED | OUTPATIENT
Start: 2023-12-29 | End: 2023-12-29

## 2023-12-29 RX ADMIN — ONDANSETRON 4 MILLIGRAM(S): 8 TABLET, FILM COATED ORAL at 17:52

## 2023-12-29 RX ADMIN — SODIUM CHLORIDE 1000 MILLILITER(S): 9 INJECTION INTRAMUSCULAR; INTRAVENOUS; SUBCUTANEOUS at 01:32

## 2023-12-29 RX ADMIN — SODIUM CHLORIDE 2000 MILLILITER(S): 9 INJECTION INTRAMUSCULAR; INTRAVENOUS; SUBCUTANEOUS at 15:47

## 2023-12-29 NOTE — ED PROVIDER NOTE - NSFOLLOWUPINSTRUCTIONS_ED_ALL_ED_FT
Thank you for choosing [facility] for your healthcare.    You were seen in the Emergency Department for leg pain and weakness after chemotherapy.  Your blood work was repeated today and did not show any change or significant electrolyte abnormalities.  Yesterday you had CAT scans of your chest abdomen pelvis which did not show any life-threatening disease at that time.  We treated you with IV fluids in the emergency room.  It is possible that some of your symptoms are related to neuropathy developed after your chemotherapy treatment but we do not have any specific treatment for that in the emergency room.  We recommend you follow-up closely with your doctors.  Please try to drink plenty of fluids at home to keep yourself hydrated.  Please return to the emergency room for any further emergent or concerning medical issues Thank you for choosing [facility] for your healthcare.    You were seen in the Emergency Department for leg pain and weakness after chemotherapy.  Your blood work was repeated today and did not show any change or significant electrolyte abnormalities.  Yesterday you had CAT scans of your chest abdomen pelvis which did not show any life-threatening disease at that time.  We treated you with IV fluids in the emergency room.  It is possible that some of your symptoms are related to neuropathy developed after your chemotherapy treatment but we do not have any specific treatment for that in the emergency room.  We recommend you follow-up closely with your doctors.  Please try to drink plenty of fluids at home to keep yourself hydrated.  Please return to the emergency room for any further emergent or concerning medical issues

## 2023-12-29 NOTE — ED PROVIDER NOTE - PROGRESS NOTE DETAILS
Patient reporting feeling somewhat improved after IV fluids.  Lab work is stable from yesterday without any emergent findings.  Flu and COVID testing negative.  Discussed with patient that I am not seeing evidence of an emergent or life-threatening cause of her symptoms.  She could be developing neuropathy after the chemotherapy treatment however there is no specific treatment for this on an emergent basis.  Will discharge to follow-up with her doctors.

## 2023-12-29 NOTE — ED PROVIDER NOTE - PATIENT PORTAL LINK FT
You can access the FollowMyHealth Patient Portal offered by [facility] by registering at the following website: [email or web address]. By joining [employer]’s FollowMyHealth portal, you will also be able to view your health information using other applications (apps) compatible with our system. You can access the FollowMyHealth Patient Portal offered by [facility] by registering at the following website: [email or web address]. By joining [employer]’s FollowMyHealth portal, you will also be able to view your health information using other applications (apps) compatible with our system.

## 2023-12-29 NOTE — ED ADULT NURSE NOTE - NSFALLUNIVINTERV_ED_ALL_ED
Bed/Stretcher in lowest position, wheels locked, appropriate side rails in place/Call bell, personal items and telephone in reach/Instruct patient to call for assistance before getting out of bed/chair/stretcher/Non-slip footwear applied when patient is off stretcher/[place] to call system/Physically safe environment - no spills, clutter or unnecessary equipment/Purposeful proactive rounding/Room/bathroom lighting operational, light cord in reach Bed/Stretcher in lowest position, wheels locked, appropriate side rails in place/Call bell, personal items and telephone in reach/Instruct patient to call for assistance before getting out of bed/chair/stretcher/Non-slip footwear applied when patient is off stretcher/[place] to call system/Physically safe environment - no spills, clutter or unnecessary equipment/Purposeful proactive rounding/Room/bathroom lighting operational, light cord in reach

## 2023-12-29 NOTE — ED ADULT NURSE NOTE - OBJECTIVE STATEMENT
pt is here for difficulty breathing.  pt stated that came here for yesterday, c/o difficulty breathing, b/l leg pain, c/o vomiting, h/x b/l mastectomy, denied chest pain or fever, denied cough,

## 2023-12-29 NOTE — ED ADULT NURSE REASSESSMENT NOTE - NS ED NURSE REASSESS COMMENT FT1
Patient blood pressure was noted to be 90's over 60's. Dr. Duong informed of same. 0.9% NS bolus ordered. Patient will be discharged after.

## 2023-12-29 NOTE — ED PROVIDER NOTE - CLINICAL SUMMARY MEDICAL DECISION MAKING FREE TEXT BOX
Patient presenting complaining of ongoing shortness of breath nausea vomiting diarrhea and malaise.  She is tachycardic in triage here although not at bedside.  She was seen yesterday and had a PE ruled out had surgical pathology of the abdomen pelvis ruled out and had largely unremarkable lab work.  Will repeat screening labs given the revisit trial IV fluids and reevaluate.

## 2023-12-29 NOTE — ED PROVIDER NOTE - OBJECTIVE STATEMENT
48-year-old woman with a history of breast cancer status postmastectomy had 1 round of chemotherapy in November not currently on treatment presenting complaining of 3 days of shortness of breath without cough no chest pain associated with diffuse abdominal cramping nausea vomiting and diarrhea.  Denies any fevers or chills.  No reported sick contacts.  Was seen in the emergency department yesterday for same complaints had labs CTA of her chest and CT abdomen pelvis did not find any emergent findings so was discharged.  Patient was continuing to feel ill also spoke to her doctor today who told her to return to the emergency department to get her electrolytes rechecked and be evaluated for possible dehydration.

## 2024-01-02 ENCOUNTER — APPOINTMENT (OUTPATIENT)
Dept: INFUSION THERAPY | Facility: HOSPITAL | Age: 49
End: 2024-01-02

## 2024-01-02 ENCOUNTER — APPOINTMENT (OUTPATIENT)
Dept: HEMATOLOGY ONCOLOGY | Facility: CLINIC | Age: 49
End: 2024-01-02

## 2024-01-02 ENCOUNTER — APPOINTMENT (OUTPATIENT)
Dept: CT IMAGING | Facility: IMAGING CENTER | Age: 49
End: 2024-01-02

## 2024-01-03 ENCOUNTER — INPATIENT (INPATIENT)
Facility: HOSPITAL | Age: 49
LOS: 5 days | Discharge: HOME | End: 2024-01-09
Attending: HOSPITALIST | Admitting: HOSPITALIST
Payer: COMMERCIAL

## 2024-01-03 ENCOUNTER — APPOINTMENT (OUTPATIENT)
Dept: HEMATOLOGY ONCOLOGY | Facility: CLINIC | Age: 49
End: 2024-01-03
Payer: COMMERCIAL

## 2024-01-03 VITALS
SYSTOLIC BLOOD PRESSURE: 91 MMHG | OXYGEN SATURATION: 98 % | RESPIRATION RATE: 22 BRPM | DIASTOLIC BLOOD PRESSURE: 63 MMHG | HEART RATE: 116 BPM

## 2024-01-03 DIAGNOSIS — R53.1 WEAKNESS: ICD-10-CM

## 2024-01-03 DIAGNOSIS — R06.02 SHORTNESS OF BREATH: ICD-10-CM

## 2024-01-03 DIAGNOSIS — T73.0XXA STARVATION, INITIAL ENCOUNTER: ICD-10-CM

## 2024-01-03 DIAGNOSIS — Z95.828 PRESENCE OF OTHER VASCULAR IMPLANTS AND GRAFTS: Chronic | ICD-10-CM

## 2024-01-03 DIAGNOSIS — Z98.890 OTHER SPECIFIED POSTPROCEDURAL STATES: Chronic | ICD-10-CM

## 2024-01-03 DIAGNOSIS — R63.4 ABNORMAL WEIGHT LOSS: ICD-10-CM

## 2024-01-03 DIAGNOSIS — Z90.13 ACQUIRED ABSENCE OF BILATERAL BREASTS AND NIPPLES: Chronic | ICD-10-CM

## 2024-01-03 DIAGNOSIS — Z98.891 HISTORY OF UTERINE SCAR FROM PREVIOUS SURGERY: Chronic | ICD-10-CM

## 2024-01-03 LAB
ALBUMIN SERPL ELPH-MCNC: 4 G/DL — SIGNIFICANT CHANGE UP (ref 3.3–5)
ALBUMIN SERPL ELPH-MCNC: 4 G/DL — SIGNIFICANT CHANGE UP (ref 3.3–5)
ALP SERPL-CCNC: 82 U/L — SIGNIFICANT CHANGE UP (ref 40–120)
ALP SERPL-CCNC: 82 U/L — SIGNIFICANT CHANGE UP (ref 40–120)
ALT FLD-CCNC: 12 U/L — SIGNIFICANT CHANGE UP (ref 4–33)
ALT FLD-CCNC: 12 U/L — SIGNIFICANT CHANGE UP (ref 4–33)
ANION GAP SERPL CALC-SCNC: 19 MMOL/L — HIGH (ref 7–14)
ANION GAP SERPL CALC-SCNC: 19 MMOL/L — HIGH (ref 7–14)
ANION GAP SERPL CALC-SCNC: 22 MMOL/L — HIGH (ref 7–14)
ANION GAP SERPL CALC-SCNC: 22 MMOL/L — HIGH (ref 7–14)
APPEARANCE UR: CLEAR — SIGNIFICANT CHANGE UP
APPEARANCE UR: CLEAR — SIGNIFICANT CHANGE UP
APTT BLD: 43.5 SEC — HIGH (ref 24.5–35.6)
APTT BLD: 43.5 SEC — HIGH (ref 24.5–35.6)
AST SERPL-CCNC: 24 U/L — SIGNIFICANT CHANGE UP (ref 4–32)
AST SERPL-CCNC: 24 U/L — SIGNIFICANT CHANGE UP (ref 4–32)
B PERT DNA SPEC QL NAA+PROBE: SIGNIFICANT CHANGE UP
B PERT DNA SPEC QL NAA+PROBE: SIGNIFICANT CHANGE UP
B PERT+PARAPERT DNA PNL SPEC NAA+PROBE: SIGNIFICANT CHANGE UP
B PERT+PARAPERT DNA PNL SPEC NAA+PROBE: SIGNIFICANT CHANGE UP
BASE EXCESS BLDV CALC-SCNC: -11.4 MMOL/L — LOW (ref -2–3)
BASE EXCESS BLDV CALC-SCNC: -11.4 MMOL/L — LOW (ref -2–3)
BASE EXCESS BLDV CALC-SCNC: -13.3 MMOL/L — LOW (ref -2–3)
BASE EXCESS BLDV CALC-SCNC: -13.3 MMOL/L — LOW (ref -2–3)
BASE EXCESS BLDV CALC-SCNC: -14 MMOL/L — LOW (ref -2–3)
BASE EXCESS BLDV CALC-SCNC: -14 MMOL/L — LOW (ref -2–3)
BASOPHILS # BLD AUTO: 0.03 K/UL — SIGNIFICANT CHANGE UP (ref 0–0.2)
BASOPHILS # BLD AUTO: 0.03 K/UL — SIGNIFICANT CHANGE UP (ref 0–0.2)
BASOPHILS NFR BLD AUTO: 0.5 % — SIGNIFICANT CHANGE UP (ref 0–2)
BASOPHILS NFR BLD AUTO: 0.5 % — SIGNIFICANT CHANGE UP (ref 0–2)
BILIRUB SERPL-MCNC: 0.5 MG/DL — SIGNIFICANT CHANGE UP (ref 0.2–1.2)
BILIRUB SERPL-MCNC: 0.5 MG/DL — SIGNIFICANT CHANGE UP (ref 0.2–1.2)
BILIRUB UR-MCNC: NEGATIVE — SIGNIFICANT CHANGE UP
BILIRUB UR-MCNC: NEGATIVE — SIGNIFICANT CHANGE UP
BLOOD GAS VENOUS COMPREHENSIVE RESULT: SIGNIFICANT CHANGE UP
BORDETELLA PARAPERTUSSIS (RAPRVP): SIGNIFICANT CHANGE UP
BORDETELLA PARAPERTUSSIS (RAPRVP): SIGNIFICANT CHANGE UP
BUN SERPL-MCNC: 10 MG/DL — SIGNIFICANT CHANGE UP (ref 7–23)
BUN SERPL-MCNC: 10 MG/DL — SIGNIFICANT CHANGE UP (ref 7–23)
BUN SERPL-MCNC: 13 MG/DL — SIGNIFICANT CHANGE UP (ref 7–23)
BUN SERPL-MCNC: 13 MG/DL — SIGNIFICANT CHANGE UP (ref 7–23)
C PNEUM DNA SPEC QL NAA+PROBE: SIGNIFICANT CHANGE UP
C PNEUM DNA SPEC QL NAA+PROBE: SIGNIFICANT CHANGE UP
CALCIUM SERPL-MCNC: 11.1 MG/DL — HIGH (ref 8.4–10.5)
CALCIUM SERPL-MCNC: 11.1 MG/DL — HIGH (ref 8.4–10.5)
CALCIUM SERPL-MCNC: 9.8 MG/DL — SIGNIFICANT CHANGE UP (ref 8.4–10.5)
CALCIUM SERPL-MCNC: 9.8 MG/DL — SIGNIFICANT CHANGE UP (ref 8.4–10.5)
CHLORIDE BLDV-SCNC: 100 MMOL/L — SIGNIFICANT CHANGE UP (ref 96–108)
CHLORIDE BLDV-SCNC: 100 MMOL/L — SIGNIFICANT CHANGE UP (ref 96–108)
CHLORIDE BLDV-SCNC: 102 MMOL/L — SIGNIFICANT CHANGE UP (ref 96–108)
CHLORIDE BLDV-SCNC: 102 MMOL/L — SIGNIFICANT CHANGE UP (ref 96–108)
CHLORIDE BLDV-SCNC: 105 MMOL/L — SIGNIFICANT CHANGE UP (ref 96–108)
CHLORIDE BLDV-SCNC: 105 MMOL/L — SIGNIFICANT CHANGE UP (ref 96–108)
CHLORIDE SERPL-SCNC: 100 MMOL/L — SIGNIFICANT CHANGE UP (ref 98–107)
CHLORIDE SERPL-SCNC: 100 MMOL/L — SIGNIFICANT CHANGE UP (ref 98–107)
CHLORIDE SERPL-SCNC: 104 MMOL/L — SIGNIFICANT CHANGE UP (ref 98–107)
CHLORIDE SERPL-SCNC: 104 MMOL/L — SIGNIFICANT CHANGE UP (ref 98–107)
CO2 BLDV-SCNC: 17 MMOL/L — LOW (ref 22–26)
CO2 BLDV-SCNC: 17 MMOL/L — LOW (ref 22–26)
CO2 BLDV-SCNC: 17.4 MMOL/L — LOW (ref 22–26)
CO2 BLDV-SCNC: 17.4 MMOL/L — LOW (ref 22–26)
CO2 BLDV-SCNC: 18.2 MMOL/L — LOW (ref 22–26)
CO2 BLDV-SCNC: 18.2 MMOL/L — LOW (ref 22–26)
CO2 SERPL-SCNC: 13 MMOL/L — LOW (ref 22–31)
CO2 SERPL-SCNC: 13 MMOL/L — LOW (ref 22–31)
CO2 SERPL-SCNC: 15 MMOL/L — LOW (ref 22–31)
CO2 SERPL-SCNC: 15 MMOL/L — LOW (ref 22–31)
COLOR SPEC: YELLOW — SIGNIFICANT CHANGE UP
COLOR SPEC: YELLOW — SIGNIFICANT CHANGE UP
CREAT SERPL-MCNC: 0.54 MG/DL — SIGNIFICANT CHANGE UP (ref 0.5–1.3)
CREAT SERPL-MCNC: 0.54 MG/DL — SIGNIFICANT CHANGE UP (ref 0.5–1.3)
CREAT SERPL-MCNC: 0.71 MG/DL — SIGNIFICANT CHANGE UP (ref 0.5–1.3)
CREAT SERPL-MCNC: 0.71 MG/DL — SIGNIFICANT CHANGE UP (ref 0.5–1.3)
DIFF PNL FLD: NEGATIVE — SIGNIFICANT CHANGE UP
DIFF PNL FLD: NEGATIVE — SIGNIFICANT CHANGE UP
EGFR: 104 ML/MIN/1.73M2 — SIGNIFICANT CHANGE UP
EGFR: 104 ML/MIN/1.73M2 — SIGNIFICANT CHANGE UP
EGFR: 113 ML/MIN/1.73M2 — SIGNIFICANT CHANGE UP
EGFR: 113 ML/MIN/1.73M2 — SIGNIFICANT CHANGE UP
EOSINOPHIL # BLD AUTO: 0.33 K/UL — SIGNIFICANT CHANGE UP (ref 0–0.5)
EOSINOPHIL # BLD AUTO: 0.33 K/UL — SIGNIFICANT CHANGE UP (ref 0–0.5)
EOSINOPHIL NFR BLD AUTO: 5.5 % — SIGNIFICANT CHANGE UP (ref 0–6)
EOSINOPHIL NFR BLD AUTO: 5.5 % — SIGNIFICANT CHANGE UP (ref 0–6)
FLUAV SUBTYP SPEC NAA+PROBE: SIGNIFICANT CHANGE UP
FLUAV SUBTYP SPEC NAA+PROBE: SIGNIFICANT CHANGE UP
FLUBV RNA SPEC QL NAA+PROBE: SIGNIFICANT CHANGE UP
FLUBV RNA SPEC QL NAA+PROBE: SIGNIFICANT CHANGE UP
GAS PNL BLDV: 132 MMOL/L — LOW (ref 136–145)
GAS PNL BLDV: 132 MMOL/L — LOW (ref 136–145)
GAS PNL BLDV: 133 MMOL/L — LOW (ref 136–145)
GAS PNL BLDV: 133 MMOL/L — LOW (ref 136–145)
GAS PNL BLDV: 137 MMOL/L — SIGNIFICANT CHANGE UP (ref 136–145)
GAS PNL BLDV: 137 MMOL/L — SIGNIFICANT CHANGE UP (ref 136–145)
GLUCOSE BLDV-MCNC: 67 MG/DL — LOW (ref 70–99)
GLUCOSE BLDV-MCNC: 67 MG/DL — LOW (ref 70–99)
GLUCOSE BLDV-MCNC: 75 MG/DL — SIGNIFICANT CHANGE UP (ref 70–99)
GLUCOSE BLDV-MCNC: 75 MG/DL — SIGNIFICANT CHANGE UP (ref 70–99)
GLUCOSE BLDV-MCNC: 92 MG/DL — SIGNIFICANT CHANGE UP (ref 70–99)
GLUCOSE BLDV-MCNC: 92 MG/DL — SIGNIFICANT CHANGE UP (ref 70–99)
GLUCOSE SERPL-MCNC: 107 MG/DL — HIGH (ref 70–99)
GLUCOSE SERPL-MCNC: 107 MG/DL — HIGH (ref 70–99)
GLUCOSE SERPL-MCNC: 64 MG/DL — LOW (ref 70–99)
GLUCOSE SERPL-MCNC: 64 MG/DL — LOW (ref 70–99)
GLUCOSE UR QL: NEGATIVE MG/DL — SIGNIFICANT CHANGE UP
GLUCOSE UR QL: NEGATIVE MG/DL — SIGNIFICANT CHANGE UP
HADV DNA SPEC QL NAA+PROBE: SIGNIFICANT CHANGE UP
HADV DNA SPEC QL NAA+PROBE: SIGNIFICANT CHANGE UP
HCO3 BLDV-SCNC: 16 MMOL/L — LOW (ref 22–29)
HCO3 BLDV-SCNC: 17 MMOL/L — LOW (ref 22–29)
HCO3 BLDV-SCNC: 17 MMOL/L — LOW (ref 22–29)
HCOV 229E RNA SPEC QL NAA+PROBE: SIGNIFICANT CHANGE UP
HCOV 229E RNA SPEC QL NAA+PROBE: SIGNIFICANT CHANGE UP
HCOV HKU1 RNA SPEC QL NAA+PROBE: SIGNIFICANT CHANGE UP
HCOV HKU1 RNA SPEC QL NAA+PROBE: SIGNIFICANT CHANGE UP
HCOV NL63 RNA SPEC QL NAA+PROBE: SIGNIFICANT CHANGE UP
HCOV NL63 RNA SPEC QL NAA+PROBE: SIGNIFICANT CHANGE UP
HCOV OC43 RNA SPEC QL NAA+PROBE: SIGNIFICANT CHANGE UP
HCOV OC43 RNA SPEC QL NAA+PROBE: SIGNIFICANT CHANGE UP
HCT VFR BLD CALC: 44 % — SIGNIFICANT CHANGE UP (ref 34.5–45)
HCT VFR BLD CALC: 44 % — SIGNIFICANT CHANGE UP (ref 34.5–45)
HCT VFR BLDA CALC: 37 % — SIGNIFICANT CHANGE UP (ref 34.5–46.5)
HCT VFR BLDA CALC: 37 % — SIGNIFICANT CHANGE UP (ref 34.5–46.5)
HCT VFR BLDA CALC: 38 % — SIGNIFICANT CHANGE UP (ref 34.5–46.5)
HCT VFR BLDA CALC: 38 % — SIGNIFICANT CHANGE UP (ref 34.5–46.5)
HCT VFR BLDA CALC: 43 % — SIGNIFICANT CHANGE UP (ref 34.5–46.5)
HCT VFR BLDA CALC: 43 % — SIGNIFICANT CHANGE UP (ref 34.5–46.5)
HGB BLD CALC-MCNC: 12.4 G/DL — SIGNIFICANT CHANGE UP (ref 11.7–16.1)
HGB BLD CALC-MCNC: 12.4 G/DL — SIGNIFICANT CHANGE UP (ref 11.7–16.1)
HGB BLD CALC-MCNC: 12.6 G/DL — SIGNIFICANT CHANGE UP (ref 11.7–16.1)
HGB BLD CALC-MCNC: 12.6 G/DL — SIGNIFICANT CHANGE UP (ref 11.7–16.1)
HGB BLD CALC-MCNC: 14.3 G/DL — SIGNIFICANT CHANGE UP (ref 11.7–16.1)
HGB BLD CALC-MCNC: 14.3 G/DL — SIGNIFICANT CHANGE UP (ref 11.7–16.1)
HGB BLD-MCNC: 14.1 G/DL — SIGNIFICANT CHANGE UP (ref 11.5–15.5)
HGB BLD-MCNC: 14.1 G/DL — SIGNIFICANT CHANGE UP (ref 11.5–15.5)
HMPV RNA SPEC QL NAA+PROBE: SIGNIFICANT CHANGE UP
HMPV RNA SPEC QL NAA+PROBE: SIGNIFICANT CHANGE UP
HPIV1 RNA SPEC QL NAA+PROBE: SIGNIFICANT CHANGE UP
HPIV1 RNA SPEC QL NAA+PROBE: SIGNIFICANT CHANGE UP
HPIV2 RNA SPEC QL NAA+PROBE: SIGNIFICANT CHANGE UP
HPIV2 RNA SPEC QL NAA+PROBE: SIGNIFICANT CHANGE UP
HPIV3 RNA SPEC QL NAA+PROBE: SIGNIFICANT CHANGE UP
HPIV3 RNA SPEC QL NAA+PROBE: SIGNIFICANT CHANGE UP
HPIV4 RNA SPEC QL NAA+PROBE: SIGNIFICANT CHANGE UP
HPIV4 RNA SPEC QL NAA+PROBE: SIGNIFICANT CHANGE UP
IANC: 3.64 K/UL — SIGNIFICANT CHANGE UP (ref 1.8–7.4)
IANC: 3.64 K/UL — SIGNIFICANT CHANGE UP (ref 1.8–7.4)
IMM GRANULOCYTES NFR BLD AUTO: 0.3 % — SIGNIFICANT CHANGE UP (ref 0–0.9)
IMM GRANULOCYTES NFR BLD AUTO: 0.3 % — SIGNIFICANT CHANGE UP (ref 0–0.9)
INR BLD: 1.16 RATIO — SIGNIFICANT CHANGE UP (ref 0.85–1.18)
INR BLD: 1.16 RATIO — SIGNIFICANT CHANGE UP (ref 0.85–1.18)
KETONES UR-MCNC: 80 MG/DL
KETONES UR-MCNC: 80 MG/DL
LACTATE BLDV-MCNC: 1.4 MMOL/L — SIGNIFICANT CHANGE UP (ref 0.5–2)
LACTATE BLDV-MCNC: 1.4 MMOL/L — SIGNIFICANT CHANGE UP (ref 0.5–2)
LACTATE BLDV-MCNC: 1.6 MMOL/L — SIGNIFICANT CHANGE UP (ref 0.5–2)
LACTATE BLDV-MCNC: 1.6 MMOL/L — SIGNIFICANT CHANGE UP (ref 0.5–2)
LACTATE BLDV-MCNC: 1.7 MMOL/L — SIGNIFICANT CHANGE UP (ref 0.5–2)
LACTATE BLDV-MCNC: 1.7 MMOL/L — SIGNIFICANT CHANGE UP (ref 0.5–2)
LEUKOCYTE ESTERASE UR-ACNC: NEGATIVE — SIGNIFICANT CHANGE UP
LEUKOCYTE ESTERASE UR-ACNC: NEGATIVE — SIGNIFICANT CHANGE UP
LYMPHOCYTES # BLD AUTO: 1.38 K/UL — SIGNIFICANT CHANGE UP (ref 1–3.3)
LYMPHOCYTES # BLD AUTO: 1.38 K/UL — SIGNIFICANT CHANGE UP (ref 1–3.3)
LYMPHOCYTES # BLD AUTO: 23 % — SIGNIFICANT CHANGE UP (ref 13–44)
LYMPHOCYTES # BLD AUTO: 23 % — SIGNIFICANT CHANGE UP (ref 13–44)
M PNEUMO DNA SPEC QL NAA+PROBE: SIGNIFICANT CHANGE UP
M PNEUMO DNA SPEC QL NAA+PROBE: SIGNIFICANT CHANGE UP
MCHC RBC-ENTMCNC: 26.5 PG — LOW (ref 27–34)
MCHC RBC-ENTMCNC: 26.5 PG — LOW (ref 27–34)
MCHC RBC-ENTMCNC: 32 GM/DL — SIGNIFICANT CHANGE UP (ref 32–36)
MCHC RBC-ENTMCNC: 32 GM/DL — SIGNIFICANT CHANGE UP (ref 32–36)
MCV RBC AUTO: 82.6 FL — SIGNIFICANT CHANGE UP (ref 80–100)
MCV RBC AUTO: 82.6 FL — SIGNIFICANT CHANGE UP (ref 80–100)
MONOCYTES # BLD AUTO: 0.59 K/UL — SIGNIFICANT CHANGE UP (ref 0–0.9)
MONOCYTES # BLD AUTO: 0.59 K/UL — SIGNIFICANT CHANGE UP (ref 0–0.9)
MONOCYTES NFR BLD AUTO: 9.8 % — SIGNIFICANT CHANGE UP (ref 2–14)
MONOCYTES NFR BLD AUTO: 9.8 % — SIGNIFICANT CHANGE UP (ref 2–14)
NEUTROPHILS # BLD AUTO: 3.64 K/UL — SIGNIFICANT CHANGE UP (ref 1.8–7.4)
NEUTROPHILS # BLD AUTO: 3.64 K/UL — SIGNIFICANT CHANGE UP (ref 1.8–7.4)
NEUTROPHILS NFR BLD AUTO: 60.9 % — SIGNIFICANT CHANGE UP (ref 43–77)
NEUTROPHILS NFR BLD AUTO: 60.9 % — SIGNIFICANT CHANGE UP (ref 43–77)
NITRITE UR-MCNC: NEGATIVE — SIGNIFICANT CHANGE UP
NITRITE UR-MCNC: NEGATIVE — SIGNIFICANT CHANGE UP
NRBC # BLD: 0 /100 WBCS — SIGNIFICANT CHANGE UP (ref 0–0)
NRBC # BLD: 0 /100 WBCS — SIGNIFICANT CHANGE UP (ref 0–0)
NRBC # FLD: 0 K/UL — SIGNIFICANT CHANGE UP (ref 0–0)
NRBC # FLD: 0 K/UL — SIGNIFICANT CHANGE UP (ref 0–0)
PCO2 BLDV: 46 MMHG — SIGNIFICANT CHANGE UP (ref 39–52)
PCO2 BLDV: 46 MMHG — SIGNIFICANT CHANGE UP (ref 39–52)
PCO2 BLDV: 47 MMHG — SIGNIFICANT CHANGE UP (ref 39–52)
PCO2 BLDV: 47 MMHG — SIGNIFICANT CHANGE UP (ref 39–52)
PCO2 BLDV: 51 MMHG — SIGNIFICANT CHANGE UP (ref 39–52)
PCO2 BLDV: 51 MMHG — SIGNIFICANT CHANGE UP (ref 39–52)
PH BLDV: 7.1 — LOW (ref 7.32–7.43)
PH BLDV: 7.1 — LOW (ref 7.32–7.43)
PH BLDV: 7.13 — LOW (ref 7.32–7.43)
PH BLDV: 7.13 — LOW (ref 7.32–7.43)
PH BLDV: 7.17 — LOW (ref 7.32–7.43)
PH BLDV: 7.17 — LOW (ref 7.32–7.43)
PH UR: 5.5 — SIGNIFICANT CHANGE UP (ref 5–8)
PH UR: 5.5 — SIGNIFICANT CHANGE UP (ref 5–8)
PLATELET # BLD AUTO: 244 K/UL — SIGNIFICANT CHANGE UP (ref 150–400)
PLATELET # BLD AUTO: 244 K/UL — SIGNIFICANT CHANGE UP (ref 150–400)
PO2 BLDV: 22 MMHG — LOW (ref 25–45)
PO2 BLDV: 22 MMHG — LOW (ref 25–45)
PO2 BLDV: 25 MMHG — SIGNIFICANT CHANGE UP (ref 25–45)
PO2 BLDV: 25 MMHG — SIGNIFICANT CHANGE UP (ref 25–45)
PO2 BLDV: 26 MMHG — SIGNIFICANT CHANGE UP (ref 25–45)
PO2 BLDV: 26 MMHG — SIGNIFICANT CHANGE UP (ref 25–45)
POTASSIUM BLDV-SCNC: 4.1 MMOL/L — SIGNIFICANT CHANGE UP (ref 3.5–5.1)
POTASSIUM BLDV-SCNC: 4.1 MMOL/L — SIGNIFICANT CHANGE UP (ref 3.5–5.1)
POTASSIUM BLDV-SCNC: 4.2 MMOL/L — SIGNIFICANT CHANGE UP (ref 3.5–5.1)
POTASSIUM BLDV-SCNC: 4.2 MMOL/L — SIGNIFICANT CHANGE UP (ref 3.5–5.1)
POTASSIUM BLDV-SCNC: 4.3 MMOL/L — SIGNIFICANT CHANGE UP (ref 3.5–5.1)
POTASSIUM BLDV-SCNC: 4.3 MMOL/L — SIGNIFICANT CHANGE UP (ref 3.5–5.1)
POTASSIUM SERPL-MCNC: 4 MMOL/L — SIGNIFICANT CHANGE UP (ref 3.5–5.3)
POTASSIUM SERPL-MCNC: 4 MMOL/L — SIGNIFICANT CHANGE UP (ref 3.5–5.3)
POTASSIUM SERPL-MCNC: 4.2 MMOL/L — SIGNIFICANT CHANGE UP (ref 3.5–5.3)
POTASSIUM SERPL-MCNC: 4.2 MMOL/L — SIGNIFICANT CHANGE UP (ref 3.5–5.3)
POTASSIUM SERPL-SCNC: 4 MMOL/L — SIGNIFICANT CHANGE UP (ref 3.5–5.3)
POTASSIUM SERPL-SCNC: 4 MMOL/L — SIGNIFICANT CHANGE UP (ref 3.5–5.3)
POTASSIUM SERPL-SCNC: 4.2 MMOL/L — SIGNIFICANT CHANGE UP (ref 3.5–5.3)
POTASSIUM SERPL-SCNC: 4.2 MMOL/L — SIGNIFICANT CHANGE UP (ref 3.5–5.3)
PROT SERPL-MCNC: 7.8 G/DL — SIGNIFICANT CHANGE UP (ref 6–8.3)
PROT SERPL-MCNC: 7.8 G/DL — SIGNIFICANT CHANGE UP (ref 6–8.3)
PROT UR-MCNC: SIGNIFICANT CHANGE UP MG/DL
PROT UR-MCNC: SIGNIFICANT CHANGE UP MG/DL
PROTHROM AB SERPL-ACNC: 13.1 SEC — HIGH (ref 9.5–13)
PROTHROM AB SERPL-ACNC: 13.1 SEC — HIGH (ref 9.5–13)
RAPID RVP RESULT: SIGNIFICANT CHANGE UP
RAPID RVP RESULT: SIGNIFICANT CHANGE UP
RBC # BLD: 5.33 M/UL — HIGH (ref 3.8–5.2)
RBC # BLD: 5.33 M/UL — HIGH (ref 3.8–5.2)
RBC # FLD: 17.4 % — HIGH (ref 10.3–14.5)
RBC # FLD: 17.4 % — HIGH (ref 10.3–14.5)
RSV RNA SPEC QL NAA+PROBE: SIGNIFICANT CHANGE UP
RSV RNA SPEC QL NAA+PROBE: SIGNIFICANT CHANGE UP
RV+EV RNA SPEC QL NAA+PROBE: SIGNIFICANT CHANGE UP
RV+EV RNA SPEC QL NAA+PROBE: SIGNIFICANT CHANGE UP
SAO2 % BLDV: 31.3 % — LOW (ref 67–88)
SAO2 % BLDV: 31.3 % — LOW (ref 67–88)
SAO2 % BLDV: 32.8 % — LOW (ref 67–88)
SAO2 % BLDV: 32.8 % — LOW (ref 67–88)
SAO2 % BLDV: 40.9 % — LOW (ref 67–88)
SAO2 % BLDV: 40.9 % — LOW (ref 67–88)
SARS-COV-2 RNA SPEC QL NAA+PROBE: SIGNIFICANT CHANGE UP
SARS-COV-2 RNA SPEC QL NAA+PROBE: SIGNIFICANT CHANGE UP
SODIUM SERPL-SCNC: 136 MMOL/L — SIGNIFICANT CHANGE UP (ref 135–145)
SODIUM SERPL-SCNC: 136 MMOL/L — SIGNIFICANT CHANGE UP (ref 135–145)
SODIUM SERPL-SCNC: 137 MMOL/L — SIGNIFICANT CHANGE UP (ref 135–145)
SODIUM SERPL-SCNC: 137 MMOL/L — SIGNIFICANT CHANGE UP (ref 135–145)
SP GR SPEC: 1.05 — HIGH (ref 1–1.03)
SP GR SPEC: 1.05 — HIGH (ref 1–1.03)
TROPONIN T, HIGH SENSITIVITY RESULT: 17 NG/L — SIGNIFICANT CHANGE UP
TROPONIN T, HIGH SENSITIVITY RESULT: 17 NG/L — SIGNIFICANT CHANGE UP
TROPONIN T, HIGH SENSITIVITY RESULT: 22 NG/L — SIGNIFICANT CHANGE UP
TROPONIN T, HIGH SENSITIVITY RESULT: 22 NG/L — SIGNIFICANT CHANGE UP
UROBILINOGEN FLD QL: 0.2 MG/DL — SIGNIFICANT CHANGE UP (ref 0.2–1)
UROBILINOGEN FLD QL: 0.2 MG/DL — SIGNIFICANT CHANGE UP (ref 0.2–1)
WBC # BLD: 5.99 K/UL — SIGNIFICANT CHANGE UP (ref 3.8–10.5)
WBC # BLD: 5.99 K/UL — SIGNIFICANT CHANGE UP (ref 3.8–10.5)
WBC # FLD AUTO: 5.99 K/UL — SIGNIFICANT CHANGE UP (ref 3.8–10.5)
WBC # FLD AUTO: 5.99 K/UL — SIGNIFICANT CHANGE UP (ref 3.8–10.5)

## 2024-01-03 PROCEDURE — 74177 CT ABD & PELVIS W/CONTRAST: CPT | Mod: 26,MA

## 2024-01-03 PROCEDURE — 71045 X-RAY EXAM CHEST 1 VIEW: CPT | Mod: 26

## 2024-01-03 PROCEDURE — 93010 ELECTROCARDIOGRAM REPORT: CPT

## 2024-01-03 PROCEDURE — 99214 OFFICE O/P EST MOD 30 MIN: CPT

## 2024-01-03 PROCEDURE — 99285 EMERGENCY DEPT VISIT HI MDM: CPT

## 2024-01-03 PROCEDURE — 71275 CT ANGIOGRAPHY CHEST: CPT | Mod: 26,MA

## 2024-01-03 RX ORDER — SODIUM CHLORIDE 9 MG/ML
1000 INJECTION INTRAMUSCULAR; INTRAVENOUS; SUBCUTANEOUS ONCE
Refills: 0 | Status: DISCONTINUED | OUTPATIENT
Start: 2024-01-03 | End: 2024-01-03

## 2024-01-03 RX ORDER — SODIUM BICARBONATE 1 MEQ/ML
650 SYRINGE (ML) INTRAVENOUS ONCE
Refills: 0 | Status: COMPLETED | OUTPATIENT
Start: 2024-01-03 | End: 2024-01-03

## 2024-01-03 RX ORDER — SODIUM CHLORIDE 9 MG/ML
1000 INJECTION INTRAMUSCULAR; INTRAVENOUS; SUBCUTANEOUS ONCE
Refills: 0 | Status: COMPLETED | OUTPATIENT
Start: 2024-01-03 | End: 2024-01-03

## 2024-01-03 RX ORDER — DEXTROSE 10 % IN WATER 10 %
1000 INTRAVENOUS SOLUTION INTRAVENOUS
Refills: 0 | Status: DISCONTINUED | OUTPATIENT
Start: 2024-01-03 | End: 2024-01-04

## 2024-01-03 RX ORDER — ONDANSETRON 8 MG/1
4 TABLET, FILM COATED ORAL ONCE
Refills: 0 | Status: DISCONTINUED | OUTPATIENT
Start: 2024-01-03 | End: 2024-01-03

## 2024-01-03 RX ORDER — DEXTROSE 50 % IN WATER 50 %
50 SYRINGE (ML) INTRAVENOUS ONCE
Refills: 0 | Status: COMPLETED | OUTPATIENT
Start: 2024-01-03 | End: 2024-01-03

## 2024-01-03 RX ORDER — SODIUM CHLORIDE 9 MG/ML
1000 INJECTION, SOLUTION INTRAVENOUS
Refills: 0 | Status: DISCONTINUED | OUTPATIENT
Start: 2024-01-03 | End: 2024-01-03

## 2024-01-03 RX ADMIN — Medication 50 MILLILITER(S): at 13:47

## 2024-01-03 RX ADMIN — SODIUM CHLORIDE 1000 MILLILITER(S): 9 INJECTION INTRAMUSCULAR; INTRAVENOUS; SUBCUTANEOUS at 16:36

## 2024-01-03 RX ADMIN — SODIUM CHLORIDE 1000 MILLILITER(S): 9 INJECTION INTRAMUSCULAR; INTRAVENOUS; SUBCUTANEOUS at 13:36

## 2024-01-03 RX ADMIN — Medication 650 MILLIGRAM(S): at 20:07

## 2024-01-03 RX ADMIN — Medication 0.5 MILLIGRAM(S): at 13:36

## 2024-01-03 RX ADMIN — Medication 100 MILLILITER(S): at 22:37

## 2024-01-03 RX ADMIN — SODIUM CHLORIDE 150 MILLILITER(S): 9 INJECTION, SOLUTION INTRAVENOUS at 20:07

## 2024-01-03 NOTE — ED ADULT NURSE NOTE - CHIEF COMPLAINT QUOTE
R ACW port not accessed  hx of breast Ca bilat  temp unable due to vomiting   given 1 cup 4 oz apple juice called charge nurse

## 2024-01-03 NOTE — ED PROVIDER NOTE - NSICDXPASTSURGICALHX_GEN_ALL_CORE_FT
PAST SURGICAL HISTORY:  H/O bilateral breast biopsy markers both breasts    Port-A-Cath in place right chest    S/P  X1 1996

## 2024-01-03 NOTE — ED ADULT NURSE NOTE - HOW PATIENT ADDRESSED, PROFILE
Spoke to the wife and informed her that I do not have the referral. She was upset because the referring office told her that it was received here. I asked her to have the office re fax the referral to [phone].   Aimee

## 2024-01-03 NOTE — ED PROVIDER NOTE - PROGRESS NOTE DETAILS
Yayo PGY 3 Received sign out regarding patient, will follow up with labs, imaging.   currently signed out pending rpt blood gas tba for n/v hosp aware

## 2024-01-03 NOTE — ED PROVIDER NOTE - DISPOSITION TYPE
Mild CAD was documented on cardiac catheterization 2018    ER records from last week reviewed, no evidence for ACS  Atypical noncardiac chest pain episode  Reassurance.  Patient continues on secondary preventive regimen  To call with any new or progressive symptoms   ADMIT

## 2024-01-03 NOTE — ED PROVIDER NOTE - OBJECTIVE STATEMENT
48-year-old female with past medical history of breast CA status post bilateral mastectomy last chemo in November presents to the ER complaining of nausea, vomiting, diarrhea, chest pain, shortness of breath, NAGEL, generalized weakness, groin pain bilaterally x 1 and half weeks.  Denies abdominal pain, urinary symptoms, leg swelling, history of VTE, headache, sore throat, runny nose, visual changes, focal weakness, neck/back pain. 48-year-old female with past medical history of breast CA status post bilateral mastectomy last chemo in November presents to the ER complaining of nausea, vomiting, diarrhea, chest pain, shortness of breath, NAGEL, generalized weakness, groin pain bilaterally x 1 and half weeks.  Denies abdominal pain, urinary symptoms, leg swelling, history of VTE, headache, sore throat, runny nose, visual changes, focal weakness, neck/back pain.  Attending - Agree with above.  I evaluated patient myself. 47 y/o F diagnosed with b/l breast CA in Oct 2022.  Chemo Dec 2022 to May 2023.  B/L mastectomy June 2023.  11/1/23-11/15/23 took Xeloda.  + right chest port in place.  c/o increasing shortness of breath, NAGEL, chest tightness, nausea, and vomiting x 1 1/2 weeks.  Onc Dr. Caballero and Dr. Jean.

## 2024-01-03 NOTE — ED ADULT NURSE NOTE - NSFALLUNIVINTERV_ED_ALL_ED
Bed/Stretcher in lowest position, wheels locked, appropriate side rails in place/Call bell, personal items and telephone in reach/Instruct patient to call for assistance before getting out of bed/chair/stretcher/Non-slip footwear applied when patient is off stretcher/[place] to call system/Physically safe environment - no spills, clutter or unnecessary equipment/Purposeful proactive rounding/Room/bathroom lighting operational, light cord in reach Bed/Stretcher in lowest position, wheels locked, appropriate side rails in place/Call bell, personal items and telephone in reach/Instruct patient to call for assistance before getting out of bed/chair/stretcher/Non-slip footwear applied when patient is off stretcher/[place] to call system/Physically safe environment - no spills, clutter or unnecessary equipment/Purposeful proactive rounding/Room/bathroom lighting operational, light cord in reach

## 2024-01-03 NOTE — ED ADULT NURSE REASSESSMENT NOTE - NS ED NURSE REASSESS COMMENT FT1
Patient received in room 10. Received report from LIZZY walker. pt remains at baseline mental status, RR even unlabored completing full sentences. pt resting in stretcher comfortably at this time, no new complaints offered. stretcher lowest position siderails up safety measures in place. Call bell wtihin reach. Labs drawn and sent per mD orders.

## 2024-01-03 NOTE — ED PROVIDER NOTE - CLINICAL SUMMARY MEDICAL DECISION MAKING FREE TEXT BOX
48-year-old female with past medical history of breast CA status post bilateral mastectomy last chemo in November presents to the ER complaining of nausea, vomiting, diarrhea, chest pain, shortness of breath, NAGEL, generalized weakness, groin pain bilaterally x 1 and half weeks.  Denies abdominal pain, urinary symptoms, leg swelling, history of VTE, headache, sore throat, runny nose, visual changes, focal weakness, neck/back pain.  vital reviewed, initially hypotensive, tachycardic, pending temp, hypoglycemic, resp22,   patient appears uncomfortable active vomiting, EKG reviewed, right axis, with QTC prolonged 587, will rx ativan for nausea.  sepsis w/u, consider viral syndrome, PE r/o r/o intraabdominal pathology/ infection/ colitis   fluids, pending temp, antipyretics PRN, antiemetics,   CTAPE, CT a/p, labs, urine cultures.   TBA

## 2024-01-03 NOTE — ED ADULT TRIAGE NOTE - CHIEF COMPLAINT QUOTE
R ACW port not accessed  hx of breast Ca bilat  temp unable due to vomiting R ACW port not accessed  hx of breast Ca bilat  temp unable due to vomiting   given 1 cup 4 oz apple juice called charge nurse

## 2024-01-03 NOTE — ED ADULT NURSE NOTE - OBJECTIVE STATEMENT
Patient verbalized feeling nauseous for several days. Also c/o diarrhea and dizziness. Patient noted with a glucose < 65 at triage. Patient given Dextrose IV push. Hx of breast cancer. B/l mastectomy  Alert and oriented x3. Patient had one episode of emesis while at triage.  " I tried to drink some apple juice but it did not stay down ". IV #20 placed in left antecubital.

## 2024-01-03 NOTE — ED PROVIDER NOTE - PHYSICAL EXAMINATION
Vital signs reviewed. 92 % on RA placed on 2 L NC   CONSTITUTIONAL: patient appears uncomfortable, generally weak and ill appearing with active vomiting   HEAD: Normocephalic, atraumatic.  EYES: PERRL, EOM intact, conjunctiva and sclera WNL.  CARD: Normal S1, S2; no murmurs, rubs, or gallops noted.  RESP: Normal chest excursion with respiration; breath sounds clear and equal bilaterally; no wheezes, rhonchi, or rales. with movement patient becomes tachypneic, then improved shortly with rest.   ABD/GI: soft, non-distended; non-tender; no palpable organomegaly, no pulsatile mass.  EXT/MS: moves all extremities; distal pulses are normal, no pedal edema.  SKIN: Normal for age and race; warm; dry; good turgor; no apparent lesions or exudate noted.  NEURO: Awake, alert, oriented x 3, no gross deficits, CN II-XII grossly intact, no motor or sensory deficit noted.  PSYCH: Normal mood; appropriate affect. Vital signs reviewed. 92 % on RA placed on 2 L NC   CONSTITUTIONAL: patient appears uncomfortable, generally weak and ill appearing with active vomiting   HEAD: Normocephalic, atraumatic.  EYES: PERRL, EOM intact, conjunctiva and sclera WNL.  CARD: Normal S1, S2; no murmurs, rubs, or gallops noted.  RESP: Normal chest excursion with respiration; breath sounds clear and equal bilaterally; no wheezes, rhonchi, or rales. with movement patient becomes tachypneic, then improved shortly with rest.   ABD/GI: soft, non-distended; non-tender; no palpable organomegaly, no pulsatile mass.  EXT/MS: moves all extremities; distal pulses are normal, no pedal edema.  SKIN: Normal for age and race; warm; dry; good turgor; no apparent lesions or exudate noted.  NEURO: Awake, alert, oriented x 3, no gross deficits, CN II-XII grossly intact, no motor or sensory deficit noted.  PSYCH: Normal mood; appropriate affect.  ATTENDING PHYSICAL EXAM  GEN - Appears tired, uncomfortable.  No resp distress, answers questions appropriately; A+O x3  HEAD - NC/AT; EYES/NOSE - PERRL, EOMI, mucous membranes moist, no discharge; THROAT: Oral cavity and pharynx normal. No inflammation, swelling, exudate, or lesions  NECK: Neck supple, non-tender without lymphadenopathy, no masses, no JVD  PULMONARY - CTA b/l, symmetric breath sounds, no w/r/r  CARDIAC -chest port inplace, s1s2, tachy, no M,R,G  ABDOMEN - Old surgical scars, +NABS, ND, NT, soft, no guarding, no rebound, no masses   BACK - no CVA tenderness, No vertebral or paravertebral tenderness  EXTREMITIES - symmetric pulses, 2+ dp, capillary refill < 2 seconds, no clubbing, no cyanosis, no edema  SKIN - no rash or bruising   NEUROLOGIC - alert, CN 2-12 intact, no focal deficits

## 2024-01-04 DIAGNOSIS — E16.2 HYPOGLYCEMIA, UNSPECIFIED: ICD-10-CM

## 2024-01-04 DIAGNOSIS — R00.0 TACHYCARDIA, UNSPECIFIED: ICD-10-CM

## 2024-01-04 DIAGNOSIS — R11.2 NAUSEA WITH VOMITING, UNSPECIFIED: ICD-10-CM

## 2024-01-04 DIAGNOSIS — Z29.9 ENCOUNTER FOR PROPHYLACTIC MEASURES, UNSPECIFIED: ICD-10-CM

## 2024-01-04 DIAGNOSIS — E87.29 OTHER ACIDOSIS: ICD-10-CM

## 2024-01-04 DIAGNOSIS — E83.52 HYPERCALCEMIA: ICD-10-CM

## 2024-01-04 LAB
A1C WITH ESTIMATED AVERAGE GLUCOSE RESULT: 4.8 % — SIGNIFICANT CHANGE UP (ref 4–5.6)
A1C WITH ESTIMATED AVERAGE GLUCOSE RESULT: 4.8 % — SIGNIFICANT CHANGE UP (ref 4–5.6)
ANION GAP SERPL CALC-SCNC: 11 MMOL/L — SIGNIFICANT CHANGE UP (ref 7–14)
ANION GAP SERPL CALC-SCNC: 11 MMOL/L — SIGNIFICANT CHANGE UP (ref 7–14)
ANION GAP SERPL CALC-SCNC: 14 MMOL/L — SIGNIFICANT CHANGE UP (ref 7–14)
ANION GAP SERPL CALC-SCNC: 14 MMOL/L — SIGNIFICANT CHANGE UP (ref 7–14)
B-OH-BUTYR SERPL-SCNC: 1.9 MMOL/L — HIGH (ref 0–0.4)
B-OH-BUTYR SERPL-SCNC: 1.9 MMOL/L — HIGH (ref 0–0.4)
BASE EXCESS BLDV CALC-SCNC: -7.9 MMOL/L — LOW (ref -2–3)
BASE EXCESS BLDV CALC-SCNC: -7.9 MMOL/L — LOW (ref -2–3)
BUN SERPL-MCNC: 6 MG/DL — LOW (ref 7–23)
BUN SERPL-MCNC: 6 MG/DL — LOW (ref 7–23)
BUN SERPL-MCNC: 8 MG/DL — SIGNIFICANT CHANGE UP (ref 7–23)
BUN SERPL-MCNC: 8 MG/DL — SIGNIFICANT CHANGE UP (ref 7–23)
CA-I SERPL-SCNC: 1.48 MMOL/L — HIGH (ref 1.15–1.33)
CA-I SERPL-SCNC: 1.48 MMOL/L — HIGH (ref 1.15–1.33)
CALCIUM SERPL-MCNC: 10.4 MG/DL — SIGNIFICANT CHANGE UP (ref 8.4–10.5)
CALCIUM SERPL-MCNC: 10.4 MG/DL — SIGNIFICANT CHANGE UP (ref 8.4–10.5)
CALCIUM SERPL-MCNC: 9.1 MG/DL — SIGNIFICANT CHANGE UP (ref 8.4–10.5)
CALCIUM SERPL-MCNC: 9.1 MG/DL — SIGNIFICANT CHANGE UP (ref 8.4–10.5)
CHLORIDE BLDV-SCNC: 100 MMOL/L — SIGNIFICANT CHANGE UP (ref 96–108)
CHLORIDE BLDV-SCNC: 100 MMOL/L — SIGNIFICANT CHANGE UP (ref 96–108)
CHLORIDE SERPL-SCNC: 102 MMOL/L — SIGNIFICANT CHANGE UP (ref 98–107)
CHLORIDE SERPL-SCNC: 102 MMOL/L — SIGNIFICANT CHANGE UP (ref 98–107)
CHLORIDE SERPL-SCNC: 103 MMOL/L — SIGNIFICANT CHANGE UP (ref 98–107)
CHLORIDE SERPL-SCNC: 103 MMOL/L — SIGNIFICANT CHANGE UP (ref 98–107)
CO2 BLDV-SCNC: 20.7 MMOL/L — LOW (ref 22–26)
CO2 BLDV-SCNC: 20.7 MMOL/L — LOW (ref 22–26)
CO2 SERPL-SCNC: 15 MMOL/L — LOW (ref 22–31)
CO2 SERPL-SCNC: 15 MMOL/L — LOW (ref 22–31)
CO2 SERPL-SCNC: 20 MMOL/L — LOW (ref 22–31)
CO2 SERPL-SCNC: 20 MMOL/L — LOW (ref 22–31)
CREAT SERPL-MCNC: 0.43 MG/DL — LOW (ref 0.5–1.3)
CREAT SERPL-MCNC: 0.43 MG/DL — LOW (ref 0.5–1.3)
CREAT SERPL-MCNC: 0.49 MG/DL — LOW (ref 0.5–1.3)
CREAT SERPL-MCNC: 0.49 MG/DL — LOW (ref 0.5–1.3)
EGFR: 115 ML/MIN/1.73M2 — SIGNIFICANT CHANGE UP
EGFR: 115 ML/MIN/1.73M2 — SIGNIFICANT CHANGE UP
EGFR: 119 ML/MIN/1.73M2 — SIGNIFICANT CHANGE UP
EGFR: 119 ML/MIN/1.73M2 — SIGNIFICANT CHANGE UP
ESTIMATED AVERAGE GLUCOSE: 91 — SIGNIFICANT CHANGE UP
ESTIMATED AVERAGE GLUCOSE: 91 — SIGNIFICANT CHANGE UP
GAS PNL BLDV: 130 MMOL/L — LOW (ref 136–145)
GAS PNL BLDV: 130 MMOL/L — LOW (ref 136–145)
GAS PNL BLDV: SIGNIFICANT CHANGE UP
GAS PNL BLDV: SIGNIFICANT CHANGE UP
GLUCOSE BLDC GLUCOMTR-MCNC: 107 MG/DL — HIGH (ref 70–99)
GLUCOSE BLDC GLUCOMTR-MCNC: 107 MG/DL — HIGH (ref 70–99)
GLUCOSE BLDC GLUCOMTR-MCNC: 125 MG/DL — HIGH (ref 70–99)
GLUCOSE BLDC GLUCOMTR-MCNC: 125 MG/DL — HIGH (ref 70–99)
GLUCOSE BLDC GLUCOMTR-MCNC: 142 MG/DL — HIGH (ref 70–99)
GLUCOSE BLDC GLUCOMTR-MCNC: 142 MG/DL — HIGH (ref 70–99)
GLUCOSE BLDC GLUCOMTR-MCNC: 167 MG/DL — HIGH (ref 70–99)
GLUCOSE BLDC GLUCOMTR-MCNC: 167 MG/DL — HIGH (ref 70–99)
GLUCOSE BLDV-MCNC: 220 MG/DL — HIGH (ref 70–99)
GLUCOSE BLDV-MCNC: 220 MG/DL — HIGH (ref 70–99)
GLUCOSE SERPL-MCNC: 135 MG/DL — HIGH (ref 70–99)
GLUCOSE SERPL-MCNC: 135 MG/DL — HIGH (ref 70–99)
GLUCOSE SERPL-MCNC: 207 MG/DL — HIGH (ref 70–99)
GLUCOSE SERPL-MCNC: 207 MG/DL — HIGH (ref 70–99)
HCO3 BLDV-SCNC: 19 MMOL/L — LOW (ref 22–29)
HCO3 BLDV-SCNC: 19 MMOL/L — LOW (ref 22–29)
HCT VFR BLDA CALC: 39 % — SIGNIFICANT CHANGE UP (ref 34.5–46.5)
HCT VFR BLDA CALC: 39 % — SIGNIFICANT CHANGE UP (ref 34.5–46.5)
HGB BLD CALC-MCNC: 13.1 G/DL — SIGNIFICANT CHANGE UP (ref 11.7–16.1)
HGB BLD CALC-MCNC: 13.1 G/DL — SIGNIFICANT CHANGE UP (ref 11.7–16.1)
HIV 1+2 AB+HIV1 P24 AG SERPL QL IA: SIGNIFICANT CHANGE UP
HIV 1+2 AB+HIV1 P24 AG SERPL QL IA: SIGNIFICANT CHANGE UP
LACTATE BLDV-MCNC: 1.6 MMOL/L — SIGNIFICANT CHANGE UP (ref 0.5–2)
LACTATE BLDV-MCNC: 1.6 MMOL/L — SIGNIFICANT CHANGE UP (ref 0.5–2)
MAGNESIUM SERPL-MCNC: 1.2 MG/DL — LOW (ref 1.6–2.6)
MAGNESIUM SERPL-MCNC: 1.2 MG/DL — LOW (ref 1.6–2.6)
MAGNESIUM SERPL-MCNC: 1.6 MG/DL — SIGNIFICANT CHANGE UP (ref 1.6–2.6)
MAGNESIUM SERPL-MCNC: 1.6 MG/DL — SIGNIFICANT CHANGE UP (ref 1.6–2.6)
PCO2 BLDV: 45 MMHG — SIGNIFICANT CHANGE UP (ref 39–52)
PCO2 BLDV: 45 MMHG — SIGNIFICANT CHANGE UP (ref 39–52)
PH BLDV: 7.24 — LOW (ref 7.32–7.43)
PH BLDV: 7.24 — LOW (ref 7.32–7.43)
PHOSPHATE SERPL-MCNC: 2.7 MG/DL — SIGNIFICANT CHANGE UP (ref 2.5–4.5)
PHOSPHATE SERPL-MCNC: 2.7 MG/DL — SIGNIFICANT CHANGE UP (ref 2.5–4.5)
PHOSPHATE SERPL-MCNC: 3.5 MG/DL — SIGNIFICANT CHANGE UP (ref 2.5–4.5)
PHOSPHATE SERPL-MCNC: 3.5 MG/DL — SIGNIFICANT CHANGE UP (ref 2.5–4.5)
PO2 BLDV: 39 MMHG — SIGNIFICANT CHANGE UP (ref 25–45)
PO2 BLDV: 39 MMHG — SIGNIFICANT CHANGE UP (ref 25–45)
POTASSIUM BLDV-SCNC: 3.8 MMOL/L — SIGNIFICANT CHANGE UP (ref 3.5–5.1)
POTASSIUM BLDV-SCNC: 3.8 MMOL/L — SIGNIFICANT CHANGE UP (ref 3.5–5.1)
POTASSIUM SERPL-MCNC: 3.2 MMOL/L — LOW (ref 3.5–5.3)
POTASSIUM SERPL-MCNC: 3.2 MMOL/L — LOW (ref 3.5–5.3)
POTASSIUM SERPL-MCNC: 3.8 MMOL/L — SIGNIFICANT CHANGE UP (ref 3.5–5.3)
POTASSIUM SERPL-MCNC: 3.8 MMOL/L — SIGNIFICANT CHANGE UP (ref 3.5–5.3)
POTASSIUM SERPL-SCNC: 3.2 MMOL/L — LOW (ref 3.5–5.3)
POTASSIUM SERPL-SCNC: 3.2 MMOL/L — LOW (ref 3.5–5.3)
POTASSIUM SERPL-SCNC: 3.8 MMOL/L — SIGNIFICANT CHANGE UP (ref 3.5–5.3)
POTASSIUM SERPL-SCNC: 3.8 MMOL/L — SIGNIFICANT CHANGE UP (ref 3.5–5.3)
SAO2 % BLDV: 62.8 % — LOW (ref 67–88)
SAO2 % BLDV: 62.8 % — LOW (ref 67–88)
SODIUM SERPL-SCNC: 131 MMOL/L — LOW (ref 135–145)
SODIUM SERPL-SCNC: 131 MMOL/L — LOW (ref 135–145)
SODIUM SERPL-SCNC: 134 MMOL/L — LOW (ref 135–145)
SODIUM SERPL-SCNC: 134 MMOL/L — LOW (ref 135–145)

## 2024-01-04 PROCEDURE — 99223 1ST HOSP IP/OBS HIGH 75: CPT | Mod: GC

## 2024-01-04 PROCEDURE — 12345: CPT | Mod: NC,GC

## 2024-01-04 PROCEDURE — 70450 CT HEAD/BRAIN W/O DYE: CPT | Mod: 26

## 2024-01-04 RX ORDER — MAGNESIUM SULFATE 500 MG/ML
2 VIAL (ML) INJECTION
Refills: 0 | Status: COMPLETED | OUTPATIENT
Start: 2024-01-04 | End: 2024-01-05

## 2024-01-04 RX ORDER — PREGABALIN 225 MG/1
1000 CAPSULE ORAL DAILY
Refills: 0 | Status: DISCONTINUED | OUTPATIENT
Start: 2024-01-04 | End: 2024-01-09

## 2024-01-04 RX ORDER — SODIUM CHLORIDE 9 MG/ML
1000 INJECTION, SOLUTION INTRAVENOUS
Refills: 0 | Status: DISCONTINUED | OUTPATIENT
Start: 2024-01-04 | End: 2024-01-05

## 2024-01-04 RX ORDER — ENOXAPARIN SODIUM 100 MG/ML
30 INJECTION SUBCUTANEOUS EVERY 24 HOURS
Refills: 0 | Status: DISCONTINUED | OUTPATIENT
Start: 2024-01-04 | End: 2024-01-04

## 2024-01-04 RX ORDER — SODIUM CHLORIDE 9 MG/ML
1000 INJECTION, SOLUTION INTRAVENOUS
Refills: 0 | Status: DISCONTINUED | OUTPATIENT
Start: 2024-01-04 | End: 2024-01-04

## 2024-01-04 RX ORDER — SODIUM CHLORIDE 0.65 %
1 AEROSOL, SPRAY (ML) NASAL THREE TIMES A DAY
Refills: 0 | Status: DISCONTINUED | OUTPATIENT
Start: 2024-01-04 | End: 2024-01-09

## 2024-01-04 RX ORDER — ENOXAPARIN SODIUM 100 MG/ML
40 INJECTION SUBCUTANEOUS EVERY 24 HOURS
Refills: 0 | Status: DISCONTINUED | OUTPATIENT
Start: 2024-01-04 | End: 2024-01-09

## 2024-01-04 RX ORDER — FOLIC ACID 0.8 MG
1 TABLET ORAL DAILY
Refills: 0 | Status: DISCONTINUED | OUTPATIENT
Start: 2024-01-04 | End: 2024-01-09

## 2024-01-04 RX ORDER — MAGNESIUM SULFATE 500 MG/ML
2 VIAL (ML) INJECTION
Refills: 0 | Status: COMPLETED | OUTPATIENT
Start: 2024-01-04 | End: 2024-01-04

## 2024-01-04 RX ORDER — PYRIDOXINE HCL (VITAMIN B6) 100 MG
25 TABLET ORAL DAILY
Refills: 0 | Status: DISCONTINUED | OUTPATIENT
Start: 2024-01-04 | End: 2024-01-09

## 2024-01-04 RX ORDER — POTASSIUM CHLORIDE 20 MEQ
40 PACKET (EA) ORAL EVERY 4 HOURS
Refills: 0 | Status: COMPLETED | OUTPATIENT
Start: 2024-01-04 | End: 2024-01-04

## 2024-01-04 RX ORDER — LANOLIN ALCOHOL/MO/W.PET/CERES
3 CREAM (GRAM) TOPICAL AT BEDTIME
Refills: 0 | Status: DISCONTINUED | OUTPATIENT
Start: 2024-01-04 | End: 2024-01-09

## 2024-01-04 RX ORDER — INFLUENZA VIRUS VACCINE 15; 15; 15; 15 UG/.5ML; UG/.5ML; UG/.5ML; UG/.5ML
0.5 SUSPENSION INTRAMUSCULAR ONCE
Refills: 0 | Status: DISCONTINUED | OUTPATIENT
Start: 2024-01-04 | End: 2024-01-09

## 2024-01-04 RX ORDER — THIAMINE MONONITRATE (VIT B1) 100 MG
100 TABLET ORAL DAILY
Refills: 0 | Status: DISCONTINUED | OUTPATIENT
Start: 2024-01-04 | End: 2024-01-09

## 2024-01-04 RX ORDER — SODIUM CHLORIDE 9 MG/ML
500 INJECTION, SOLUTION INTRAVENOUS ONCE
Refills: 0 | Status: COMPLETED | OUTPATIENT
Start: 2024-01-04 | End: 2024-01-04

## 2024-01-04 RX ADMIN — Medication 25 GRAM(S): at 22:01

## 2024-01-04 RX ADMIN — Medication 100 MILLIGRAM(S): at 13:24

## 2024-01-04 RX ADMIN — Medication 1 MILLIGRAM(S): at 13:25

## 2024-01-04 RX ADMIN — PREGABALIN 1000 MICROGRAM(S): 225 CAPSULE ORAL at 13:24

## 2024-01-04 RX ADMIN — Medication 63.75 MILLIMOLE(S): at 09:02

## 2024-01-04 RX ADMIN — Medication 40 MILLIEQUIVALENT(S): at 22:02

## 2024-01-04 RX ADMIN — SODIUM CHLORIDE 500 MILLILITER(S): 9 INJECTION, SOLUTION INTRAVENOUS at 07:57

## 2024-01-04 RX ADMIN — SODIUM CHLORIDE 75 MILLILITER(S): 9 INJECTION, SOLUTION INTRAVENOUS at 20:56

## 2024-01-04 RX ADMIN — Medication 25 GRAM(S): at 08:42

## 2024-01-04 RX ADMIN — Medication 40 MILLIEQUIVALENT(S): at 18:17

## 2024-01-04 RX ADMIN — ENOXAPARIN SODIUM 40 MILLIGRAM(S): 100 INJECTION SUBCUTANEOUS at 22:13

## 2024-01-04 RX ADMIN — Medication 25 GRAM(S): at 10:21

## 2024-01-04 RX ADMIN — Medication 25 MILLIGRAM(S): at 15:04

## 2024-01-04 RX ADMIN — Medication 75 MILLILITER(S): at 04:09

## 2024-01-04 RX ADMIN — SODIUM CHLORIDE 75 MILLILITER(S): 9 INJECTION, SOLUTION INTRAVENOUS at 06:25

## 2024-01-04 NOTE — H&P ADULT - PROBLEM SELECTOR PLAN 3
With report of orthostatic hypotension ?adrenal insufficiency vs. prolonged starvation in patient with low fat/glycogen storage  - AM cortisol  - A1c  - Monitor fingersticks q6  - D10 as above

## 2024-01-04 NOTE — H&P ADULT - HISTORY OF PRESENT ILLNESS
Ms. Sauer is a 49 YO woman with PMH of left breast cancer on chemo, last session 11/2023 s/p b/l nipple sparing mastectomy/left axillary sentinel lymph node biopsy, JOHN flap reconstruction, who presents for 2 week progressive history of dyspnea on exertion. She notes that around Thanksgiving she started noticing she would fatigue more quickly. She attributed it to a brief chemotherapy regimen with Xeloda, which she had not been able to tolerate and had been discontinued 2 weeks prior. Two weeks prior she noticed progressive weakness and palpitations when standing; she would take a couple of steps and feel like her legs were shaking and that she was gasping for air. The next day, she attempted to go to work but had to leave because she was feeling dyspneic and noted pain in her groin that radiated down her legs. She did not have any bleeding or discharge. She then went to urgent care, who sent her to the hospital where she was told her heart rate was in the 140s, a CT was done and she was told she was negative for COVID/flu. She had one episode of NBNB vomiting in the the ED, and was told to rest and follow up with a cardiologist. She spoke to her oncologist's office, was evaluated and sent to the hospital for dehydration. She received fluids but was not told of any follow up. She called her oncologist's office again as she became very weak, unable to stand to put her clothes on. She denies fevers during this time. No chest pain, abdominal pain. She has had copious nausea and vomiting, has been unable to tolerate PO for 2.5 weeks. She had 3 days of diarrhea ending 2 days ago, and has not had a bowel movement since.     In the ED, she was found to be hypoglycemic and started on D5 then D10, as her IVC was felt to be of moderate size. She was given ativan with improvement of her nausea. At the time of interview she was thirsty and tolerated a few sips of ice water.

## 2024-01-04 NOTE — H&P ADULT - NSHPPHYSICALEXAM_GEN_ALL_CORE
GENERAL: Thin woman, lying in bed, tremulous appearing  EYES: Conjunctiva noninjected or pale, sclera anicteric  HENT: NC/AT, dry mucous membranes  NECK: Supple, trachea midline  LUNG: Nonlabored respirations, no wheezes, rales  CV: Tachycardic, Pulses- Radial: 2+ b/l  ABDOMEN: Nondistended, mild RLQ tenderness  MSK: No visible deformities, nontender extremities  SKIN: No rashes, bruises  NEURO: AAOx4 (to person, place, time, event)  PSYCH: Normal mood and affect

## 2024-01-04 NOTE — H&P ADULT - ATTENDING COMMENTS
Ms. Sauer is a 47 YO woman with PMH of left breast cancer s/p chemo (last 11/2023) and mastectomy, who presents to the ED with nausea/vomiting, diarrhea, chest pain, shortness of breath, weakness and b/l groin pain for 2 weeks.      Pt with breast ca s/p mastecomy on chemo p/w nausea, vomiting, diarrhea, and difficulty taking PO intake.    On exam: Pt in NAD, heart RRR, lungs CTA B.  Abd benign  labs reviewed notable for AG metabolic acidosis.  Elevated BHP    Will need to trend BMP  hydrate with D5LR  clear liquids and gradually advance diet.

## 2024-01-04 NOTE — PROGRESS NOTE ADULT - PROBLEM SELECTOR PLAN 4
- Unclear what prompted the nausea/vomiting - ?delayed onset from chemotherapy  - Ativan for nausea given prolonged QTc  - GI PCR, stool ova and parasites when able to produce stool sample

## 2024-01-04 NOTE — H&P ADULT - PROBLEM SELECTOR PLAN 2
Sinus on ECG. Likely iso hypovolemia, possibly infection in immunocompromised patient. Note QTc 587  - Fluid resuscitation as above  - Blood cultures x2, urine culture pending  - Monitor off antibiotics as not febrile?  - TTE - patient mentions that she was told she has a "hole in her heart" when she was 13  - Troponins negative Sinus on ECG. Likely iso hypovolemia, possibly infection in immunocompromised patient. Note QTc 587  - Fluid resuscitation as above  - Blood cultures x2, urine culture pending  - Monitor off antibiotics as not febrile  - TTE - patient mentions that she was told she has a "hole in her heart" when she was 13  - Troponins negative

## 2024-01-04 NOTE — PATIENT PROFILE ADULT - FUNCTIONAL ASSESSMENT - BASIC MOBILITY SCORE.
glipizide         Last Written Prescription Date: 11/1/16  Last Fill Quantity: 120, # refills: 3  Last Office Visit with G, P or [facility] prescribing provider:  2/9/17   Next 5 appointments (look out 90 days)     Apr 14, 2017 10:00 AM CDT   [facility] Visit with Candice Worley MD, [facility] ENDO NURSE   [facility]    [address] [facility] [address]   [phone]            Apr 25, 2017 10:00 AM CDT   Return Visit with Ehsan Araya DPM   [facility]    [address] [facility] [address]   [phone]                   BP Readings from Last 3 Encounters:   02/17/17 144/87   02/09/17 124/72   01/24/17 127/77     Lab Results   Component Value Date    MICROL 14 02/09/2017     No results found for: MICROALBUMIN  Creatinine   Date Value Ref Range Status   02/09/2017 0.67 0.52 - 1.04 mg/dL Final   ]  GFR Estimate   Date Value Ref Range Status   02/09/2017 86 >60 mL/min/1.7m2 Final     Comment:     Non  GFR Calc   11/08/2016 >90  Non  GFR Calc   >60 mL/min/1.7m2 Final   04/25/2016 80 >60 mL/min/1.7m2 Final     Comment:     Non  GFR Calc     GFR Estimate If Black   Date Value Ref Range Status   02/09/2017 >90   GFR Calc   >60 mL/min/1.7m2 Final   11/08/2016 >90   GFR Calc   >60 mL/min/1.7m2 Final   04/25/2016 >90   GFR Calc   >60 mL/min/1.7m2 Final     Lab Results   Component Value Date    CHOL 142 02/09/2017     Lab Results   Component Value Date    HDL 59 02/09/2017     Lab Results   Component Value Date    LDL 52 02/09/2017     07/21/2015     Lab Results   Component Value Date    TRIG 157 02/09/2017     Lab Results   Component Value Date    CHOLHDLRATIO 3.6 08/21/2015     Lab Results   Component Value Date    AST 16 02/09/2017     Lab Results   Component Value Date    ALT 21 02/09/2017      Lab Results   Component Value Date    A1C 10.7 02/09/2017    A1C 11.0 11/08/2016    A1C 10.4 07/26/2016    A1C 11.5 04/15/2016    A1C 11.2 02/23/2016     Potassium   Date Value Ref Range Status   02/09/2017 4.2 3.4 - 5.3 mmol/L Final     Medication filled for 3 months per protocol.      Reny Cedeno RN, [facility]        20

## 2024-01-04 NOTE — H&P ADULT - ASSESSMENT
Ms. Sauer is a 47 YO woman with PMH of left breast cancer s/p chemo (last 11/2023) and mastectomy, who presents to the ED with nausea/vomiting, diarrhea, chest pain, shortness of breath, weakness and b/l groin pain for 2 weeks.

## 2024-01-04 NOTE — PATIENT PROFILE ADULT - FALL HARM RISK - HARM RISK INTERVENTIONS
Assistance with ambulation/Assistance OOB with selected safe patient handling equipment/Communicate Risk of Fall with Harm to all staff/Discuss with provider need for PT consult/Monitor gait and stability/Provide patient with walking aids - walker, cane, crutches/Reinforce activity limits and safety measures with patient and family/Tailored Fall Risk Interventions/Visual Cue: Yellow wristband and red socks/Bed in lowest position, wheels locked, appropriate side rails in place/Call bell, personal items and telephone in reach/Instruct patient to call for assistance before getting out of bed or chair/Non-slip footwear when patient is out of bed/[place] to call system/Physically safe environment - no spills, clutter or unnecessary equipment/Purposeful Proactive Rounding/Room/bathroom lighting operational, light cord in reach Assistance with ambulation/Assistance OOB with selected safe patient handling equipment/Communicate Risk of Fall with Harm to all staff/Discuss with provider need for PT consult/Monitor gait and stability/Provide patient with walking aids - walker, cane, crutches/Reinforce activity limits and safety measures with patient and family/Tailored Fall Risk Interventions/Visual Cue: Yellow wristband and red socks/Bed in lowest position, wheels locked, appropriate side rails in place/Call bell, personal items and telephone in reach/Instruct patient to call for assistance before getting out of bed or chair/Non-slip footwear when patient is out of bed/[place] to call system/Physically safe environment - no spills, clutter or unnecessary equipment/Purposeful Proactive Rounding/Room/bathroom lighting operational, light cord in reach

## 2024-01-04 NOTE — PROGRESS NOTE ADULT - SUBJECTIVE AND OBJECTIVE BOX
PGY-1 Mary Alvarez  TEAM  Progress Note    Patient is a 48y old  Female who presents with a chief complaint of Nausea/vomiting, tachycardia (04 Jan 2024 03:59)      SUBJECTIVE / OVERNIGHT EVENTS:  OVN: No acute events, started on D5LR, s/p ativan for nausea   Labs and Imaging Reviewed  Patient seen and examined bedside.      PHYSICAL EXAM:  GENERAL: Resting in bed.  HEAD:  Atraumatic, Normocephalic  EYES: Patient opening eyes, maintaining eye contact, tracking examiner   CHEST/LUNG: Clear to auscultation bilaterally; No wheeze  HEART: Regular rate and rhythm; No murmurs, rubs, or gallops  ABDOMEN: Soft, Nontender, Nondistended; Bowel sounds present  EXTREMITIES:  2+ Peripheral Pulses, No clubbing, cyanosis, or edema  PSYCH: AAOx3  NEUROLOGY: non-focal  SKIN: No rashes or lesions      VITAL SIGNS:    Vital Signs Last 24 Hrs  T(C): 36.9 (04 Jan 2024 07:50), Max: 37.6 (03 Jan 2024 16:10)  T(F): 98.4 (04 Jan 2024 07:50), Max: 99.7 (03 Jan 2024 16:10)  HR: 120 (04 Jan 2024 09:17) (110 - 122)  BP: 92/59 (04 Jan 2024 09:17) (83/59 - 113/65)  BP(mean): 66 (04 Jan 2024 07:50) (66 - 78)  RR: 18 (04 Jan 2024 09:17) (16 - 22)  SpO2: 100% (04 Jan 2024 09:17) (97% - 100%)    Parameters below as of 04 Jan 2024 07:50  Patient On (Oxygen Delivery Method): room air      CAPILLARY BLOOD GLUCOSE      POCT Blood Glucose.: 167 mg/dL (04 Jan 2024 02:48)  POCT Blood Glucose.: 131 mg/dL (03 Jan 2024 23:00)  POCT Blood Glucose.: 93 mg/dL (03 Jan 2024 21:47)  POCT Blood Glucose.: 130 mg/dL (03 Jan 2024 14:57)  POCT Blood Glucose.: 67 mg/dL (03 Jan 2024 12:20)  POCT Blood Glucose.: 55 mg/dL (03 Jan 2024 11:55)    I&O's Summary    MEDICATIONS  (STANDING):  dextrose 5% + lactated ringers. 1000 milliLiter(s) (75 mL/Hr) IV Continuous <Continuous>  enoxaparin Injectable 30 milliGRAM(s) SubCutaneous every 24 hours  magnesium sulfate  IVPB 2 Gram(s) IV Intermittent every 2 hours  thiamine 100 milliGRAM(s) Oral daily    MEDICATIONS  (PRN):  melatonin 3 milliGRAM(s) Oral at bedtime PRN Insomnia    LABS:                        14.1   5.99  )-----------( 244      ( 03 Jan 2024 13:21 )             44.0     01-04    131<L>  |  102  |  8   ----------------------------<  207<H>  3.8   |  15<L>  |  0.49<L>    Ca    10.4      04 Jan 2024 06:21  Phos  2.7     01-04  Mg     1.20     01-04    TPro  7.8  /  Alb  4.0  /  TBili  0.5  /  DBili  x   /  AST  24  /  ALT  12  /  AlkPhos  82  01-03    PT/INR - ( 03 Jan 2024 13:21 )   PT: 13.1 sec;   INR: 1.16 ratio         PTT - ( 03 Jan 2024 13:21 )  PTT:43.5 sec      IMAGING:  < from: CT Head No Cont (01.04.24 @ 00:57) >  IMPRESSION:  1. Unremarkable, unenhanced CT imaging of the brain for the patient's age.    --- End of Report ---    < end of copied text >  < from: CT Abdomen and Pelvis w/ IV Cont (01.03.24 @ 14:46) >  IMPRESSION:    No main, lobar or segmental pulmonary embolism. Evaluation of the   subsegmental branches of the pulmonary arteries within the lung bases is   limited secondary to motion.    No acute pathology in the chest, abdomen or pelvis.    --- End of Report ---    < end of copied text >      Consultant(s) Notes Reviewed     Care Discussed with Consultants/Other Providers PGY-1 Mary Alvarez  TEAM  Progress Note    Patient is a 48y old  Female who presents with a chief complaint of Nausea/vomiting, tachycardia (04 Jan 2024 03:59)      SUBJECTIVE / OVERNIGHT EVENTS:  OVN: No acute events, started on D5LR, s/p ativan for nausea   Labs and Imaging Reviewed  Patient seen and examined bedside.  Pt feeling very weak, intermittently sleeping during exam. Noting her n/v is slightly better after medication. Denies fever, chills, chest pain, sob, constipation. Diarrhea has since resolved for last 24hrs.     PHYSICAL EXAM:  GENERAL: Resting in bed.  HEAD:  Atraumatic, Normocephalic  EYES: Patient opening eyes, maintaining eye contact, tracking examiner   CHEST/LUNG: Clear to auscultation bilaterally; No wheeze  HEART: Regular rhythm, tachycardia; No murmurs, rubs, or gallops  ABDOMEN: Soft, Nontender, Nondistended; Bowel sounds present  EXTREMITIES:  2+ Peripheral Pulses, No clubbing, cyanosis, or edema  PSYCH: AAOx3  NEUROLOGY: non-focal  SKIN: No rashes or lesions      VITAL SIGNS:    Vital Signs Last 24 Hrs  T(C): 36.9 (04 Jan 2024 07:50), Max: 37.6 (03 Jan 2024 16:10)  T(F): 98.4 (04 Jan 2024 07:50), Max: 99.7 (03 Jan 2024 16:10)  HR: 120 (04 Jan 2024 09:17) (110 - 122)  BP: 92/59 (04 Jan 2024 09:17) (83/59 - 113/65)  BP(mean): 66 (04 Jan 2024 07:50) (66 - 78)  RR: 18 (04 Jan 2024 09:17) (16 - 22)  SpO2: 100% (04 Jan 2024 09:17) (97% - 100%)    Parameters below as of 04 Jan 2024 07:50  Patient On (Oxygen Delivery Method): room air      CAPILLARY BLOOD GLUCOSE      POCT Blood Glucose.: 167 mg/dL (04 Jan 2024 02:48)  POCT Blood Glucose.: 131 mg/dL (03 Jan 2024 23:00)  POCT Blood Glucose.: 93 mg/dL (03 Jan 2024 21:47)  POCT Blood Glucose.: 130 mg/dL (03 Jan 2024 14:57)  POCT Blood Glucose.: 67 mg/dL (03 Jan 2024 12:20)  POCT Blood Glucose.: 55 mg/dL (03 Jan 2024 11:55)    I&O's Summary    MEDICATIONS  (STANDING):  dextrose 5% + lactated ringers. 1000 milliLiter(s) (75 mL/Hr) IV Continuous <Continuous>  enoxaparin Injectable 30 milliGRAM(s) SubCutaneous every 24 hours  magnesium sulfate  IVPB 2 Gram(s) IV Intermittent every 2 hours  thiamine 100 milliGRAM(s) Oral daily    MEDICATIONS  (PRN):  melatonin 3 milliGRAM(s) Oral at bedtime PRN Insomnia    LABS:                        14.1   5.99  )-----------( 244      ( 03 Jan 2024 13:21 )             44.0     01-04    131<L>  |  102  |  8   ----------------------------<  207<H>  3.8   |  15<L>  |  0.49<L>    Ca    10.4      04 Jan 2024 06:21  Phos  2.7     01-04  Mg     1.20     01-04    TPro  7.8  /  Alb  4.0  /  TBili  0.5  /  DBili  x   /  AST  24  /  ALT  12  /  AlkPhos  82  01-03    PT/INR - ( 03 Jan 2024 13:21 )   PT: 13.1 sec;   INR: 1.16 ratio         PTT - ( 03 Jan 2024 13:21 )  PTT:43.5 sec      IMAGING:  < from: CT Head No Cont (01.04.24 @ 00:57) >  IMPRESSION:  1. Unremarkable, unenhanced CT imaging of the brain for the patient's age.    --- End of Report ---    < end of copied text >  < from: CT Abdomen and Pelvis w/ IV Cont (01.03.24 @ 14:46) >  IMPRESSION:    No main, lobar or segmental pulmonary embolism. Evaluation of the   subsegmental branches of the pulmonary arteries within the lung bases is   limited secondary to motion.    No acute pathology in the chest, abdomen or pelvis.    --- End of Report ---    < end of copied text >      Consultant(s) Notes Reviewed     Care Discussed with Consultants/Other Providers

## 2024-01-04 NOTE — H&P ADULT - NSHPLABSRESULTS_GEN_ALL_CORE
LABS: When present labs, imaging, and ECG were personally reviewed                          14.1   5.99  )-----------( 244      ( 03 Jan 2024 13:21 )             44.0       01-03    136  |  104  |  10  ----------------------------<  107<H>  4.0   |  13<L>  |  0.54    Ca    9.8      03 Jan 2024 21:27    TPro  7.8  /  Alb  4.0  /  TBili  0.5  /  DBili  x   /  AST  24  /  ALT  12  /  AlkPhos  82  01-03       LIVER FUNCTIONS - ( 03 Jan 2024 13:21 )  Alb: 4.0 g/dL / Pro: 7.8 g/dL / ALK PHOS: 82 U/L / ALT: 12 U/L / AST: 24 U/L / GGT: x                    Urinalysis Basic - ( 03 Jan 2024 21:27 )    Color: x / Appearance: x / SG: x / pH: x  Gluc: 107 mg/dL / Ketone: x  / Bili: x / Urobili: x   Blood: x / Protein: x / Nitrite: x   Leuk Esterase: x / RBC: x / WBC x   Sq Epi: x / Non Sq Epi: x / Bacteria: x        PT/INR - ( 03 Jan 2024 13:21 )   PT: 13.1 sec;   INR: 1.16 ratio         PTT - ( 03 Jan 2024 13:21 )  PTT:43.5 sec    Lactate Trend            CAPILLARY BLOOD GLUCOSE      POCT Blood Glucose.: 167 mg/dL (04 Jan 2024 02:48)            RADIOLOGY & ADDITIONAL TESTS:

## 2024-01-04 NOTE — PROGRESS NOTE ADULT - PROBLEM SELECTOR PLAN 1
pH 7.17. Most recent anion gap 19. Lactate wnl, no salicylate use  - Likely starvation ketosis as hasn't had food in many days, added on BHB  - Monitor electrolytes for refeeding syndrome  - Continue on D5 LR, will wean as patient able to tolerate diet  - Thiamine supplementation pH 7.17. Most recent anion gap 19. Lactate wnl, no salicylate use  - Likely starvation ketosis as hasn't had food in many days, BHB slightly elevated      Plan:  - Monitor electrolytes for refeeding syndrome  - Continue D5 LR, will wean as patient able to tolerate diet  - Thiamine, Folate/Vitamin b12 supplementation  - 500cc bolus pH 7.17. Most recent anion gap 19. Lactate wnl, no salicylate use  - Likely starvation ketosis as hasn't had food in many days, BHB slightly elevated  -Total fluids resuscitation of 3L      Plan:  - Monitor electrolytes for refeeding syndrome  - Continue D5 LR, will wean as patient able to tolerate diet  - Thiamine, Folate/Vitamin b12 supplementation  - Strict I&O

## 2024-01-04 NOTE — PROGRESS NOTE ADULT - PROBLEM SELECTOR PLAN 2
Sinus on ECG. Likely iso hypovolemia, possibly infection in immunocompromised patient. Note QTc 587  - Fluid resuscitation as above  - Blood cultures x2, urine culture pending  - Monitor off antibiotics as not febrile  - TTE - patient mentions that she was told she has a "hole in her heart" when she was 13  - Troponins negative Sinus on ECG. Likely iso hypovolemia, possibly infection in immunocompromised patient. Note QTc 587  - Fluid resuscitation as above  - Blood cultures x2, urine culture pending  - Monitor off antibiotics as not febrile  - Obatin TTE - patient mentions that she was told she has a "hole in her heart" when she was 13  - Troponins negative

## 2024-01-04 NOTE — PROGRESS NOTE ADULT - PROBLEM SELECTOR PLAN 6
DVT: Patient currently unable to walk, lovenox  Diet: Clear liquids  Dispo: Pending PT DVT: Patient currently unable to walk, lovenox  Diet: Clear liquids  Dispo: Pending PT and clinical improvement

## 2024-01-04 NOTE — PROGRESS NOTE ADULT - PROBLEM SELECTOR PLAN 3
With report of orthostatic hypotension ?adrenal insufficiency vs. prolonged starvation in patient with low fat/glycogen storage  - AM cortisol  - A1c  - Monitor fingersticks q6  - D10 as above With report of orthostatic hypotension ?adrenal insufficiency vs. prolonged starvation in patient with low fat/glycogen storage  - AM cortisol  - A1c 4.8  - Monitor fingersticks q6

## 2024-01-04 NOTE — H&P ADULT - PROBLEM SELECTOR PLAN 1
pH 7.17. Most recent anion gap 19. Lactate wnl, no salicylate use  - Likely starvation ketosis as hasn't had food in many days, added on BHB  - Monitor electrolytes for refeeding syndrome  - On dextrose 10, will wean as patient able to tolerate diet  - Thiamine? pH 7.17. Most recent anion gap 19. Lactate wnl, no salicylate use  - Likely starvation ketosis as hasn't had food in many days, added on BHB  - Monitor electrolytes for refeeding syndrome  - Continue on D5 LR, will wean as patient able to tolerate diet  - Thiamine supplementation

## 2024-01-04 NOTE — H&P ADULT - PROBLEM SELECTOR PLAN 4
- Unclear what prompted the nausea/vomiting - ?delayed onset from chemotherapy  - Ativan for nausea given prolonged QTc - Unclear what prompted the nausea/vomiting - ?delayed onset from chemotherapy  - Ativan for nausea given prolonged QTc  - GI PCR, stool ova and parasites when able to produce stool sample

## 2024-01-04 NOTE — PROGRESS NOTE ADULT - ASSESSMENT
Ms. Sauer is a 49 YO woman with PMH of left breast cancer s/p chemo (last 11/2023) and mastectomy, who presents to the ED with nausea/vomiting, diarrhea, chest pain, shortness of breath, weakness and b/l groin pain for 2 weeks. Ms. Sauer is a 47 YO woman with PMH of left breast cancer s/p chemo (last 11/2023) and mastectomy, who presents to the ED with nausea/vomiting, diarrhea, chest pain, shortness of breath, weakness and b/l groin pain for 2 weeks.

## 2024-01-04 NOTE — H&P ADULT - NSHPREVIEWOFSYSTEMS_GEN_ALL_CORE
CONSTITUTIONAL: +Weakness, dizziness  EYES: No double vision, blurry vision  ENT: No ear pain, nasal congestion, runny nose, sore throat  CV: No chest pain, +palpitations  PULM: No cough, +shortness of breath  GI: +nausea, vomiting, diarrhea  : No dysuria, polyuria, hematuria  SKIN: No rashes, swelling  MSK: +pain in groin radiating down legs  NEURO: No headache, paresthesias  PSYCHIATRIC: Denies suicidal, homicidal ideations. No auditory, visual, tactile hallucinations

## 2024-01-04 NOTE — PROGRESS NOTE ADULT - ATTENDING COMMENTS
47 YO woman with PMH of left breast cancer s/p chemo (last 11/2023) and mastectomy, who presents with intractable nausea and vomiting (NBNB) with inability to tolerate PO for 4 days. Denies eating raw or unusual foods (states she cooks food extra well done since starting chemo). No notable sick contacts or travel history for past 12 months. Prior chemo noted in late november 2023. Also had diarrhea, but no more BMs for past 2 days. Also extreme fatigue for past 2-3 weeks that has progressively worsened.     #Intractable N/V   - numerous NBNB vomiting episodes for past 4 days. Last vomited yesterday morning. Differential includes delayed chemo reaction, gastroenteritis, or infection   - check blood cultures, RSV, EBV, CMV, HIV, Hep panel   - continue fluid repletion as has low UOP, received 3+ L, continue maintenance   - start MV, folate, thiamine, B12   - QTC prolonged, but can do ativan PRN for N/V for now.   - recheck EKG today, if QTC improved, will start zofran PRN     #Sinus Tachycardia   - CTA negative on admission. Per ct scan reads: no acute pathology in the chest, abdomen or pelvis.  - most likely due to volume down   - s/p 3L of fluids, continue maintenance   - check TTE   - repeat EKG to ensure qtc improves   - aggressive repletion of electrolytes   - tele     #High anion gap metabolic acidosis  - presents with AG around 20   - likely starvation   - improving   - start clears and ADAT   - monitor for refeeding     Discussed with HS   Rest as above 49 YO woman with PMH of left breast cancer s/p chemo (last 11/2023) and mastectomy, who presents with intractable nausea and vomiting (NBNB) with inability to tolerate PO for 4 days. Denies eating raw or unusual foods (states she cooks food extra well done since starting chemo). No notable sick contacts or travel history for past 12 months. Prior chemo noted in late november 2023. Also had diarrhea, but no more BMs for past 2 days. Also extreme fatigue for past 2-3 weeks that has progressively worsened.     #Intractable N/V   - numerous NBNB vomiting episodes for past 4 days. Last vomited yesterday morning. Differential includes delayed chemo reaction, gastroenteritis, or infection   - check blood cultures, RSV, EBV, CMV, HIV, Hep panel   - continue fluid repletion as has low UOP, received 3+ L, continue maintenance   - start MV, folate, thiamine, B12   - QTC prolonged, but can do ativan PRN for N/V for now.   - recheck EKG today, if QTC improved, will start zofran PRN     #Sinus Tachycardia   - CTA negative on admission. Per ct scan reads: no acute pathology in the chest, abdomen or pelvis.  - most likely due to volume down   - s/p 3L of fluids, continue maintenance   - check TTE   - repeat EKG to ensure qtc improves   - aggressive repletion of electrolytes   - tele     #High anion gap metabolic acidosis  - presents with AG around 20   - likely starvation   - improving   - start clears and ADAT   - monitor for refeeding     Discussed with HS   Rest as above

## 2024-01-05 ENCOUNTER — TRANSCRIPTION ENCOUNTER (OUTPATIENT)
Age: 49
End: 2024-01-05

## 2024-01-05 DIAGNOSIS — E27.40 UNSPECIFIED ADRENOCORTICAL INSUFFICIENCY: ICD-10-CM

## 2024-01-05 DIAGNOSIS — I95.9 HYPOTENSION, UNSPECIFIED: ICD-10-CM

## 2024-01-05 DIAGNOSIS — Z86.39 PERSONAL HISTORY OF OTHER ENDOCRINE, NUTRITIONAL AND METABOLIC DISEASE: ICD-10-CM

## 2024-01-05 DIAGNOSIS — E87.1 HYPO-OSMOLALITY AND HYPONATREMIA: ICD-10-CM

## 2024-01-05 DIAGNOSIS — Z92.89 PERSONAL HISTORY OF OTHER MEDICAL TREATMENT: ICD-10-CM

## 2024-01-05 LAB
ACTH SER-ACNC: 2.3 PG/ML — LOW (ref 7.2–63.3)
ACTH SER-ACNC: 2.3 PG/ML — LOW (ref 7.2–63.3)
ANION GAP SERPL CALC-SCNC: 7 MMOL/L — SIGNIFICANT CHANGE UP (ref 7–14)
ANION GAP SERPL CALC-SCNC: 7 MMOL/L — SIGNIFICANT CHANGE UP (ref 7–14)
ANION GAP SERPL CALC-SCNC: 9 MMOL/L — SIGNIFICANT CHANGE UP (ref 7–14)
ANION GAP SERPL CALC-SCNC: 9 MMOL/L — SIGNIFICANT CHANGE UP (ref 7–14)
ANISOCYTOSIS BLD QL: SLIGHT — SIGNIFICANT CHANGE UP
ANISOCYTOSIS BLD QL: SLIGHT — SIGNIFICANT CHANGE UP
APPEARANCE UR: CLEAR — SIGNIFICANT CHANGE UP
APPEARANCE UR: CLEAR — SIGNIFICANT CHANGE UP
BACTERIA # UR AUTO: ABNORMAL /HPF
BACTERIA # UR AUTO: ABNORMAL /HPF
BASE EXCESS BLDV CALC-SCNC: -2.2 MMOL/L — LOW (ref -2–3)
BASE EXCESS BLDV CALC-SCNC: -2.2 MMOL/L — LOW (ref -2–3)
BASOPHILS # BLD AUTO: 0.04 K/UL — SIGNIFICANT CHANGE UP (ref 0–0.2)
BASOPHILS # BLD AUTO: 0.04 K/UL — SIGNIFICANT CHANGE UP (ref 0–0.2)
BASOPHILS NFR BLD AUTO: 1.7 % — SIGNIFICANT CHANGE UP (ref 0–2)
BASOPHILS NFR BLD AUTO: 1.7 % — SIGNIFICANT CHANGE UP (ref 0–2)
BILIRUB UR-MCNC: NEGATIVE — SIGNIFICANT CHANGE UP
BILIRUB UR-MCNC: NEGATIVE — SIGNIFICANT CHANGE UP
BLOOD GAS VENOUS COMPREHENSIVE RESULT: SIGNIFICANT CHANGE UP
BLOOD GAS VENOUS COMPREHENSIVE RESULT: SIGNIFICANT CHANGE UP
BUN SERPL-MCNC: 4 MG/DL — LOW (ref 7–23)
CALCIUM SERPL-MCNC: 8.6 MG/DL — SIGNIFICANT CHANGE UP (ref 8.4–10.5)
CALCIUM SERPL-MCNC: 8.6 MG/DL — SIGNIFICANT CHANGE UP (ref 8.4–10.5)
CALCIUM SERPL-MCNC: 8.9 MG/DL — SIGNIFICANT CHANGE UP (ref 8.4–10.5)
CALCIUM SERPL-MCNC: 8.9 MG/DL — SIGNIFICANT CHANGE UP (ref 8.4–10.5)
CAST: 1 /LPF — SIGNIFICANT CHANGE UP (ref 0–4)
CAST: 1 /LPF — SIGNIFICANT CHANGE UP (ref 0–4)
CHLORIDE BLDV-SCNC: 106 MMOL/L — SIGNIFICANT CHANGE UP (ref 96–108)
CHLORIDE BLDV-SCNC: 106 MMOL/L — SIGNIFICANT CHANGE UP (ref 96–108)
CHLORIDE SERPL-SCNC: 106 MMOL/L — SIGNIFICANT CHANGE UP (ref 98–107)
CMV IGG FLD QL: 9.5 U/ML — HIGH
CMV IGG FLD QL: 9.5 U/ML — HIGH
CMV IGG SERPL-IMP: POSITIVE
CMV IGG SERPL-IMP: POSITIVE
CMV IGM FLD-ACNC: <8 AU/ML — SIGNIFICANT CHANGE UP
CMV IGM FLD-ACNC: <8 AU/ML — SIGNIFICANT CHANGE UP
CMV IGM SERPL QL: NEGATIVE — SIGNIFICANT CHANGE UP
CMV IGM SERPL QL: NEGATIVE — SIGNIFICANT CHANGE UP
CO2 BLDV-SCNC: 23.5 MMOL/L — SIGNIFICANT CHANGE UP (ref 22–26)
CO2 BLDV-SCNC: 23.5 MMOL/L — SIGNIFICANT CHANGE UP (ref 22–26)
CO2 SERPL-SCNC: 21 MMOL/L — LOW (ref 22–31)
CO2 SERPL-SCNC: 21 MMOL/L — LOW (ref 22–31)
CO2 SERPL-SCNC: 23 MMOL/L — SIGNIFICANT CHANGE UP (ref 22–31)
CO2 SERPL-SCNC: 23 MMOL/L — SIGNIFICANT CHANGE UP (ref 22–31)
COLOR SPEC: YELLOW — SIGNIFICANT CHANGE UP
COLOR SPEC: YELLOW — SIGNIFICANT CHANGE UP
CORTIS AM PEAK SERPL-MCNC: 0.5 UG/DL — LOW (ref 6–18.4)
CORTIS AM PEAK SERPL-MCNC: 0.5 UG/DL — LOW (ref 6–18.4)
CREAT ?TM UR-MCNC: 124 MG/DL — SIGNIFICANT CHANGE UP
CREAT ?TM UR-MCNC: 124 MG/DL — SIGNIFICANT CHANGE UP
CREAT SERPL-MCNC: 0.42 MG/DL — LOW (ref 0.5–1.3)
CREAT SERPL-MCNC: 0.42 MG/DL — LOW (ref 0.5–1.3)
CREAT SERPL-MCNC: 0.45 MG/DL — LOW (ref 0.5–1.3)
CREAT SERPL-MCNC: 0.45 MG/DL — LOW (ref 0.5–1.3)
DIFF PNL FLD: NEGATIVE — SIGNIFICANT CHANGE UP
DIFF PNL FLD: NEGATIVE — SIGNIFICANT CHANGE UP
EBV DNA SERPL NAA+PROBE-ACNC: ABNORMAL IU/ML
EBV DNA SERPL NAA+PROBE-ACNC: ABNORMAL IU/ML
EBV EA AB SER IA-ACNC: <5 U/ML — SIGNIFICANT CHANGE UP
EBV EA AB SER IA-ACNC: <5 U/ML — SIGNIFICANT CHANGE UP
EBV EA AB TITR SER IF: POSITIVE
EBV EA AB TITR SER IF: POSITIVE
EBV EA IGG SER-ACNC: NEGATIVE — SIGNIFICANT CHANGE UP
EBV EA IGG SER-ACNC: NEGATIVE — SIGNIFICANT CHANGE UP
EBV NA IGG SER IA-ACNC: 246 U/ML — HIGH
EBV NA IGG SER IA-ACNC: 246 U/ML — HIGH
EBV PATRN SPEC IB-IMP: SIGNIFICANT CHANGE UP
EBV PATRN SPEC IB-IMP: SIGNIFICANT CHANGE UP
EBV VCA IGG AVIDITY SER QL IA: POSITIVE
EBV VCA IGG AVIDITY SER QL IA: POSITIVE
EBV VCA IGM SER IA-ACNC: 181 U/ML — HIGH
EBV VCA IGM SER IA-ACNC: 181 U/ML — HIGH
EBV VCA IGM SER IA-ACNC: <10 U/ML — SIGNIFICANT CHANGE UP
EBV VCA IGM SER IA-ACNC: <10 U/ML — SIGNIFICANT CHANGE UP
EBV VCA IGM TITR FLD: NEGATIVE — SIGNIFICANT CHANGE UP
EBV VCA IGM TITR FLD: NEGATIVE — SIGNIFICANT CHANGE UP
EBVPCR LOG: ABNORMAL LOG10IU/ML
EBVPCR LOG: ABNORMAL LOG10IU/ML
EGFR: 118 ML/MIN/1.73M2 — SIGNIFICANT CHANGE UP
EGFR: 118 ML/MIN/1.73M2 — SIGNIFICANT CHANGE UP
EGFR: 120 ML/MIN/1.73M2 — SIGNIFICANT CHANGE UP
EGFR: 120 ML/MIN/1.73M2 — SIGNIFICANT CHANGE UP
EOSINOPHIL # BLD AUTO: 0.26 K/UL — SIGNIFICANT CHANGE UP (ref 0–0.5)
EOSINOPHIL # BLD AUTO: 0.26 K/UL — SIGNIFICANT CHANGE UP (ref 0–0.5)
EOSINOPHIL NFR BLD AUTO: 10.4 % — HIGH (ref 0–6)
EOSINOPHIL NFR BLD AUTO: 10.4 % — HIGH (ref 0–6)
GAS PNL BLDV: 133 MMOL/L — LOW (ref 136–145)
GAS PNL BLDV: 133 MMOL/L — LOW (ref 136–145)
GLUCOSE BLDC GLUCOMTR-MCNC: 107 MG/DL — HIGH (ref 70–99)
GLUCOSE BLDC GLUCOMTR-MCNC: 107 MG/DL — HIGH (ref 70–99)
GLUCOSE BLDV-MCNC: 114 MG/DL — HIGH (ref 70–99)
GLUCOSE BLDV-MCNC: 114 MG/DL — HIGH (ref 70–99)
GLUCOSE SERPL-MCNC: 107 MG/DL — HIGH (ref 70–99)
GLUCOSE SERPL-MCNC: 107 MG/DL — HIGH (ref 70–99)
GLUCOSE SERPL-MCNC: 115 MG/DL — HIGH (ref 70–99)
GLUCOSE SERPL-MCNC: 115 MG/DL — HIGH (ref 70–99)
GLUCOSE UR QL: NEGATIVE MG/DL — SIGNIFICANT CHANGE UP
GLUCOSE UR QL: NEGATIVE MG/DL — SIGNIFICANT CHANGE UP
HAV IGM SER-ACNC: SIGNIFICANT CHANGE UP
HAV IGM SER-ACNC: SIGNIFICANT CHANGE UP
HBV CORE IGM SER-ACNC: SIGNIFICANT CHANGE UP
HBV CORE IGM SER-ACNC: SIGNIFICANT CHANGE UP
HBV SURFACE AG SER-ACNC: SIGNIFICANT CHANGE UP
HBV SURFACE AG SER-ACNC: SIGNIFICANT CHANGE UP
HCO3 BLDV-SCNC: 22 MMOL/L — SIGNIFICANT CHANGE UP (ref 22–29)
HCO3 BLDV-SCNC: 22 MMOL/L — SIGNIFICANT CHANGE UP (ref 22–29)
HCT VFR BLD CALC: 29.7 % — LOW (ref 34.5–45)
HCT VFR BLD CALC: 29.7 % — LOW (ref 34.5–45)
HCT VFR BLDA CALC: 32 % — LOW (ref 34.5–46.5)
HCT VFR BLDA CALC: 32 % — LOW (ref 34.5–46.5)
HCV AB S/CO SERPL IA: 0.16 S/CO — SIGNIFICANT CHANGE UP (ref 0–0.99)
HCV AB S/CO SERPL IA: 0.16 S/CO — SIGNIFICANT CHANGE UP (ref 0–0.99)
HCV AB SERPL-IMP: SIGNIFICANT CHANGE UP
HCV AB SERPL-IMP: SIGNIFICANT CHANGE UP
HGB BLD CALC-MCNC: 10.6 G/DL — LOW (ref 11.7–16.1)
HGB BLD CALC-MCNC: 10.6 G/DL — LOW (ref 11.7–16.1)
HGB BLD-MCNC: 10.2 G/DL — LOW (ref 11.5–15.5)
HGB BLD-MCNC: 10.2 G/DL — LOW (ref 11.5–15.5)
IANC: 0.98 K/UL — LOW (ref 1.8–7.4)
IANC: 0.98 K/UL — LOW (ref 1.8–7.4)
KETONES UR-MCNC: 15 MG/DL
KETONES UR-MCNC: 15 MG/DL
LACTATE BLDV-MCNC: 0.9 MMOL/L — SIGNIFICANT CHANGE UP (ref 0.5–2)
LACTATE BLDV-MCNC: 0.9 MMOL/L — SIGNIFICANT CHANGE UP (ref 0.5–2)
LEUKOCYTE ESTERASE UR-ACNC: ABNORMAL
LEUKOCYTE ESTERASE UR-ACNC: ABNORMAL
LYMPHOCYTES # BLD AUTO: 0.83 K/UL — LOW (ref 1–3.3)
LYMPHOCYTES # BLD AUTO: 0.83 K/UL — LOW (ref 1–3.3)
LYMPHOCYTES # BLD AUTO: 33 % — SIGNIFICANT CHANGE UP (ref 13–44)
LYMPHOCYTES # BLD AUTO: 33 % — SIGNIFICANT CHANGE UP (ref 13–44)
MAGNESIUM SERPL-MCNC: 1.6 MG/DL — SIGNIFICANT CHANGE UP (ref 1.6–2.6)
MAGNESIUM SERPL-MCNC: 1.6 MG/DL — SIGNIFICANT CHANGE UP (ref 1.6–2.6)
MAGNESIUM SERPL-MCNC: 1.7 MG/DL — SIGNIFICANT CHANGE UP (ref 1.6–2.6)
MAGNESIUM SERPL-MCNC: 1.7 MG/DL — SIGNIFICANT CHANGE UP (ref 1.6–2.6)
MCHC RBC-ENTMCNC: 26.8 PG — LOW (ref 27–34)
MCHC RBC-ENTMCNC: 26.8 PG — LOW (ref 27–34)
MCHC RBC-ENTMCNC: 34.3 GM/DL — SIGNIFICANT CHANGE UP (ref 32–36)
MCHC RBC-ENTMCNC: 34.3 GM/DL — SIGNIFICANT CHANGE UP (ref 32–36)
MCV RBC AUTO: 78 FL — LOW (ref 80–100)
MCV RBC AUTO: 78 FL — LOW (ref 80–100)
MICROCYTES BLD QL: SLIGHT — SIGNIFICANT CHANGE UP
MICROCYTES BLD QL: SLIGHT — SIGNIFICANT CHANGE UP
MONOCYTES # BLD AUTO: 0.18 K/UL — SIGNIFICANT CHANGE UP (ref 0–0.9)
MONOCYTES # BLD AUTO: 0.18 K/UL — SIGNIFICANT CHANGE UP (ref 0–0.9)
MONOCYTES NFR BLD AUTO: 7 % — SIGNIFICANT CHANGE UP (ref 2–14)
MONOCYTES NFR BLD AUTO: 7 % — SIGNIFICANT CHANGE UP (ref 2–14)
NEUTROPHILS # BLD AUTO: 1.11 K/UL — LOW (ref 1.8–7.4)
NEUTROPHILS # BLD AUTO: 1.11 K/UL — LOW (ref 1.8–7.4)
NEUTROPHILS NFR BLD AUTO: 44.4 % — SIGNIFICANT CHANGE UP (ref 43–77)
NEUTROPHILS NFR BLD AUTO: 44.4 % — SIGNIFICANT CHANGE UP (ref 43–77)
NITRITE UR-MCNC: NEGATIVE — SIGNIFICANT CHANGE UP
NITRITE UR-MCNC: NEGATIVE — SIGNIFICANT CHANGE UP
OSMOLALITY UR: 537 MOSM/KG — SIGNIFICANT CHANGE UP (ref 50–1200)
OSMOLALITY UR: 537 MOSM/KG — SIGNIFICANT CHANGE UP (ref 50–1200)
PCO2 BLDV: 37 MMHG — LOW (ref 39–52)
PCO2 BLDV: 37 MMHG — LOW (ref 39–52)
PH BLDV: 7.39 — SIGNIFICANT CHANGE UP (ref 7.32–7.43)
PH BLDV: 7.39 — SIGNIFICANT CHANGE UP (ref 7.32–7.43)
PH UR: 6 — SIGNIFICANT CHANGE UP (ref 5–8)
PH UR: 6 — SIGNIFICANT CHANGE UP (ref 5–8)
PHOSPHATE SERPL-MCNC: 3.1 MG/DL — SIGNIFICANT CHANGE UP (ref 2.5–4.5)
PHOSPHATE SERPL-MCNC: 3.1 MG/DL — SIGNIFICANT CHANGE UP (ref 2.5–4.5)
PHOSPHATE SERPL-MCNC: 3.2 MG/DL — SIGNIFICANT CHANGE UP (ref 2.5–4.5)
PHOSPHATE SERPL-MCNC: 3.2 MG/DL — SIGNIFICANT CHANGE UP (ref 2.5–4.5)
PLAT MORPH BLD: ABNORMAL
PLAT MORPH BLD: ABNORMAL
PLATELET # BLD AUTO: 165 K/UL — SIGNIFICANT CHANGE UP (ref 150–400)
PLATELET # BLD AUTO: 165 K/UL — SIGNIFICANT CHANGE UP (ref 150–400)
PLATELET COUNT - ESTIMATE: NORMAL — SIGNIFICANT CHANGE UP
PLATELET COUNT - ESTIMATE: NORMAL — SIGNIFICANT CHANGE UP
PO2 BLDV: 98 MMHG — HIGH (ref 25–45)
PO2 BLDV: 98 MMHG — HIGH (ref 25–45)
POIKILOCYTOSIS BLD QL AUTO: SLIGHT — SIGNIFICANT CHANGE UP
POIKILOCYTOSIS BLD QL AUTO: SLIGHT — SIGNIFICANT CHANGE UP
POLYCHROMASIA BLD QL SMEAR: SLIGHT — SIGNIFICANT CHANGE UP
POLYCHROMASIA BLD QL SMEAR: SLIGHT — SIGNIFICANT CHANGE UP
POTASSIUM BLDV-SCNC: 3.6 MMOL/L — SIGNIFICANT CHANGE UP (ref 3.5–5.1)
POTASSIUM BLDV-SCNC: 3.6 MMOL/L — SIGNIFICANT CHANGE UP (ref 3.5–5.1)
POTASSIUM SERPL-MCNC: 3.8 MMOL/L — SIGNIFICANT CHANGE UP (ref 3.5–5.3)
POTASSIUM SERPL-MCNC: 3.8 MMOL/L — SIGNIFICANT CHANGE UP (ref 3.5–5.3)
POTASSIUM SERPL-MCNC: 3.9 MMOL/L — SIGNIFICANT CHANGE UP (ref 3.5–5.3)
POTASSIUM SERPL-MCNC: 3.9 MMOL/L — SIGNIFICANT CHANGE UP (ref 3.5–5.3)
POTASSIUM SERPL-SCNC: 3.8 MMOL/L — SIGNIFICANT CHANGE UP (ref 3.5–5.3)
POTASSIUM SERPL-SCNC: 3.8 MMOL/L — SIGNIFICANT CHANGE UP (ref 3.5–5.3)
POTASSIUM SERPL-SCNC: 3.9 MMOL/L — SIGNIFICANT CHANGE UP (ref 3.5–5.3)
POTASSIUM SERPL-SCNC: 3.9 MMOL/L — SIGNIFICANT CHANGE UP (ref 3.5–5.3)
POTASSIUM UR-SCNC: 22.4 MMOL/L — SIGNIFICANT CHANGE UP
POTASSIUM UR-SCNC: 22.4 MMOL/L — SIGNIFICANT CHANGE UP
PROT ?TM UR-MCNC: 27 MG/DL — SIGNIFICANT CHANGE UP
PROT ?TM UR-MCNC: 27 MG/DL — SIGNIFICANT CHANGE UP
PROT UR-MCNC: SIGNIFICANT CHANGE UP MG/DL
PROT UR-MCNC: SIGNIFICANT CHANGE UP MG/DL
PROT/CREAT UR-RTO: 0.2 RATIO — SIGNIFICANT CHANGE UP (ref 0–0.2)
PROT/CREAT UR-RTO: 0.2 RATIO — SIGNIFICANT CHANGE UP (ref 0–0.2)
RBC # BLD: 3.81 M/UL — SIGNIFICANT CHANGE UP (ref 3.8–5.2)
RBC # BLD: 3.81 M/UL — SIGNIFICANT CHANGE UP (ref 3.8–5.2)
RBC # FLD: 17.3 % — HIGH (ref 10.3–14.5)
RBC # FLD: 17.3 % — HIGH (ref 10.3–14.5)
RBC BLD AUTO: ABNORMAL
RBC BLD AUTO: ABNORMAL
RBC CASTS # UR COMP ASSIST: 1 /HPF — SIGNIFICANT CHANGE UP (ref 0–4)
RBC CASTS # UR COMP ASSIST: 1 /HPF — SIGNIFICANT CHANGE UP (ref 0–4)
SAO2 % BLDV: 99 % — HIGH (ref 67–88)
SAO2 % BLDV: 99 % — HIGH (ref 67–88)
SODIUM SERPL-SCNC: 136 MMOL/L — SIGNIFICANT CHANGE UP (ref 135–145)
SODIUM UR-SCNC: <20 MMOL/L — SIGNIFICANT CHANGE UP
SODIUM UR-SCNC: <20 MMOL/L — SIGNIFICANT CHANGE UP
SP GR SPEC: 1.02 — SIGNIFICANT CHANGE UP (ref 1–1.03)
SP GR SPEC: 1.02 — SIGNIFICANT CHANGE UP (ref 1–1.03)
SQUAMOUS # UR AUTO: 1 /HPF — SIGNIFICANT CHANGE UP (ref 0–5)
SQUAMOUS # UR AUTO: 1 /HPF — SIGNIFICANT CHANGE UP (ref 0–5)
UROBILINOGEN FLD QL: 1 MG/DL — SIGNIFICANT CHANGE UP (ref 0.2–1)
UROBILINOGEN FLD QL: 1 MG/DL — SIGNIFICANT CHANGE UP (ref 0.2–1)
UUN UR-MCNC: 483.2 MG/DL — SIGNIFICANT CHANGE UP
UUN UR-MCNC: 483.2 MG/DL — SIGNIFICANT CHANGE UP
VARIANT LYMPHS # BLD: 3.5 % — SIGNIFICANT CHANGE UP (ref 0–6)
VARIANT LYMPHS # BLD: 3.5 % — SIGNIFICANT CHANGE UP (ref 0–6)
WBC # BLD: 2.5 K/UL — LOW (ref 3.8–10.5)
WBC # BLD: 2.5 K/UL — LOW (ref 3.8–10.5)
WBC # FLD AUTO: 2.5 K/UL — LOW (ref 3.8–10.5)
WBC # FLD AUTO: 2.5 K/UL — LOW (ref 3.8–10.5)
WBC UR QL: 5 /HPF — SIGNIFICANT CHANGE UP (ref 0–5)
WBC UR QL: 5 /HPF — SIGNIFICANT CHANGE UP (ref 0–5)

## 2024-01-05 PROCEDURE — 93010 ELECTROCARDIOGRAM REPORT: CPT

## 2024-01-05 PROCEDURE — 99223 1ST HOSP IP/OBS HIGH 75: CPT

## 2024-01-05 PROCEDURE — 99233 SBSQ HOSP IP/OBS HIGH 50: CPT

## 2024-01-05 PROCEDURE — 99232 SBSQ HOSP IP/OBS MODERATE 35: CPT | Mod: GC

## 2024-01-05 PROCEDURE — 93306 TTE W/DOPPLER COMPLETE: CPT | Mod: 26

## 2024-01-05 RX ORDER — POTASSIUM CHLORIDE 20 MEQ
10 PACKET (EA) ORAL
Refills: 0 | Status: DISCONTINUED | OUTPATIENT
Start: 2024-01-05 | End: 2024-01-05

## 2024-01-05 RX ORDER — POTASSIUM CHLORIDE 20 MEQ
40 PACKET (EA) ORAL ONCE
Refills: 0 | Status: COMPLETED | OUTPATIENT
Start: 2024-01-05 | End: 2024-01-05

## 2024-01-05 RX ORDER — MAGNESIUM SULFATE 500 MG/ML
2 VIAL (ML) INJECTION
Refills: 0 | Status: COMPLETED | OUTPATIENT
Start: 2024-01-05 | End: 2024-01-05

## 2024-01-05 RX ORDER — HYDROCORTISONE 20 MG
20 TABLET ORAL
Refills: 0 | Status: DISCONTINUED | OUTPATIENT
Start: 2024-01-06 | End: 2024-01-06

## 2024-01-05 RX ORDER — POTASSIUM CHLORIDE 20 MEQ
20 PACKET (EA) ORAL ONCE
Refills: 0 | Status: COMPLETED | OUTPATIENT
Start: 2024-01-05 | End: 2024-01-05

## 2024-01-05 RX ORDER — SODIUM CHLORIDE 9 MG/ML
1000 INJECTION, SOLUTION INTRAVENOUS
Refills: 0 | Status: DISCONTINUED | OUTPATIENT
Start: 2024-01-05 | End: 2024-01-06

## 2024-01-05 RX ORDER — HYDROCORTISONE 20 MG
30 TABLET ORAL ONCE
Refills: 0 | Status: DISCONTINUED | OUTPATIENT
Start: 2024-01-05 | End: 2024-01-05

## 2024-01-05 RX ORDER — HYDROCORTISONE 20 MG
20 TABLET ORAL ONCE
Refills: 0 | Status: COMPLETED | OUTPATIENT
Start: 2024-01-05 | End: 2024-01-05

## 2024-01-05 RX ORDER — MAGNESIUM SULFATE 500 MG/ML
2 VIAL (ML) INJECTION
Refills: 0 | Status: DISCONTINUED | OUTPATIENT
Start: 2024-01-05 | End: 2024-01-06

## 2024-01-05 RX ORDER — HYDROCORTISONE 20 MG
10 TABLET ORAL
Refills: 0 | Status: DISCONTINUED | OUTPATIENT
Start: 2024-01-06 | End: 2024-01-06

## 2024-01-05 RX ADMIN — Medication 20 MILLIEQUIVALENT(S): at 17:55

## 2024-01-05 RX ADMIN — Medication 1 MILLIGRAM(S): at 14:45

## 2024-01-05 RX ADMIN — PREGABALIN 1000 MICROGRAM(S): 225 CAPSULE ORAL at 14:46

## 2024-01-05 RX ADMIN — Medication 25 GRAM(S): at 00:10

## 2024-01-05 RX ADMIN — Medication 25 GRAM(S): at 09:11

## 2024-01-05 RX ADMIN — SODIUM CHLORIDE 75 MILLILITER(S): 9 INJECTION, SOLUTION INTRAVENOUS at 13:08

## 2024-01-05 RX ADMIN — Medication 25 MILLIGRAM(S): at 14:45

## 2024-01-05 RX ADMIN — SODIUM CHLORIDE 75 MILLILITER(S): 9 INJECTION, SOLUTION INTRAVENOUS at 21:25

## 2024-01-05 RX ADMIN — Medication 25 GRAM(S): at 10:10

## 2024-01-05 RX ADMIN — Medication 20 MILLIGRAM(S): at 17:37

## 2024-01-05 RX ADMIN — Medication 25 GRAM(S): at 17:55

## 2024-01-05 RX ADMIN — Medication 100 MILLIGRAM(S): at 14:45

## 2024-01-05 RX ADMIN — Medication 40 MILLIEQUIVALENT(S): at 09:11

## 2024-01-05 NOTE — CONSULT NOTE ADULT - SUBJECTIVE AND OBJECTIVE BOX
Patient is a 48y old  Female who presents with a chief complaint of Nausea/vomiting, tachycardia (2024 12:21)  Patient is a 49 YO woman with PMH of left breast cancer on chemo, last session 2023 s/p b/l nipple sparing mastectomy/left axillary sentinel lymph node biopsy, JOHN flap reconstruction, who presents for 2 week progressive history of dyspnea on exertion. She notes that around Thanks she started noticing she would fatigue more quickly. She attributed it to a brief chemotherapy regimen with Xeloda, which she had not been able to tolerate and had been discontinued 2 weeks prior. Two weeks prior she noticed progressive weakness and palpitations when standing; she would take a couple of steps and feel like her legs were shaking and that she was gasping for air. The next day, she attempted to go to work but had to leave because she was feeling dyspneic and noted pain in her groin that radiated down her legs. She did not have any bleeding or discharge. She then went to urgent care, who sent her to the hospital where she was told her heart rate was in the 140s, a CT was done and she was told she was negative for COVID/flu. She had one episode of NBNB vomiting in the the ED, and was told to rest and follow up with a cardiologist. She spoke to her oncologist's office, was evaluated and sent to the hospital for dehydration. She received fluids but was not told of any follow up. She called her oncologist's office again as she became very weak, unable to stand to put her clothes on. She denies fevers during this time. No chest pain, abdominal pain. She has had copious nausea and vomiting, has been unable to tolerate PO for 2.5 weeks. She had 3 days of diarrhea ending 2 days ago, and has not had a bowel movement since.     In the ED, she was found to be hypoglycemic and started on D5 then D10, as her IVC was felt to be of moderate size. She was given ativan with improv      PAST MEDICAL & SURGICAL HISTORY:  Breast CA left breast  History of chemotherapycompleted 23    S/P  X1   H/O bilateral breast biopsy bioarkers both breasts  Port-A-Cath in place right chest        MEDICATIONS  (STANDING):  cyanocobalamin 1000 MICROGram(s) Oral daily  enoxaparin Injectable 40 milliGRAM(s) SubCutaneous every 24 hours  folic acid 1 milliGRAM(s) Oral daily  influenza   Vaccine 0.5 milliLiter(s) IntraMuscular once  lactated ringers. 1000 milliLiter(s) (75 mL/Hr) IV Continuous <Continuous>  pyridoxine Injectable 25 milliGRAM(s) IV Push daily  thiamine 100 milliGRAM(s) Oral daily    MEDICATIONS  (PRN):  melatonin 3 milliGRAM(s) Oral at bedtime PRN Insomnia  sodium chloride 0.65% Nasal 1 Spray(s) Both Nostrils three times a day PRN Nasal Congestion      FAMILY HISTORY:      SOCIAL HISTORY:    CIGARETTES:    ALCOHOL:    REVIEW OF SYSTEMS:    CONSTITUTIONAL: No fever, weight loss, chills, shakes, or fatigue  EYES: No eye pain, visual disturbances, or discharge  ENMT:  No difficulty hearing, tinnitus, vertigo; No sinus or throat pain  NECK: No pain or stiffness  BREASTS: No pain, masses, or nipple discharge  RESPIRATORY: No cough, wheezing, hemoptysis, or shortness of breath  CARDIOVASCULAR: No chest pain, dyspnea, palpitations, dizziness, syncope, paroxysmal nocturnal dyspnea, orthopnea, or arm or leg swelling  GASTROINTESTINAL: No abdominal  or epigastric pain, nausea, vomiting, hematemesis, diarrhea, constipation, melena or bright red blood.  GENITOURINARY: No dysuria, nocturia, hematuria, or urinary incontinence  NEUROLOGICAL: No headaches, memory loss, slurred speech, limb weakness, loss of strength, numbness, or tremors  SKIN: No itching, burning, rashes, or lesions   LYMPH NODES: No enlarged glands  ENDOCRINE: No heat or cold intolerance, or hair loss  MUSCULOSKELETAL: No joint pain or swelling, muscle, back, or extremity pain  PSYCHIATRIC: No depression, anxiety, or difficulty sleeping  HEME/LYMPH: No easy bruising or bleeding gums  ALLERY AND IMMUNOLOGIC: No hives or rash.      Vital Signs Last 24 Hrs  T(C): 37.6 (2024 06:58), Max: 37.6 (2024 16:59)  T(F): 99.6 (2024 06:58), Max: 99.6 (2024 16:59)  HR: 115 (2024 06:58) (115 - 123)  BP: 106/49 (2024 06:58) (93/62 - 106/49)  BP(mean): --  RR: 18 (2024 06:58) (18 - 18)  SpO2: 98% (2024 06:58) (97% - 100%)    Parameters below as of 2024 06:58  Patient On (Oxygen Delivery Method): room air        PHYSICAL EXAM:    GENERAL: In no apparent distress, well nourished, and hydrated.  HEAD:  Atraumatic, Normocephalic  EYES: EOMI, PERRLA, conjunctiva and sclera clear  ENMT: No tonsillar erythema, exudates, or enlargement; Moist mucous membranes, Good dentition, No lesions  NECK: Supple and normal thyroid.  No JVD or carotid bruit.  Carotid pulse is 2+ bilaterally.  HEART: Regular rate and rhythm; No murmurs, rubs, or gallops.  PULMONARY: Clear to auscultation and perfusion.  No rales, wheezing, or rhonchi bilaterally.  ABDOMEN: Soft, Nontender, Nondistended; Bowel sounds present  EXTREMITIES:  2+ Peripheral Pulses, No clubbing, cyanosis, or edema  LYMPH: No lymphadenopathy noted  NEUROLOGICAL: Grossly nonfocal          INTERPRETATION OF TELEMETRY:    ECG:    I&O's Detail      LABS:                        10.2   2.50  )-----------( 165      ( 2024 07:14 )             29.7     01-05    136  |  106  |  4<L>  ----------------------------<  115<H>  3.8   |  21<L>  |  0.45<L>    Ca    8.9      2024 07:14  Phos  3.2     01-05  Mg     1.70     01-05    TPro  7.8  /  Alb  4.0  /  TBili  0.5  /  DBili  x   /  AST  24  /  ALT  12  /  AlkPhos  82  01-03        PT/INR - ( 2024 13:21 )   PT: 13.1 sec;   INR: 1.16 ratio         PTT - ( 2024 13:21 )  PTT:43.5 sec  Urinalysis Basic - ( 2024 07:14 )    Color: x / Appearance: x / SG: x / pH: x  Gluc: 115 mg/dL / Ketone: x  / Bili: x / Urobili: x   Blood: x / Protein: x / Nitrite: x   Leuk Esterase: x / RBC: x / WBC x   Sq Epi: x / Non Sq Epi: x / Bacteria: x      BNP  I&O's Detail    Daily     Daily     RADIOLOGY & ADDITIONAL STUDIES:  PREVIOUS DIAGNOSTIC TESTING:         Patient is a 48y old  Female who presents with a chief complaint of Nausea/vomiting/diarrhea/tachycardia and lower extremity weakness.      Patient is a 49 YO woman with PMH of left breast cancer diagnosed 2023 on chemo, last session 2023 s/p b/l nipple sparing mastectomy/left axillary sentinel lymph node biopsy, JOHN flap reconstruction, who presented with 2 week progressive history of dyspnea on exertion. She notes that around [place] she started noticing she would fatigue more quickly. She attributed it to a brief chemotherapy regimen with Xeloda, which she had not been able to tolerate and had been discontinued 2 weeks prior. She also noticed intermittent nausea, vomiting and diarrhea and progressive weakness involving bilateral lower extremities and palpitations when standing or taking a couple of steps. She felt like her legs were shaking and she was gasping for air. No bleeding or discharge. She went to urgent care, who sent her to the hospital where she was told her heart rate was in the 140s, Told she was negative for COVID/flu. She had one episode of NBNB vomiting in the the ED, and was told to rest and follow up with a cardiologist. She spoke to her oncologist's office, was evaluated and sent to the hospital for dehydration. She received fluids but was not told of any follow up. She called her oncologist's office again as she became very weak, unable to stand to put her clothes on. She denies fevers during this time. No chest pain, abdominal pain. She has had copious nausea and vomiting, has been unable to tolerate PO for 2.5 weeks. She had 3 days of diarrhea ending 2 days ago, and has not had a bowel movement since.     In the ED, she was found to be hypoglycemic and started on D5 then D10, as her IVC was felt to be of moderate size. She was given ativan with improvement.   EKG showed Sinus tachycardia 121 bpm with narrow QRS and QTC 586ms.   24 labs: Mg 1.2  K 3.2  proBNP normal   CHEST CT angio negative for PE.   Blood cx's negative to date.   RVP negative.   Patient feeling a little better but still with restless legs and weakness when ambulating. No chest pain, shortness of breath at rest. No n/v/d.   EP consultation for prolonged QTc.     PAST MEDICAL & SURGICAL HISTORY:  Breast CA left breast  History of chemotherapy completed 23    S/P  X1   H/O bilateral breast biopsy bioarkers both breasts  Port-A-Cath in place right chest        MEDICATIONS  (STANDING):  cyanocobalamin 1000 MICROGram(s) Oral daily  enoxaparin Injectable 40 milliGRAM(s) SubCutaneous every 24 hours  folic acid 1 milliGRAM(s) Oral daily  influenza   Vaccine 0.5 milliLiter(s) IntraMuscular once  lactated ringers. 1000 milliLiter(s) (75 mL/Hr) IV Continuous <Continuous>  pyridoxine Injectable 25 milliGRAM(s) IV Push daily  thiamine 100 milliGRAM(s) Oral daily    MEDICATIONS  (PRN):  melatonin 3 milliGRAM(s) Oral at bedtime PRN Insomnia  sodium chloride 0.65% Nasal 1 Spray(s) Both Nostrils three times a day PRN Nasal Congestion      FAMILY HISTORY:      SOCIAL HISTORY:    CIGARETTES:    ALCOHOL:    REVIEW OF SYSTEMS:    CONSTITUTIONAL: No fever, weight loss, chills, shakes, or fatigue  EYES: No eye pain, visual disturbances, or discharge  ENMT:  No difficulty hearing, tinnitus, vertigo; No sinus or throat pain  NECK: No pain or stiffness  BREASTS: No pain, masses, or nipple discharge  RESPIRATORY: No cough, wheezing, hemoptysis, or shortness of breath  CARDIOVASCULAR: No chest pain, dyspnea, palpitations, dizziness, syncope, paroxysmal nocturnal dyspnea, orthopnea, or arm or leg swelling  GASTROINTESTINAL: No abdominal  or epigastric pain, nausea, vomiting, hematemesis, diarrhea, constipation, melena or bright red blood.  GENITOURINARY: No dysuria, nocturia, hematuria, or urinary incontinence  NEUROLOGICAL: No headaches, memory loss, slurred speech, limb weakness, loss of strength, numbness, or tremors  SKIN: No itching, burning, rashes, or lesions   LYMPH NODES: No enlarged glands  ENDOCRINE: No heat or cold intolerance, or hair loss  MUSCULOSKELETAL: No joint pain or swelling, muscle, back, or extremity pain  PSYCHIATRIC: No depression, anxiety, or difficulty sleeping  HEME/LYMPH: No easy bruising or bleeding gums  ALLERY AND IMMUNOLOGIC: No hives or rash.      Vital Signs Last 24 Hrs  T(C): 37.6 (2024 06:58), Max: 37.6 (2024 16:59)  T(F): 99.6 (2024 06:58), Max: 99.6 (2024 16:59)  HR: 115 (2024 06:58) (115 - 123)  BP: 106/49 (2024 06:58) (93/62 - 106/49)  BP(mean): --  RR: 18 (2024 06:58) (18 - 18)  SpO2: 98% (2024 06:58) (97% - 100%)    Parameters below as of 2024 06:58  Patient On (Oxygen Delivery Method): room air        PHYSICAL EXAM:    GENERAL: In no apparent distress, well nourished, and hydrated.  HEAD:  Atraumatic, Normocephalic  EYES: EOMI, PERRLA, conjunctiva and sclera clear  ENMT: No tonsillar erythema, exudates, or enlargement; Moist mucous membranes, Good dentition, No lesions  NECK: Supple and normal thyroid.  No JVD or carotid bruit.  Carotid pulse is 2+ bilaterally.  HEART: Regular rate and rhythm; No murmurs, rubs, or gallops.  PULMONARY: Clear to auscultation and perfusion.  No rales, wheezing, or rhonchi bilaterally.  ABDOMEN: Soft, Nontender, Nondistended; Bowel sounds present  EXTREMITIES:  2+ Peripheral Pulses, No clubbing, cyanosis, or edema  LYMPH: No lymphadenopathy noted  NEUROLOGICAL: Grossly nonfocal          INTERPRETATION OF TELEMETRY:    ECG:    I&O's Detail      LABS:                        10.2   2.50  )-----------( 165      ( 2024 07:14 )             29.7     01-    136  |  106  |  4<L>  ----------------------------<  115<H>  3.8   |  21<L>  |  0.45<L>    Ca    8.9      2024 07:14  Phos  3.2     01-05  Mg     1.70     -05    TPro  7.8  /  Alb  4.0  /  TBili  0.5  /  DBili  x   /  AST  24  /  ALT  12  /  AlkPhos  82  01-03        PT/INR - ( 2024 13:21 )   PT: 13.1 sec;   INR: 1.16 ratio         PTT - ( 2024 13:21 )  PTT:43.5 sec  Urinalysis Basic - ( 2024 07:14 )    Color: x / Appearance: x / SG: x / pH: x  Gluc: 115 mg/dL / Ketone: x  / Bili: x / Urobili: x   Blood: x / Protein: x / Nitrite: x   Leuk Esterase: x / RBC: x / WBC x   Sq Epi: x / Non Sq Epi: x / Bacteria: x      BNP  I&O's Detail    Daily     Daily     RADIOLOGY & ADDITIONAL STUDIES:  PREVIOUS DIAGNOSTIC TESTING:         Patient is a 48y old  Female who presents with a chief complaint of Nausea/vomiting/diarrhea/tachycardia and lower extremity weakness.      Patient is a 47 YO woman with PMH of left breast cancer diagnosed 2023 on chemo, last session 2023 s/p b/l nipple sparing mastectomy/left axillary sentinel lymph node biopsy, JOHN flap reconstruction, who presented with 2 week progressive history of dyspnea on exertion. She notes that around [place] she started noticing she would fatigue more quickly. She attributed it to a brief chemotherapy regimen with Xeloda, which she had not been able to tolerate and had been discontinued 2 weeks prior. She also noticed intermittent nausea, vomiting and diarrhea and progressive weakness involving bilateral lower extremities and palpitations when standing or taking a couple of steps. She felt like her legs were shaking and she was gasping for air. No bleeding or discharge. She went to urgent care, who sent her to the hospital where she was told her heart rate was in the 140s, Told she was negative for COVID/flu. She had one episode of NBNB vomiting in the the ED, and was told to rest and follow up with a cardiologist. She spoke to her oncologist's office, was evaluated and sent to the hospital for dehydration. She received fluids but was not told of any follow up. She called her oncologist's office again as she became very weak, unable to stand to put her clothes on. She denies fevers during this time. No chest pain, abdominal pain. She has had copious nausea and vomiting, has been unable to tolerate PO for 2.5 weeks. She had 3 days of diarrhea ending 2 days ago, and has not had a bowel movement since. Has lost 18 lbs within last 2-3 weeks.    In the ED, she was found to be hypoglycemic and started on D5 then D10, as her IVC was felt to be of moderate size. She was given ativan with improvement.   EKG showed Sinus tachycardia 121 bpm with narrow QRS and QTC 586ms.   24 labs: Mg 1.2  K 3.2  proBNP normal   CHEST CT angio negative for PE.   Blood cx's negative to date.   RVP negative.   Patient feeling a little better but still with restless legs and weakness when ambulating. No chest pain, shortness of breath at rest. No n/v/d.   EP consultation for prolonged QTc.     PAST MEDICAL & SURGICAL HISTORY:  Breast CA left breast  History of chemotherapy completed 23    S/P  X1   H/O bilateral breast biopsy bioarkers both breasts  Port-A-Cath in place right chest        MEDICATIONS  (STANDING):  cyanocobalamin 1000 MICROGram(s) Oral daily  enoxaparin Injectable 40 milliGRAM(s) SubCutaneous every 24 hours  folic acid 1 milliGRAM(s) Oral daily  influenza   Vaccine 0.5 milliLiter(s) IntraMuscular once  lactated ringers. 1000 milliLiter(s) (75 mL/Hr) IV Continuous <Continuous>  pyridoxine Injectable 25 milliGRAM(s) IV Push daily  thiamine 100 milliGRAM(s) Oral daily    MEDICATIONS  (PRN):  melatonin 3 milliGRAM(s) Oral at bedtime PRN Insomnia  sodium chloride 0.65% Nasal 1 Spray(s) Both Nostrils three times a day PRN Nasal Congestion      FAMILY HISTORY: noncontribuatory      SOCIAL HISTORY:    CIGARETTES: remote history. Quit > 25 years ago  DRUGS:  no  ALCOHOL: no    REVIEW OF SYSTEMS:    CONSTITUTIONAL: No fever, chills, shakes, + fatigue, weight loss of 18lbs within 2-3weeks.   EYES: No eye pain, visual disturbances, or discharge  ENMT:  No difficulty hearing, tinnitus, vertigo; No sinus or throat pain. Dry cough which she states she has had for many years when she becomes anxious.  NECK: No pain or stiffness  BREASTS: No pain, masses, or nipple discharge  RESPIRATORY: No wheezing, hemoptysis, +shortness of breath   CARDIOVASCULAR: MIld chest discomfort with dyspnea, palpitations, No dizziness, syncope or lower leg weakness.   GASTROINTESTINAL: No abdominal  or epigastric pain,  + nausea, vomiting, diarrhea (nonbloody)   GENITOURINARY: No dysuria, nocturia, hematuria, or urinary incontinence  NEUROLOGICAL: No headaches, memory loss, slurred speech,  + limb weakness with loss of strength and restlessness. No numbness or tremors.   SKIN: No itching, burning, rashes, or lesions   LYMPH NODES: No enlarged glands  ENDOCRINE: No heat or cold intolerance, or hair loss  MUSCULOSKELETAL: No joint pain or swelling, muscle, back, or extremity pain  PSYCHIATRIC: No depression, , or difficulty sleeping  + anxiety  HEME/LYMPH: No easy bruising or bleeding gums  ALLERG AND IMMUNOLOGIC: No hives or rash.      Vital Signs Last 24 Hrs  T(C): 37.6 (2024 06:58), Max: 37.6 (2024 16:59)  T(F): 99.6 (2024 06:58), Max: 99.6 (2024 16:59)  HR: 115 (2024 06:58) (115 - 123)  BP: 106/49 (2024 06:58) (93/62 - 106/49)  BP(mean): --  RR: 18 (2024 06:58) (18 - 18)  SpO2: 98% (2024 06:58) (97% - 100%)    Parameters below as of 2024 06:58  Patient On (Oxygen Delivery Method): room air        PHYSICAL EXAM:    GENERAL: In no apparent distress, well nourished, and hydrated. Restless leg movements while lying in bed  HEAD:  Atraumatic, Normocephalic  EYES: EOMI, PERRLA, conjunctiva and sclera clear  ENMT: No tonsillar erythema, exudates, or enlargement; Moist mucous membranes, Good dentition, No lesions  NECK: Supple and normal thyroid.  No JVD or carotid bruit.  Carotid pulse is 2+ bilaterally.  HEART: + regular rhythm slightly tachycardic. No murmurs, rubs, or gallops.  PULMONARY: Clear to auscultation and perfusion.  No rales, wheezing, or rhonchi bilaterally.  ABDOMEN: Soft, Nontender, Nondistended; Bowel sounds present  EXTREMITIES:  2+ Peripheral Pulses, No clubbing, cyanosis, or edema  LYMPH: No lymphadenopathy noted  NEUROLOGICAL: Grossly nonfocal          INTERPRETATION OF TELEMETRY: Sinus tachycardia 110-123 bpm    ECG: Sinus tachycardia 121 bpm with narrow QRS and QTC 586ms.         LABS:                        10.2   2.50  )-----------( 165      ( 2024 07:14 )             29.7     01-05    136  |  106  |  4<L>  ----------------------------<  115<H>  3.8   |  21<L>  |  0.45<L>    Ca    8.9      2024 07:14  Phos  3.2     01-05  Mg     1.70     01-05    TPro  7.8  /  Alb  4.0  /  TBili  0.5  /  DBili  x   /  AST  24  /  ALT  12  /  AlkPhos  82          PT/INR - ( 2024 13:21 )   PT: 13.1 sec;   INR: 1.16 ratio         PTT - ( 2024 13:21 )  PTT:43.5 sec  Urinalysis Basic - ( 2024 07:14 )    Color: x / Appearance: x / SG: x / pH: x  Gluc: 115 mg/dL / Ketone: x  / Bili: x / Urobili: x   Blood: x / Protein: x / Nitrite: x   Leuk Esterase: x / RBC: x / WBC x   Sq Epi: x / Non Sq Epi: x / Bacteria: x      BNP: normal    RADIOLOGY & ADDITIONAL STUDIES:  PREVIOUS DIAGNOSTIC TESTING:      < from: CT Angio Chest PE Protocol w/ IV Cont (24 @ 14:46) >  ACC: [id number] EXAM:  CT ABDOMEN AND PELVIS IC   ORDERED BY: XIOMARA MERCHANT     ACC: [id number] EXAM:  CT ANGIO CHEST PULM ART WAWIC   ORDERED BY: XIOMARA MERCHANT     PROCEDURE DATE:  2024          INTERPRETATION:  CLINICAL INFORMATION: HPI ObjectiveStatement:   48-year-old female with past medical history of  breast CA status post bilateral mastectomy last chemo in November   presents to  the ER complaining of nausea, vomiting, diarrhea, chest pain, shortness of  breath, NAGEL, generalized weakness, groin pain bilaterally x 1 and half   weeks.  COMPARISON: CT chest 2023, MRA abdomen and pelvis 2023    CONTRAST/COMPLICATIONS:  IV Contrast: Omnipaque 350 90 cc administered   10 cc discarded  Oral Contrast: NONE  Complications: None reported at time of study completion    PROCEDURE:  CT Angiography of the Chest was performed followed by portal venous phase   imaging of the Abdomen and Pelvis.  Sagittal and coronal reformats were performed as well as 3D (MIP)   reconstructions.    FINDINGS:  CHEST:  LUNGS AND LARGE AIRWAYS: Patent central airways. Clear lungs.  PLEURA: No pleural effusion.  VESSELS: No main, lobar or segmental pulmonary embolism. Evaluation of   the subsegmental branches of the pulmonary arteries within the lung bases   is limited secondary to motion.  HEART: Heart size is normal. No pericardial effusion.  MEDIASTINUM AND HIRAL: No lymphadenopathy.  CHEST WALL AND LOWER NECK: Status post bilateral breast reconstruction   and implants. A postoperative seroma measuring 2.7 x 2.7 cm is noted in   the right lateral soft tissues. Bilateral axillary clips. Right chest   wall Mediport central venous catheter in place. 1 cm left thyroid nodule   is again noted.    ABDOMEN AND PELVIS:  LIVER: Stable too small to characterize hypodensity within posterior   right lobe of the liver.  BILE DUCTS: Normal caliber.  GALLBLADDER: Within normal limits.  SPLEEN: Within normal limits.  PANCREAS: Within normal limits.  ADRENALS: Within normal limits.  KIDNEYS/URETERS: Within normal limits.  < from: CT Angio Chest PE Protocol w/ IV Cont (24 @ 14:46) >  BLADDER: Within normal limits.  REPRODUCTIVE ORGANS: Uterus and adnexa within normal limits.    BOWEL: No bowel obstruction. Appendix is normal.  PERITONEUM: No ascites.  VESSELS: Within normal limits.  RETROPERITONEUM/LYMPH NODES: No lymphadenopathy.  ABDOMINAL WALL: Postsurgical changes of the anterior lower abdominal   wall. Bilateral inguinal clips.  BONES: Within normal limits.    IMPRESSION:    No main, lobar or segmental pulmonary embolism. Evaluation of the   subsegmental branches of the pulmonary arteries within the lung bases is   limited secondary to motion.    No acute pathology in the chest, abdomen or pelvis.    --- End of Report ---    FROYLAN CAIN MD; Resident Radiology  This document has been electronically signed.  MIKAEL FRANKLIN MD; Attending Radiologist  This document has been electronically signed. Gustavo  3 2024  4:32PM           Patient is a 48y old  Female who presents with a chief complaint of Nausea/vomiting/diarrhea/tachycardia and lower extremity weakness.      Patient is a 49 YO woman with PMH of left breast cancer diagnosed 2023 on chemo, last session 2023 s/p b/l nipple sparing mastectomy/left axillary sentinel lymph node biopsy, JOHN flap reconstruction, who presented with 2 week progressive history of dyspnea on exertion. She notes that around [place] she started noticing she would fatigue more quickly. She attributed it to a brief chemotherapy regimen with Xeloda, which she had not been able to tolerate and had been discontinued 2 weeks prior. She also noticed intermittent nausea, vomiting and diarrhea and progressive weakness involving bilateral lower extremities and palpitations when standing or taking a couple of steps. She felt like her legs were shaking and she was gasping for air. No bleeding or discharge. She went to urgent care, who sent her to the hospital where she was told her heart rate was in the 140s, Told she was negative for COVID/flu. She had one episode of NBNB vomiting in the the ED, and was told to rest and follow up with a cardiologist. She spoke to her oncologist's office, was evaluated and sent to the hospital for dehydration. She received fluids but was not told of any follow up. She called her oncologist's office again as she became very weak, unable to stand to put her clothes on. She denies fevers during this time. No chest pain, abdominal pain. She has had copious nausea and vomiting, has been unable to tolerate PO for 2.5 weeks. She had 3 days of diarrhea ending 2 days ago, and has not had a bowel movement since. Has lost 18 lbs within last 2-3 weeks.    In the ED, she was found to be hypoglycemic and started on D5 then D10, as her IVC was felt to be of moderate size. She was given ativan with improvement.   EKG showed Sinus tachycardia 121 bpm with narrow QRS and QTC 586ms.   24 labs: Mg 1.2  K 3.2  proBNP normal   CHEST CT angio negative for PE.   Blood cx's negative to date.   RVP negative.   Patient feeling a little better but still with restless legs and weakness when ambulating. No chest pain, shortness of breath at rest. No n/v/d.   EP consultation for prolonged QTc.     PAST MEDICAL & SURGICAL HISTORY:  Breast CA left breast  History of chemotherapy completed 23    S/P  X1   H/O bilateral breast biopsy bioarkers both breasts  Port-A-Cath in place right chest        MEDICATIONS  (STANDING):  cyanocobalamin 1000 MICROGram(s) Oral daily  enoxaparin Injectable 40 milliGRAM(s) SubCutaneous every 24 hours  folic acid 1 milliGRAM(s) Oral daily  influenza   Vaccine 0.5 milliLiter(s) IntraMuscular once  lactated ringers. 1000 milliLiter(s) (75 mL/Hr) IV Continuous <Continuous>  pyridoxine Injectable 25 milliGRAM(s) IV Push daily  thiamine 100 milliGRAM(s) Oral daily    MEDICATIONS  (PRN):  melatonin 3 milliGRAM(s) Oral at bedtime PRN Insomnia  sodium chloride 0.65% Nasal 1 Spray(s) Both Nostrils three times a day PRN Nasal Congestion      FAMILY HISTORY: noncontribuatory      SOCIAL HISTORY:    CIGARETTES: remote history. Quit > 25 years ago  DRUGS:  no  ALCOHOL: no    REVIEW OF SYSTEMS:    CONSTITUTIONAL: No fever, chills, shakes, + fatigue, weight loss of 18lbs within 2-3weeks.   EYES: No eye pain, visual disturbances, or discharge  ENMT:  No difficulty hearing, tinnitus, vertigo; No sinus or throat pain. Dry cough which she states she has had for many years when she becomes anxious.  NECK: No pain or stiffness  BREASTS: No pain, masses, or nipple discharge  RESPIRATORY: No wheezing, hemoptysis, +shortness of breath   CARDIOVASCULAR: MIld chest discomfort with dyspnea, palpitations, No dizziness, syncope or lower leg weakness.   GASTROINTESTINAL: No abdominal  or epigastric pain,  + nausea, vomiting, diarrhea (nonbloody)   GENITOURINARY: No dysuria, nocturia, hematuria, or urinary incontinence  NEUROLOGICAL: No headaches, memory loss, slurred speech,  + limb weakness with loss of strength and restlessness. No numbness or tremors.   SKIN: No itching, burning, rashes, or lesions   LYMPH NODES: No enlarged glands  ENDOCRINE: No heat or cold intolerance, or hair loss  MUSCULOSKELETAL: No joint pain or swelling, muscle, back, or extremity pain  PSYCHIATRIC: No depression, , or difficulty sleeping  + anxiety  HEME/LYMPH: No easy bruising or bleeding gums  ALLERG AND IMMUNOLOGIC: No hives or rash.      Vital Signs Last 24 Hrs  T(C): 37.6 (2024 06:58), Max: 37.6 (2024 16:59)  T(F): 99.6 (2024 06:58), Max: 99.6 (2024 16:59)  HR: 115 (2024 06:58) (115 - 123)  BP: 106/49 (2024 06:58) (93/62 - 106/49)  BP(mean): --  RR: 18 (2024 06:58) (18 - 18)  SpO2: 98% (2024 06:58) (97% - 100%)    Parameters below as of 2024 06:58  Patient On (Oxygen Delivery Method): room air        PHYSICAL EXAM:    GENERAL: In no apparent distress, well nourished, and hydrated. Restless leg movements while lying in bed  HEAD:  Atraumatic, Normocephalic  EYES: EOMI, PERRLA, conjunctiva and sclera clear  ENMT: No tonsillar erythema, exudates, or enlargement; Moist mucous membranes, Good dentition, No lesions  NECK: Supple and normal thyroid.  No JVD or carotid bruit.  Carotid pulse is 2+ bilaterally.  HEART: + regular rhythm slightly tachycardic. No murmurs, rubs, or gallops.  PULMONARY: Clear to auscultation and perfusion.  No rales, wheezing, or rhonchi bilaterally.  ABDOMEN: Soft, Nontender, Nondistended; Bowel sounds present  EXTREMITIES:  2+ Peripheral Pulses, No clubbing, cyanosis, or edema  LYMPH: No lymphadenopathy noted  NEUROLOGICAL: Grossly nonfocal          INTERPRETATION OF TELEMETRY: Sinus tachycardia 110-123 bpm    ECG: Sinus tachycardia 121 bpm with narrow QRS and QTC 586ms.         LABS:                        10.2   2.50  )-----------( 165      ( 2024 07:14 )             29.7     01-05    136  |  106  |  4<L>  ----------------------------<  115<H>  3.8   |  21<L>  |  0.45<L>    Ca    8.9      2024 07:14  Phos  3.2     01-05  Mg     1.70     01-05    TPro  7.8  /  Alb  4.0  /  TBili  0.5  /  DBili  x   /  AST  24  /  ALT  12  /  AlkPhos  82          PT/INR - ( 2024 13:21 )   PT: 13.1 sec;   INR: 1.16 ratio         PTT - ( 2024 13:21 )  PTT:43.5 sec  Urinalysis Basic - ( 2024 07:14 )    Color: x / Appearance: x / SG: x / pH: x  Gluc: 115 mg/dL / Ketone: x  / Bili: x / Urobili: x   Blood: x / Protein: x / Nitrite: x   Leuk Esterase: x / RBC: x / WBC x   Sq Epi: x / Non Sq Epi: x / Bacteria: x      BNP: normal    RADIOLOGY & ADDITIONAL STUDIES:  PREVIOUS DIAGNOSTIC TESTING:      < from: CT Angio Chest PE Protocol w/ IV Cont (24 @ 14:46) >  ACC: [id number] EXAM:  CT ABDOMEN AND PELVIS IC   ORDERED BY: XIOMARA MERCHANT     ACC: [id number] EXAM:  CT ANGIO CHEST PULM ART WAWIC   ORDERED BY: XIOMARA MERCHANT     PROCEDURE DATE:  2024          INTERPRETATION:  CLINICAL INFORMATION: HPI ObjectiveStatement:   48-year-old female with past medical history of  breast CA status post bilateral mastectomy last chemo in November   presents to  the ER complaining of nausea, vomiting, diarrhea, chest pain, shortness of  breath, NAGEL, generalized weakness, groin pain bilaterally x 1 and half   weeks.  COMPARISON: CT chest 2023, MRA abdomen and pelvis 2023    CONTRAST/COMPLICATIONS:  IV Contrast: Omnipaque 350 90 cc administered   10 cc discarded  Oral Contrast: NONE  Complications: None reported at time of study completion    PROCEDURE:  CT Angiography of the Chest was performed followed by portal venous phase   imaging of the Abdomen and Pelvis.  Sagittal and coronal reformats were performed as well as 3D (MIP)   reconstructions.    FINDINGS:  CHEST:  LUNGS AND LARGE AIRWAYS: Patent central airways. Clear lungs.  PLEURA: No pleural effusion.  VESSELS: No main, lobar or segmental pulmonary embolism. Evaluation of   the subsegmental branches of the pulmonary arteries within the lung bases   is limited secondary to motion.  HEART: Heart size is normal. No pericardial effusion.  MEDIASTINUM AND HIRAL: No lymphadenopathy.  CHEST WALL AND LOWER NECK: Status post bilateral breast reconstruction   and implants. A postoperative seroma measuring 2.7 x 2.7 cm is noted in   the right lateral soft tissues. Bilateral axillary clips. Right chest   wall Mediport central venous catheter in place. 1 cm left thyroid nodule   is again noted.    ABDOMEN AND PELVIS:  LIVER: Stable too small to characterize hypodensity within posterior   right lobe of the liver.  BILE DUCTS: Normal caliber.  GALLBLADDER: Within normal limits.  SPLEEN: Within normal limits.  PANCREAS: Within normal limits.  ADRENALS: Within normal limits.  KIDNEYS/URETERS: Within normal limits.  < from: CT Angio Chest PE Protocol w/ IV Cont (24 @ 14:46) >  BLADDER: Within normal limits.  REPRODUCTIVE ORGANS: Uterus and adnexa within normal limits.    BOWEL: No bowel obstruction. Appendix is normal.  PERITONEUM: No ascites.  VESSELS: Within normal limits.  RETROPERITONEUM/LYMPH NODES: No lymphadenopathy.  ABDOMINAL WALL: Postsurgical changes of the anterior lower abdominal   wall. Bilateral inguinal clips.  BONES: Within normal limits.    IMPRESSION:    No main, lobar or segmental pulmonary embolism. Evaluation of the   subsegmental branches of the pulmonary arteries within the lung bases is   limited secondary to motion.    No acute pathology in the chest, abdomen or pelvis.    --- End of Report ---    FROYLAN CAIN MD; Resident Radiology  This document has been electronically signed.  MIKAEL FRANKLIN MD; Attending Radiologist  This document has been electronically signed. Gustavo  3 2024  4:32PM           Patient is a 48y old  Female who presents with a chief complaint of Nausea/vomiting/diarrhea/tachycardia and lower extremity weakness.      Patient is a 47 YO woman with PMH of left breast cancer diagnosed 2023 on chemo, last session 2023 s/p b/l nipple sparing mastectomy/left axillary sentinel lymph node biopsy, JOHN flap reconstruction, who presented with 2 week progressive history of dyspnea on exertion. She notes that around Thanks[place] she started noticing she would fatigue more quickly. She attributed it to a brief chemotherapy regimen with Xeloda, which she had not been able to tolerate and had been discontinued 2 weeks prior. She also noticed intermittent nausea, vomiting and diarrhea and progressive weakness involving bilateral lower extremities and palpitations when standing or taking a couple of steps. She felt like her legs were shaking and she was gasping for air. No bleeding or discharge. She went to urgent care, who sent her to the hospital where she was told her heart rate was in the 140s, Told she was negative for COVID/flu. She had one episode of NBNB vomiting in the the ED, and was told to rest and follow up with a cardiologist. She spoke to her oncologist's office, was evaluated and sent to the hospital for dehydration. She received fluids but was not told of any follow up. She called her oncologist's office again as she became very weak, unable to stand to put her clothes on. She denies fevers during this time. No chest pain, abdominal pain. She has had copious nausea and vomiting, has been unable to tolerate PO for 2.5 weeks. She had 3 days of diarrhea ending 2 days ago, and has not had a bowel movement since. Has lost 18 lbs within last 2-3 weeks.    In the ED, she was found to be hypoglycemic and started on D5 then D10, as her IVC was felt to be of moderate size. She was given ativan with improvement.   EKG showed Sinus tachycardia 121 bpm with narrow QRS and QTC 586ms.   24 labs: Mg 1.2  K 3.2  proBNP normal   CHEST CT angio negative for PE.   Blood cx's negative to date.   RVP negative.   Patient feeling a little better but still with restless legs and weakness when ambulating. No chest pain, shortness of breath at rest. No n/v/d.   EP consultation for prolonged QTc.     PAST MEDICAL & SURGICAL HISTORY:  Breast CA left breast  History of chemotherapy completed 23    S/P  X1   H/O bilateral breast biopsy bioarkers both breasts  Port-A-Cath in place right chest        MEDICATIONS  (STANDING):  cyanocobalamin 1000 MICROGram(s) Oral daily  enoxaparin Injectable 40 milliGRAM(s) SubCutaneous every 24 hours  folic acid 1 milliGRAM(s) Oral daily  influenza   Vaccine 0.5 milliLiter(s) IntraMuscular once  lactated ringers. 1000 milliLiter(s) (75 mL/Hr) IV Continuous <Continuous>  pyridoxine Injectable 25 milliGRAM(s) IV Push daily  thiamine 100 milliGRAM(s) Oral daily    MEDICATIONS  (PRN):  melatonin 3 milliGRAM(s) Oral at bedtime PRN Insomnia  sodium chloride 0.65% Nasal 1 Spray(s) Both Nostrils three times a day PRN Nasal Congestion      FAMILY HISTORY: noncontribuatory      SOCIAL HISTORY:    CIGARETTES: remote history. Quit > 25 years ago  DRUGS:  no  ALCOHOL: no    REVIEW OF SYSTEMS:    CONSTITUTIONAL: No fever, chills, shakes, + fatigue, weight loss of 18lbs within 2-3weeks.   EYES: No eye pain, visual disturbances, or discharge  ENMT:  No difficulty hearing, tinnitus, vertigo; No sinus or throat pain. Dry cough which she states she has had for many years when she becomes anxious.  NECK: No pain or stiffness  BREASTS: No pain, masses, or nipple discharge  RESPIRATORY: No wheezing, hemoptysis, +shortness of breath   CARDIOVASCULAR: MIld chest discomfort with dyspnea, palpitations, No dizziness, syncope or lower leg weakness.   GASTROINTESTINAL: No abdominal  or epigastric pain,  + nausea, vomiting, diarrhea (nonbloody)   GENITOURINARY: No dysuria, nocturia, hematuria, or urinary incontinence  NEUROLOGICAL: No headaches, memory loss, slurred speech,  + limb weakness with loss of strength and restlessness. No numbness or tremors.   SKIN: No itching, burning, rashes, or lesions   LYMPH NODES: No enlarged glands  ENDOCRINE: No heat or cold intolerance, or hair loss  MUSCULOSKELETAL: No joint pain or swelling, muscle, back, or extremity pain  PSYCHIATRIC: No depression, , or difficulty sleeping  + anxiety  HEME/LYMPH: No easy bruising or bleeding gums  ALLERG AND IMMUNOLOGIC: No hives or rash.      Vital Signs Last 24 Hrs  T(C): 37.6 (2024 06:58), Max: 37.6 (2024 16:59)  T(F): 99.6 (2024 06:58), Max: 99.6 (2024 16:59)  HR: 115 (2024 06:58) (115 - 123)  BP: 106/49 (2024 06:58) (93/62 - 106/49)  BP(mean): --  RR: 18 (2024 06:58) (18 - 18)  SpO2: 98% (2024 06:58) (97% - 100%)    Parameters below as of 2024 06:58  Patient On (Oxygen Delivery Method): room air        PHYSICAL EXAM:    GENERAL: In no apparent distress, well nourished, and hydrated. Restless leg movements while lying in bed  HEAD:  Atraumatic, Normocephalic  EYES: EOMI, PERRLA, conjunctiva and sclera clear  ENMT: No tonsillar erythema, exudates, or enlargement; Moist mucous membranes, Good dentition, No lesions  NECK: Supple and normal thyroid.  No JVD or carotid bruit.  Carotid pulse is 2+ bilaterally.  HEART: + regular rhythm slightly tachycardic. No murmurs, rubs, or gallops.  PULMONARY: Clear to auscultation and perfusion.  No rales, wheezing, or rhonchi bilaterally.  ABDOMEN: Soft, Nontender, Nondistended; Bowel sounds present  EXTREMITIES:  2+ Peripheral Pulses, No clubbing, cyanosis, or edema  LYMPH: No lymphadenopathy noted  NEUROLOGICAL: Grossly nonfocal          INTERPRETATION OF TELEMETRY: Sinus tachycardia 110-123 bpm    ECG: Sinus tachycardia 121 bpm with narrow QRS and QTC 586ms.         LABS:                        10.2   2.50  )-----------( 165      ( 2024 07:14 )             29.7     01-05    136  |  106  |  4<L>  ----------------------------<  115<H>  3.8   |  21<L>  |  0.45<L>    Ca    8.9      2024 07:14  Phos  3.2     01-05  Mg     1.70     01-05    TPro  7.8  /  Alb  4.0  /  TBili  0.5  /  DBili  x   /  AST  24  /  ALT  12  /  AlkPhos  82          PT/INR - ( 2024 13:21 )   PT: 13.1 sec;   INR: 1.16 ratio         PTT - ( 2024 13:21 )  PTT:43.5 sec  Urinalysis Basic - ( 2024 07:14 )    Color: x / Appearance: x / SG: x / pH: x  Gluc: 115 mg/dL / Ketone: x  / Bili: x / Urobili: x   Blood: x / Protein: x / Nitrite: x   Leuk Esterase: x / RBC: x / WBC x   Sq Epi: x / Non Sq Epi: x / Bacteria: x      BNP: normal    RADIOLOGY & ADDITIONAL STUDIES:  PREVIOUS DIAGNOSTIC TESTING:      < from: CT Angio Chest PE Protocol w/ IV Cont (24 @ 14:46) >  ACC: [id number] EXAM:  CT ABDOMEN AND PELVIS IC   ORDERED BY: XIOMARA MERCHANT     ACC: [id number] EXAM:  CT ANGIO CHEST PULM ART WAWIC   ORDERED BY: XIOMARA MERCHANT     PROCEDURE DATE:  2024          INTERPRETATION:  CLINICAL INFORMATION: HPI ObjectiveStatement:   48-year-old female with past medical history of  breast CA status post bilateral mastectomy last chemo in November   presents to  the ER complaining of nausea, vomiting, diarrhea, chest pain, shortness of  breath, NAGEL, generalized weakness, groin pain bilaterally x 1 and half   weeks.  COMPARISON: CT chest 2023, MRA abdomen and pelvis 2023    CONTRAST/COMPLICATIONS:  IV Contrast: Omnipaque 350 90 cc administered   10 cc discarded  Oral Contrast: NONE  Complications: None reported at time of study completion    PROCEDURE:  CT Angiography of the Chest was performed followed by portal venous phase   imaging of the Abdomen and Pelvis.  Sagittal and coronal reformats were performed as well as 3D (MIP)   reconstructions.    FINDINGS:  CHEST:  LUNGS AND LARGE AIRWAYS: Patent central airways. Clear lungs.  PLEURA: No pleural effusion.  VESSELS: No main, lobar or segmental pulmonary embolism. Evaluation of   the subsegmental branches of the pulmonary arteries within the lung bases   is limited secondary to motion.  HEART: Heart size is normal. No pericardial effusion.  MEDIASTINUM AND HIRAL: No lymphadenopathy.  CHEST WALL AND LOWER NECK: Status post bilateral breast reconstruction   and implants. A postoperative seroma measuring 2.7 x 2.7 cm is noted in   the right lateral soft tissues. Bilateral axillary clips. Right chest   wall Mediport central venous catheter in place. 1 cm left thyroid nodule   is again noted.    ABDOMEN AND PELVIS:  LIVER: Stable too small to characterize hypodensity within posterior   right lobe of the liver.  BILE DUCTS: Normal caliber.  GALLBLADDER: Within normal limits.  SPLEEN: Within normal limits.  PANCREAS: Within normal limits.  ADRENALS: Within normal limits.  KIDNEYS/URETERS: Within normal limits.  < from: CT Angio Chest PE Protocol w/ IV Cont (24 @ 14:46) >  BLADDER: Within normal limits.  REPRODUCTIVE ORGANS: Uterus and adnexa within normal limits.    BOWEL: No bowel obstruction. Appendix is normal.  PERITONEUM: No ascites.  VESSELS: Within normal limits.  RETROPERITONEUM/LYMPH NODES: No lymphadenopathy.  ABDOMINAL WALL: Postsurgical changes of the anterior lower abdominal   wall. Bilateral inguinal clips.  BONES: Within normal limits.    IMPRESSION:    No main, lobar or segmental pulmonary embolism. Evaluation of the   subsegmental branches of the pulmonary arteries within the lung bases is   limited secondary to motion.    No acute pathology in the chest, abdomen or pelvis.    --- End of Report ---    FROYLAN CAIN MD; Resident Radiology  This document has been electronically signed.  MIKAEL FRANKLIN MD; Attending Radiologist  This document has been electronically signed. Gustavo  3 2024  4:32PM      < from: TTE W or WO Ultrasound Enhancing Agent (24 @ 10:27) >  TRANSTHORACIC ECHOCARDIOGRAM REPORT  ________________________________________________________________________________                                      _______       Pt. Name:       JUSTIN DE LA CRUZ Study Date:    2024  MRN:            [id number]     YOB: 1975  Accession #:    [id number]     Age:           48 years  Account#:       [id number]      Gender:        F  Heart Rate:                   Height:        69.00 in (175.26 cm)  Rhythm:                       Weight:        159.00 lb (72.12 kg)  Blood Pressure: 106/49 mmHg   BSA/BMI:       1.87 m² / 23.48 kg/m²  ________________________________________________________________________________________  Referring Physician:    [id number] Ariadna Paniagua  Interpreting Physician: Kenny Partida M.D.  Primary Sonographer:    Michelle VENTURA    CPT:               ECHO TTE WO CON COMP W DOPP - 03369.m  Indication(s):     Palpitations - R00.2  Procedure:         Transthoracic echocardiogram with 2-D, M-mode and complete          spectral and color flow Doppler.  Ordering Location: [facility]  Study Information: Image quality for this study is fair.    _______________________________________________________________________________________     CONCLUSIONS:      1. Left ventricular cavity is normal. Left ventricular wall thickness is normal. Left ventricular systolic function is normal with an ejection fraction of 60 % by Ortiz's method of disks. There are no regional wall motion abnormalities seen.   2. Normal right ventricular cavity size and probably normal systolic function.   3. Structurally normal mitral valve with normal leaflet excursion. There is trace mitral regurgitation.    ________________________________________________________________________________________  FINDINGS:     Left Ventricle:  The left ventricular cavity is normal. Left ventricular wall thickness is normal. Left ventricular systolic function is normal with a calculated ejection fraction of 60 % by the Ortiz's biplane method of disks. There are no regional wall motion abnormalities seen.     Right Ventricle:  The right ventricular cavity is normal in size and probably normal systolic function. Tricuspid annular plane systolic excursion (TAPSE) is 2.1 cm (normal >=1.7 cm).     Left Atrium:  The left atrium is normal with an indexed volume of 6.48 ml/m².     Right Atrium:  The right atrium is normal in size with an indexed area of 4.97 cm²/m².     Aortic Valve:  The aortic valve anatomy cannot be determined. There is no evidence of aortic regurgitation.     Mitral Valve:  Structurally normal mitral valve with normal leaflet excursion. There is trace mitral regurgitation.     Tricuspid Valve:  Structurally normal tricuspid valve with normal leaflet excursion. There is trace tricuspid regurgitation.     Pulmonic Valve:  Structurally normal pulmonic valve with normal leaflet excursion. There is trace pulmonic regurgitation.     Aorta:  The aortic root at the sinuses of Valsalva is normal in size, measuring 3.10 cm (indexed1.65 cm/m²). The ascending aorta diameter is normal in size, measuring 2.80 cm (indexed 1.49 cm/m²).     Pericardium:  No pericardial effusion seen.     Systemic Veins:  The inferior vena cava was not well visualized.  ____________________________________________________________________         Patient is a 48y old  Female who presents with a chief complaint of Nausea/vomiting/diarrhea/tachycardia and lower extremity weakness.      Patient is a 47 YO woman with PMH of left breast cancer diagnosed 2023 on chemo, last session 2023 s/p b/l nipple sparing mastectomy/left axillary sentinel lymph node biopsy, JOHN flap reconstruction, who presented with 2 week progressive history of dyspnea on exertion. She notes that around [place] she started noticing she would fatigue more quickly. She attributed it to a brief chemotherapy regimen with Xeloda, which she had not been able to tolerate and had been discontinued 2 weeks prior. She also noticed intermittent nausea, vomiting and diarrhea and progressive weakness involving bilateral lower extremities and palpitations when standing or taking a couple of steps. She felt like her legs were shaking and she was gasping for air. No bleeding or discharge. She went to urgent care, who sent her to the hospital where she was told her heart rate was in the 140s, Told she was negative for COVID/flu. She had one episode of NBNB vomiting in the the ED, and was told to rest and follow up with a cardiologist. She spoke to her oncologist's office, was evaluated and sent to the hospital for dehydration. She received fluids but was not told of any follow up. She called her oncologist's office again as she became very weak, unable to stand to put her clothes on. She denies fevers during this time. No chest pain, abdominal pain. She has had copious nausea and vomiting, has been unable to tolerate PO for 2.5 weeks. She had 3 days of diarrhea ending 2 days ago, and has not had a bowel movement since. Has lost 18 lbs within last 2-3 weeks.    In the ED, she was found to be hypoglycemic and started on D5 then D10, as her IVC was felt to be of moderate size. She was given ativan with improvement.   EKG showed Sinus tachycardia 121 bpm with narrow QRS and QTC 586ms.   24 labs: Mg 1.2  K 3.2  proBNP normal   CHEST CT angio negative for PE.   Blood cx's negative to date.   RVP negative.   Patient feeling a little better but still with restless legs and weakness when ambulating. No chest pain, shortness of breath at rest. No n/v/d.   EP consultation for prolonged QTc.     PAST MEDICAL & SURGICAL HISTORY:  Breast CA left breast  History of chemotherapy completed 23    S/P  X1   H/O bilateral breast biopsy bioarkers both breasts  Port-A-Cath in place right chest        MEDICATIONS  (STANDING):  cyanocobalamin 1000 MICROGram(s) Oral daily  enoxaparin Injectable 40 milliGRAM(s) SubCutaneous every 24 hours  folic acid 1 milliGRAM(s) Oral daily  influenza   Vaccine 0.5 milliLiter(s) IntraMuscular once  lactated ringers. 1000 milliLiter(s) (75 mL/Hr) IV Continuous <Continuous>  pyridoxine Injectable 25 milliGRAM(s) IV Push daily  thiamine 100 milliGRAM(s) Oral daily    MEDICATIONS  (PRN):  melatonin 3 milliGRAM(s) Oral at bedtime PRN Insomnia  sodium chloride 0.65% Nasal 1 Spray(s) Both Nostrils three times a day PRN Nasal Congestion      FAMILY HISTORY: noncontribuatory      SOCIAL HISTORY:    CIGARETTES: remote history. Quit > 25 years ago  DRUGS:  no  ALCOHOL: no    REVIEW OF SYSTEMS:    CONSTITUTIONAL: No fever, chills, shakes, + fatigue, weight loss of 18lbs within 2-3weeks.   EYES: No eye pain, visual disturbances, or discharge  ENMT:  No difficulty hearing, tinnitus, vertigo; No sinus or throat pain. Dry cough which she states she has had for many years when she becomes anxious.  NECK: No pain or stiffness  BREASTS: No pain, masses, or nipple discharge  RESPIRATORY: No wheezing, hemoptysis, +shortness of breath   CARDIOVASCULAR: MIld chest discomfort with dyspnea, palpitations, No dizziness, syncope or lower leg weakness.   GASTROINTESTINAL: No abdominal  or epigastric pain,  + nausea, vomiting, diarrhea (nonbloody)   GENITOURINARY: No dysuria, nocturia, hematuria, or urinary incontinence  NEUROLOGICAL: No headaches, memory loss, slurred speech,  + limb weakness with loss of strength and restlessness. No numbness or tremors.   SKIN: No itching, burning, rashes, or lesions   LYMPH NODES: No enlarged glands  ENDOCRINE: No heat or cold intolerance, or hair loss  MUSCULOSKELETAL: No joint pain or swelling, muscle, back, or extremity pain  PSYCHIATRIC: No depression, , or difficulty sleeping  + anxiety  HEME/LYMPH: No easy bruising or bleeding gums  ALLERG AND IMMUNOLOGIC: No hives or rash.      Vital Signs Last 24 Hrs  T(C): 37.6 (2024 06:58), Max: 37.6 (2024 16:59)  T(F): 99.6 (2024 06:58), Max: 99.6 (2024 16:59)  HR: 115 (2024 06:58) (115 - 123)  BP: 106/49 (2024 06:58) (93/62 - 106/49)  BP(mean): --  RR: 18 (2024 06:58) (18 - 18)  SpO2: 98% (2024 06:58) (97% - 100%)    Parameters below as of 2024 06:58  Patient On (Oxygen Delivery Method): room air        PHYSICAL EXAM:    GENERAL: In no apparent distress, well nourished, and hydrated. Restless leg movements while lying in bed  HEAD:  Atraumatic, Normocephalic  EYES: EOMI, PERRLA, conjunctiva and sclera clear  ENMT: No tonsillar erythema, exudates, or enlargement; Moist mucous membranes, Good dentition, No lesions  NECK: Supple and normal thyroid.  No JVD or carotid bruit.  Carotid pulse is 2+ bilaterally.  HEART: + regular rhythm slightly tachycardic. No murmurs, rubs, or gallops.  PULMONARY: Clear to auscultation and perfusion.  No rales, wheezing, or rhonchi bilaterally.  ABDOMEN: Soft, Nontender, Nondistended; Bowel sounds present  EXTREMITIES:  2+ Peripheral Pulses, No clubbing, cyanosis, or edema  LYMPH: No lymphadenopathy noted  NEUROLOGICAL: Grossly nonfocal          INTERPRETATION OF TELEMETRY: Sinus tachycardia 110-123 bpm    ECG: Sinus tachycardia 121 bpm with narrow QRS and QTC 586ms.         LABS:                        10.2   2.50  )-----------( 165      ( 2024 07:14 )             29.7     01-05    136  |  106  |  4<L>  ----------------------------<  115<H>  3.8   |  21<L>  |  0.45<L>    Ca    8.9      2024 07:14  Phos  3.2     01-05  Mg     1.70     01-05    TPro  7.8  /  Alb  4.0  /  TBili  0.5  /  DBili  x   /  AST  24  /  ALT  12  /  AlkPhos  82          PT/INR - ( 2024 13:21 )   PT: 13.1 sec;   INR: 1.16 ratio         PTT - ( 2024 13:21 )  PTT:43.5 sec  Urinalysis Basic - ( 2024 07:14 )    Color: x / Appearance: x / SG: x / pH: x  Gluc: 115 mg/dL / Ketone: x  / Bili: x / Urobili: x   Blood: x / Protein: x / Nitrite: x   Leuk Esterase: x / RBC: x / WBC x   Sq Epi: x / Non Sq Epi: x / Bacteria: x      BNP: normal    RADIOLOGY & ADDITIONAL STUDIES:  PREVIOUS DIAGNOSTIC TESTING:      < from: CT Angio Chest PE Protocol w/ IV Cont (24 @ 14:46) >  ACC: [id number] EXAM:  CT ABDOMEN AND PELVIS IC   ORDERED BY: XIOMARA MERCHANT     ACC: [id number] EXAM:  CT ANGIO CHEST PULM ART WAWIC   ORDERED BY: XIOMARA MERCHANT     PROCEDURE DATE:  2024          INTERPRETATION:  CLINICAL INFORMATION: HPI ObjectiveStatement:   48-year-old female with past medical history of  breast CA status post bilateral mastectomy last chemo in November   presents to  the ER complaining of nausea, vomiting, diarrhea, chest pain, shortness of  breath, NAGEL, generalized weakness, groin pain bilaterally x 1 and half   weeks.  COMPARISON: CT chest 2023, MRA abdomen and pelvis 2023    CONTRAST/COMPLICATIONS:  IV Contrast: Omnipaque 350 90 cc administered   10 cc discarded  Oral Contrast: NONE  Complications: None reported at time of study completion    PROCEDURE:  CT Angiography of the Chest was performed followed by portal venous phase   imaging of the Abdomen and Pelvis.  Sagittal and coronal reformats were performed as well as 3D (MIP)   reconstructions.    FINDINGS:  CHEST:  LUNGS AND LARGE AIRWAYS: Patent central airways. Clear lungs.  PLEURA: No pleural effusion.  VESSELS: No main, lobar or segmental pulmonary embolism. Evaluation of   the subsegmental branches of the pulmonary arteries within the lung bases   is limited secondary to motion.  HEART: Heart size is normal. No pericardial effusion.  MEDIASTINUM AND HIRAL: No lymphadenopathy.  CHEST WALL AND LOWER NECK: Status post bilateral breast reconstruction   and implants. A postoperative seroma measuring 2.7 x 2.7 cm is noted in   the right lateral soft tissues. Bilateral axillary clips. Right chest   wall Mediport central venous catheter in place. 1 cm left thyroid nodule   is again noted.    ABDOMEN AND PELVIS:  LIVER: Stable too small to characterize hypodensity within posterior   right lobe of the liver.  BILE DUCTS: Normal caliber.  GALLBLADDER: Within normal limits.  SPLEEN: Within normal limits.  PANCREAS: Within normal limits.  ADRENALS: Within normal limits.  KIDNEYS/URETERS: Within normal limits.  < from: CT Angio Chest PE Protocol w/ IV Cont (24 @ 14:46) >  BLADDER: Within normal limits.  REPRODUCTIVE ORGANS: Uterus and adnexa within normal limits.    BOWEL: No bowel obstruction. Appendix is normal.  PERITONEUM: No ascites.  VESSELS: Within normal limits.  RETROPERITONEUM/LYMPH NODES: No lymphadenopathy.  ABDOMINAL WALL: Postsurgical changes of the anterior lower abdominal   wall. Bilateral inguinal clips.  BONES: Within normal limits.    IMPRESSION:    No main, lobar or segmental pulmonary embolism. Evaluation of the   subsegmental branches of the pulmonary arteries within the lung bases is   limited secondary to motion.    No acute pathology in the chest, abdomen or pelvis.    --- End of Report ---    FROYLAN CAIN MD; Resident Radiology  This document has been electronically signed.  MIKAEL FRANKLIN MD; Attending Radiologist  This document has been electronically signed. Gustavo  3 2024  4:32PM      < from: TTE W or WO Ultrasound Enhancing Agent (24 @ 10:27) >  TRANSTHORACIC ECHOCARDIOGRAM REPORT  ________________________________________________________________________________                                      _______       Pt. Name:       JUSTIN DE LA CRUZ Study Date:    2024  MRN:            [id number]     YOB: 1975  Accession #:    [id number]     Age:           48 years  Account#:       [id number]      Gender:        F  Heart Rate:                   Height:        69.00 in (175.26 cm)  Rhythm:                       Weight:        159.00 lb (72.12 kg)  Blood Pressure: 106/49 mmHg   BSA/BMI:       1.87 m² / 23.48 kg/m²  ________________________________________________________________________________________  Referring Physician:    [id number] Ariadna Paniagua  Interpreting Physician: Kenny Partida M.D.  Primary Sonographer:    Michelle VENTURA    CPT:               ECHO TTE WO CON COMP W DOPP - 26477.m  Indication(s):     Palpitations - R00.2  Procedure:         Transthoracic echocardiogram with 2-D, M-mode and complete          spectral and color flow Doppler.  Ordering Location: [facility]  Study Information: Image quality for this study is fair.    _______________________________________________________________________________________     CONCLUSIONS:      1. Left ventricular cavity is normal. Left ventricular wall thickness is normal. Left ventricular systolic function is normal with an ejection fraction of 60 % by Ortiz's method of disks. There are no regional wall motion abnormalities seen.   2. Normal right ventricular cavity size and probably normal systolic function.   3. Structurally normal mitral valve with normal leaflet excursion. There is trace mitral regurgitation.    ________________________________________________________________________________________  FINDINGS:     Left Ventricle:  The left ventricular cavity is normal. Left ventricular wall thickness is normal. Left ventricular systolic function is normal with a calculated ejection fraction of 60 % by the Ortiz's biplane method of disks. There are no regional wall motion abnormalities seen.     Right Ventricle:  The right ventricular cavity is normal in size and probably normal systolic function. Tricuspid annular plane systolic excursion (TAPSE) is 2.1 cm (normal >=1.7 cm).     Left Atrium:  The left atrium is normal with an indexed volume of 6.48 ml/m².     Right Atrium:  The right atrium is normal in size with an indexed area of 4.97 cm²/m².     Aortic Valve:  The aortic valve anatomy cannot be determined. There is no evidence of aortic regurgitation.     Mitral Valve:  Structurally normal mitral valve with normal leaflet excursion. There is trace mitral regurgitation.     Tricuspid Valve:  Structurally normal tricuspid valve with normal leaflet excursion. There is trace tricuspid regurgitation.     Pulmonic Valve:  Structurally normal pulmonic valve with normal leaflet excursion. There is trace pulmonic regurgitation.     Aorta:  The aortic root at the sinuses of Valsalva is normal in size, measuring 3.10 cm (indexed1.65 cm/m²). The ascending aorta diameter is normal in size, measuring 2.80 cm (indexed 1.49 cm/m²).     Pericardium:  No pericardial effusion seen.     Systemic Veins:  The inferior vena cava was not well visualized.  ____________________________________________________________________         Patient is a 48y old  Female who presents with a chief complaint of Nausea/vomiting/diarrhea/tachycardia and lower extremity weakness.      Patient is a 49 YO woman with PMH of left breast cancer diagnosed 2023 s/p chemo (Xeloda) last session 2023 s/p b/l nipple sparing mastectomy/left axillary sentinel lymph node biopsy, JOHN flap reconstruction, who presented with 2 week progressive history of dyspnea on exertion. She notes that around Thanks[place] she started noticing she would fatigue more quickly. She attributed it to a brief chemotherapy regimen with Xeloda, which she had not been able to tolerate and had been discontinued 2 weeks prior. She also noticed intermittent nausea, vomiting and diarrhea and progressive weakness involving bilateral lower extremities and palpitations when standing or taking a couple of steps. She felt like her legs were shaking and she was gasping for air. No bleeding or discharge. She went to urgent care, who sent her to the hospital where she was told her heart rate was in the 140s, Told she was negative for COVID/flu. She had one episode of NBNB vomiting in the the ED, and was told to rest and follow up with a cardiologist. She spoke to her oncologist's office, was evaluated and sent to the hospital for dehydration. She received fluids but was not told of any follow up. She called her oncologist's office again as she became very weak, unable to stand to put her clothes on. She denies fevers during this time. No chest pain, abdominal pain. She has had copious nausea and vomiting, has been unable to tolerate PO for 2.5 weeks. She had 3 days of diarrhea ending 2 days ago, and has not had a bowel movement since. Has lost 18 lbs within last 2-3 weeks.    In the ED, she was found to be hypoglycemic and started on D5 then D10, as her IVC was felt to be of moderate size. She was given ativan with improvement.   EKG showed Sinus tachycardia 121 bpm with narrow QRS and QTC 586ms.   24 labs: Mg 1.2  K 3.2  proBNP normal   CHEST CT angio negative for PE.   Blood cx's negative to date.   RVP negative.   Patient feeling a little better but still with restless legs and weakness when ambulating. No chest pain, shortness of breath at rest. No n/v/d.   EP consultation for prolonged QTc.     PAST MEDICAL & SURGICAL HISTORY:  Breast CA left breast  History of chemotherapy completed 23    S/P  X1   H/O bilateral breast biopsy bioarkers both breasts  Port-A-Cath in place right chest        MEDICATIONS  (STANDING):  cyanocobalamin 1000 MICROGram(s) Oral daily  enoxaparin Injectable 40 milliGRAM(s) SubCutaneous every 24 hours  folic acid 1 milliGRAM(s) Oral daily  influenza   Vaccine 0.5 milliLiter(s) IntraMuscular once  lactated ringers. 1000 milliLiter(s) (75 mL/Hr) IV Continuous <Continuous>  pyridoxine Injectable 25 milliGRAM(s) IV Push daily  thiamine 100 milliGRAM(s) Oral daily    MEDICATIONS  (PRN):  melatonin 3 milliGRAM(s) Oral at bedtime PRN Insomnia  sodium chloride 0.65% Nasal 1 Spray(s) Both Nostrils three times a day PRN Nasal Congestion      FAMILY HISTORY: noncontribuatory      SOCIAL HISTORY:    CIGARETTES: remote history. Quit > 25 years ago  DRUGS:  no  ALCOHOL: no    REVIEW OF SYSTEMS:    CONSTITUTIONAL: No fever, chills, shakes, + fatigue, weight loss of 18lbs within 2-3weeks.   EYES: No eye pain, visual disturbances, or discharge  ENMT:  No difficulty hearing, tinnitus, vertigo; No sinus or throat pain. Dry cough which she states she has had for many years when she becomes anxious.  NECK: No pain or stiffness  BREASTS: No pain, masses, or nipple discharge  RESPIRATORY: No wheezing, hemoptysis, +shortness of breath   CARDIOVASCULAR: MIld chest discomfort with dyspnea, palpitations, No dizziness, syncope or lower leg weakness.   GASTROINTESTINAL: No abdominal  or epigastric pain,  + nausea, vomiting, diarrhea (nonbloody)   GENITOURINARY: No dysuria, nocturia, hematuria, or urinary incontinence  NEUROLOGICAL: No headaches, memory loss, slurred speech,  + limb weakness with loss of strength and restlessness. No numbness or tremors.   SKIN: No itching, burning, rashes, or lesions   LYMPH NODES: No enlarged glands  ENDOCRINE: No heat or cold intolerance, or hair loss  MUSCULOSKELETAL: No joint pain or swelling, muscle, back, or extremity pain  PSYCHIATRIC: No depression, , or difficulty sleeping  + anxiety  HEME/LYMPH: No easy bruising or bleeding gums  ALLERG AND IMMUNOLOGIC: No hives or rash.      Vital Signs Last 24 Hrs  T(C): 37.6 (2024 06:58), Max: 37.6 (2024 16:59)  T(F): 99.6 (2024 06:58), Max: 99.6 (2024 16:59)  HR: 115 (2024 06:58) (115 - 123)  BP: 106/49 (2024 06:58) (93/62 - 106/49)  BP(mean): --  RR: 18 (2024 06:58) (18 - 18)  SpO2: 98% (2024 06:58) (97% - 100%)    Parameters below as of 2024 06:58  Patient On (Oxygen Delivery Method): room air        PHYSICAL EXAM:    GENERAL: In no apparent distress, well nourished, and hydrated. Restless leg movements while lying in bed  HEAD:  Atraumatic, Normocephalic  EYES: EOMI, PERRLA, conjunctiva and sclera clear  ENMT: No tonsillar erythema, exudates, or enlargement; Moist mucous membranes, Good dentition, No lesions  NECK: Supple and normal thyroid.  No JVD or carotid bruit.  Carotid pulse is 2+ bilaterally.  HEART: + regular rhythm slightly tachycardic. No murmurs, rubs, or gallops.  PULMONARY: Clear to auscultation and perfusion.  No rales, wheezing, or rhonchi bilaterally.  ABDOMEN: Soft, Nontender, Nondistended; Bowel sounds present  EXTREMITIES:  2+ Peripheral Pulses, No clubbing, cyanosis, or edema  LYMPH: No lymphadenopathy noted  NEUROLOGICAL: Grossly nonfocal          INTERPRETATION OF TELEMETRY: Sinus tachycardia 110-123 bpm    ECG: Sinus tachycardia 121 bpm with narrow QRS and QTC 586ms.         LABS:                        10.2   2.50  )-----------( 165      ( 2024 07:14 )             29.7     01-05    136  |  106  |  4<L>  ----------------------------<  115<H>  3.8   |  21<L>  |  0.45<L>    Ca    8.9      2024 07:14  Phos  3.2     01-05  Mg     1.70     01-05    TPro  7.8  /  Alb  4.0  /  TBili  0.5  /  DBili  x   /  AST  24  /  ALT  12  /  AlkPhos  82          PT/INR - ( 2024 13:21 )   PT: 13.1 sec;   INR: 1.16 ratio         PTT - ( 2024 13:21 )  PTT:43.5 sec  Urinalysis Basic - ( 2024 07:14 )    Color: x / Appearance: x / SG: x / pH: x  Gluc: 115 mg/dL / Ketone: x  / Bili: x / Urobili: x   Blood: x / Protein: x / Nitrite: x   Leuk Esterase: x / RBC: x / WBC x   Sq Epi: x / Non Sq Epi: x / Bacteria: x      BNP: normal    RADIOLOGY & ADDITIONAL STUDIES:  PREVIOUS DIAGNOSTIC TESTING:      < from: CT Angio Chest PE Protocol w/ IV Cont (24 @ 14:46) >  ACC: [id number] EXAM:  CT ABDOMEN AND PELVIS IC   ORDERED BY: XIOMARA MERCHATN     ACC: [id number] EXAM:  CT ANGIO CHEST PULM ART WAWIC   ORDERED BY: XIOMARA MERCHANT     PROCEDURE DATE:  2024          INTERPRETATION:  CLINICAL INFORMATION: HPI ObjectiveStatement:   48-year-old female with past medical history of  breast CA status post bilateral mastectomy last chemo in November   presents to  the ER complaining of nausea, vomiting, diarrhea, chest pain, shortness of  breath, NAGEL, generalized weakness, groin pain bilaterally x 1 and half   weeks.  COMPARISON: CT chest 2023, MRA abdomen and pelvis 2023    CONTRAST/COMPLICATIONS:  IV Contrast: Omnipaque 350 90 cc administered   10 cc discarded  Oral Contrast: NONE  Complications: None reported at time of study completion    PROCEDURE:  CT Angiography of the Chest was performed followed by portal venous phase   imaging of the Abdomen and Pelvis.  Sagittal and coronal reformats were performed as well as 3D (MIP)   reconstructions.    FINDINGS:  CHEST:  LUNGS AND LARGE AIRWAYS: Patent central airways. Clear lungs.  PLEURA: No pleural effusion.  VESSELS: No main, lobar or segmental pulmonary embolism. Evaluation of   the subsegmental branches of the pulmonary arteries within the lung bases   is limited secondary to motion.  HEART: Heart size is normal. No pericardial effusion.  MEDIASTINUM AND HIRAL: No lymphadenopathy.  CHEST WALL AND LOWER NECK: Status post bilateral breast reconstruction   and implants. A postoperative seroma measuring 2.7 x 2.7 cm is noted in   the right lateral soft tissues. Bilateral axillary clips. Right chest   wall Mediport central venous catheter in place. 1 cm left thyroid nodule   is again noted.    ABDOMEN AND PELVIS:  LIVER: Stable too small to characterize hypodensity within posterior   right lobe of the liver.  BILE DUCTS: Normal caliber.  GALLBLADDER: Within normal limits.  SPLEEN: Within normal limits.  PANCREAS: Within normal limits.  ADRENALS: Within normal limits.  KIDNEYS/URETERS: Within normal limits.  < from: CT Angio Chest PE Protocol w/ IV Cont (24 @ 14:46) >  BLADDER: Within normal limits.  REPRODUCTIVE ORGANS: Uterus and adnexa within normal limits.    BOWEL: No bowel obstruction. Appendix is normal.  PERITONEUM: No ascites.  VESSELS: Within normal limits.  RETROPERITONEUM/LYMPH NODES: No lymphadenopathy.  ABDOMINAL WALL: Postsurgical changes of the anterior lower abdominal   wall. Bilateral inguinal clips.  BONES: Within normal limits.    IMPRESSION:    No main, lobar or segmental pulmonary embolism. Evaluation of the   subsegmental branches of the pulmonary arteries within the lung bases is   limited secondary to motion.    No acute pathology in the chest, abdomen or pelvis.    --- End of Report ---    FROYLAN CAIN MD; Resident Radiology  This document has been electronically signed.  MIKAEL FRANKLIN MD; Attending Radiologist  This document has been electronically signed. Gustavo  3 2024  4:32PM      < from: TTE W or WO Ultrasound Enhancing Agent (24 @ 10:27) >  TRANSTHORACIC ECHOCARDIOGRAM REPORT  ________________________________________________________________________________                                      _______       Pt. Name:       JUSTIN DE LA CRUZ Study Date:    2024  MRN:            [id number]     YOB: 1975  Accession #:    [id number]     Age:           48 years  Account#:       [id number]      Gender:        F  Heart Rate:                   Height:        69.00 in (175.26 cm)  Rhythm:                       Weight:        159.00 lb (72.12 kg)  Blood Pressure: 106/49 mmHg   BSA/BMI:       1.87 m² / 23.48 kg/m²  ________________________________________________________________________________________  Referring Physician:    [id number] Ariadna Paniagua  Interpreting Physician: Kenny Partida M.D.  Primary Sonographer:    Michelle VENTURA    CPT:               ECHO TTE WO CON COMP W DOPP - 87019.m  Indication(s):     Palpitations - R00.2  Procedure:         Transthoracic echocardiogram with 2-D, M-mode and complete          spectral and color flow Doppler.  [facility]  Study Information: Image quality for this study is fair.    _______________________________________________________________________________________     CONCLUSIONS:      1. Left ventricular cavity is normal. Left ventricular wall thickness is normal. Left ventricular systolic function is normal with an ejection fraction of 60 % by Ortiz's method of disks. There are no regional wall motion abnormalities seen.   2. Normal right ventricular cavity size and probably normal systolic function.   3. Structurally normal mitral valve with normal leaflet excursion. There is trace mitral regurgitation.    ________________________________________________________________________________________  FINDINGS:     Left Ventricle:  The left ventricular cavity is normal. Left ventricular wall thickness is normal. Left ventricular systolic function is normal with a calculated ejection fraction of 60 % by the Ortiz's biplane method of disks. There are no regional wall motion abnormalities seen.     Right Ventricle:  The right ventricular cavity is normal in size and probably normal systolic function. Tricuspid annular plane systolic excursion (TAPSE) is 2.1 cm (normal >=1.7 cm).     Left Atrium:  The left atrium is normal with an indexed volume of 6.48 ml/m².     Right Atrium:  The right atrium is normal in size with an indexed area of 4.97 cm²/m².     Aortic Valve:  The aortic valve anatomy cannot be determined. There is no evidence of aortic regurgitation.     Mitral Valve:  Structurally normal mitral valve with normal leaflet excursion. There is trace mitral regurgitation.     Tricuspid Valve:  Structurally normal tricuspid valve with normal leaflet excursion. There is trace tricuspid regurgitation.     Pulmonic Valve:  Structurally normal pulmonic valve with normal leaflet excursion. There is trace pulmonic regurgitation.     Aorta:  The aortic root at the sinuses of Valsalva is normal in size, measuring 3.10 cm (indexed1.65 cm/m²). The ascending aorta diameter is normal in size, measuring 2.80 cm (indexed 1.49 cm/m²).     Pericardium:  No pericardial effusion seen.     Systemic Veins:  The inferior vena cava was not well visualized.  ____________________________________________________________________         Patient is a 48y old  Female who presents with a chief complaint of Nausea/vomiting/diarrhea/tachycardia and lower extremity weakness.      Patient is a 47 YO woman with PMH of left breast cancer diagnosed 2023 s/p chemo (Xeloda) last session 2023 s/p b/l nipple sparing mastectomy/left axillary sentinel lymph node biopsy, JOHN flap reconstruction, who presented with 2 week progressive history of dyspnea on exertion. She notes that around [place] she started noticing she would fatigue more quickly. She attributed it to a brief chemotherapy regimen with Xeloda, which she had not been able to tolerate and had been discontinued 2 weeks prior. She also noticed intermittent nausea, vomiting and diarrhea and progressive weakness involving bilateral lower extremities and palpitations when standing or taking a couple of steps. She felt like her legs were shaking and she was gasping for air. No bleeding or discharge. She went to urgent care, who sent her to the hospital where she was told her heart rate was in the 140s, Told she was negative for COVID/flu. She had one episode of NBNB vomiting in the the ED, and was told to rest and follow up with a cardiologist. She spoke to her oncologist's office, was evaluated and sent to the hospital for dehydration. She received fluids but was not told of any follow up. She called her oncologist's office again as she became very weak, unable to stand to put her clothes on. She denies fevers during this time. No chest pain, abdominal pain. She has had copious nausea and vomiting, has been unable to tolerate PO for 2.5 weeks. She had 3 days of diarrhea ending 2 days ago, and has not had a bowel movement since. Has lost 18 lbs within last 2-3 weeks.    In the ED, she was found to be hypoglycemic and started on D5 then D10, as her IVC was felt to be of moderate size. She was given ativan with improvement.   EKG showed Sinus tachycardia 121 bpm with narrow QRS and QTC 586ms.   24 labs: Mg 1.2  K 3.2  proBNP normal   CHEST CT angio negative for PE.   Blood cx's negative to date.   RVP negative.   Patient feeling a little better but still with restless legs and weakness when ambulating. No chest pain, shortness of breath at rest. No n/v/d.   EP consultation for prolonged QTc.     PAST MEDICAL & SURGICAL HISTORY:  Breast CA left breast  History of chemotherapy completed 23    S/P  X1   H/O bilateral breast biopsy bioarkers both breasts  Port-A-Cath in place right chest        MEDICATIONS  (STANDING):  cyanocobalamin 1000 MICROGram(s) Oral daily  enoxaparin Injectable 40 milliGRAM(s) SubCutaneous every 24 hours  folic acid 1 milliGRAM(s) Oral daily  influenza   Vaccine 0.5 milliLiter(s) IntraMuscular once  lactated ringers. 1000 milliLiter(s) (75 mL/Hr) IV Continuous <Continuous>  pyridoxine Injectable 25 milliGRAM(s) IV Push daily  thiamine 100 milliGRAM(s) Oral daily    MEDICATIONS  (PRN):  melatonin 3 milliGRAM(s) Oral at bedtime PRN Insomnia  sodium chloride 0.65% Nasal 1 Spray(s) Both Nostrils three times a day PRN Nasal Congestion      FAMILY HISTORY: noncontribuatory      SOCIAL HISTORY:    CIGARETTES: remote history. Quit > 25 years ago  DRUGS:  no  ALCOHOL: no    REVIEW OF SYSTEMS:    CONSTITUTIONAL: No fever, chills, shakes, + fatigue, weight loss of 18lbs within 2-3weeks.   EYES: No eye pain, visual disturbances, or discharge  ENMT:  No difficulty hearing, tinnitus, vertigo; No sinus or throat pain. Dry cough which she states she has had for many years when she becomes anxious.  NECK: No pain or stiffness  BREASTS: No pain, masses, or nipple discharge  RESPIRATORY: No wheezing, hemoptysis, +shortness of breath   CARDIOVASCULAR: MIld chest discomfort with dyspnea, palpitations, No dizziness, syncope or lower leg weakness.   GASTROINTESTINAL: No abdominal  or epigastric pain,  + nausea, vomiting, diarrhea (nonbloody)   GENITOURINARY: No dysuria, nocturia, hematuria, or urinary incontinence  NEUROLOGICAL: No headaches, memory loss, slurred speech,  + limb weakness with loss of strength and restlessness. No numbness or tremors.   SKIN: No itching, burning, rashes, or lesions   LYMPH NODES: No enlarged glands  ENDOCRINE: No heat or cold intolerance, or hair loss  MUSCULOSKELETAL: No joint pain or swelling, muscle, back, or extremity pain  PSYCHIATRIC: No depression, , or difficulty sleeping  + anxiety  HEME/LYMPH: No easy bruising or bleeding gums  ALLERG AND IMMUNOLOGIC: No hives or rash.      Vital Signs Last 24 Hrs  T(C): 37.6 (2024 06:58), Max: 37.6 (2024 16:59)  T(F): 99.6 (2024 06:58), Max: 99.6 (2024 16:59)  HR: 115 (2024 06:58) (115 - 123)  BP: 106/49 (2024 06:58) (93/62 - 106/49)  BP(mean): --  RR: 18 (2024 06:58) (18 - 18)  SpO2: 98% (2024 06:58) (97% - 100%)    Parameters below as of 2024 06:58  Patient On (Oxygen Delivery Method): room air        PHYSICAL EXAM:    GENERAL: In no apparent distress, well nourished, and hydrated. Restless leg movements while lying in bed  HEAD:  Atraumatic, Normocephalic  EYES: EOMI, PERRLA, conjunctiva and sclera clear  ENMT: No tonsillar erythema, exudates, or enlargement; Moist mucous membranes, Good dentition, No lesions  NECK: Supple and normal thyroid.  No JVD or carotid bruit.  Carotid pulse is 2+ bilaterally.  HEART: + regular rhythm slightly tachycardic. No murmurs, rubs, or gallops.  PULMONARY: Clear to auscultation and perfusion.  No rales, wheezing, or rhonchi bilaterally.  ABDOMEN: Soft, Nontender, Nondistended; Bowel sounds present  EXTREMITIES:  2+ Peripheral Pulses, No clubbing, cyanosis, or edema  LYMPH: No lymphadenopathy noted  NEUROLOGICAL: Grossly nonfocal          INTERPRETATION OF TELEMETRY: Sinus tachycardia 110-123 bpm    ECG: Sinus tachycardia 121 bpm with narrow QRS and QTC 586ms.         LABS:                        10.2   2.50  )-----------( 165      ( 2024 07:14 )             29.7     01-05    136  |  106  |  4<L>  ----------------------------<  115<H>  3.8   |  21<L>  |  0.45<L>    Ca    8.9      2024 07:14  Phos  3.2     01-05  Mg     1.70     01-05    TPro  7.8  /  Alb  4.0  /  TBili  0.5  /  DBili  x   /  AST  24  /  ALT  12  /  AlkPhos  82          PT/INR - ( 2024 13:21 )   PT: 13.1 sec;   INR: 1.16 ratio         PTT - ( 2024 13:21 )  PTT:43.5 sec  Urinalysis Basic - ( 2024 07:14 )    Color: x / Appearance: x / SG: x / pH: x  Gluc: 115 mg/dL / Ketone: x  / Bili: x / Urobili: x   Blood: x / Protein: x / Nitrite: x   Leuk Esterase: x / RBC: x / WBC x   Sq Epi: x / Non Sq Epi: x / Bacteria: x      BNP: normal    RADIOLOGY & ADDITIONAL STUDIES:  PREVIOUS DIAGNOSTIC TESTING:      < from: CT Angio Chest PE Protocol w/ IV Cont (24 @ 14:46) >  ACC: [id number] EXAM:  CT ABDOMEN AND PELVIS IC   ORDERED BY: XIOMARA MERCHANT     ACC: [id number] EXAM:  CT ANGIO CHEST PULM ART WAWIC   ORDERED BY: XIOMARA MERCHANT     PROCEDURE DATE:  2024          INTERPRETATION:  CLINICAL INFORMATION: HPI ObjectiveStatement:   48-year-old female with past medical history of  breast CA status post bilateral mastectomy last chemo in November   presents to  the ER complaining of nausea, vomiting, diarrhea, chest pain, shortness of  breath, NAGEL, generalized weakness, groin pain bilaterally x 1 and half   weeks.  COMPARISON: CT chest 2023, MRA abdomen and pelvis 2023    CONTRAST/COMPLICATIONS:  IV Contrast: Omnipaque 350 90 cc administered   10 cc discarded  Oral Contrast: NONE  Complications: None reported at time of study completion    PROCEDURE:  CT Angiography of the Chest was performed followed by portal venous phase   imaging of the Abdomen and Pelvis.  Sagittal and coronal reformats were performed as well as 3D (MIP)   reconstructions.    FINDINGS:  CHEST:  LUNGS AND LARGE AIRWAYS: Patent central airways. Clear lungs.  PLEURA: No pleural effusion.  VESSELS: No main, lobar or segmental pulmonary embolism. Evaluation of   the subsegmental branches of the pulmonary arteries within the lung bases   is limited secondary to motion.  HEART: Heart size is normal. No pericardial effusion.  MEDIASTINUM AND HIRAL: No lymphadenopathy.  CHEST WALL AND LOWER NECK: Status post bilateral breast reconstruction   and implants. A postoperative seroma measuring 2.7 x 2.7 cm is noted in   the right lateral soft tissues. Bilateral axillary clips. Right chest   wall Mediport central venous catheter in place. 1 cm left thyroid nodule   is again noted.    ABDOMEN AND PELVIS:  LIVER: Stable too small to characterize hypodensity within posterior   right lobe of the liver.  BILE DUCTS: Normal caliber.  GALLBLADDER: Within normal limits.  SPLEEN: Within normal limits.  PANCREAS: Within normal limits.  ADRENALS: Within normal limits.  KIDNEYS/URETERS: Within normal limits.  < from: CT Angio Chest PE Protocol w/ IV Cont (24 @ 14:46) >  BLADDER: Within normal limits.  REPRODUCTIVE ORGANS: Uterus and adnexa within normal limits.    BOWEL: No bowel obstruction. Appendix is normal.  PERITONEUM: No ascites.  VESSELS: Within normal limits.  RETROPERITONEUM/LYMPH NODES: No lymphadenopathy.  ABDOMINAL WALL: Postsurgical changes of the anterior lower abdominal   wall. Bilateral inguinal clips.  BONES: Within normal limits.    IMPRESSION:    No main, lobar or segmental pulmonary embolism. Evaluation of the   subsegmental branches of the pulmonary arteries within the lung bases is   limited secondary to motion.    No acute pathology in the chest, abdomen or pelvis.    --- End of Report ---    FROYLAN CAIN MD; Resident Radiology  This document has been electronically signed.  MIKAEL FRANKLIN MD; Attending Radiologist  This document has been electronically signed. Gustavo  3 2024  4:32PM      < from: TTE W or WO Ultrasound Enhancing Agent (24 @ 10:27) >  TRANSTHORACIC ECHOCARDIOGRAM REPORT  ________________________________________________________________________________                                      _______       Pt. Name:       JUSTIN DE LA CRUZ Study Date:    2024  MRN:            [id number]     YOB: 1975  Accession #:    [id number]     Age:           48 years  Account#:       [id number]      Gender:        F  Heart Rate:                   Height:        69.00 in (175.26 cm)  Rhythm:                       Weight:        159.00 lb (72.12 kg)  Blood Pressure: 106/49 mmHg   BSA/BMI:       1.87 m² / 23.48 kg/m²  ________________________________________________________________________________________  Referring Physician:    [id number] Ariadna Paniagua  Interpreting Physician: Kenny Partida M.D.  Primary Sonographer:    Michelle VENTURA    CPT:               ECHO TTE WO CON COMP W DOPP - 35571.m  Indication(s):     Palpitations - R00.2  Procedure:         Transthoracic echocardiogram with 2-D, M-mode and complete          spectral and color flow Doppler.  Ordering Location: [facility]  Study Information: Image quality for this study is fair.    _______________________________________________________________________________________     CONCLUSIONS:      1. Left ventricular cavity is normal. Left ventricular wall thickness is normal. Left ventricular systolic function is normal with an ejection fraction of 60 % by Ortiz's method of disks. There are no regional wall motion abnormalities seen.   2. Normal right ventricular cavity size and probably normal systolic function.   3. Structurally normal mitral valve with normal leaflet excursion. There is trace mitral regurgitation.    ________________________________________________________________________________________  FINDINGS:     Left Ventricle:  The left ventricular cavity is normal. Left ventricular wall thickness is normal. Left ventricular systolic function is normal with a calculated ejection fraction of 60 % by the Ortiz's biplane method of disks. There are no regional wall motion abnormalities seen.     Right Ventricle:  The right ventricular cavity is normal in size and probably normal systolic function. Tricuspid annular plane systolic excursion (TAPSE) is 2.1 cm (normal >=1.7 cm).     Left Atrium:  The left atrium is normal with an indexed volume of 6.48 ml/m².     Right Atrium:  The right atrium is normal in size with an indexed area of 4.97 cm²/m².     Aortic Valve:  The aortic valve anatomy cannot be determined. There is no evidence of aortic regurgitation.     Mitral Valve:  Structurally normal mitral valve with normal leaflet excursion. There is trace mitral regurgitation.     Tricuspid Valve:  Structurally normal tricuspid valve with normal leaflet excursion. There is trace tricuspid regurgitation.     Pulmonic Valve:  Structurally normal pulmonic valve with normal leaflet excursion. There is trace pulmonic regurgitation.     Aorta:  The aortic root at the sinuses of Valsalva is normal in size, measuring 3.10 cm (indexed1.65 cm/m²). The ascending aorta diameter is normal in size, measuring 2.80 cm (indexed 1.49 cm/m²).     Pericardium:  No pericardial effusion seen.     Systemic Veins:  The inferior vena cava was not well visualized.  ____________________________________________________________________

## 2024-01-05 NOTE — CONSULT NOTE ADULT - ASSESSMENT
Ms. Sauer is a 47 YO woman with PMH of left breast cancer on chemo, last session 11/2023 s/p b/l nipple sparing mastectomy/left axillary sentinel lymph node biopsy, JOHN flap reconstruction, who presents for 2 week progressive history of dyspnea on exertion. Endocrinology consulted for evaluation of breast cancer.    #Adrenal Insufficiency  #Hypotension  #Tachycardia  #Hyponatremia  #Hypoglycemia  - 7:14am serum cortisol on 1/5 0.5, diagnostic of adrenal insufficiency  - 11/23 TSH 1.51, FT4 1.1  - patient had hypoglycemia to 64 on BMP 1/3 and hyponatremia to 131  - history of chemotherapy for breast cancer, last 11/23  - patient hypotensive and tachycardic  PLAN    Discussed with primary team.     Thank you for the interesting consult.    Patrice Brar MD, Endocrinology Fellow  For non-urgent follow up questions, please email [email or web address] or [email or web address].  Pager [phone] from 9am to 5pm. After hours and on weekends, please call [phone]. Ms. Sauer is a 49 YO woman with PMH of left breast cancer on chemo, last session 11/2023 s/p b/l nipple sparing mastectomy/left axillary sentinel lymph node biopsy, JOHN flap reconstruction, who presents for 2 week progressive history of dyspnea on exertion. Endocrinology consulted for evaluation of breast cancer.    #Adrenal Insufficiency  #Hypotension  #Tachycardia  #Hyponatremia  #Hypoglycemia  - 7:14am serum cortisol on 1/5 0.5, diagnostic of adrenal insufficiency  - 11/23 TSH 1.51, FT4 1.1  - patient had hypoglycemia to 64 on BMP 1/3 and hyponatremia to 131  - history of chemotherapy for breast cancer, last 11/23  - patient hypotensive and tachycardic  PLAN    Discussed with primary team.     Thank you for the interesting consult.    Patrice Brar MD, Endocrinology Fellow  For non-urgent follow up questions, please email [email or web address] or [email or web address].  Pager [phone] from 9am to 5pm. After hours and on weekends, please call [phone]. Ms. Sauer is a 47 YO woman with PMH of left breast cancer on chemo, last session 11/2023 s/p b/l nipple sparing mastectomy/left axillary sentinel lymph node biopsy, JOHN flap reconstruction, who presents for 2 week progressive history of dyspnea on exertion. Endocrinology consulted for evaluation of breast cancer.    #Adrenal Insufficiency  #Hypotension  #Tachycardia  #Hyponatremia  #Hypoglycemia  - 7:14am serum cortisol on 1/5 0.5, diagnostic of adrenal insufficiency  - 11/23 TSH 1.51, FT4 1.1  - patient had hypoglycemia to 64 on BMP 1/3 and hyponatremia to 131  - history of chemotherapy for breast cancer, last 11/23  - patient hypotensive and tachycardic  - has experienced weight loss, nausea and vomiting  PLAN    Discussed with primary team.     Thank you for the interesting consult.    Patrice Brar MD, Endocrinology Fellow  For non-urgent follow up questions, please email [email or web address] or [email or web address].  Pager [phone] from 9am to 5pm. After hours and on weekends, please call [phone]. Ms. Sauer is a 47 YO woman with PMH of left breast cancer on chemo, last session 11/2023 s/p b/l nipple sparing mastectomy/left axillary sentinel lymph node biopsy, JOHN flap reconstruction, who presents for 2 week progressive history of dyspnea on exertion. Endocrinology consulted for evaluation of breast cancer.    #Adrenal Insufficiency  #Hypotension  #Tachycardia  #Hyponatremia  #Hypoglycemia  - 7:14am serum cortisol on 1/5 0.5, diagnostic of adrenal insufficiency  - 11/23 TSH 1.51, FT4 1.1  - patient had hypoglycemia to 64 on BMP 1/3 and hyponatremia to 131  - history of chemotherapy for breast cancer, last 11/23  - patient hypotensive and tachycardic  - has experienced weight loss, nausea and vomiting  PLAN    Discussed with primary team.     Thank you for the interesting consult.    Patrice Brar MD, Endocrinology Fellow  For non-urgent follow up questions, please email [email or web address] or [email or web address].  Pager [phone] from 9am to 5pm. After hours and on weekends, please call [phone]. Ms. Sauer is a 47 YO woman with PMH of left breast cancer on chemo, last session 11/2023 s/p b/l nipple sparing mastectomy/left axillary sentinel lymph node biopsy, JOHN flap reconstruction, who presents for 2 week progressive history of dyspnea on exertion. Endocrinology consulted for evaluation of breast cancer.    #Adrenal Insufficiency  #Hypotension  #Tachycardia  #Hyponatremia  #Hypoglycemia  - 7:14am serum cortisol on 1/5 0.5, diagnostic of adrenal insufficiency  - 11/23 TSH 1.51, FT4 1.1  - patient had hypoglycemia to 64 on BMP 1/3 and hyponatremia to 131  - history of chemotherapy for breast cancer, last 11/23  - patient hypotensive and tachycardic  - has experienced weight loss, nausea and vomiting  - unclear etiology at this time of adrenal insufficiency  PLAN  - f/u ACTH which will help determine if primary vs secondary AI  - for now, can give one dose of hydrocortisone 30mg PO 4pm on 1/5  - start hydrocortisone 30mg PO at 8am and 15mg PO at 3pm on 1/6  - if patient becomes HD unstable, can start stress dose hydrocortisone 50mg IV q8h  - if patient has primary AI, will also need fludrocortisone and can send adrenal cortex antibody  - check TSH and free thyroxine    Will arrange for follow up and contact patient with appointment details.  [address] [place] [address]  Phone Number: [phone]    Discussed with primary team.     Thank you for the interesting consult.    Patrice Brar MD, Endocrinology Fellow  For non-urgent follow up questions, please email [email or web address] or [email or web address].  Pager [phone] from 9am to 5pm. After hours and on weekends, please call [phone]. Ms. Sauer is a 49 YO woman with PMH of left breast cancer on chemo, last session 11/2023 s/p b/l nipple sparing mastectomy/left axillary sentinel lymph node biopsy, JOHN flap reconstruction, who presents for 2 week progressive history of dyspnea on exertion. Endocrinology consulted for evaluation of breast cancer.    #Adrenal Insufficiency  #Hypotension  #Tachycardia  #Hyponatremia  #Hypoglycemia  - 7:14am serum cortisol on 1/5 0.5, diagnostic of adrenal insufficiency  - 11/23 TSH 1.51, FT4 1.1  - patient had hypoglycemia to 64 on BMP 1/3 and hyponatremia to 131  - history of chemotherapy for breast cancer, last 11/23  - patient hypotensive and tachycardic  - has experienced weight loss, nausea and vomiting  - unclear etiology at this time of adrenal insufficiency  PLAN  - f/u ACTH which will help determine if primary vs secondary AI  - for now, can give one dose of hydrocortisone 30mg PO 4pm on 1/5  - start hydrocortisone 30mg PO at 8am and 15mg PO at 3pm on 1/6  - if patient becomes HD unstable, can start stress dose hydrocortisone 50mg IV q8h  - if patient has primary AI, will also need fludrocortisone and can send adrenal cortex antibody  - check TSH and free thyroxine    Will arrange for follow up and contact patient with appointment details.  [address] [place] [address]  Phone Number: [phone]    Discussed with primary team.     Thank you for the interesting consult.    Patrice Brar MD, Endocrinology Fellow  For non-urgent follow up questions, please email [email or web address] or [email or web address].  Pager [phone] from 9am to 5pm. After hours and on weekends, please call [phone]. Ms. Sauer is a 47 YO woman with PMH of left breast cancer on chemo, last session 11/2023 s/p b/l nipple sparing mastectomy/left axillary sentinel lymph node biopsy, JOHN flap reconstruction, who presents for 2 week progressive history of dyspnea on exertion. Endocrinology consulted for evaluation of breast cancer.    #Adrenal Insufficiency  #Hypotension  #Tachycardia  #Hyponatremia  #Hypoglycemia  - 7:14am serum cortisol on 1/5 0.5, diagnostic of adrenal insufficiency  - 11/23 TSH 1.51, FT4 1.1  - patient had hypoglycemia to 64 on BMP 1/3 and hyponatremia to 131  - history of chemotherapy for breast cancer, last 11/23  - patient hypotensive and tachycardic  - has experienced weight loss, nausea and vomiting  - unclear etiology at this time of adrenal insufficiency  PLAN  - f/u ACTH which will help determine if primary vs secondary AI  - for now, can give one dose of hydrocortisone 30mg PO 4pm on 1/5  - start hydrocortisone 30mg PO at 8am and 15mg PO at 3pm on 1/6, will assist with taper  - if patient becomes HD unstable, can start stress dose hydrocortisone 50mg IV q8h  - if patient has primary AI, will also need fludrocortisone and can send adrenal cortex antibody  - check TSH and free thyroxine  FOR DISCHARGE  to help with dc planning-  - Please print and give the pt info section for patients under adrenal insufficiency (this will educate them regarding the warning signs of adrenal crisis )  - Patient should take 2-3x of home dose in cases of illness, ever, accidents, and surgery. If unable to take PO, use IM injection as below (Solu-Cortef)  - pt should receive stress dosing (IV hydrocortisone 50mg q8) with any major illness or surgical procedure  - Should pt be unable to tolerate PO and is unable to take hydrocortisone, pt will need an emergency injection. Please discharge with a prescription for 100 mg Solu-Cortef Act-O-Vial and the following instructions:  http://www.addisoncrisis.info/emergency-injection/emergency-injection-cortico-steroids-solu-cortef-act-o-vial-two-chamber-ampul/  - pt should obtain a medical alert bracelet or necklace to inform emergency providers of adrenal insufficiency diagnosis [[email or web address].[email or web address]]    Will arrange for follow up and contact patient with appointment details.  [address] [facility] [address]  [place]  [address]  Phone Number: [phone]    Discussed with primary team.     Thank you for the interesting consult.    Patrice Brar MD, Endocrinology Fellow  For non-urgent follow up questions, please email [email or web address] or [email or web address].  Pager [phone] from 9am to 5pm. After hours and on weekends, please call [phone]. Ms. Sauer is a 49 YO woman with PMH of left breast cancer on chemo, last session 11/2023 s/p b/l nipple sparing mastectomy/left axillary sentinel lymph node biopsy, JOHN flap reconstruction, who presents for 2 week progressive history of dyspnea on exertion. Endocrinology consulted for evaluation of breast cancer.    #Adrenal Insufficiency  #Hypotension  #Tachycardia  #Hyponatremia  #Hypoglycemia  - 7:14am serum cortisol on 1/5 0.5, diagnostic of adrenal insufficiency  - 11/23 TSH 1.51, FT4 1.1  - patient had hypoglycemia to 64 on BMP 1/3 and hyponatremia to 131  - history of chemotherapy for breast cancer, last 11/23  - patient hypotensive and tachycardic  - has experienced weight loss, nausea and vomiting  - unclear etiology at this time of adrenal insufficiency  PLAN  - f/u ACTH which will help determine if primary vs secondary AI  - for now, can give one dose of hydrocortisone 30mg PO 4pm on 1/5  - start hydrocortisone 30mg PO at 8am and 15mg PO at 3pm on 1/6, will assist with taper  - if patient becomes HD unstable, can start stress dose hydrocortisone 50mg IV q8h  - if patient has primary AI, will also need fludrocortisone and can send adrenal cortex antibody  - check TSH and free thyroxine  FOR DISCHARGE  to help with dc planning-  - Please print and give the pt info section for patients under adrenal insufficiency (this will educate them regarding the warning signs of adrenal crisis )  - Patient should take 2-3x of home dose in cases of illness, ever, accidents, and surgery. If unable to take PO, use IM injection as below (Solu-Cortef)  - pt should receive stress dosing (IV hydrocortisone 50mg q8) with any major illness or surgical procedure  - Should pt be unable to tolerate PO and is unable to take hydrocortisone, pt will need an emergency injection. Please discharge with a prescription for 100 mg Solu-Cortef Act-O-Vial and the following instructions:  http://www.addisoncrisis.info/emergency-injection/emergency-injection-cortico-steroids-solu-cortef-act-o-vial-two-chamber-ampul/  - pt should obtain a medical alert bracelet or necklace to inform emergency providers of adrenal insufficiency diagnosis [[email or web address].medicalert.[email or web address]]    Will arrange for follow up and contact patient with appointment details.  [address] [facility] [address]  [place]  [address]  Phone Number: [phone]    Discussed with primary team.     Thank you for the interesting consult.    Patrice Brar MD, Endocrinology Fellow  For non-urgent follow up questions, please email [email or web address] or [email or web address].  Pager [phone] from 9am to 5pm. After hours and on weekends, please call [phone]. Ms. Sauer is a 47 YO woman with PMH of left breast cancer on chemo, last session 11/2023 s/p b/l nipple sparing mastectomy/left axillary sentinel lymph node biopsy, JOHN flap reconstruction, who presents for 2 week progressive history of dyspnea on exertion. Endocrinology consulted for evaluation of breast cancer.    #Adrenal Insufficiency  #Hypotension  #Tachycardia  #Hyponatremia  #Hypoglycemia  - 7:14am serum cortisol on 1/5 0.5, diagnostic of adrenal insufficiency  - 11/23 TSH 1.51, FT4 1.1  - patient had hypoglycemia to 64 on BMP 1/3 and hyponatremia to 131  - history of chemotherapy for breast cancer, last 11/23  - patient hypotensive and tachycardic  - patient unsure if received immunotherapy, no history of prolonged steroid or opioid use  - does not appear tanned/concerned for addisons disease  - has experienced weight loss, nausea and vomiting  - unclear etiology at this time of adrenal insufficiency  PLAN  - f/u ACTH which will help determine if primary vs secondary AI  - for now, can give one dose of hydrocortisone 30mg PO 4pm on 1/5  - start hydrocortisone 30mg PO at 8am and 15mg PO at 3pm on 1/6, will assist with taper  - if patient becomes HD unstable, can start stress dose hydrocortisone 50mg IV q8h  - if patient has primary AI, will also need fludrocortisone and can send adrenal cortex antibody  - check TSH and free thyroxine  FOR DISCHARGE  to help with dc planning-  - Please print and give the pt info section for patients under adrenal insufficiency (this will educate them regarding the warning signs of adrenal crisis )  - Patient should take 2-3x of home dose in cases of illness, ever, accidents, and surgery. If unable to take PO, use IM injection as below (Solu-Cortef)  - pt should receive stress dosing (IV hydrocortisone 50mg q8) with any major illness or surgical procedure  - Should pt be unable to tolerate PO and is unable to take hydrocortisone, pt will need an emergency injection. Please discharge with a prescription for 100 mg Solu-Cortef Act-O-Vial and the following instructions:  http://www.addisoncrisis.info/emergency-injection/emergency-injection-cortico-steroids-solu-cortef-act-o-vial-two-chamber-ampul/  - pt should obtain a medical alert bracelet or necklace to inform emergency providers of adrenal insufficiency diagnosis [[email or web address].medicalert.[email or web address]]    Will arrange for follow up and contact patient with appointment details.  [address] [place] [address]  Phone Number: [phone]    Discussed with primary team.     Thank you for the interesting consult.    Patrice Brar MD, Endocrinology Fellow  For non-urgent follow up questions, please email [email or web address] or [email or web address].  Pager [phone] from 9am to 5pm. After hours and on weekends, please call [phone]. Ms. Sauer is a 47 YO woman with PMH of left breast cancer on chemo, last session 11/2023 s/p b/l nipple sparing mastectomy/left axillary sentinel lymph node biopsy, JOHN flap reconstruction, who presents for 2 week progressive history of dyspnea on exertion. Endocrinology consulted for evaluation of breast cancer.    #Adrenal Insufficiency  #Hypotension  #Tachycardia  #Hyponatremia  #Hypoglycemia  - 7:14am serum cortisol on 1/5 0.5, diagnostic of adrenal insufficiency  - 11/23 TSH 1.51, FT4 1.1  - patient had hypoglycemia to 64 on BMP 1/3 and hyponatremia to 131  - history of chemotherapy for breast cancer, last 11/23  - patient hypotensive and tachycardic  - patient unsure if received immunotherapy, no history of prolonged steroid or opioid use  - does not appear tanned/concerned for addisons disease  - has experienced weight loss, nausea and vomiting  - unclear etiology at this time of adrenal insufficiency  PLAN  - f/u ACTH which will help determine if primary vs secondary AI  - for now, can give one dose of hydrocortisone 30mg PO 4pm on 1/5  - start hydrocortisone 30mg PO at 8am and 15mg PO at 3pm on 1/6, will assist with taper  - if patient becomes HD unstable, can start stress dose hydrocortisone 50mg IV q8h  - if patient has primary AI, will also need fludrocortisone and can send adrenal cortex antibody  - check TSH and free thyroxine  FOR DISCHARGE  to help with dc planning-  - Please print and give the pt info section for patients under adrenal insufficiency (this will educate them regarding the warning signs of adrenal crisis )  - Patient should take 2-3x of home dose in cases of illness, ever, accidents, and surgery. If unable to take PO, use IM injection as below (Solu-Cortef)  - pt should receive stress dosing (IV hydrocortisone 50mg q8) with any major illness or surgical procedure  - Should pt be unable to tolerate PO and is unable to take hydrocortisone, pt will need an emergency injection. Please discharge with a prescription for 100 mg Solu-Cortef Act-O-Vial and the following instructions:  http://www.addisoncrisis.info/emergency-injection/emergency-injection-cortico-steroids-solu-cortef-act-o-vial-two-chamber-ampul/  - pt should obtain a medical alert bracelet or necklace to inform emergency providers of adrenal insufficiency diagnosis [http://www.medicalert.[email or web address]]    Will arrange for follow up and contact patient with appointment details.  [address] [place] [address]  Phone Number: [phone]    Discussed with primary team.     Thank you for the interesting consult.    Patrice Brar MD, Endocrinology Fellow  For non-urgent follow up questions, please email [email or web address] or [email or web address].  Pager [phone] from 9am to 5pm. After hours and on weekends, please call [phone]. Ms. Sauer is a 49 YO woman with PMH of left breast cancer on chemo, last session 11/2023 s/p b/l nipple sparing mastectomy/left axillary sentinel lymph node biopsy, JOHN flap reconstruction, who presents for 2 week progressive history of dyspnea on exertion. Endocrinology consulted for evaluation of breast cancer.    #Adrenal Insufficiency  #Hypotension  #Tachycardia  #Hyponatremia  #Hypoglycemia  - 7:14am serum cortisol on 1/5 0.5, diagnostic of adrenal insufficiency  - 11/23 TSH 1.51, FT4 1.1  - patient had hypoglycemia to 64 on BMP 1/3 and hyponatremia to 131  - history of chemotherapy for breast cancer, last 11/23  - patient hypotensive and tachycardic  - patient unsure if received immunotherapy, no history of prolonged steroid or opioid use  - does not appear tanned/concerned for addisons disease  - has experienced weight loss, nausea and vomiting  - per primary team, patient had received keytruda in 11/23 which could cause AI  PLAN  - f/u ACTH which will help determine if primary vs secondary AI  - for now, can give one dose of hydrocortisone 30mg PO 4pm on 1/5  - start hydrocortisone 30mg PO at 8am and 15mg PO at 3pm on 1/6, will assist with taper  - if patient becomes HD unstable, can start stress dose hydrocortisone 50mg IV q8h  - if patient has primary AI, will also need fludrocortisone and can send adrenal cortex antibody  - check TSH and free thyroxine  FOR DISCHARGE  to help with dc planning-  - Please print and give the pt info section for patients under adrenal insufficiency (this will educate them regarding the warning signs of adrenal crisis )  - Patient should take 2-3x of home dose in cases of illness, ever, accidents, and surgery. If unable to take PO, use IM injection as below (Solu-Cortef)  - pt should receive stress dosing (IV hydrocortisone 50mg q8) with any major illness or surgical procedure  - Should pt be unable to tolerate PO and is unable to take hydrocortisone, pt will need an emergency injection. Please discharge with a prescription for 100 mg Solu-Cortef Act-O-Vial and the following instructions:  http://www.addisoncrisis.info/emergency-injection/emergency-injection-cortico-steroids-solu-cortef-act-o-vial-two-chamber-ampul/  - pt should obtain a medical alert bracelet or necklace to inform emergency providers of adrenal insufficiency diagnosis [http://www.medicalert.[email or web address]]    Will arrange for follow up and contact patient with appointment details.  [address] [place] [address]  [address]  Phone Number: [phone]    Discussed with primary team.     Thank you for the interesting consult.    Patrice Brar MD, Endocrinology Fellow  For non-urgent follow up questions, please email [email or web address] or [email or web address].  Pager [phone] from 9am to 5pm. After hours and on weekends, please call [phone]. Ms. Sauer is a 47 YO woman with PMH of left breast cancer on chemo, last session 11/2023 s/p b/l nipple sparing mastectomy/left axillary sentinel lymph node biopsy, JOHN flap reconstruction, who presents for 2 week progressive history of dyspnea on exertion. Endocrinology consulted for evaluation of breast cancer.    #Adrenal Insufficiency  #Hypotension  #Tachycardia  #Hyponatremia  #Hypoglycemia  - 7:14am serum cortisol on 1/5 0.5, diagnostic of adrenal insufficiency  - 11/23 TSH 1.51, FT4 1.1  - patient had hypoglycemia to 64 on BMP 1/3 and hyponatremia to 131  - history of chemotherapy for breast cancer, last 11/23  - patient hypotensive and tachycardic  - patient unsure if received immunotherapy, no history of prolonged steroid or opioid use  - does not appear tanned/concerned for addisons disease  - has experienced weight loss, nausea and vomiting  - per primary team, patient had received keytruda in 11/23 which could cause AI  PLAN  - f/u ACTH which will help determine if primary vs secondary AI  - for now, can give one dose of hydrocortisone 30mg PO 4pm on 1/5  - start hydrocortisone 30mg PO at 8am and 15mg PO at 3pm on 1/6, will assist with taper  - if patient becomes HD unstable, can start stress dose hydrocortisone 50mg IV q8h  - if patient has primary AI, will also need fludrocortisone and can send adrenal cortex antibody  - check TSH and free thyroxine  FOR DISCHARGE  to help with dc planning-  - Please print and give the pt info section for patients under adrenal insufficiency (this will educate them regarding the warning signs of adrenal crisis )  - Patient should take 2-3x of home dose in cases of illness, ever, accidents, and surgery. If unable to take PO, use IM injection as below (Solu-Cortef)  - pt should receive stress dosing (IV hydrocortisone 50mg q8) with any major illness or surgical procedure  - Should pt be unable to tolerate PO and is unable to take hydrocortisone, pt will need an emergency injection. Please discharge with a prescription for 100 mg Solu-Cortef Act-O-Vial and the following instructions:  http://www.addisoncrisis.info/emergency-injection/emergency-injection-cortico-steroids-solu-cortef-act-o-vial-two-chamber-ampul/  - pt should obtain a medical alert bracelet or necklace to inform emergency providers of adrenal insufficiency diagnosis [[email or web address]]    Will arrange for follow up and contact patient with appointment details.  [address] [place] [address]  [id number]  Phone Number: [phone]    Discussed with primary team.     Thank you for the interesting consult.    Patrice Brar MD, Endocrinology Fellow  For non-urgent follow up questions, please email [email or web address] or [email or web address].  Pager [phone] from 9am to 5pm. After hours and on weekends, please call [phone]. Ms. Sauer is a 49 YO woman with PMH of left breast cancer on chemo, last session 11/2023 s/p b/l nipple sparing mastectomy/left axillary sentinel lymph node biopsy, JOHN flap reconstruction, who presents for 2 week progressive history of dyspnea on exertion. Endocrinology consulted for evaluation of breast cancer.    #Adrenal Insufficiency  #Hypotension  #Tachycardia  #Hyponatremia  #Hypoglycemia  #Hx immunotherapy  - 7:14am serum cortisol on 1/5 0.5, diagnostic of adrenal insufficiency  - 11/23 TSH 1.51, FT4 1.1  - patient had hypoglycemia to 64 on BMP 1/3 and hyponatremia to 131  - history of chemotherapy for breast cancer, last 11/23  - patient hypotensive and tachycardic  - patient unsure if received immunotherapy, no history of prolonged steroid or opioid use  - does not appear tanned/concerned for addisons disease  - has experienced weight loss, nausea and vomiting  - per primary team, patient had received keytruda in 11/23 which could cause AI  PLAN  - f/u ACTH which will help determine if primary vs secondary AI  - for now, can give one dose of hydrocortisone 30mg PO 4pm on 1/5  - start hydrocortisone 30mg PO at 8am and 15mg PO at 3pm on 1/6, will assist with taper  - if patient becomes HD unstable, can start stress dose hydrocortisone 50mg IV q8h  - if patient has primary AI, will also need fludrocortisone and can send adrenal cortex antibody  - check TSH and free thyroxine  FOR DISCHARGE  to help with dc planning-  - Please print and give the pt info section for patients under adrenal insufficiency (this will educate them regarding the warning signs of adrenal crisis )  - Patient should take 2-3x of home dose in cases of illness, ever, accidents, and surgery. If unable to take PO, use IM injection as below (Solu-Cortef)  - pt should receive stress dosing (IV hydrocortisone 50mg q8) with any major illness or surgical procedure  - Should pt be unable to tolerate PO and is unable to take hydrocortisone, pt will need an emergency injection. Please discharge with a prescription for 100 mg Solu-Cortef Act-O-Vial and the following instructions:  http://www.addisoncrisis.info/emergency-injection/emergency-injection-cortico-steroids-solu-cortef-act-o-vial-two-chamber-ampul/  - pt should obtain a medical alert bracelet or necklace to inform emergency providers of adrenal insufficiency diagnosis [[email or web address]]    Will arrange for follow up and contact patient with appointment details.  [address] [place] [address]  Phone Number: [phone]    Discussed with primary team.     Thank you for the interesting consult.    Patrice Brar MD, Endocrinology Fellow  For non-urgent follow up questions, please email [email or web address] or [email or web address].  Pager [phone] from 9am to 5pm. After hours and on weekends, please call [phone]. Ms. Sauer is a 49 YO woman with PMH of left breast cancer on chemo, last session 11/2023 s/p b/l nipple sparing mastectomy/left axillary sentinel lymph node biopsy, JOHN flap reconstruction, who presents for 2 week progressive history of dyspnea on exertion. Endocrinology consulted for evaluation of breast cancer.    #Adrenal Insufficiency  #Hypotension  #Tachycardia  #Hyponatremia  #Hypoglycemia  #Hx immunotherapy  - 7:14am serum cortisol on 1/5 0.5, diagnostic of adrenal insufficiency  - 11/23 TSH 1.51, FT4 1.1  - patient had hypoglycemia to 64 on BMP 1/3 and hyponatremia to 131  - history of chemotherapy for breast cancer, last 11/23  - patient hypotensive and tachycardic  - patient unsure if received immunotherapy, no history of prolonged steroid or opioid use  - does not appear tanned/concerned for addisons disease  - has experienced weight loss, nausea and vomiting  - per primary team, patient had received keytruda in 11/23 which could cause AI  PLAN  - f/u ACTH which will help determine if primary vs secondary AI  - for now, can give one dose of hydrocortisone 30mg PO 4pm on 1/5  - start hydrocortisone 30mg PO at 8am and 15mg PO at 3pm on 1/6, will assist with taper  - if patient becomes HD unstable, can start stress dose hydrocortisone 50mg IV q8h  - if patient has primary AI, will also need fludrocortisone and can send adrenal cortex antibody  - check TSH and free thyroxine  FOR DISCHARGE  to help with dc planning-  - Please print and give the pt info section for patients under adrenal insufficiency (this will educate them regarding the warning signs of adrenal crisis )  - Patient should take 2-3x of home dose in cases of illness, ever, accidents, and surgery. If unable to take PO, use IM injection as below (Solu-Cortef)  - pt should receive stress dosing (IV hydrocortisone 50mg q8) with any major illness or surgical procedure  - Should pt be unable to tolerate PO and is unable to take hydrocortisone, pt will need an emergency injection. Please discharge with a prescription for 100 mg Solu-Cortef Act-O-Vial and the following instructions:  http://www.addisoncrisis.info/emergency-injection/emergency-injection-cortico-steroids-solu-cortef-act-o-vial-two-chamber-ampul/  - pt should obtain a medical alert bracelet or necklace to inform emergency providers of adrenal insufficiency diagnosis [[email or web address]]    Will arrange for follow up and contact patient with appointment details.  [address] [place] [address]  Phone Number: [phone]    Discussed with primary team.     Thank you for the interesting consult.    Patrice Brar MD, Endocrinology Fellow  For non-urgent follow up questions, please email [email or web address] or [email or web address].  Pager [phone] from 9am to 5pm. After hours and on weekends, please call [phone]. Ms. Sauer is a 47 YO woman with PMH of left breast cancer on chemo, last session 11/2023 s/p b/l nipple sparing mastectomy/left axillary sentinel lymph node biopsy, JOHN flap reconstruction, who presents for 2 week progressive history of dyspnea on exertion. Endocrinology consulted for evaluation of breast cancer.    #Adrenal Insufficiency  #Hypotension  #Tachycardia  #Hyponatremia  #Hypoglycemia  #Hx immunotherapy  - 7:14am serum cortisol on 1/5 0.5, diagnostic of adrenal insufficiency  - 11/23 TSH 1.51, FT4 1.1  - patient had hypoglycemia to 64 on BMP 1/3 and hyponatremia to 131  - history of chemotherapy for breast cancer, last 11/23  - patient hypotensive and tachycardic  - patient unsure if received immunotherapy, no history of prolonged steroid or opioid use  - does not appear tanned/concerned for addisons disease  - has experienced weight loss, nausea and vomiting  - per primary team, patient had received keytruda in 11/23 which could cause AI  PLAN  - f/u ACTH which will help determine if primary vs secondary AI  - for now, can give one dose of hydrocortisone 20mg PO 4pm on 1/5  - start hydrocortisone 20mg PO at 8am and 10mg PO at 3pm on 1/6, will assist with taper  - if patient becomes HD unstable, can start stress dose hydrocortisone 50mg IV q8h  - if patient has primary AI, will also need fludrocortisone and can send adrenal cortex antibody  - check TSH and free thyroxine  FOR DISCHARGE  to help with dc planning-  - Please print and give the pt info section for patients under adrenal insufficiency (this will educate them regarding the warning signs of adrenal crisis )  - Patient should take 2-3x of home dose in cases of illness, ever, accidents, and surgery. If unable to take PO, use IM injection as below (Solu-Cortef)  - pt should receive stress dosing (IV hydrocortisone 50mg q8) with any major illness or surgical procedure  - Should pt be unable to tolerate PO and is unable to take hydrocortisone, pt will need an emergency injection. Please discharge with a prescription for 100 mg Solu-Cortef Act-O-Vial and the following instructions:  http://www.addisoncrisis.info/emergency-injection/emergency-injection-cortico-steroids-solu-cortef-act-o-vial-two-chamber-ampul/  - pt should obtain a medical alert bracelet or necklace to inform emergency providers of adrenal insufficiency diagnosis [[email or web address]]    Will arrange for follow up and contact patient with appointment details.  [address] [place] [address]  Phone Number: [phone]    Discussed with primary team.     Thank you for the interesting consult.    Patrice Brar MD, Endocrinology Fellow  For non-urgent follow up questions, please email [email or web address] or [email or web address].  Pager [phone] from 9am to 5pm. After hours and on weekends, please call [phone]. Ms. Sauer is a 49 YO woman with PMH of left breast cancer on chemo, last session 11/2023 s/p b/l nipple sparing mastectomy/left axillary sentinel lymph node biopsy, JOHN flap reconstruction, who presents for 2 week progressive history of dyspnea on exertion. Endocrinology consulted for evaluation of breast cancer.    #Adrenal Insufficiency  #Hypotension  #Tachycardia  #Hyponatremia  #Hypoglycemia  #Hx immunotherapy  - 7:14am serum cortisol on 1/5 0.5, diagnostic of adrenal insufficiency  - 11/23 TSH 1.51, FT4 1.1  - patient had hypoglycemia to 64 on BMP 1/3 and hyponatremia to 131  - history of chemotherapy for breast cancer, last 11/23  - patient hypotensive and tachycardic  - patient unsure if received immunotherapy, no history of prolonged steroid or opioid use  - does not appear tanned/concerned for addisons disease  - has experienced weight loss, nausea and vomiting  - per primary team, patient had received keytruda in 11/23 which could cause AI  PLAN  - f/u ACTH which will help determine if primary vs secondary AI  - for now, can give one dose of hydrocortisone 20mg PO 4pm on 1/5  - start hydrocortisone 20mg PO at 8am and 10mg PO at 3pm on 1/6, will assist with taper  - if patient becomes HD unstable, can start stress dose hydrocortisone 50mg IV q8h  - if patient has primary AI, will also need fludrocortisone and can send adrenal cortex antibody  - check TSH and free thyroxine  FOR DISCHARGE  to help with dc planning-  - Please print and give the pt info section for patients under adrenal insufficiency (this will educate them regarding the warning signs of adrenal crisis )  - Patient should take 2-3x of home dose in cases of illness, ever, accidents, and surgery. If unable to take PO, use IM injection as below (Solu-Cortef)  - pt should receive stress dosing (IV hydrocortisone 50mg q8) with any major illness or surgical procedure  - Should pt be unable to tolerate PO and is unable to take hydrocortisone, pt will need an emergency injection. Please discharge with a prescription for 100 mg Solu-Cortef Act-O-Vial and the following instructions:  http://www.addisoncrisis.info/emergency-injection/emergency-injection-cortico-steroids-solu-cortef-act-o-vial-two-chamber-ampul/  - pt should obtain a medical alert bracelet or necklace to inform emergency providers of adrenal insufficiency diagnosis [[email or web address]]    Will arrange for follow up and contact patient with appointment details.  [address] [place] [address]  Phone Number: [phone]    Discussed with primary team.     Thank you for the interesting consult.    Patrice Brar MD, Endocrinology Fellow  For non-urgent follow up questions, please email [email or web address] or [email or web address].  Pager [phone] from 9am to 5pm. After hours and on weekends, please call [phone]. Ms. Sauer is a 49 YO woman with PMH of left breast cancer on chemo, last session 11/2023 s/p b/l nipple sparing mastectomy/left axillary sentinel lymph node biopsy, JOHN flap reconstruction, who presents for 2 week progressive history of dyspnea on exertion. Endocrinology consulted for evaluation of breast cancer.    #Adrenal Insufficiency  #Hypotension  #Tachycardia  #Hyponatremia  #Hypoglycemia  #Hx immunotherapy  - 7:14am serum cortisol on 1/5 0.5, diagnostic of adrenal insufficiency  - 11/23 TSH 1.51, FT4 1.1  - patient had hypoglycemia to 64 on BMP 1/3 and hyponatremia to 131  - history of chemotherapy for breast cancer, last 11/23  - patient hypotensive and tachycardic  - patient unsure if received immunotherapy, no history of prolonged steroid or opioid use  - does not appear tanned/concerned for addisons disease  - has experienced weight loss, nausea and vomiting  - per primary team, patient had received keytruda in 11/23 which could cause AI  PLAN  - f/u ACTH which will help determine if primary vs secondary AI  - for now, can give one dose of hydrocortisone 20mg PO 4pm on 1/5  - start hydrocortisone 20mg PO at 8am and 10mg PO at 3pm on 1/6, will assist with taper  - if patient becomes HD unstable, can start stress dose hydrocortisone 50mg IV q8h  - if patient has primary AI, will also need fludrocortisone and can send adrenal cortex antibody  - check TSH and free thyroxine  FOR DISCHARGE  to help with dc planning-  - Please print and give the pt info section for patients under adrenal insufficiency (this will educate them regarding the warning signs of adrenal crisis )  - Patient should take 2-3x of home dose in cases of illness, ever, accidents, and surgery. If unable to take PO, use IM injection as below (Solu-Cortef)  - pt should receive stress dosing (IV hydrocortisone 50mg q8) with any major illness or surgical procedure  - Should pt be unable to tolerate PO and is unable to take hydrocortisone, pt will need an emergency injection. Please discharge with a prescription for 100 mg Solu-Cortef Act-O-Vial and the following instructions:  http://www.addisoncrisis.info/emergency-injection/emergency-injection-cortico-steroids-solu-cortef-act-o-vial-two-chamber-ampul/  - pt should obtain a medical alert bracelet or necklace to inform emergency providers of adrenal insufficiency diagnosis [[email or web address]]    Will arrange for follow up and contact patient with appointment details. Will schedule with Dr. Rivero.  [address] [place] [address]  Phone Number: [phone]    Discussed with primary team.     Thank you for the interesting consult.    Patrice Brar MD, Endocrinology Fellow  For non-urgent follow up questions, please email [email or web address] or [email or web address].  Pager [phone] from 9am to 5pm. After hours and on weekends, please call [phone]. Ms. Sauer is a 49 YO woman with PMH of left breast cancer on chemo, last session 11/2023 s/p b/l nipple sparing mastectomy/left axillary sentinel lymph node biopsy, JOHN flap reconstruction, who presents for 2 week progressive history of dyspnea on exertion. Endocrinology consulted for evaluation of breast cancer.    #Adrenal Insufficiency  #Hypotension  #Tachycardia  #Hyponatremia  #Hypoglycemia  #Hx immunotherapy  - 7:14am serum cortisol on 1/5 0.5, diagnostic of adrenal insufficiency  - 11/23 TSH 1.51, FT4 1.1  - patient had hypoglycemia to 64 on BMP 1/3 and hyponatremia to 131  - history of chemotherapy for breast cancer, last 11/23  - patient hypotensive and tachycardic  - patient unsure if received immunotherapy, no history of prolonged steroid or opioid use  - does not appear tanned/concerned for addisons disease  - has experienced weight loss, nausea and vomiting  - per primary team, patient had received keytruda in 11/23 which could cause AI  PLAN  - f/u ACTH which will help determine if primary vs secondary AI  - for now, can give one dose of hydrocortisone 20mg PO 4pm on 1/5  - start hydrocortisone 20mg PO at 8am and 10mg PO at 3pm on 1/6, will assist with taper  - if patient becomes HD unstable, can start stress dose hydrocortisone 50mg IV q8h  - if patient has primary AI, will also need fludrocortisone and can send adrenal cortex antibody  - check TSH and free thyroxine  FOR DISCHARGE  to help with dc planning-  - Please print and give the pt info section for patients under adrenal insufficiency (this will educate them regarding the warning signs of adrenal crisis )  - Patient should take 2-3x of home dose in cases of illness, ever, accidents, and surgery. If unable to take PO, use IM injection as below (Solu-Cortef)  - pt should receive stress dosing (IV hydrocortisone 50mg q8) with any major illness or surgical procedure  - Should pt be unable to tolerate PO and is unable to take hydrocortisone, pt will need an emergency injection. Please discharge with a prescription for 100 mg Solu-Cortef Act-O-Vial and the following instructions:  http://www.addisoncrisis.info/emergency-injection/emergency-injection-cortico-steroids-solu-cortef-act-o-vial-two-chamber-ampul/  - pt should obtain a medical alert bracelet or necklace to inform emergency providers of adrenal insufficiency diagnosis [[email or web address].[email or web address]]    Will arrange for follow up and contact patient with appointment details. Will schedule with Dr. Rivero.  [address] [place] [address]  Phone Number: [phone]    Discussed with primary team.     Thank you for the interesting consult.    Patrice Brar MD, Endocrinology Fellow  For non-urgent follow up questions, please email [email or web address] or [email or web address].  Pager [phone] from 9am to 5pm. After hours and on weekends, please call [phone]. Ms. Sauer is a 47 YO woman with PMH of left breast cancer on chemo, last session 11/2023 s/p b/l nipple sparing mastectomy/left axillary sentinel lymph node biopsy, JOHN flap reconstruction, who presents for 2 week progressive history of dyspnea on exertion. Endocrinology consulted for evaluation of breast cancer.    #Adrenal Insufficiency  #Hypotension  #Tachycardia  #Hyponatremia  #Hypoglycemia  #Hx immunotherapy  - 7:14am serum cortisol on 1/5 0.5, diagnostic of adrenal insufficiency  - 11/23 TSH 1.51, FT4 1.1  - patient had hypoglycemia to 64 on BMP 1/3 and hyponatremia to 131  - history of chemotherapy for breast cancer, last 11/23  - patient hypotensive and tachycardic  - patient unsure if received immunotherapy, no history of prolonged steroid or opioid use  - does not appear tanned/concerned for addisons disease  - has experienced weight loss, nausea and vomiting  - per primary team, patient had received keytruda in 11/23 which could cause AI  PLAN  - f/u ACTH which will help determine if primary vs secondary AI  - for now, can give one dose of hydrocortisone 20mg PO 4pm on 1/5  - start hydrocortisone 20mg PO at 8am and 10mg PO at 3pm on 1/6, will assist with taper  - if patient becomes HD unstable, can start stress dose hydrocortisone 50mg IV q8h  - if patient has primary AI, will also need fludrocortisone and can send adrenal cortex antibody  - check TSH and free thyroxine  FOR DISCHARGE  to help with dc planning-  - Please print and give the pt info section for patients under adrenal insufficiency (this will educate them regarding the warning signs of adrenal crisis )  - Patient should take 2-3x of home dose in cases of illness, ever, accidents, and surgery. If unable to take PO, use IM injection as below (Solu-Cortef)  - pt should receive stress dosing (IV hydrocortisone 50mg q8) with any major illness or surgical procedure  - Should pt be unable to tolerate PO and is unable to take hydrocortisone, pt will need an emergency injection. Please discharge with a prescription for 100 mg Solu-Cortef Act-O-Vial and the following instructions:  http://www.addisoncrisis.info/emergency-injection/emergency-injection-cortico-steroids-solu-cortef-act-o-vial-two-chamber-ampul/  - pt should obtain a medical alert bracelet or necklace to inform emergency providers of adrenal insufficiency diagnosis [[email or web address].[email or web address]]    Will arrange for follow up and contact patient with appointment details. Will schedule with Dr. Rivero.  [address] [place] [address]  Phone Number: [phone]    Discussed with primary team.     Thank you for the interesting consult.    Patrice Brar MD, Endocrinology Fellow  For non-urgent follow up questions, please email [email or web address] or [email or web address].  Pager [phone] from 9am to 5pm. After hours and on weekends, please call [phone]. Ms. Sauer is a 47 YO woman with PMH of left breast cancer on chemo, last session 11/2023 s/p b/l nipple sparing mastectomy/left axillary sentinel lymph node biopsy, JOHN flap reconstruction, who presents for 2 week progressive history of dyspnea on exertion. Endocrinology consulted for evaluation of breast cancer.    #Adrenal Insufficiency  #Hypotension  #Tachycardia  #Hyponatremia  #Hypoglycemia  #Hx immunotherapy  - 7:14am serum cortisol on 1/5 0.5, diagnostic of adrenal insufficiency  - 11/23 TSH 1.51, FT4 1.1  - patient had hypoglycemia to 64 on BMP 1/3 and hyponatremia to 131  - history of chemotherapy for breast cancer, last 11/23  - patient hypotensive and tachycardic  - patient unsure if received immunotherapy, no history of prolonged steroid or opioid use  - does not appear tanned/concerned for addisons disease  - has experienced weight loss, nausea and vomiting  - per primary team, patient had received keytruda in 11/23 which could cause AI  PLAN  - f/u ACTH which will help determine if primary vs secondary AI  - for now, can give one dose of hydrocortisone 20mg PO 4pm on 1/5  - start hydrocortisone 20mg PO at 8am and 10mg PO at 3pm on 1/6, will assist with taper  - if patient becomes HD unstable, can start stress dose hydrocortisone 50mg IV q8h  - if patient has primary AI, will also need fludrocortisone and can send adrenal cortex antibody  - check TSH and free thyroxine  FOR DISCHARGE  to help with dc planning-  - Please print and give the pt info section for patients under adrenal insufficiency (this will educate them regarding the warning signs of adrenal crisis )  - Patient should take 2-3x of home dose in cases of illness, ever, accidents, and surgery. If unable to take PO, use IM injection as below (Solu-Cortef)  - pt should receive stress dosing (IV hydrocortisone 50mg q8) with any major illness or surgical procedure  - Should pt be unable to tolerate PO and is unable to take hydrocortisone, pt will need an emergency injection. Please discharge with a prescription for 100 mg Solu-Cortef Act-O-Vial and the following instructions:  http://www.addisoncrisis.info/emergency-injection/emergency-injection-cortico-steroids-solu-cortef-act-o-vial-two-chamber-ampul/  - pt should obtain a medical alert bracelet or necklace to inform emergency providers of adrenal insufficiency diagnosis [[email or web address].[email or web address]]    Will arrange for follow up and contact patient with appointment details. Will schedule with Dr. Rivero.  [address] [place] [address]  Phone Number: [phone]    Discussed with primary team.     Thank you for the interesting consult.    Patrice Brar MD, Endocrinology Fellow  For non-urgent follow up questions, please email [email or web address] or [email or web address].  Pager [phone] from 9am to 5pm. After hours and on weekends, please call [phone].

## 2024-01-05 NOTE — PHYSICAL THERAPY INITIAL EVALUATION ADULT - GENERAL OBSERVATIONS, REHAB EVAL
Pt encountered semireclined in bed in no distress, +telemetry, +primafit, +IV. Blood pressure = 87/49. Heart rate = 112 bpm.

## 2024-01-05 NOTE — DISCHARGE NOTE PROVIDER - CARE PROVIDERS DIRECT ADDRESSES
,[email or web address],DirectAddress_Unknown

## 2024-01-05 NOTE — DISCHARGE NOTE PROVIDER - NSDCMRMEDTOKEN_GEN_ALL_CORE_FT
3cc Syringe: To draw up solu-cortef  ICD-10   alcohol swabs: To clean top of solu-cortef vial prior to drawing up medication  hydrocortisone 5 mg oral tablet: 1 tab(s) orally twice Please take 4 Tabs at 8am on 1/7, 2 tabs at 3pm on 1/7  Started 1/8 please take 2 tabs at 8am and 1 tab at 3pm  Do not stop taking this medication   If you feel sick/fevers/chills please take 4 tabs at 8am and 2 tabs at 3pm until your symptoms resolve, then resume 2 tabs at 8am and 1 tab at 3pm  Intramuscular Needles: To draw up solu-cortef  ICD 10 W46.1XXA  Physical Therapy: R26.81	Unsteadiness on feet  Please provide the patient with outpatient physical therapy for strength and mobility training  Solu-CORTEF Act-O-Vial 100 mg injection: 100 milligram(s) intravenously once a day Please use this in an emergency if you are unable to take your PO steroid tabs   Immediately call 911 after administering  Synthroid 50 mcg (0.05 mg) oral tablet: 1 tab(s) orally once a day Please take this 6am on an empty stomach, do not eat/drink or take additional medications for 1hr   3cc Syringe: To draw up solu-cortef  ICD-10   alcohol swabs: To clean top of solu-cortef vial prior to drawing up medication  hydrocortisone 5 mg oral tablet: 1 tab(s) orally twice Please take 3 Tabs at 8am, 1.5 tabs at 3pm  Do not stop taking this medication   If you feel sick/fevers/chills please take 6 tabs at 8am and 3 tabs at 3pm until your symptoms resolve, then resume 3 tabs at 8am and 1.5 tab at 3pm  Intramuscular Needles: To draw up solu-cortef  ICD 10 W46.1XXA  Physical Therapy: R26.81	Unsteadiness on feet  Please provide the patient with outpatient physical therapy for strength and mobility training  Solu-CORTEF Act-O-Vial 100 mg injection: 100 milligram(s) intravenously once a day Please use this in an emergency if you are unable to take your PO steroid tabs   Immediately call 911 after administering  Synthroid 50 mcg (0.05 mg) oral tablet: 1 tab(s) orally once a day Please take this 6am on an empty stomach, do not eat/drink or take additional medications for 1hr

## 2024-01-05 NOTE — CONSULT NOTE ADULT - NS ATTEND AMEND GEN_ALL_CORE FT
49 YO woman with PMH of left breast cancer diagnosed 5/2023 on chemo, last session 11/2023 s/p b/l nipple sparing mastectomy/left axillary sentinel lymph node biopsy, JOHN flap reconstruction, who presented with 2 week progressive history of dyspnea on exertion, nausea, vomiting, diarrhea, fatigue,18 lb weight loss, palpitations and weakness in lower extremities. COVID and flu negative.  She attributed it to a brief chemotherapy regimen with Xeloda, which she had not been able to tolerate and had been discontinued 2 weeks prior. Seen at urgent care and was sent her to the hospital where she was told her heart rate was in the 140s,  Sent to the hospital for dehydration.  She has had copious nausea and vomiting, has been unable to tolerate PO for 2.5 weeks. She had 3 days of diarrhea ending 2 days ago, and has not had a bowel movement since. Has lost 18 lbs within last 2-3 weeks.  Prolonged QTc likely secondary to metabolic derangements and xeloda. Continue to replete Mg and K. Will follow.

## 2024-01-05 NOTE — PROGRESS NOTE ADULT - PROBLEM SELECTOR PLAN 1
pH 7.17. Most recent anion gap 19. Lactate wnl, no salicylate use  - Likely starvation ketosis as hasn't had food in many days, BHB slightly elevated  -Total fluids resuscitation of 3L      Plan:  - Monitor electrolytes for refeeding syndrome  - Continue LR MIVF  - Thiamine, Folate/Vitamin b12 supplementation  - Strict I&O pH 7.17. Most recent anion gap 19. Lactate wnl, no salicylate use  - Likely starvation ketosis as hasn't had food in many days, BHB slightly elevated  -Total fluids resuscitation of 3L    Anion gap resolving     Plan:  - Monitor electrolytes for refeeding syndrome  - Continue LR MIVF  - Thiamine, Folate/Vitamin b12 supplementation  - Strict I&O

## 2024-01-05 NOTE — CONSULT NOTE ADULT - ASSESSMENT
Patient is a 49 YO woman with PMH of left breast cancer diagnosed 5/2023 on chemo, last session 11/2023 s/p b/l nipple sparing mastectomy/left axillary sentinel lymph node biopsy, JOHN flap reconstruction, who presented with 2 week progressive history of dyspnea on exertion, nausea, vomiting, diarrhea, fatigur,18 lb weight loss and weakness in lower extremities. COVID and flu negative She notes that around Thanksgiving she started noticing she would fatigue more quickly. She attributed it to a brief chemotherapy regimen with Xeloda, which she had not been able to tolerate and had been discontinued 2 weeks prior. She also noticed intermittent nausea, vomiting and diarrhea and progressive weakness involving bilateral lower extremities and palpitations when standing or taking a couple of steps. She felt like her legs were shaking and she was gasping for air. No bleeding or discharge. She went to urgent care, who sent her to the hospital where she was told her heart rate was in the 140s, Told she was negative for COVID/flu. She had one episode of NBNB vomiting in the the ED, and was told to rest and follow up with a cardiologist. She spoke to her oncologist's office, was evaluated and sent to the hospital for dehydration. She received fluids but was not told of any follow up. She called her oncologist's office again as she became very weak, unable to stand to put her clothes on. She denies fevers during this time. No chest pain, abdominal pain. She has had copious nausea and vomiting, has been unable to tolerate PO for 2.5 weeks. She had 3 days of diarrhea ending 2 days ago, and has not had a bowel movement since. Has lost 18 lbs within last 2-3 weeks.    In the ED, she was found to be hypoglycemic and started on D5 then D10, as her IVC was felt to be of moderate size. She was given ativan with improvement.   EKG showed Sinus tachycardia 121 bpm with narrow QRS and QTC 586ms.   1/4/24 labs: Mg 1.2  K 3.2  proBNP normal   CHEST CT angio negative for PE.   Blood cx's negative to date.   RVP negative.   Patient feeling a little better but still with restless legs and weakness when ambulating. No chest pain, shortness of breath at rest. No n/v/d.   EP consultation for prolonged QTc.    Patient is a 47 YO woman with PMH of left breast cancer diagnosed 5/2023 on chemo, last session 11/2023 s/p b/l nipple sparing mastectomy/left axillary sentinel lymph node biopsy, JOHN flap reconstruction, who presented with 2 week progressive history of dyspnea on exertion, nausea, vomiting, diarrhea, fatigur,18 lb weight loss and weakness in lower extremities. COVID and flu negative She notes that around Thanksgiving she started noticing she would fatigue more quickly. She attributed it to a brief chemotherapy regimen with Xeloda, which she had not been able to tolerate and had been discontinued 2 weeks prior. She also noticed intermittent nausea, vomiting and diarrhea and progressive weakness involving bilateral lower extremities and palpitations when standing or taking a couple of steps. She felt like her legs were shaking and she was gasping for air. No bleeding or discharge. She went to urgent care, who sent her to the hospital where she was told her heart rate was in the 140s, Told she was negative for COVID/flu. She had one episode of NBNB vomiting in the the ED, and was told to rest and follow up with a cardiologist. She spoke to her oncologist's office, was evaluated and sent to the hospital for dehydration. She received fluids but was not told of any follow up. She called her oncologist's office again as she became very weak, unable to stand to put her clothes on. She denies fevers during this time. No chest pain, abdominal pain. She has had copious nausea and vomiting, has been unable to tolerate PO for 2.5 weeks. She had 3 days of diarrhea ending 2 days ago, and has not had a bowel movement since. Has lost 18 lbs within last 2-3 weeks.    In the ED, she was found to be hypoglycemic and started on D5 then D10, as her IVC was felt to be of moderate size. She was given ativan with improvement.   EKG showed Sinus tachycardia 121 bpm with narrow QRS and QTC 586ms.   1/4/24 labs: Mg 1.2  K 3.2  proBNP normal   CHEST CT angio negative for PE.   Blood cx's negative to date.   RVP negative.   Patient feeling a little better but still with restless legs and weakness when ambulating. No chest pain, shortness of breath at rest. No n/v/d.   EP consultation for prolonged QTc.    Patient is a 49 YO woman with PMH of left breast cancer diagnosed 5/2023 on chemo, last session 11/2023 s/p b/l nipple sparing mastectomy/left axillary sentinel lymph node biopsy, JOHN flap reconstruction, who presented with 2 week progressive history of dyspnea on exertion, nausea, vomiting, diarrhea, fatigue,18 lb weight loss, palpitations and weakness in lower extremities. COVID and flu negative.  She attributed it to a brief chemotherapy regimen with Xeloda, which she had not been able to tolerate and had been discontinued 2 weeks prior. Seen at urgent care and was sent her to the hospital where she was told her heart rate was in the 140s,  Sent to the hospital for dehydration.  She has had copious nausea and vomiting, has been unable to tolerate PO for 2.5 weeks. She had 3 days of diarrhea ending 2 days ago, and has not had a bowel movement since. Has lost 18 lbs within last 2-3 weeks.    In the ED, she was found to be hypoglycemic and started on D5 then D10, as her IVC was felt to be of moderate size. She was given ativan with improvement.   EKG showed Sinus tachycardia 121 bpm with narrow QRS and QTC 586ms.   1/4/24 labs: Mg 1.2  K 3.2  proBNP normal   CHEST CT angio negative for PE.   Blood cx's negative to date.   RVP negative.   Patient feeling a little better but still with restless legs and weakness when ambulating. No chest pain, shortness of breath at rest. No n/v/d.   EP consultation for prolonged QTc.     Prolonged QTc and neuromuscular sx's including lower extremity weakness most likely related to hypomagnesemia/hypokalemia.   Tachycardia could be physiologic response to anemia,  dehydration and hypotension.    Plan:  Continue to replete Mg > 2.2 and K+ > 4.0  Daily EKG's.   Orthostatics Patient is a 47 YO woman with PMH of left breast cancer diagnosed 5/2023 on chemo, last session 11/2023 s/p b/l nipple sparing mastectomy/left axillary sentinel lymph node biopsy, JOHN flap reconstruction, who presented with 2 week progressive history of dyspnea on exertion, nausea, vomiting, diarrhea, fatigue,18 lb weight loss, palpitations and weakness in lower extremities. COVID and flu negative.  She attributed it to a brief chemotherapy regimen with Xeloda, which she had not been able to tolerate and had been discontinued 2 weeks prior. Seen at urgent care and was sent her to the hospital where she was told her heart rate was in the 140s,  Sent to the hospital for dehydration.  She has had copious nausea and vomiting, has been unable to tolerate PO for 2.5 weeks. She had 3 days of diarrhea ending 2 days ago, and has not had a bowel movement since. Has lost 18 lbs within last 2-3 weeks.    In the ED, she was found to be hypoglycemic and started on D5 then D10, as her IVC was felt to be of moderate size. She was given ativan with improvement.   EKG showed Sinus tachycardia 121 bpm with narrow QRS and QTC 586ms.   1/4/24 labs: Mg 1.2  K 3.2  proBNP normal   CHEST CT angio negative for PE.   Blood cx's negative to date.   RVP negative.   Patient feeling a little better but still with restless legs and weakness when ambulating. No chest pain, shortness of breath at rest. No n/v/d.   EP consultation for prolonged QTc.     Prolonged QTc and neuromuscular sx's including lower extremity weakness most likely multifactorial due to hypomagnesemia/hypokalemia/Xeloda. Patient off Xeloda x 5 weeks.   Tachycardia could be physiologic response to anemia,  dehydration and hypotension. Echo LVEF 60%.     Plan:  Continue to replete Mg > 2.2 and K+ > 4.0  Daily EKG's to monitor QTc  Orthostatics

## 2024-01-05 NOTE — DISCHARGE NOTE PROVIDER - DETAILS OF MALNUTRITION DIAGNOSIS/DIAGNOSES
This patient has been assessed with a concern for Malnutrition and was treated during this hospitalization for the following Nutrition diagnosis/diagnoses:     -  01/06/2024: Severe protein-calorie malnutrition

## 2024-01-05 NOTE — PROGRESS NOTE ADULT - PROBLEM SELECTOR PLAN 2
Sinus on ECG. Likely iso hypovolemia, possibly infection in immunocompromised patient. Note QTc 587  - Fluid resuscitation as above  - Blood cultures x2, urine culture pending  - Monitor off antibiotics as not febrile  - Obatin TTE - patient mentions that she was told she has a "hole in her heart" when she was 13  - Troponins negative

## 2024-01-05 NOTE — PHYSICAL THERAPY INITIAL EVALUATION ADULT - PERTINENT HX OF CURRENT PROBLEM, REHAB EVAL
48 year old female presents to the ED with nausea/vomiting, diarrhea, chest pain, shortness of breath, weakness and bilateral groin pain. CT Angio Chest - No main, lobar or segmental pulmonary embolism.

## 2024-01-05 NOTE — PROGRESS NOTE ADULT - ASSESSMENT
Ms. Sauer is a 49 YO woman with PMH of left breast cancer s/p chemo (last 11/2023) and mastectomy, who presents to the ED with nausea/vomiting, diarrhea, chest pain, shortness of breath, weakness and b/l groin pain for 2 weeks.  Ms. Sauer is a 49 YO woman with PMH of left breast cancer s/p chemo (last 11/2023) and mastectomy, who presents to the ED with nausea/vomiting, diarrhea, chest pain, shortness of breath, weakness and b/l groin pain for 2 weeks. Admitted for anion gap metabolic acidosis requiring electrolyte repletions. Ms. Sauer is a 47 YO woman with PMH of left breast cancer s/p chemo (last 11/2023) and mastectomy, who presents to the ED with nausea/vomiting, diarrhea, chest pain, shortness of breath, weakness and b/l groin pain for 2 weeks. Admitted for anion gap metabolic acidosis requiring electrolyte repletions.

## 2024-01-05 NOTE — CONSULT NOTE ADULT - ATTENDING COMMENTS
48F breast cancer on chemo and prior Keytruda now diagnosed with adrenal insufficiency, cortisol 0.5 and ACTH pending.  Start hydrocortisone replacement.  Patient stating she will think about taking it.  Explained that treating adrenal insufficiency is not optional and is life sustaining.  Patient should follow up with endocrine as outpatient.  Check TFTs. Follow up ACTH to confirm if secondary AI vs primary less common with immunotherapy.  High risk patient high level decision making.    Moreno Cifuentes MD  Division of Endocrinology  Pager: [phone]    If after 6PM or before 9AM, or on weekends/holidays, please call endocrine answering service for assistance ([phone]).  For nonurgent matters email [email or web address] for assistance. 48F breast cancer on chemo and prior Keytruda now diagnosed with adrenal insufficiency, cortisol 0.5 and ACTH pending.  Start hydrocortisone replacement.  Patient stating she will think about taking it.  Explained that treating adrenal insufficiency is not optional and is life sustaining.  Patient should follow up with endocrine as outpatient.  Check TFTs. Follow up ACTH to confirm if secondary AI vs primary less common with immunotherapy.  High risk patient high level decision making.    Moreno Cifuentes MD  Division of Endocrinology  Pager: [phone]    If after 6PM or before 9AM, or on weekends/holidays, please call endocrine answering service for assistance ([phone]).  For nonurgent matters email [email or web address] for assistance.

## 2024-01-05 NOTE — PROGRESS NOTE ADULT - ATTENDING COMMENTS
49 yo woman with PMH of left breast cancer s/p chemo (last 11/2023) and mastectomy, who presents with intractable nausea and vomiting (NBNB) with inability to tolerate PO for 4 days. Denies eating raw or unusual foods (states she cooks food extra well done since starting chemo). No notable sick contacts or travel history for past 12 months. Prior chemo noted in late november 2023. Also had diarrhea, but no more BMs for past 2 days. Also extreme fatigue for past 2-3 weeks that has progressively worsened.     #Intractable N/V   - numerous NBNB vomiting episodes for past 4 days. Last vomited 2 days ago. Differential includes delayed chemo reaction, gastroenteritis, or infection   - blood cultures NGTD, RVP negative; EBV, CMV, HIV all negative    - continue fluid repletion due to poor po intake   - start MV, folate, thiamine, B12   - QTC prolonged, but can do ativan PRN for N/V for now (has not needed). Do not give zofran     #Cortisol Deficiency   - Patient with hypotension, tachycardia. AM cortisol level returned at 0.5.    - consult endocrine for further workup     #prolonged QTC   #Sinus Tachycardia   - CTA negative on admission. Per ct scan reads: no acute pathology in the chest, abdomen or pelvis.  - check TTE   - repeat EKG also with qtc close to 600    - aggressive repletion of electrolytes, keep mg >2, k >4   - tele   - consult EP     #High anion gap metabolic acidosis  - presents with AG around 20   - likely starvation   - now WNL with fluids   - ADAT   - monitor for refeeding     Discussed with HS   Rest as above 47 yo woman with PMH of left breast cancer s/p chemo (last 11/2023) and mastectomy, who presents with intractable nausea and vomiting (NBNB) with inability to tolerate PO for 4 days. Denies eating raw or unusual foods (states she cooks food extra well done since starting chemo). No notable sick contacts or travel history for past 12 months. Prior chemo noted in late november 2023. Also had diarrhea, but no more BMs for past 2 days. Also extreme fatigue for past 2-3 weeks that has progressively worsened.     #Intractable N/V   - numerous NBNB vomiting episodes for past 4 days. Last vomited 2 days ago. Differential includes delayed chemo reaction, gastroenteritis, or infection   - blood cultures NGTD, RVP negative; EBV, CMV, HIV all negative    - continue fluid repletion due to poor po intake   - start MV, folate, thiamine, B12   - QTC prolonged, but can do ativan PRN for N/V for now (has not needed). Do not give zofran     #Cortisol Deficiency   - Patient with hypotension, tachycardia. AM cortisol level returned at 0.5.    - consult endocrine for further workup     #prolonged QTC   #Sinus Tachycardia   - CTA negative on admission. Per ct scan reads: no acute pathology in the chest, abdomen or pelvis.  - check TTE   - repeat EKG also with qtc close to 600    - aggressive repletion of electrolytes, keep mg >2, k >4   - tele   - consult EP     #High anion gap metabolic acidosis  - presents with AG around 20   - likely starvation   - now WNL with fluids   - ADAT   - monitor for refeeding     Discussed with HS   Rest as above

## 2024-01-05 NOTE — DISCHARGE NOTE PROVIDER - CARE PROVIDER_API CALL
Kirk Cuadra  Medical Oncology  [address] [place] [address]  Phone: [phone]  Fax: [phone]  Established Patient  Scheduled Appointment: 01/09/2024    Elva Rivero [facility], [address], [place] [address]  Phone: [phone]  Fax: (   )    -  Follow Up Time: 1 week   Kirk Cuadra  Medical Oncology  [address] [place] [address]  Phone: [phone]  Fax: [phone]  Established Patient  Scheduled Appointment: 01/09/2024    Elva Rivero [facility], [address], [place] [address]  Phone: [phone]  Fax: (   )    -  Follow Up Time: 1 week   Kirk Cuadra  Medical Oncology  [address] [place] [address]  Phone: [phone]  Fax: [phone]  Established Patient  Scheduled Appointment: 01/09/2024    Dinorah Rivero  Endocrinology/Metab/Diabetes  [address] [place] [address] [address]  [place] [address]  Phone: [phone]  Fax: [phone]  Follow Up Time: 1 week   Kirk Cuadra  Medical Oncology  [address] [place] [address]  Phone: [phone]  Fax: [phone]  Established Patient  Scheduled Appointment: 01/09/2024    Dinorah Rivero  Endocrinology/Metab/Diabetes  [address] [place] [address] [address]  [place] [address]  Phone: [phone]  Fax: [phone]  Follow Up Time: 1 week

## 2024-01-05 NOTE — DISCHARGE NOTE PROVIDER - HOSPITAL COURSE
Ms. Sauer is a 49 YO woman with PMH of left breast cancer on chemo, last session 11/2023 s/p b/l nipple sparing mastectomy/left axillary sentinel lymph node biopsy, JOHN flap reconstruction, who presents for 2 week progressive history of dyspnea on exertion. She notes that around Thanksgiving she started noticing she would fatigue more quickly. She attributed it to a brief chemotherapy regimen with Xeloda, which she had not been able to tolerate and had been discontinued 2 weeks prior. She then went to urgent care, who sent her to the hospital where she was told her heart rate was in the 140s, a CT was done and she was told she was negative for COVID/flu. She had one episode of NBNB vomiting in the the ED, and was told to rest and follow up with a cardiologist. She spoke to her oncologist's office, was evaluated and sent to the hospital for dehydration. She received fluids but was not told of any follow up. She called her oncologist's office again as she became very weak, unable to stand to put her clothes on. She has had copious nausea and vomiting, has been unable to tolerate PO for 2.5 weeks. She had 3 days of diarrhea ending 2 days ago, and has not had a bowel movement since.     In the ED, she was found to be hypoglycemic and started on D5 then D10, as her IVC was felt to be of moderate size. She was given ativan with improvement of her nausea. At the time of interview she was thirsty and tolerated a few sips of ice water.     Hospital course: While in the hospital she was found to have serve electrolyte derangements including anion gap metabolic acidosis, hypomagnesia hypoglycemia and hypercalcemia. She was treated supportively with IVF and electrolyte repletions. She was also found to have QTc prolongation on EKG consistent with an outpatient EKG from December. She received ativan for nausea and her symptoms started to improve. She was continued on D5LR and started on a clear liquid diet and slowly advanced. She is now tolerating a regular diet. During her hypoglycemia workup she was found to have severely reduced cortisol level consistent with adrenal insufficiency. Endocrine was consulted and recommended ________. For the prolonged QTc the Electrophysiology team was consulted and recommended ____________.    On day of discharge, patient is clinically stable with no new exam findings or acute symptoms compared to prior. The patient was seen by the attending physician on the date of discharge and deemed stable and acceptable for discharge. The patient's chronic medical conditions were treated accordingly per the patient's home medication regimen. The patient's medication reconciliation (with changes made to chronic medications), follow up appointments, discharge orders, instructions, and significant lab and diagnostic studies are as noted.    Important Medication Changes and Reason:    Active of Pending issues Requiring Follow up:    Discharge Diagnoses:  [ ] Anion gap metabolic acidosis  [ ] Hypoglycemia  [ ] Hypercalcemia   [ ] Gastritis induced nausea vomiting.   [ ] Adrenal insufficiency  [ ] Prolonged QTc Ms. Sauer is a 47 YO woman with PMH of left breast cancer on chemo, last session 11/2023 s/p b/l nipple sparing mastectomy/left axillary sentinel lymph node biopsy, JOHN flap reconstruction, who presents for 2 week progressive history of dyspnea on exertion. She notes that around Thanksgiving she started noticing she would fatigue more quickly. She attributed it to a brief chemotherapy regimen with Xeloda, which she had not been able to tolerate and had been discontinued 2 weeks prior. She then went to urgent care, who sent her to the hospital where she was told her heart rate was in the 140s, a CT was done and she was told she was negative for COVID/flu. She had one episode of NBNB vomiting in the the ED, and was told to rest and follow up with a cardiologist. She spoke to her oncologist's office, was evaluated and sent to the hospital for dehydration. She received fluids but was not told of any follow up. She called her oncologist's office again as she became very weak, unable to stand to put her clothes on. She has had copious nausea and vomiting, has been unable to tolerate PO for 2.5 weeks. She had 3 days of diarrhea ending 2 days ago, and has not had a bowel movement since.     In the ED, she was found to be hypoglycemic and started on D5 then D10, as her IVC was felt to be of moderate size. She was given ativan with improvement of her nausea. At the time of interview she was thirsty and tolerated a few sips of ice water.     Hospital course: While in the hospital she was found to have serve electrolyte derangements including anion gap metabolic acidosis, hypomagnesia hypoglycemia and hypercalcemia. She was treated supportively with IVF and electrolyte repletions. She was also found to have QTc prolongation on EKG consistent with an outpatient EKG from December. She received ativan for nausea and her symptoms started to improve. She was continued on D5LR and started on a clear liquid diet and slowly advanced. She is now tolerating a regular diet. During her hypoglycemia workup she was found to have severely reduced cortisol level consistent with adrenal insufficiency. Endocrine was consulted and recommended ________. For the prolonged QTc the Electrophysiology team was consulted and recommended ____________.    On day of discharge, patient is clinically stable with no new exam findings or acute symptoms compared to prior. The patient was seen by the attending physician on the date of discharge and deemed stable and acceptable for discharge. The patient's chronic medical conditions were treated accordingly per the patient's home medication regimen. The patient's medication reconciliation (with changes made to chronic medications), follow up appointments, discharge orders, instructions, and significant lab and diagnostic studies are as noted.    Important Medication Changes and Reason:    Active of Pending issues Requiring Follow up:    Discharge Diagnoses:  [ ] Anion gap metabolic acidosis  [ ] Hypoglycemia  [ ] Hypercalcemia   [ ] Gastritis induced nausea vomiting.   [ ] Adrenal insufficiency  [ ] Prolonged QTc Ms. Sauer is a 49 YO woman with PMH of left breast cancer on chemo, last session 11/2023 s/p b/l nipple sparing mastectomy/left axillary sentinel lymph node biopsy, JOHN flap reconstruction, who presents for 2 week progressive history of dyspnea on exertion. She notes that around Thanksgiving she started noticing she would fatigue more quickly. She attributed it to a brief chemotherapy regimen with Xeloda, which she had not been able to tolerate and had been discontinued 2 weeks prior. She then went to urgent care, who sent her to the hospital where she was told her heart rate was in the 140s, a CT was done and she was told she was negative for COVID/flu. She had one episode of NBNB vomiting in the the ED, and was told to rest and follow up with a cardiologist. She spoke to her oncologist's office, was evaluated and sent to the hospital for dehydration. She received fluids but was not told of any follow up. She called her oncologist's office again as she became very weak, unable to stand to put her clothes on. She has had copious nausea and vomiting, has been unable to tolerate PO for 2.5 weeks. She had 3 days of diarrhea ending 2 days ago, and has not had a bowel movement since.     In the ED, she was found to be hypoglycemic and started on D5 then D10, as her IVC was felt to be of moderate size. She was given ativan with improvement of her nausea. At the time of interview she was thirsty and tolerated a few sips of ice water.     Hospital course: While in the hospital she was found to have serve electrolyte derangements including anion gap metabolic acidosis, hypomagnesia hypoglycemia and hypercalcemia. She was treated supportively with IVF and electrolyte repletions. She was also found to have QTc prolongation on EKG consistent with an outpatient EKG from December. She received ativan for nausea and her symptoms started to improve. She was continued on D5LR and started on a clear liquid diet and slowly advanced. She is now tolerating a regular diet. During her hypoglycemia workup she was found to have severely reduced cortisol level consistent with adrenal insufficiency. Endocrine was consulted and recommended physiology steroid replacement. They also recommended Synthroid for hypothyroidism. For the prolonged QTc the Electrophysiology team was consulted and recommended electrolyte repletions.    On day of discharge, patient is clinically stable with no new exam findings or acute symptoms compared to prior. The patient was seen by the attending physician on the date of discharge and deemed stable and acceptable for discharge. The patient's chronic medical conditions were treated accordingly per the patient's home medication regimen. The patient's medication reconciliation (with changes made to chronic medications), follow up appointments, discharge orders, instructions, and significant lab and diagnostic studies are as noted.    Important Medication Changes and Reason:    Active of Pending issues Requiring Follow up:    Discharge Diagnoses:  [ ] Anion gap metabolic acidosis  [ ] Hypoglycemia  [ ] Hypercalcemia   [ ] Gastritis induced nausea vomiting.   [ ] Adrenal insufficiency  [ ] Hypothyroidism   [ ] Prolonged QTc Ms. Sauer is a 49 YO woman with PMH of left breast cancer on chemo, last session 11/2023 s/p b/l nipple sparing mastectomy/left axillary sentinel lymph node biopsy, JOHN flap reconstruction, who presents for 2 week progressive history of dyspnea on exertion. She notes that around Thanksgiving she started noticing she would fatigue more quickly. She attributed it to a brief chemotherapy regimen with Xeloda, which she had not been able to tolerate and had been discontinued 2 weeks prior. She then went to urgent care, who sent her to the hospital where she was told her heart rate was in the 140s, a CT was done and she was told she was negative for COVID/flu. She had one episode of NBNB vomiting in the the ED, and was told to rest and follow up with a cardiologist. She spoke to her oncologist's office, was evaluated and sent to the hospital for dehydration. She received fluids but was not told of any follow up. She called her oncologist's office again as she became very weak, unable to stand to put her clothes on. She has had copious nausea and vomiting, has been unable to tolerate PO for 2.5 weeks. She had 3 days of diarrhea ending 2 days ago, and has not had a bowel movement since.     In the ED, she was found to be hypoglycemic and started on D5 then D10, as her IVC was felt to be of moderate size. She was given ativan with improvement of her nausea. At the time of interview she was thirsty and tolerated a few sips of ice water.     Hospital course: While in the hospital she was found to have serve electrolyte derangements including anion gap metabolic acidosis, hypomagnesia hypoglycemia and hypercalcemia. She was treated supportively with IVF and electrolyte repletions. She was also found to have QTc prolongation on EKG consistent with an outpatient EKG from December. She received ativan for nausea and her symptoms started to improve. She was continued on D5LR and started on a clear liquid diet and slowly advanced. She is now tolerating a regular diet. During her hypoglycemia workup she was found to have severely reduced cortisol level consistent with adrenal insufficiency. Endocrine was consulted and recommended physiology steroid replacement. They also recommended Synthroid for hypothyroidism. For the prolonged QTc the Electrophysiology team was consulted and recommended electrolyte repletions with outpatient follow up.    On day of discharge, patient is clinically stable with no new exam findings or acute symptoms compared to prior. The patient was seen by the attending physician on the date of discharge and deemed stable and acceptable for discharge. The patient's chronic medical conditions were treated accordingly per the patient's home medication regimen. The patient's medication reconciliation (with changes made to chronic medications), follow up appointments, discharge orders, instructions, and significant lab and diagnostic studies are as noted.    Important Medication Changes and Reason:    Active of Pending issues Requiring Follow up:    Discharge Diagnoses:  [ ] Anion gap metabolic acidosis  [ ] Hypoglycemia  [ ] Hypercalcemia   [ ] Gastritis induced nausea vomiting.   [ ] Adrenal insufficiency  [ ] Hypothyroidism   [ ] Prolonged QTc Ms. Sauer is a 47 YO woman with PMH of left breast cancer on chemo, last session 11/2023 s/p b/l nipple sparing mastectomy/left axillary sentinel lymph node biopsy, JOHN flap reconstruction, who presents for 2 week progressive history of dyspnea on exertion. She notes that around Thanksgiving she started noticing she would fatigue more quickly. She attributed it to a brief chemotherapy regimen with Xeloda, which she had not been able to tolerate and had been discontinued 2 weeks prior. She then went to urgent care, who sent her to the hospital where she was told her heart rate was in the 140s, a CT was done and she was told she was negative for COVID/flu. She had one episode of NBNB vomiting in the the ED, and was told to rest and follow up with a cardiologist. She spoke to her oncologist's office, was evaluated and sent to the hospital for dehydration. She received fluids but was not told of any follow up. She called her oncologist's office again as she became very weak, unable to stand to put her clothes on. She has had copious nausea and vomiting, has been unable to tolerate PO for 2.5 weeks. She had 3 days of diarrhea ending 2 days ago, and has not had a bowel movement since.     In the ED, she was found to be hypoglycemic and started on D5 then D10, as her IVC was felt to be of moderate size. She was given ativan with improvement of her nausea. At the time of interview she was thirsty and tolerated a few sips of ice water.     Hospital course: While in the hospital she was found to have serve electrolyte derangements including anion gap metabolic acidosis, hypomagnesia hypoglycemia and hypercalcemia. She was treated supportively with IVF and electrolyte repletions. She was also found to have QTc prolongation on EKG consistent with an outpatient EKG from December. She received ativan for nausea and her symptoms started to improve. She was continued on D5LR and started on a clear liquid diet and slowly advanced. She is now tolerating a regular diet. During her hypoglycemia workup she was found to have severely reduced cortisol level consistent with adrenal insufficiency. Endocrine was consulted and recommended physiology steroid replacement. They also recommended Synthroid for hypothyroidism. For the prolonged QTc the Electrophysiology team was consulted and recommended electrolyte repletions with outpatient follow up.    On day of discharge, patient is clinically stable with no new exam findings or acute symptoms compared to prior. The patient was seen by the attending physician on the date of discharge and deemed stable and acceptable for discharge. The patient's chronic medical conditions were treated accordingly per the patient's home medication regimen. The patient's medication reconciliation (with changes made to chronic medications), follow up appointments, discharge orders, instructions, and significant lab and diagnostic studies are as noted.    Important Medication Changes and Reason:    Active of Pending issues Requiring Follow up:    Discharge Diagnoses:  [ ] Anion gap metabolic acidosis  [ ] Hypoglycemia  [ ] Hypercalcemia   [ ] Gastritis induced nausea vomiting.   [ ] Adrenal insufficiency  [ ] Hypothyroidism   [ ] Prolonged QTc

## 2024-01-05 NOTE — PHYSICAL THERAPY INITIAL EVALUATION ADULT - GAIT DISTANCE, PT EVAL
pt reports significant fatigue and nausea as well as heaviness in head and chest. Further ambulation deferred. Heart rate = 125 bpm./5 feet

## 2024-01-05 NOTE — PROGRESS NOTE ADULT - PROBLEM SELECTOR PLAN 6
DVT: Patient currently unable to walk, lovenox  Diet: Clear liquids  Dispo: Pending PT and clinical improvement DVT: Patient currently unable to walk, lovenox  Diet: Advance to Full liquid today  Dispo: Pending PT and clinical improvement

## 2024-01-05 NOTE — DISCHARGE NOTE PROVIDER - NSDCCPCAREPLAN_GEN_ALL_CORE_FT
PRINCIPAL DISCHARGE DIAGNOSIS  Diagnosis: Starvation ketoacidosis  Assessment and Plan of Treatment: While in the hospital your electrolytes were imbalanced likely from your decreased appetite and presistent nausea/vomiting. You were treated supportively and your electrolytes stabilized.      SECONDARY DISCHARGE DIAGNOSES  Diagnosis: Gastroenteritis  Assessment and Plan of Treatment: While in the hospital you had serve nausea and vomiting. Based on your symptoms it was likely related to an undiagnosed infection. You were treated supportively with intravenous fluids, anti nausea medications and electrolyte replacements. Your symptoms resolved and you were able to tolerate a regular diet prior to discharge.    Diagnosis: Cardiac arrhythmia  Assessment and Plan of Treatment: While in the hospital you were found to have a prolonged QTc. This had to do with the electrical activity of the heart. When these numbers are prolonged it puts you at risk of further heart problems and possible cardiac arrest. You should ask your doctor or a pharmacist before taking any medications as many over the counter medications can potentially make this number higher and increase your risk of a life threatening arrythmia or cardiac arrest    Diagnosis: Adrenal insufficiency  Assessment and Plan of Treatment: While in the hospital you were found to have low cortisol levels. The endorinology team was consulted to help and diagnosed you were adrenal insufficiency. This means that your adrenal glands were not producing sufficient levels of hormones necessary to support your blood pressure and electrolytes.   They recommended _________________     PRINCIPAL DISCHARGE DIAGNOSIS  Diagnosis: Starvation ketoacidosis  Assessment and Plan of Treatment: While in the hospital your electrolytes were imbalanced likely from your decreased appetite and presistent nausea/vomiting. You were treated supportively and your electrolytes stabilized.      SECONDARY DISCHARGE DIAGNOSES  Diagnosis: Gastroenteritis  Assessment and Plan of Treatment: While in the hospital you had serve nausea and vomiting. Based on your symptoms it was likely related to an undiagnosed infection. You were treated supportively with intravenous fluids, anti nausea medications and electrolyte replacements. Your symptoms resolved and you were able to tolerate a regular diet prior to discharge.    Diagnosis: Cardiac arrhythmia  Assessment and Plan of Treatment: While in the hospital you were found to have a prolonged QTc. This had to do with the electrical activity of the heart. When these numbers are prolonged it puts you at risk of further heart problems and possible cardiac arrest. You should ask your doctor or a pharmacist before taking any medications as many over the counter medications can potentially make this number higher and increase your risk of a life threatening arrythmia or cardiac arrest.  Please follow up a cardiologist upon discharge.    Diagnosis: Adrenal insufficiency  Assessment and Plan of Treatment: While in the hospital you were found to have low cortisol levels. The endorinology team was consulted to help and diagnosed you were adrenal insufficiency. This means that your adrenal glands were not producing sufficient levels of hormones necessary to support your blood pressure and electrolytes.   They recommended starting physiologic steroids to replace the hormones normally supplied by the adrenal glands.  You will take 20mg hydrocortisone on 1/7 at 8am, 10mg at 3pm  You will take 10mg hydrocortisone on 1/8 at 8am, 5mg at 3pm, continue taking 10 and 5 until you see the endocrinologist.   You should take 2-3x of home dose in cases of illness, ever, accidents, and surgery. If unable to take PO, use IM injection as below (Solu-Cortef)  You should receive stress dosing (IV hydrocortisone 50mg q8) with any major illness or surgical procedure  Should you be unable to tolerate hydrocortisone tablets you will need an emergency injection. Called 100 mg Solu-Cortef Act-O-Vial and the following instructions:  http://www.addisoncrisis.info/emergency-injection/emergency-injection-cortico-steroids-solu-cortef-act-o-vial-two-chamber-ampul/  You should obtain a medical alert bracelet or necklace to inform emergency providers of adrenal insufficiency diagnosis [[email or web address].[email or web address]]      Diagnosis: Hypothyroid  Assessment and Plan of Treatment: You have hypothyroidism. For this condition, it is important to continue taking medication as prescribed. If your dose of thyroid replacement medication has been changed, be sure to have your Primary Care Doctor or Endocronologist repeat blood work to check your Thyroid function in 6-8 weeks to make sure that your current dose does not need to be changed again to reach the best possible level for you.   - Please take your levothyroxine 50mcg once daily (maintain on empty stomach (at least 1 hour before meals), separate by 4 hours from PPI or calcium supplementation which can inhibit its absorption)     PRINCIPAL DISCHARGE DIAGNOSIS  Diagnosis: Starvation ketoacidosis  Assessment and Plan of Treatment: While in the hospital your electrolytes were imbalanced likely from your decreased appetite and presistent nausea/vomiting. You were treated supportively and your electrolytes stabilized.      SECONDARY DISCHARGE DIAGNOSES  Diagnosis: Gastroenteritis  Assessment and Plan of Treatment: While in the hospital you had serve nausea and vomiting. Based on your symptoms it was likely related to an undiagnosed infection. You were treated supportively with intravenous fluids, anti nausea medications and electrolyte replacements. Your symptoms resolved and you were able to tolerate a regular diet prior to discharge.    Diagnosis: Cardiac arrhythmia  Assessment and Plan of Treatment: While in the hospital you were found to have a prolonged QTc. This had to do with the electrical activity of the heart. When these numbers are prolonged it puts you at risk of further heart problems and possible cardiac arrest. You should ask your doctor or a pharmacist before taking any medications as many over the counter medications can potentially make this number higher and increase your risk of a life threatening arrythmia or cardiac arrest.  Please follow up a cardiologist upon discharge.    Diagnosis: Adrenal insufficiency  Assessment and Plan of Treatment: While in the hospital you were found to have low cortisol levels. The endorinology team was consulted to help and diagnosed you were adrenal insufficiency. This means that your adrenal glands were not producing sufficient levels of hormones necessary to support your blood pressure and electrolytes.   They recommended starting physiologic steroids to replace the hormones normally supplied by the adrenal glands.  You will take 15mg hydrocortisone at 8am, 7.5mg at 3pm  continue taking 15mg and 7.5mg until you see the endocrinologist.   You should take 2-3x of home dose in cases of illness, ever, accidents, and surgery. If unable to take PO, use IM injection as below (Solu-Cortef)  You should receive stress dosing (IV hydrocortisone 50mg q8) with any major illness or surgical procedure  Should you be unable to tolerate hydrocortisone tablets you will need an emergency injection. Called 100 mg Solu-Cortef Act-O-Vial and the following instructions:  http://www.addisoncrisis.info/emergency-injection/emergency-injection-cortico-steroids-solu-cortef-act-o-vial-two-chamber-ampul/  You should obtain a medical alert bracelet or necklace to inform emergency providers of adrenal insufficiency diagnosis [[email or web address]]      Diagnosis: Hypothyroid  Assessment and Plan of Treatment: You have hypothyroidism. For this condition, it is important to continue taking medication as prescribed. If your dose of thyroid replacement medication has been changed, be sure to have your Primary Care Doctor or Endocronologist repeat blood work to check your Thyroid function in 6-8 weeks to make sure that your current dose does not need to be changed again to reach the best possible level for you.   - Please take your levothyroxine 50mcg once daily (maintain on empty stomach (at least 1 hour before meals), separate by 4 hours from PPI or calcium supplementation which can inhibit its absorption)

## 2024-01-05 NOTE — DISCHARGE NOTE PROVIDER - NSDCFUADDAPPT_GEN_ALL_CORE_FT
APPTS ARE READY TO BE MADE: [X ] YES    Best Family or Patient Contact (if needed):    Additional Information about above appointments (if needed):    1: Endocrine- [phone]  2: PCP   3: Onc  4: cardiology    Other comments or requests:    APPTS ARE READY TO BE MADE: [X ] YES    Best Family or Patient Contact (if needed):    Additional Information about above appointments (if needed):    1: Endocrine- [phone]  2: PCP   3: Onc  4: cardiology    Other comments or requests:    APPTS ARE READY TO BE MADE: [X ] YES    Best Family or Patient Contact (if needed):    Additional Information about above appointments (if needed):    1: Endocrine- [phone]  2: PCP   3: Onc  4: cardiology    Other comments or requests:     Patient declined scheduling assistance. Patient was told that it was unnecessary to follow-up with cardiologist at this time.    Patient was in contact with the Endocrinology office to schedule an appointment and is awaiting a call back. Patient will call us back if any issues arise and needs our help with scheduling.    Patient was previously scheduled with Dr. Cuadra 1/23/24 10:30am [address] [place] [address] APPTS ARE READY TO BE MADE: [X ] YES    Best Family or Patient Contact (if needed):    Additional Information about above appointments (if needed):    1: Endocrine- [phone]  2: PCP   3: Onc  4: cardiology    Other comments or requests:     Patient declined scheduling assistance. Patient was told that it was unnecessary to follow-up with cardiologist at this time.    Patient was in contact with the Endocrinology office to schedule an appointment and is awaiting a call back. Patient will call us back if any issues arise and needs our help with scheduling.    Patient was previously scheduled with Dr. Cuadra 1/23/24 10:30am [address] [place] [address]

## 2024-01-05 NOTE — CONSULT NOTE ADULT - PROBLEM/RECOMMENDATION-2
DISPLAY PLAN FREE TEXT Patient received discharge instructions , verbalize understanding of follow up appt. He received  Indigent medications . All personal items received by security. Bus ticket provided. Escort off unit by staff.

## 2024-01-05 NOTE — PROGRESS NOTE ADULT - SUBJECTIVE AND OBJECTIVE BOX
PGY-1 Mary Alvarez  TEAM  Progress Note    Patient is a 48y old  Female who presents with a chief complaint of Nausea/vomiting, tachycardia (04 Jan 2024 03:59)      SUBJECTIVE / OVERNIGHT EVENTS:  OVN: No acute events, started on D5LR, s/p ativan for nausea   Labs and Imaging Reviewed  Patient seen and examined bedside.  Pt awake at bedside. Feeling okay, slightly better, intermittently congested. No more nausea. Tolerated CLD.   Denies fever, chills, chest pain, sob, constipation. No BM since Diarrhea resolved.     PHYSICAL EXAM:  GENERAL: Resting in bed.  HEAD:  Atraumatic, Normocephalic  EYES: Patient opening eyes, maintaining eye contact, tracking examiner   CHEST/LUNG: Clear to auscultation bilaterally; No wheeze  HEART: Regular rhythm, tachycardia; No murmurs, rubs, or gallops  ABDOMEN: Soft, Nontender, Nondistended; Bowel sounds present  EXTREMITIES:  2+ Peripheral Pulses, No clubbing, cyanosis, or edema  PSYCH: AAOx3  NEUROLOGY: non-focal  SKIN: No rashes or lesions      VITAL SIGNS:    Vital Signs Last 24 Hrs  T(C): 37.6 (05 Jan 2024 06:58), Max: 37.6 (04 Jan 2024 16:59)  T(F): 99.6 (05 Jan 2024 06:58), Max: 99.6 (04 Jan 2024 16:59)  HR: 115 (05 Jan 2024 06:58) (110 - 123)  BP: 106/49 (05 Jan 2024 06:58) (92/58 - 106/49)  BP(mean): --  RR: 18 (05 Jan 2024 06:58) (18 - 18)  SpO2: 98% (05 Jan 2024 06:58) (97% - 100%)    Parameters below as of 05 Jan 2024 06:58  Patient On (Oxygen Delivery Method): room air      CAPILLARY BLOOD GLUCOSE      POCT Blood Glucose.: 107 mg/dL (05 Jan 2024 07:59)  POCT Blood Glucose.: 107 mg/dL (04 Jan 2024 22:31)  POCT Blood Glucose.: 125 mg/dL (04 Jan 2024 17:17)  POCT Blood Glucose.: 142 mg/dL (04 Jan 2024 11:08)    I&O's Summary    MEDICATIONS  (STANDING):  cyanocobalamin 1000 MICROGram(s) Oral daily  dextrose 5% + lactated ringers. 1000 milliLiter(s) (75 mL/Hr) IV Continuous <Continuous>  enoxaparin Injectable 40 milliGRAM(s) SubCutaneous every 24 hours  folic acid 1 milliGRAM(s) Oral daily  influenza   Vaccine 0.5 milliLiter(s) IntraMuscular once  magnesium sulfate  IVPB 2 Gram(s) IV Intermittent every 2 hours  pyridoxine Injectable 25 milliGRAM(s) IV Push daily  thiamine 100 milliGRAM(s) Oral daily    MEDICATIONS  (PRN):  melatonin 3 milliGRAM(s) Oral at bedtime PRN Insomnia  sodium chloride 0.65% Nasal 1 Spray(s) Both Nostrils three times a day PRN Nasal Congestion        LABS:                        14.1   5.99  )-----------( 244      ( 03 Jan 2024 13:21 )             44.0     01-05    136  |  106  |  4<L>  ----------------------------<  115<H>  3.8   |  21<L>  |  0.45<L>    Ca    8.9      05 Jan 2024 07:14  Phos  3.2     01-05  Mg     1.70     01-05    TPro  7.8  /  Alb  4.0  /  TBili  0.5  /  DBili  x   /  AST  24  /  ALT  12  /  AlkPhos  82  01-03    PT/INR - ( 03 Jan 2024 13:21 )   PT: 13.1 sec;   INR: 1.16 ratio         PTT - ( 03 Jan 2024 13:21 )  PTT:43.5 sec            IMAGING:  No new imaging to review    Consultant(s) Notes Reviewed     Care Discussed with Consultants/Other Providers

## 2024-01-05 NOTE — DISCHARGE NOTE PROVIDER - PROVIDER TOKENS
PROVIDER:[TOKEN:[[id number]],SCHEDULEDAPPT:[01/09/2024],ESTABLISHEDPATIENT:[T]],FREE:[LAST:[Rivero],PHONE:[phone]],FAX:[(   )    -],ADDRESS:[[address] [place] [address], [facility] [address], [place] [address]],FOLLOWUP:[1 week]] PROVIDER:[TOKEN:[[id number]],SCHEDULEDAPPT:[01/09/2024],ESTABLISHEDPATIENT:[T]],FREE:[LAST:[Rivero],PHONE:[phone]:[(   )    -],ADDRESS:[[address] [place] [address], [facility] [address], [place] [address]],FOLLOWUP:[1 week]] PROVIDER:[TOKEN:[[id number]],SCHEDULEDAPPT:[01/09/2024],ESTABLISHEDPATIENT:[T]],PROVIDER:[TOKEN:[[id number]],FOLLOWUP:[1 week]] PROVIDER:[TOKEN:[[id number]],SCHEDULEDAPPT:[01/09/2024],ESTABLISHEDPATIENT:[T]],PROVIDER:[TOKEN:[[id number]],FOLLOWUP:[1 week]]

## 2024-01-05 NOTE — CONSULT NOTE ADULT - SUBJECTIVE AND OBJECTIVE BOX
ENDOCRINE INITIAL CONSULT - adrenal insufficiency    HPI:  Ms. Sauer is a 49 YO woman with PMH of left breast cancer on chemo, last session 2023 s/p b/l nipple sparing mastectomy/left axillary sentinel lymph node biopsy, JOHN flap reconstruction, who presents for 2 week progressive history of dyspnea on exertion. She notes that around [place] she started noticing she would fatigue more quickly. She attributed it to a brief chemotherapy regimen with Xeloda, which she had not been able to tolerate and had been discontinued 2 weeks prior. Two weeks prior she noticed progressive weakness and palpitations when standing; she would take a couple of steps and feel like her legs were shaking and that she was gasping for air. The next day, she attempted to go to work but had to leave because she was feeling dyspneic and noted pain in her groin that radiated down her legs. She did not have any bleeding or discharge. She then went to urgent care, who sent her to the hospital where she was told her heart rate was in the 140s, a CT was done and she was told she was negative for COVID/flu. She had one episode of NBNB vomiting in the the ED, and was told to rest and follow up with a cardiologist. She spoke to her oncologist's office, was evaluated and sent to the hospital for dehydration. She received fluids but was not told of any follow up. She called her oncologist's office again as she became very weak, unable to stand to put her clothes on. She denies fevers during this time. No chest pain, abdominal pain. She has had copious nausea and vomiting, has been unable to tolerate PO for 2.5 weeks. She had 3 days of diarrhea ending 2 days ago, and has not had a bowel movement since.     In the ED, she was found to be hypoglycemic and started on D5 then D10, as her IVC was felt to be of moderate size. She was given ativan with improvement of her nausea. At the time of interview she was thirsty and tolerated a few sips of ice water.  (2024 03:59)      ENDOCRINE HISTORY     Endocrinologist:  Social History:  Family History:  Surgical History:  Insurance:  Resides In:    PAST MEDICAL & SURGICAL HISTORY:  Breast CA  left breast      History of chemotherapy  completed 23      S/P   X1       H/O bilateral breast biopsy  markers both breasts      Port-A-Cath in place  right chest          FAMILY HISTORY:      Social History:  No tobacco, alcohol or drug use (2024 03:59)      Home Medications:      MEDICATIONS  (STANDING):  cyanocobalamin 1000 MICROGram(s) Oral daily  enoxaparin Injectable 40 milliGRAM(s) SubCutaneous every 24 hours  folic acid 1 milliGRAM(s) Oral daily  influenza   Vaccine 0.5 milliLiter(s) IntraMuscular once  lactated ringers. 1000 milliLiter(s) (75 mL/Hr) IV Continuous <Continuous>  pyridoxine Injectable 25 milliGRAM(s) IV Push daily  thiamine 100 milliGRAM(s) Oral daily    MEDICATIONS  (PRN):  melatonin 3 milliGRAM(s) Oral at bedtime PRN Insomnia  sodium chloride 0.65% Nasal 1 Spray(s) Both Nostrils three times a day PRN Nasal Congestion      Allergies    unknown chemo medication (Anaphylaxis)  No Known Drug Allergies    Intolerances        REVIEW OF SYSTEMS  Constitutional: No fever  Eyes: No blurry vision  Neuro: No tremors  HEENT: No pain  Cardiovascular: No chest pain, palpitations  Respiratory: No SOB, no cough  GI: No nausea, vomiting, abdominal pain  : No dysuria  Skin: no rash  Psych: no depression  Endocrine: no polyuria, polydipsia  Hem/lymph: no swelling  Osteoporosis: no fractures  ALL OTHER SYSTEMS REVIEWED AND NEGATIVE     UNABLE TO OBTAIN     PHYSICAL EXAM   Vital Signs Last 24 Hrs  T(C): 37.6 (2024 06:58), Max: 37.6 (2024 16:59)  T(F): 99.6 (2024 06:58), Max: 99.6 (2024 16:59)  HR: 115 (2024 06:58) (115 - 123)  BP: 106/49 (2024 06:58) (93/62 - 106/49)  BP(mean): --  RR: 18 (2024 06:58) (18 - 18)  SpO2: 98% (2024 06:58) (97% - 100%)    Parameters below as of 2024 06:58  Patient On (Oxygen Delivery Method): room air      GENERAL: NAD, well-groomed, well-developed  EYES: No proptosis, no lid lag, anicteric  HEENT:  Atraumatic, Normocephalic, moist mucous membranes  THYROID: Normal size, no palpable nodules  RESPIRATORY: Clear to auscultation bilaterally; No rales, rhonchi, wheezing  CARDIOVASCULAR: Regular rate and rhythm; No murmurs; no peripheral edema  GI: Soft, nontender, non distended, normal bowel sounds  SKIN: Dry, intact, No rashes or lesions  MUSCULOSKELETAL: Full range of motion, normal strength  NEURO: sensation intact, extraocular movements intact, no tremor  PSYCH: Alert and oriented x 3, normal affect, normal mood  CUSHING'S SIGNS: no striae    CAPILLARY BLOOD GLUCOSE      POCT Blood Glucose.: 107 mg/dL (2024 07:59)  POCT Blood Glucose.: 107 mg/dL (2024 22:31)  POCT Blood Glucose.: 125 mg/dL (2024 17:17)      A1C with Estimated Average Glucose Result: 4.8 % (24 @ 08:01)                            10.2   2.50  )-----------( 165      ( 2024 07:14 )             29.7           136  |  106  |  4<L>  ----------------------------<  115<H>  3.8   |  21<L>  |  0.45<L>    Ca    8.9      2024 07:14  Phos  3.2       Mg     1.70         TPro  7.8  /  Alb  4.0  /  TBili  0.5  /  DBili  x   /  AST  24  /  ALT  12  /  AlkPhos  82            LIPIDS    RADIOLOGY ENDOCRINE INITIAL CONSULT - adrenal insufficiency    HPI:  Ms. Sauer is a 47 YO woman with PMH of left breast cancer on chemo, last session 2023 s/p b/l nipple sparing mastectomy/left axillary sentinel lymph node biopsy, JOHN flap reconstruction, who presents for 2 week progressive history of dyspnea on exertion. She notes that around [place] she started noticing she would fatigue more quickly. She attributed it to a brief chemotherapy regimen with Xeloda, which she had not been able to tolerate and had been discontinued 2 weeks prior. Two weeks prior she noticed progressive weakness and palpitations when standing; she would take a couple of steps and feel like her legs were shaking and that she was gasping for air. The next day, she attempted to go to work but had to leave because she was feeling dyspneic and noted pain in her groin that radiated down her legs. She did not have any bleeding or discharge. She then went to urgent care, who sent her to the hospital where she was told her heart rate was in the 140s, a CT was done and she was told she was negative for COVID/flu. She had one episode of NBNB vomiting in the the ED, and was told to rest and follow up with a cardiologist. She spoke to her oncologist's office, was evaluated and sent to the hospital for dehydration. She received fluids but was not told of any follow up. She called her oncologist's office again as she became very weak, unable to stand to put her clothes on. She denies fevers during this time. No chest pain, abdominal pain. She has had copious nausea and vomiting, has been unable to tolerate PO for 2.5 weeks. She had 3 days of diarrhea ending 2 days ago, and has not had a bowel movement since.     In the ED, she was found to be hypoglycemic and started on D5 then D10, as her IVC was felt to be of moderate size. She was given ativan with improvement of her nausea. At the time of interview she was thirsty and tolerated a few sips of ice water.  (2024 03:59)      ENDOCRINE HISTORY     Endocrinologist:  Social History:  Family History:  Surgical History:  Insurance:  Resides In:    PAST MEDICAL & SURGICAL HISTORY:  Breast CA  left breast      History of chemotherapy  completed 23      S/P   X1       H/O bilateral breast biopsy  markers both breasts      Port-A-Cath in place  right chest          FAMILY HISTORY:      Social History:  No tobacco, alcohol or drug use (2024 03:59)      Home Medications:      MEDICATIONS  (STANDING):  cyanocobalamin 1000 MICROGram(s) Oral daily  enoxaparin Injectable 40 milliGRAM(s) SubCutaneous every 24 hours  folic acid 1 milliGRAM(s) Oral daily  influenza   Vaccine 0.5 milliLiter(s) IntraMuscular once  lactated ringers. 1000 milliLiter(s) (75 mL/Hr) IV Continuous <Continuous>  pyridoxine Injectable 25 milliGRAM(s) IV Push daily  thiamine 100 milliGRAM(s) Oral daily    MEDICATIONS  (PRN):  melatonin 3 milliGRAM(s) Oral at bedtime PRN Insomnia  sodium chloride 0.65% Nasal 1 Spray(s) Both Nostrils three times a day PRN Nasal Congestion      Allergies    unknown chemo medication (Anaphylaxis)  No Known Drug Allergies    Intolerances        REVIEW OF SYSTEMS  Constitutional: No fever  Eyes: No blurry vision  Neuro: No tremors  HEENT: No pain  Cardiovascular: No chest pain, palpitations  Respiratory: No SOB, no cough  GI: No nausea, vomiting, abdominal pain  : No dysuria  Skin: no rash  Psych: no depression  Endocrine: no polyuria, polydipsia  Hem/lymph: no swelling  Osteoporosis: no fractures  ALL OTHER SYSTEMS REVIEWED AND NEGATIVE     UNABLE TO OBTAIN     PHYSICAL EXAM   Vital Signs Last 24 Hrs  T(C): 37.6 (2024 06:58), Max: 37.6 (2024 16:59)  T(F): 99.6 (2024 06:58), Max: 99.6 (2024 16:59)  HR: 115 (2024 06:58) (115 - 123)  BP: 106/49 (2024 06:58) (93/62 - 106/49)  BP(mean): --  RR: 18 (2024 06:58) (18 - 18)  SpO2: 98% (2024 06:58) (97% - 100%)    Parameters below as of 2024 06:58  Patient On (Oxygen Delivery Method): room air      GENERAL: NAD, well-groomed, well-developed  EYES: No proptosis, no lid lag, anicteric  HEENT:  Atraumatic, Normocephalic, moist mucous membranes  THYROID: Normal size, no palpable nodules  RESPIRATORY: Clear to auscultation bilaterally; No rales, rhonchi, wheezing  CARDIOVASCULAR: Regular rate and rhythm; No murmurs; no peripheral edema  GI: Soft, nontender, non distended, normal bowel sounds  SKIN: Dry, intact, No rashes or lesions  MUSCULOSKELETAL: Full range of motion, normal strength  NEURO: sensation intact, extraocular movements intact, no tremor  PSYCH: Alert and oriented x 3, normal affect, normal mood  CUSHING'S SIGNS: no striae    CAPILLARY BLOOD GLUCOSE      POCT Blood Glucose.: 107 mg/dL (2024 07:59)  POCT Blood Glucose.: 107 mg/dL (2024 22:31)  POCT Blood Glucose.: 125 mg/dL (2024 17:17)      A1C with Estimated Average Glucose Result: 4.8 % (24 @ 08:01)                            10.2   2.50  )-----------( 165      ( 2024 07:14 )             29.7           136  |  106  |  4<L>  ----------------------------<  115<H>  3.8   |  21<L>  |  0.45<L>    Ca    8.9      2024 07:14  Phos  3.2       Mg     1.70         TPro  7.8  /  Alb  4.0  /  TBili  0.5  /  DBili  x   /  AST  24  /  ALT  12  /  AlkPhos  82            LIPIDS    RADIOLOGY ENDOCRINE INITIAL CONSULT - adrenal insufficiency    HPI:  Ms. Sauer is a 49 YO woman with PMH of left breast cancer on chemo, last session 2023 s/p b/l nipple sparing mastectomy/left axillary sentinel lymph node biopsy, JOHN flap reconstruction, who presents for 2 week progressive history of dyspnea on exertion. She notes that around [place] she started noticing she would fatigue more quickly. She attributed it to a brief chemotherapy regimen with Xeloda, which she had not been able to tolerate and had been discontinued 2 weeks prior. Two weeks prior she noticed progressive weakness and palpitations when standing; she would take a couple of steps and feel like her legs were shaking and that she was gasping for air. The next day, she attempted to go to work but had to leave because she was feeling dyspneic and noted pain in her groin that radiated down her legs. She did not have any bleeding or discharge. She then went to urgent care, who sent her to the hospital where she was told her heart rate was in the 140s, a CT was done and she was told she was negative for COVID/flu. She had one episode of NBNB vomiting in the the ED, and was told to rest and follow up with a cardiologist. She spoke to her oncologist's office, was evaluated and sent to the hospital for dehydration. She received fluids but was not told of any follow up. She called her oncologist's office again as she became very weak, unable to stand to put her clothes on. She denies fevers during this time. No chest pain, abdominal pain. She has had copious nausea and vomiting, has been unable to tolerate PO for 2.5 weeks. She had 3 days of diarrhea ending 2 days ago, and has not had a bowel movement since.     In the ED, she was found to be hypoglycemic and started on D5 then D10, as her IVC was felt to be of moderate size. She was given ativan with improvement of her nausea. At the time of interview she was thirsty and tolerated a few sips of ice water.  (2024 03:59)      ENDOCRINE HISTORY   She denies being told she has low cortisol, adrenal insufficiency, pituitary disorders, thyroid disease. She reports having breast cancer, - taxol, "red devil", 23 breast removed, surgery,  xeloda. She reports in  she started feeling weak, nauseous, vomiting, unexplained weight loss, chills, sob, diarrhea, denies fevers, polyuria, polydipsia, chest pain, constipation. She is unsure if she received immunotherapy. She denies radiation history. She denies history of steroid or opioid use. She reports she may have received some opioids for a few days post surgery . She is not on medication regularly at home.    Endocrinologist: does not have  Social History: former tobacco user, denies alcohol use, drug use  Family History: denies family history of thyroid disorders, pituitary disorders adrenal gland disorders, diabetes  Surgical History: as above  Insurance: [facility]  Resides In: [place]    PAST MEDICAL & SURGICAL HISTORY:  Breast CA  left breast      History of chemotherapy  completed 23      S/P   X1       H/O bilateral breast biopsy  markers both breasts      Port-A-Cath in place  right chest          FAMILY HISTORY:      Social History:  No tobacco, alcohol or drug use (2024 03:59)      Home Medications:      MEDICATIONS  (STANDING):  cyanocobalamin 1000 MICROGram(s) Oral daily  enoxaparin Injectable 40 milliGRAM(s) SubCutaneous every 24 hours  folic acid 1 milliGRAM(s) Oral daily  influenza   Vaccine 0.5 milliLiter(s) IntraMuscular once  lactated ringers. 1000 milliLiter(s) (75 mL/Hr) IV Continuous <Continuous>  pyridoxine Injectable 25 milliGRAM(s) IV Push daily  thiamine 100 milliGRAM(s) Oral daily    MEDICATIONS  (PRN):  melatonin 3 milliGRAM(s) Oral at bedtime PRN Insomnia  sodium chloride 0.65% Nasal 1 Spray(s) Both Nostrils three times a day PRN Nasal Congestion      Allergies    unknown chemo medication (Anaphylaxis)  No Known Drug Allergies    Intolerances        REVIEW OF SYSTEMS  Constitutional: No fever  Eyes: No blurry vision  Neuro: No tremors  HEENT: No pain  Cardiovascular: No chest pain, palpitations  Respiratory: endorses SOB, no cough  GI: endorses nausea, vomiting, abdominal pain, diarrhea  : No dysuria  Skin: no rash  Psych: no depression  Endocrine: no polyuria, polydipsia  Hem/lymph: no swelling  Osteoporosis: no fractures  ALL OTHER SYSTEMS REVIEWED AND NEGATIVE       PHYSICAL EXAM   Vital Signs Last 24 Hrs  T(C): 37.6 (2024 06:58), Max: 37.6 (2024 16:59)  T(F): 99.6 (2024 06:58), Max: 99.6 (2024 16:59)  HR: 115 (2024 06:58) (115 - 123)  BP: 106/49 (2024 06:58) (93/62 - 106/49)  BP(mean): --  RR: 18 (2024 06:58) (18 - 18)  SpO2: 98% (2024 06:58) (97% - 100%)    Parameters below as of 2024 06:58  Patient On (Oxygen Delivery Method): room air      GENERAL: NAD, well-groomed, well-developed  EYES: No proptosis, no lid lag, anicteric  HEENT:  Atraumatic, Normocephalic, moist mucous membranes  THYROID: Normal size, no palpable nodules  RESPIRATORY: Clear to auscultation bilaterally; No rales, rhonchi, wheezing  CARDIOVASCULAR: Regular rate and rhythm; No murmurs; no peripheral edema  GI: Soft, nontender, non distended, normal bowel sounds  SKIN: Dry, intact, No rashes or lesions  MUSCULOSKELETAL: Full range of motion, normal strength  NEURO: sensation intact, extraocular movements intact, no tremor  PSYCH: Alert and oriented x 3, normal affect, normal mood  CUSHING'S SIGNS: no striae    CAPILLARY BLOOD GLUCOSE      POCT Blood Glucose.: 107 mg/dL (2024 07:59)  POCT Blood Glucose.: 107 mg/dL (2024 22:31)  POCT Blood Glucose.: 125 mg/dL (2024 17:17)      A1C with Estimated Average Glucose Result: 4.8 % (24 @ 08:01)                            10.2   2.50  )-----------( 165      ( 2024 07:14 )             29.7       -    136  |  106  |  4<L>  ----------------------------<  115<H>  3.8   |  21<L>  |  0.45<L>    Ca    8.9      2024 07:14  Phos  3.2     -05  Mg     1.70     01-05    TPro  7.8  /  Alb  4.0  /  TBili  0.5  /  DBili  x   /  AST  24  /  ALT  12  /  AlkPhos  82            LIPIDS    RADIOLOGY ENDOCRINE INITIAL CONSULT - adrenal insufficiency    HPI:  Ms. Sauer is a 47 YO woman with PMH of left breast cancer on chemo, last session 2023 s/p b/l nipple sparing mastectomy/left axillary sentinel lymph node biopsy, JOHN flap reconstruction, who presents for 2 week progressive history of dyspnea on exertion. She notes that around [place] she started noticing she would fatigue more quickly. She attributed it to a brief chemotherapy regimen with Xeloda, which she had not been able to tolerate and had been discontinued 2 weeks prior. Two weeks prior she noticed progressive weakness and palpitations when standing; she would take a couple of steps and feel like her legs were shaking and that she was gasping for air. The next day, she attempted to go to work but had to leave because she was feeling dyspneic and noted pain in her groin that radiated down her legs. She did not have any bleeding or discharge. She then went to urgent care, who sent her to the hospital where she was told her heart rate was in the 140s, a CT was done and she was told she was negative for COVID/flu. She had one episode of NBNB vomiting in the the ED, and was told to rest and follow up with a cardiologist. She spoke to her oncologist's office, was evaluated and sent to the hospital for dehydration. She received fluids but was not told of any follow up. She called her oncologist's office again as she became very weak, unable to stand to put her clothes on. She denies fevers during this time. No chest pain, abdominal pain. She has had copious nausea and vomiting, has been unable to tolerate PO for 2.5 weeks. She had 3 days of diarrhea ending 2 days ago, and has not had a bowel movement since.     In the ED, she was found to be hypoglycemic and started on D5 then D10, as her IVC was felt to be of moderate size. She was given ativan with improvement of her nausea. At the time of interview she was thirsty and tolerated a few sips of ice water.  (2024 03:59)      ENDOCRINE HISTORY   She denies being told she has low cortisol, adrenal insufficiency, pituitary disorders, thyroid disease. She reports having breast cancer, - taxol, "red devil", 23 breast removed, surgery,  xeloda. She reports in  she started feeling weak, nauseous, vomiting, unexplained weight loss, chills, sob, diarrhea, denies fevers, polyuria, polydipsia, chest pain, constipation. She is unsure if she received immunotherapy. She denies radiation history. She denies history of steroid or opioid use. She reports she may have received some opioids for a few days post surgery . She is not on medication regularly at home.    Endocrinologist: does not have  Social History: former tobacco user, denies alcohol use, drug use  Family History: denies family history of thyroid disorders, pituitary disorders adrenal gland disorders, diabetes  Surgical History: as above  Insurance: [facility]  Resides In: [place]    PAST MEDICAL & SURGICAL HISTORY:  Breast CA  left breast      History of chemotherapy  completed 23      S/P   X1       H/O bilateral breast biopsy  markers both breasts      Port-A-Cath in place  right chest          FAMILY HISTORY:      Social History:  No tobacco, alcohol or drug use (2024 03:59)      Home Medications:      MEDICATIONS  (STANDING):  cyanocobalamin 1000 MICROGram(s) Oral daily  enoxaparin Injectable 40 milliGRAM(s) SubCutaneous every 24 hours  folic acid 1 milliGRAM(s) Oral daily  influenza   Vaccine 0.5 milliLiter(s) IntraMuscular once  lactated ringers. 1000 milliLiter(s) (75 mL/Hr) IV Continuous <Continuous>  pyridoxine Injectable 25 milliGRAM(s) IV Push daily  thiamine 100 milliGRAM(s) Oral daily    MEDICATIONS  (PRN):  melatonin 3 milliGRAM(s) Oral at bedtime PRN Insomnia  sodium chloride 0.65% Nasal 1 Spray(s) Both Nostrils three times a day PRN Nasal Congestion      Allergies    unknown chemo medication (Anaphylaxis)  No Known Drug Allergies    Intolerances        REVIEW OF SYSTEMS  Constitutional: No fever  Eyes: No blurry vision  Neuro: No tremors  HEENT: No pain  Cardiovascular: No chest pain, palpitations  Respiratory: endorses SOB, no cough  GI: endorses nausea, vomiting, abdominal pain, diarrhea  : No dysuria  Skin: no rash  Psych: no depression  Endocrine: no polyuria, polydipsia  Hem/lymph: no swelling  Osteoporosis: no fractures  ALL OTHER SYSTEMS REVIEWED AND NEGATIVE       PHYSICAL EXAM   Vital Signs Last 24 Hrs  T(C): 37.6 (2024 06:58), Max: 37.6 (2024 16:59)  T(F): 99.6 (2024 06:58), Max: 99.6 (2024 16:59)  HR: 115 (2024 06:58) (115 - 123)  BP: 106/49 (2024 06:58) (93/62 - 106/49)  BP(mean): --  RR: 18 (2024 06:58) (18 - 18)  SpO2: 98% (2024 06:58) (97% - 100%)    Parameters below as of 2024 06:58  Patient On (Oxygen Delivery Method): room air      GENERAL: NAD, well-groomed, well-developed  EYES: No proptosis, no lid lag, anicteric  HEENT:  Atraumatic, Normocephalic, moist mucous membranes  THYROID: Normal size, no palpable nodules  RESPIRATORY: Clear to auscultation bilaterally; No rales, rhonchi, wheezing  CARDIOVASCULAR: Regular rate and rhythm; No murmurs; no peripheral edema  GI: Soft, nontender, non distended, normal bowel sounds  SKIN: Dry, intact, No rashes or lesions  MUSCULOSKELETAL: Full range of motion, normal strength  NEURO: sensation intact, extraocular movements intact, no tremor  PSYCH: Alert and oriented x 3, normal affect, normal mood  CUSHING'S SIGNS: no striae    CAPILLARY BLOOD GLUCOSE      POCT Blood Glucose.: 107 mg/dL (2024 07:59)  POCT Blood Glucose.: 107 mg/dL (2024 22:31)  POCT Blood Glucose.: 125 mg/dL (2024 17:17)      A1C with Estimated Average Glucose Result: 4.8 % (24 @ 08:01)                            10.2   2.50  )-----------( 165      ( 2024 07:14 )             29.7       -    136  |  106  |  4<L>  ----------------------------<  115<H>  3.8   |  21<L>  |  0.45<L>    Ca    8.9      2024 07:14  Phos  3.2     -05  Mg     1.70     01-05    TPro  7.8  /  Alb  4.0  /  TBili  0.5  /  DBili  x   /  AST  24  /  ALT  12  /  AlkPhos  82            LIPIDS    RADIOLOGY ENDOCRINE INITIAL CONSULT - adrenal insufficiency    HPI:  Ms. Sauer is a 47 YO woman with PMH of left breast cancer on chemo, last session 2023 s/p b/l nipple sparing mastectomy/left axillary sentinel lymph node biopsy, JOHN flap reconstruction, who presents for 2 week progressive history of dyspnea on exertion. She notes that around [place] she started noticing she would fatigue more quickly. She attributed it to a brief chemotherapy regimen with Xeloda, which she had not been able to tolerate and had been discontinued 2 weeks prior. Two weeks prior she noticed progressive weakness and palpitations when standing; she would take a couple of steps and feel like her legs were shaking and that she was gasping for air. The next day, she attempted to go to work but had to leave because she was feeling dyspneic and noted pain in her groin that radiated down her legs. She did not have any bleeding or discharge. She then went to urgent care, who sent her to the hospital where she was told her heart rate was in the 140s, a CT was done and she was told she was negative for COVID/flu. She had one episode of NBNB vomiting in the the ED, and was told to rest and follow up with a cardiologist. She spoke to her oncologist's office, was evaluated and sent to the hospital for dehydration. She received fluids but was not told of any follow up. She called her oncologist's office again as she became very weak, unable to stand to put her clothes on. She denies fevers during this time. No chest pain, abdominal pain. She has had copious nausea and vomiting, has been unable to tolerate PO for 2.5 weeks. She had 3 days of diarrhea ending 2 days ago, and has not had a bowel movement since.     In the ED, she was found to be hypoglycemic and started on D5 then D10, as her IVC was felt to be of moderate size. She was given ativan with improvement of her nausea. At the time of interview she was thirsty and tolerated a few sips of ice water.  (2024 03:59)      ENDOCRINE HISTORY   She denies being told she has low cortisol, adrenal insufficiency, pituitary disorders, thyroid disease. She reports having breast cancer, - taxol, "red devil", 23 breast removed, surgery,  xeloda. She reports in  she started feeling weak, nauseous, vomiting, unexplained weight loss, chills, sob, diarrhea, denies fevers, polyuria, polydipsia, chest pain, constipation. She is unsure if she received immunotherapy. She denies radiation history. She denies history of steroid or opioid use. She reports she may have received some opioids for a few days post surgery . She is not on medication regularly at home. She reports one week after November, endorsed swelling hands and feet, denies darkening of the skin.    Endocrinologist: does not have  Social History: former tobacco user, denies alcohol use, drug use  Family History: denies family history of thyroid disorders, pituitary disorders adrenal gland disorders, diabetes  Surgical History: as above  Insurance: [facility]  Resides In: [place]    PAST MEDICAL & SURGICAL HISTORY:  Breast CA  left breast      History of chemotherapy  completed 23      S/P   X1       H/O bilateral breast biopsy  markers both breasts      Port-A-Cath in place  right chest          FAMILY HISTORY:      Social History:  No tobacco, alcohol or drug use (2024 03:59)      Home Medications:      MEDICATIONS  (STANDING):  cyanocobalamin 1000 MICROGram(s) Oral daily  enoxaparin Injectable 40 milliGRAM(s) SubCutaneous every 24 hours  folic acid 1 milliGRAM(s) Oral daily  influenza   Vaccine 0.5 milliLiter(s) IntraMuscular once  lactated ringers. 1000 milliLiter(s) (75 mL/Hr) IV Continuous <Continuous>  pyridoxine Injectable 25 milliGRAM(s) IV Push daily  thiamine 100 milliGRAM(s) Oral daily    MEDICATIONS  (PRN):  melatonin 3 milliGRAM(s) Oral at bedtime PRN Insomnia  sodium chloride 0.65% Nasal 1 Spray(s) Both Nostrils three times a day PRN Nasal Congestion      Allergies    unknown chemo medication (Anaphylaxis)  No Known Drug Allergies    Intolerances        REVIEW OF SYSTEMS  Constitutional: No fever  Eyes: No blurry vision  Neuro: No tremors  HEENT: No pain  Cardiovascular: No chest pain, palpitations  Respiratory: endorses SOB, no cough  GI: endorses nausea, vomiting, abdominal pain, diarrhea  : No dysuria  Skin: no rash  Psych: no depression  Endocrine: no polyuria, polydipsia  Hem/lymph: no swelling  Osteoporosis: no fractures  ALL OTHER SYSTEMS REVIEWED AND NEGATIVE       PHYSICAL EXAM   Vital Signs Last 24 Hrs  T(C): 37.6 (2024 06:58), Max: 37.6 (2024 16:59)  T(F): 99.6 (2024 06:58), Max: 99.6 (2024 16:59)  HR: 115 (2024 06:58) (115 - 123)  BP: 106/49 (2024 06:58) (93/62 - 106/49)  BP(mean): --  RR: 18 (2024 06:58) (18 - 18)  SpO2: 98% (2024 06:58) (97% - 100%)    Parameters below as of 2024 06:58  Patient On (Oxygen Delivery Method): room air      GENERAL: NAD, well-groomed, well-developed  EYES: No proptosis, no lid lag, anicteric  HEENT:  Atraumatic, Normocephalic, moist mucous membranes  THYROID: Normal size, no palpable nodules  RESPIRATORY: Clear to auscultation bilaterally; No rales, rhonchi, wheezing  CARDIOVASCULAR: Regular rate and rhythm; No murmurs; no peripheral edema  GI: Soft, nontender, non distended, normal bowel sounds  SKIN: Dry, intact, No rashes or lesions  MUSCULOSKELETAL: Full range of motion, normal strength  NEURO: sensation intact, extraocular movements intact, no tremor  PSYCH: Alert and oriented x 3, normal affect, normal mood  CUSHING'S SIGNS: no striae    CAPILLARY BLOOD GLUCOSE      POCT Blood Glucose.: 107 mg/dL (2024 07:59)  POCT Blood Glucose.: 107 mg/dL (2024 22:31)  POCT Blood Glucose.: 125 mg/dL (2024 17:17)      A1C with Estimated Average Glucose Result: 4.8 % (24 @ 08:01)                            10.2   2.50  )-----------( 165      ( 2024 07:14 )             29.7           136  |  106  |  4<L>  ----------------------------<  115<H>  3.8   |  21<L>  |  0.45<L>    Ca    8.9      2024 07:14  Phos  3.2     01-05  Mg     1.70     -05    TPro  7.8  /  Alb  4.0  /  TBili  0.5  /  DBili  x   /  AST  24  /  ALT  12  /  AlkPhos  82  -          LIPIDS    RADIOLOGY ENDOCRINE INITIAL CONSULT - adrenal insufficiency    HPI:  Ms. Sauer is a 49 YO woman with PMH of left breast cancer on chemo, last session 2023 s/p b/l nipple sparing mastectomy/left axillary sentinel lymph node biopsy, JOHN flap reconstruction, who presents for 2 week progressive history of dyspnea on exertion. She notes that around [place] she started noticing she would fatigue more quickly. She attributed it to a brief chemotherapy regimen with Xeloda, which she had not been able to tolerate and had been discontinued 2 weeks prior. Two weeks prior she noticed progressive weakness and palpitations when standing; she would take a couple of steps and feel like her legs were shaking and that she was gasping for air. The next day, she attempted to go to work but had to leave because she was feeling dyspneic and noted pain in her groin that radiated down her legs. She did not have any bleeding or discharge. She then went to urgent care, who sent her to the hospital where she was told her heart rate was in the 140s, a CT was done and she was told she was negative for COVID/flu. She had one episode of NBNB vomiting in the the ED, and was told to rest and follow up with a cardiologist. She spoke to her oncologist's office, was evaluated and sent to the hospital for dehydration. She received fluids but was not told of any follow up. She called her oncologist's office again as she became very weak, unable to stand to put her clothes on. She denies fevers during this time. No chest pain, abdominal pain. She has had copious nausea and vomiting, has been unable to tolerate PO for 2.5 weeks. She had 3 days of diarrhea ending 2 days ago, and has not had a bowel movement since.     In the ED, she was found to be hypoglycemic and started on D5 then D10, as her IVC was felt to be of moderate size. She was given ativan with improvement of her nausea. At the time of interview she was thirsty and tolerated a few sips of ice water.  (2024 03:59)      ENDOCRINE HISTORY   She denies being told she has low cortisol, adrenal insufficiency, pituitary disorders, thyroid disease. She reports having breast cancer, - taxol, "red devil", 23 breast removed, surgery,  xeloda. She reports in  she started feeling weak, nauseous, vomiting, unexplained weight loss, chills, sob, diarrhea, denies fevers, polyuria, polydipsia, chest pain, constipation. She is unsure if she received immunotherapy. She denies radiation history. She denies history of steroid or opioid use. She reports she may have received some opioids for a few days post surgery . She is not on medication regularly at home. She reports one week after November, endorsed swelling hands and feet, denies darkening of the skin.    Endocrinologist: does not have  Social History: former tobacco user, denies alcohol use, drug use  Family History: denies family history of thyroid disorders, pituitary disorders adrenal gland disorders, diabetes  Surgical History: as above  Insurance: [facility]  Resides In: [place]    PAST MEDICAL & SURGICAL HISTORY:  Breast CA  left breast      History of chemotherapy  completed 23      S/P   X1       H/O bilateral breast biopsy  markers both breasts      Port-A-Cath in place  right chest          FAMILY HISTORY:      Social History:  No tobacco, alcohol or drug use (2024 03:59)      Home Medications:      MEDICATIONS  (STANDING):  cyanocobalamin 1000 MICROGram(s) Oral daily  enoxaparin Injectable 40 milliGRAM(s) SubCutaneous every 24 hours  folic acid 1 milliGRAM(s) Oral daily  influenza   Vaccine 0.5 milliLiter(s) IntraMuscular once  lactated ringers. 1000 milliLiter(s) (75 mL/Hr) IV Continuous <Continuous>  pyridoxine Injectable 25 milliGRAM(s) IV Push daily  thiamine 100 milliGRAM(s) Oral daily    MEDICATIONS  (PRN):  melatonin 3 milliGRAM(s) Oral at bedtime PRN Insomnia  sodium chloride 0.65% Nasal 1 Spray(s) Both Nostrils three times a day PRN Nasal Congestion      Allergies    unknown chemo medication (Anaphylaxis)  No Known Drug Allergies    Intolerances        REVIEW OF SYSTEMS  Constitutional: No fever  Eyes: No blurry vision  Neuro: No tremors  HEENT: No pain  Cardiovascular: No chest pain, palpitations  Respiratory: endorses SOB, no cough  GI: endorses nausea, vomiting, abdominal pain, diarrhea  : No dysuria  Skin: no rash  Psych: no depression  Endocrine: no polyuria, polydipsia  Hem/lymph: no swelling  Osteoporosis: no fractures  ALL OTHER SYSTEMS REVIEWED AND NEGATIVE       PHYSICAL EXAM   Vital Signs Last 24 Hrs  T(C): 37.6 (2024 06:58), Max: 37.6 (2024 16:59)  T(F): 99.6 (2024 06:58), Max: 99.6 (2024 16:59)  HR: 115 (2024 06:58) (115 - 123)  BP: 106/49 (2024 06:58) (93/62 - 106/49)  BP(mean): --  RR: 18 (2024 06:58) (18 - 18)  SpO2: 98% (2024 06:58) (97% - 100%)    Parameters below as of 2024 06:58  Patient On (Oxygen Delivery Method): room air      GENERAL: NAD, well-groomed, well-developed  EYES: No proptosis, no lid lag, anicteric  HEENT:  Atraumatic, Normocephalic, moist mucous membranes  THYROID: Normal size, no palpable nodules  RESPIRATORY: Clear to auscultation bilaterally; No rales, rhonchi, wheezing  CARDIOVASCULAR: Regular rate and rhythm; No murmurs; no peripheral edema  GI: Soft, nontender, non distended, normal bowel sounds  SKIN: Dry, intact, No rashes or lesions  MUSCULOSKELETAL: Full range of motion, normal strength  NEURO: sensation intact, extraocular movements intact, no tremor  PSYCH: Alert and oriented x 3, normal affect, normal mood  CUSHING'S SIGNS: no striae    CAPILLARY BLOOD GLUCOSE      POCT Blood Glucose.: 107 mg/dL (2024 07:59)  POCT Blood Glucose.: 107 mg/dL (2024 22:31)  POCT Blood Glucose.: 125 mg/dL (2024 17:17)      A1C with Estimated Average Glucose Result: 4.8 % (24 @ 08:01)                            10.2   2.50  )-----------( 165      ( 2024 07:14 )             29.7           136  |  106  |  4<L>  ----------------------------<  115<H>  3.8   |  21<L>  |  0.45<L>    Ca    8.9      2024 07:14  Phos  3.2     01-05  Mg     1.70     -05    TPro  7.8  /  Alb  4.0  /  TBili  0.5  /  DBili  x   /  AST  24  /  ALT  12  /  AlkPhos  82  -          LIPIDS    RADIOLOGY ENDOCRINE INITIAL CONSULT - adrenal insufficiency    HPI:  Ms. Sauer is a 49 YO woman with PMH of left breast cancer on chemo, last session 2023 s/p b/l nipple sparing mastectomy/left axillary sentinel lymph node biopsy, JOHN flap reconstruction, who presents for 2 week progressive history of dyspnea on exertion. She notes that around [place] she started noticing she would fatigue more quickly. She attributed it to a brief chemotherapy regimen with Xeloda, which she had not been able to tolerate and had been discontinued 2 weeks prior. Two weeks prior she noticed progressive weakness and palpitations when standing; she would take a couple of steps and feel like her legs were shaking and that she was gasping for air. The next day, she attempted to go to work but had to leave because she was feeling dyspneic and noted pain in her groin that radiated down her legs. She did not have any bleeding or discharge. She then went to urgent care, who sent her to the hospital where she was told her heart rate was in the 140s, a CT was done and she was told she was negative for COVID/flu. She had one episode of NBNB vomiting in the the ED, and was told to rest and follow up with a cardiologist. She spoke to her oncologist's office, was evaluated and sent to the hospital for dehydration. She received fluids but was not told of any follow up. She called her oncologist's office again as she became very weak, unable to stand to put her clothes on. She denies fevers during this time. No chest pain, abdominal pain. She has had copious nausea and vomiting, has been unable to tolerate PO for 2.5 weeks. She had 3 days of diarrhea ending 2 days ago, and has not had a bowel movement since.     In the ED, she was found to be hypoglycemic and started on D5 then D10, as her IVC was felt to be of moderate size. She was given ativan with improvement of her nausea. At the time of interview she was thirsty and tolerated a few sips of ice water.  (2024 03:59)      ENDOCRINE HISTORY   She denies being told she has low cortisol, adrenal insufficiency, pituitary disorders, thyroid disease. She reports having breast cancer, - taxol, "red devil", 23 breast removed, surgery,  xeloda. She reports in  she started feeling weak, nauseous, vomiting, unexplained weight loss, chills, sob, diarrhea, denies fevers, polyuria, polydipsia, chest pain, constipation. She is unsure if she received immunotherapy. She denies radiation history. She denies history of steroid or opioid use. She reports she may have received some opioids for a few days post surgery . She is not on medication regularly at home. She reports one week after November, endorsed swelling hands and feet, denies darkening of the skin.    Endocrinologist: does not have  Social History: former tobacco user, denies alcohol use, drug use  Family History: denies family history of thyroid disorders, pituitary disorders adrenal gland disorders, diabetes  Surgical History: as above  Insurance: [facility]  Resides In: [place]    PAST MEDICAL & SURGICAL HISTORY:  Breast CA  left breast      History of chemotherapy  completed 23      S/P   X1       H/O bilateral breast biopsy  markers both breasts      Port-A-Cath in place  right chest          FAMILY HISTORY:      Social History:  No tobacco, alcohol or drug use (2024 03:59)      Home Medications:      MEDICATIONS  (STANDING):  cyanocobalamin 1000 MICROGram(s) Oral daily  enoxaparin Injectable 40 milliGRAM(s) SubCutaneous every 24 hours  folic acid 1 milliGRAM(s) Oral daily  influenza   Vaccine 0.5 milliLiter(s) IntraMuscular once  lactated ringers. 1000 milliLiter(s) (75 mL/Hr) IV Continuous <Continuous>  pyridoxine Injectable 25 milliGRAM(s) IV Push daily  thiamine 100 milliGRAM(s) Oral daily    MEDICATIONS  (PRN):  melatonin 3 milliGRAM(s) Oral at bedtime PRN Insomnia  sodium chloride 0.65% Nasal 1 Spray(s) Both Nostrils three times a day PRN Nasal Congestion      Allergies    unknown chemo medication (Anaphylaxis)  No Known Drug Allergies    Intolerances        REVIEW OF SYSTEMS  Constitutional: No fever  Eyes: No blurry vision  Neuro: No tremors  HEENT: No pain  Cardiovascular: No chest pain, palpitations  Respiratory: endorses SOB, no cough  GI: endorses nausea, vomiting, abdominal pain, diarrhea  : No dysuria  Skin: no rash  Psych: no depression  Endocrine: no polyuria, polydipsia  Hem/lymph: no swelling  Osteoporosis: no fractures  ALL OTHER SYSTEMS REVIEWED AND NEGATIVE       PHYSICAL EXAM   Vital Signs Last 24 Hrs  T(C): 37.6 (2024 06:58), Max: 37.6 (2024 16:59)  T(F): 99.6 (2024 06:58), Max: 99.6 (2024 16:59)  HR: 115 (2024 06:58) (115 - 123)  BP: 106/49 (2024 06:58) (93/62 - 106/49)  BP(mean): --  RR: 18 (2024 06:58) (18 - 18)  SpO2: 98% (2024 06:58) (97% - 100%)    Parameters below as of 2024 06:58  Patient On (Oxygen Delivery Method): room air      GENERAL: NAD, lying in bed  EYES: No proptosis, anicteric  HEENT:  Atraumatic, Normocephalic, mildly dry mucous membranes  THYROID: Normal size, no palpable nodules  RESPIRATORY: Clear to auscultation bilaterally; No rales, rhonchi, wheezing  CARDIOVASCULAR: Regular rate and rhythm; No murmurs; no peripheral edema  GI: Soft, nontender, non distended, normal bowel sounds  SKIN: Dry, intact, No rashes or lesions  MUSCULOSKELETAL: Full range of motion, normal strength  NEURO: sensation intact, no tremor  PSYCH: Answering questions appropriately, normal affect, normal mood  CUSHING'S SIGNS: no striae, no acanthosis, no dorsocervical fat pad    CAPILLARY BLOOD GLUCOSE      POCT Blood Glucose.: 107 mg/dL (2024 07:59)  POCT Blood Glucose.: 107 mg/dL (2024 22:31)  POCT Blood Glucose.: 125 mg/dL (2024 17:17)      A1C with Estimated Average Glucose Result: 4.8 % (24 @ 08:01)                            10.2   2.50  )-----------( 165      ( 2024 07:14 )             29.7           136  |  106  |  4<L>  ----------------------------<  115<H>  3.8   |  21<L>  |  0.45<L>    Ca    8.9      2024 07:14  Phos  3.2     01-05  Mg     1.70     01-05    TPro  7.8  /  Alb  4.0  /  TBili  0.5  /  DBili  x   /  AST  24  /  ALT  12  /  AlkPhos  82  -03          LIPIDS    RADIOLOGY ENDOCRINE INITIAL CONSULT - adrenal insufficiency    HPI:  Ms. Sauer is a 49 YO woman with PMH of left breast cancer on chemo, last session 2023 s/p b/l nipple sparing mastectomy/left axillary sentinel lymph node biopsy, JOHN flap reconstruction, who presents for 2 week progressive history of dyspnea on exertion. She notes that around [place] she started noticing she would fatigue more quickly. She attributed it to a brief chemotherapy regimen with Xeloda, which she had not been able to tolerate and had been discontinued 2 weeks prior. Two weeks prior she noticed progressive weakness and palpitations when standing; she would take a couple of steps and feel like her legs were shaking and that she was gasping for air. The next day, she attempted to go to work but had to leave because she was feeling dyspneic and noted pain in her groin that radiated down her legs. She did not have any bleeding or discharge. She then went to urgent care, who sent her to the hospital where she was told her heart rate was in the 140s, a CT was done and she was told she was negative for COVID/flu. She had one episode of NBNB vomiting in the the ED, and was told to rest and follow up with a cardiologist. She spoke to her oncologist's office, was evaluated and sent to the hospital for dehydration. She received fluids but was not told of any follow up. She called her oncologist's office again as she became very weak, unable to stand to put her clothes on. She denies fevers during this time. No chest pain, abdominal pain. She has had copious nausea and vomiting, has been unable to tolerate PO for 2.5 weeks. She had 3 days of diarrhea ending 2 days ago, and has not had a bowel movement since.     In the ED, she was found to be hypoglycemic and started on D5 then D10, as her IVC was felt to be of moderate size. She was given ativan with improvement of her nausea. At the time of interview she was thirsty and tolerated a few sips of ice water.  (2024 03:59)      ENDOCRINE HISTORY   She denies being told she has low cortisol, adrenal insufficiency, pituitary disorders, thyroid disease. She reports having breast cancer, - taxol, "red devil", 23 breast removed, surgery,  xeloda. She reports in  she started feeling weak, nauseous, vomiting, unexplained weight loss, chills, sob, diarrhea, denies fevers, polyuria, polydipsia, chest pain, constipation. She is unsure if she received immunotherapy. She denies radiation history. She denies history of steroid or opioid use. She reports she may have received some opioids for a few days post surgery . She is not on medication regularly at home. She reports one week after November, endorsed swelling hands and feet, denies darkening of the skin.    Endocrinologist: does not have  Social History: former tobacco user, denies alcohol use, drug use  Family History: denies family history of thyroid disorders, pituitary disorders adrenal gland disorders, diabetes  Surgical History: as above  Insurance: [facility]  Resides In: [place]    PAST MEDICAL & SURGICAL HISTORY:  Breast CA  left breast      History of chemotherapy  completed 23      S/P   X1       H/O bilateral breast biopsy  markers both breasts      Port-A-Cath in place  right chest          FAMILY HISTORY:      Social History:  No tobacco, alcohol or drug use (2024 03:59)      Home Medications:      MEDICATIONS  (STANDING):  cyanocobalamin 1000 MICROGram(s) Oral daily  enoxaparin Injectable 40 milliGRAM(s) SubCutaneous every 24 hours  folic acid 1 milliGRAM(s) Oral daily  influenza   Vaccine 0.5 milliLiter(s) IntraMuscular once  lactated ringers. 1000 milliLiter(s) (75 mL/Hr) IV Continuous <Continuous>  pyridoxine Injectable 25 milliGRAM(s) IV Push daily  thiamine 100 milliGRAM(s) Oral daily    MEDICATIONS  (PRN):  melatonin 3 milliGRAM(s) Oral at bedtime PRN Insomnia  sodium chloride 0.65% Nasal 1 Spray(s) Both Nostrils three times a day PRN Nasal Congestion      Allergies    unknown chemo medication (Anaphylaxis)  No Known Drug Allergies    Intolerances        REVIEW OF SYSTEMS  Constitutional: No fever  Eyes: No blurry vision  Neuro: No tremors  HEENT: No pain  Cardiovascular: No chest pain, palpitations  Respiratory: endorses SOB, no cough  GI: endorses nausea, vomiting, abdominal pain, diarrhea  : No dysuria  Skin: no rash  Psych: no depression  Endocrine: no polyuria, polydipsia  Hem/lymph: no swelling  Osteoporosis: no fractures  ALL OTHER SYSTEMS REVIEWED AND NEGATIVE       PHYSICAL EXAM   Vital Signs Last 24 Hrs  T(C): 37.6 (2024 06:58), Max: 37.6 (2024 16:59)  T(F): 99.6 (2024 06:58), Max: 99.6 (2024 16:59)  HR: 115 (2024 06:58) (115 - 123)  BP: 106/49 (2024 06:58) (93/62 - 106/49)  BP(mean): --  RR: 18 (2024 06:58) (18 - 18)  SpO2: 98% (2024 06:58) (97% - 100%)    Parameters below as of 2024 06:58  Patient On (Oxygen Delivery Method): room air      GENERAL: NAD, lying in bed  EYES: No proptosis, anicteric  HEENT:  Atraumatic, Normocephalic, mildly dry mucous membranes  THYROID: Normal size, no palpable nodules  RESPIRATORY: Clear to auscultation bilaterally; No rales, rhonchi, wheezing  CARDIOVASCULAR: Regular rate and rhythm; No murmurs; no peripheral edema  GI: Soft, nontender, non distended, normal bowel sounds  SKIN: Dry, intact, No rashes or lesions  MUSCULOSKELETAL: Full range of motion, normal strength  NEURO: sensation intact, no tremor  PSYCH: Answering questions appropriately, normal affect, normal mood  CUSHING'S SIGNS: no striae, no acanthosis, no dorsocervical fat pad    CAPILLARY BLOOD GLUCOSE      POCT Blood Glucose.: 107 mg/dL (2024 07:59)  POCT Blood Glucose.: 107 mg/dL (2024 22:31)  POCT Blood Glucose.: 125 mg/dL (2024 17:17)      A1C with Estimated Average Glucose Result: 4.8 % (24 @ 08:01)                            10.2   2.50  )-----------( 165      ( 2024 07:14 )             29.7           136  |  106  |  4<L>  ----------------------------<  115<H>  3.8   |  21<L>  |  0.45<L>    Ca    8.9      2024 07:14  Phos  3.2     01-05  Mg     1.70     01-05    TPro  7.8  /  Alb  4.0  /  TBili  0.5  /  DBili  x   /  AST  24  /  ALT  12  /  AlkPhos  82  -03          LIPIDS    RADIOLOGY ENDOCRINE INITIAL CONSULT - adrenal insufficiency    HPI:  Ms. Sauer is a 47 YO woman with PMH of left breast cancer on chemo, last session 2023 s/p b/l nipple sparing mastectomy/left axillary sentinel lymph node biopsy, JOHN flap reconstruction, who presents for 2 week progressive history of dyspnea on exertion. She notes that around [place] she started noticing she would fatigue more quickly. She attributed it to a brief chemotherapy regimen with Xeloda, which she had not been able to tolerate and had been discontinued 2 weeks prior. Two weeks prior she noticed progressive weakness and palpitations when standing; she would take a couple of steps and feel like her legs were shaking and that she was gasping for air. The next day, she attempted to go to work but had to leave because she was feeling dyspneic and noted pain in her groin that radiated down her legs. She did not have any bleeding or discharge. She then went to urgent care, who sent her to the hospital where she was told her heart rate was in the 140s, a CT was done and she was told she was negative for COVID/flu. She had one episode of NBNB vomiting in the the ED, and was told to rest and follow up with a cardiologist. She spoke to her oncologist's office, was evaluated and sent to the hospital for dehydration. She received fluids but was not told of any follow up. She called her oncologist's office again as she became very weak, unable to stand to put her clothes on. She denies fevers during this time. No chest pain, abdominal pain. She has had copious nausea and vomiting, has been unable to tolerate PO for 2.5 weeks. She had 3 days of diarrhea ending 2 days ago, and has not had a bowel movement since.     In the ED, she was found to be hypoglycemic and started on D5 then D10, as her IVC was felt to be of moderate size. She was given ativan with improvement of her nausea. At the time of interview she was thirsty and tolerated a few sips of ice water.  (2024 03:59)      ENDOCRINE HISTORY   She denies being told she has low cortisol, adrenal insufficiency, pituitary disorders, thyroid disease. She reports having breast cancer, - taxol, "red devil", 23 breast removed, surgery,  xeloda. She reports in  she started feeling weak, nauseous, vomiting, unexplained weight loss, chills, sob, diarrhea, denies fevers, polyuria, polydipsia, chest pain, constipation. She is unsure if she received immunotherapy. She denies radiation history. She denies history of steroid or opioid use. She reports she may have received some opioids for a few days post surgery . She is not on medication regularly at home. She reports one week after November, endorsed swelling hands and feet, denies darkening of the skin. Per primary team, patient had keytruda in .    Endocrinologist: does not have  Social History: former tobacco user, denies alcohol use, drug use  Family History: denies family history of thyroid disorders, pituitary disorders adrenal gland disorders, diabetes  Surgical History: as above  Insurance: [facility]  Resides In: [place]    PAST MEDICAL & SURGICAL HISTORY:  Breast CA  left breast      History of chemotherapy  completed 23      S/P   X1       H/O bilateral breast biopsy  markers both breasts      Port-A-Cath in place  right chest          FAMILY HISTORY:      Social History:  No tobacco, alcohol or drug use (2024 03:59)      Home Medications:      MEDICATIONS  (STANDING):  cyanocobalamin 1000 MICROGram(s) Oral daily  enoxaparin Injectable 40 milliGRAM(s) SubCutaneous every 24 hours  folic acid 1 milliGRAM(s) Oral daily  influenza   Vaccine 0.5 milliLiter(s) IntraMuscular once  lactated ringers. 1000 milliLiter(s) (75 mL/Hr) IV Continuous <Continuous>  pyridoxine Injectable 25 milliGRAM(s) IV Push daily  thiamine 100 milliGRAM(s) Oral daily    MEDICATIONS  (PRN):  melatonin 3 milliGRAM(s) Oral at bedtime PRN Insomnia  sodium chloride 0.65% Nasal 1 Spray(s) Both Nostrils three times a day PRN Nasal Congestion      Allergies    unknown chemo medication (Anaphylaxis)  No Known Drug Allergies    Intolerances        REVIEW OF SYSTEMS  Constitutional: No fever  Eyes: No blurry vision  Neuro: No tremors  HEENT: No pain  Cardiovascular: No chest pain, palpitations  Respiratory: endorses SOB, no cough  GI: endorses nausea, vomiting, abdominal pain, diarrhea  : No dysuria  Skin: no rash  Psych: no depression  Endocrine: no polyuria, polydipsia  Hem/lymph: no swelling  Osteoporosis: no fractures  ALL OTHER SYSTEMS REVIEWED AND NEGATIVE       PHYSICAL EXAM   Vital Signs Last 24 Hrs  T(C): 37.6 (2024 06:58), Max: 37.6 (2024 16:59)  T(F): 99.6 (2024 06:58), Max: 99.6 (2024 16:59)  HR: 115 (2024 06:58) (115 - 123)  BP: 106/49 (2024 06:58) (93/62 - 106/49)  BP(mean): --  RR: 18 (2024 06:58) (18 - 18)  SpO2: 98% (2024 06:58) (97% - 100%)    Parameters below as of 2024 06:58  Patient On (Oxygen Delivery Method): room air      GENERAL: NAD, lying in bed  EYES: No proptosis, anicteric  HEENT:  Atraumatic, Normocephalic, mildly dry mucous membranes  THYROID: Normal size, no palpable nodules  RESPIRATORY: Clear to auscultation bilaterally; No rales, rhonchi, wheezing  CARDIOVASCULAR: Regular rate and rhythm; No murmurs; no peripheral edema  GI: Soft, nontender, non distended, normal bowel sounds  SKIN: Dry, intact, No rashes or lesions  MUSCULOSKELETAL: Full range of motion, normal strength  NEURO: sensation intact, no tremor  PSYCH: Answering questions appropriately, normal affect, normal mood  CUSHING'S SIGNS: no striae, no acanthosis, no dorsocervical fat pad    CAPILLARY BLOOD GLUCOSE      POCT Blood Glucose.: 107 mg/dL (2024 07:59)  POCT Blood Glucose.: 107 mg/dL (2024 22:31)  POCT Blood Glucose.: 125 mg/dL (2024 17:17)      A1C with Estimated Average Glucose Result: 4.8 % (24 @ 08:01)                            10.2   2.50  )-----------( 165      ( 2024 07:14 )             29.7           136  |  106  |  4<L>  ----------------------------<  115<H>  3.8   |  21<L>  |  0.45<L>    Ca    8.9      2024 07:14  Phos  3.2     01-05  Mg     1.70     -05    TPro  7.8  /  Alb  4.0  /  TBili  0.5  /  DBili  x   /  AST  24  /  ALT  12  /  AlkPhos  82  -          LIPIDS    RADIOLOGY ENDOCRINE INITIAL CONSULT - adrenal insufficiency    HPI:  Ms. Sauer is a 47 YO woman with PMH of left breast cancer on chemo, last session 2023 s/p b/l nipple sparing mastectomy/left axillary sentinel lymph node biopsy, JOHN flap reconstruction, who presents for 2 week progressive history of dyspnea on exertion. She notes that around [place] she started noticing she would fatigue more quickly. She attributed it to a brief chemotherapy regimen with Xeloda, which she had not been able to tolerate and had been discontinued 2 weeks prior. Two weeks prior she noticed progressive weakness and palpitations when standing; she would take a couple of steps and feel like her legs were shaking and that she was gasping for air. The next day, she attempted to go to work but had to leave because she was feeling dyspneic and noted pain in her groin that radiated down her legs. She did not have any bleeding or discharge. She then went to urgent care, who sent her to the hospital where she was told her heart rate was in the 140s, a CT was done and she was told she was negative for COVID/flu. She had one episode of NBNB vomiting in the the ED, and was told to rest and follow up with a cardiologist. She spoke to her oncologist's office, was evaluated and sent to the hospital for dehydration. She received fluids but was not told of any follow up. She called her oncologist's office again as she became very weak, unable to stand to put her clothes on. She denies fevers during this time. No chest pain, abdominal pain. She has had copious nausea and vomiting, has been unable to tolerate PO for 2.5 weeks. She had 3 days of diarrhea ending 2 days ago, and has not had a bowel movement since.     In the ED, she was found to be hypoglycemic and started on D5 then D10, as her IVC was felt to be of moderate size. She was given ativan with improvement of her nausea. At the time of interview she was thirsty and tolerated a few sips of ice water.  (2024 03:59)      ENDOCRINE HISTORY   She denies being told she has low cortisol, adrenal insufficiency, pituitary disorders, thyroid disease. She reports having breast cancer, - taxol, "red devil", 23 breast removed, surgery,  xeloda. She reports in  she started feeling weak, nauseous, vomiting, unexplained weight loss, chills, sob, diarrhea, denies fevers, polyuria, polydipsia, chest pain, constipation. She is unsure if she received immunotherapy. She denies radiation history. She denies history of steroid or opioid use. She reports she may have received some opioids for a few days post surgery . She is not on medication regularly at home. She reports one week after November, endorsed swelling hands and feet, denies darkening of the skin. Per primary team, patient had keytruda in .    Endocrinologist: does not have  Social History: former tobacco user, denies alcohol use, drug use  Family History: denies family history of thyroid disorders, pituitary disorders adrenal gland disorders, diabetes  Surgical History: as above  Insurance: [facility]  Resides In: [place]    PAST MEDICAL & SURGICAL HISTORY:  Breast CA  left breast      History of chemotherapy  completed 23      S/P   X1       H/O bilateral breast biopsy  markers both breasts      Port-A-Cath in place  right chest          FAMILY HISTORY:      Social History:  No tobacco, alcohol or drug use (2024 03:59)      Home Medications:      MEDICATIONS  (STANDING):  cyanocobalamin 1000 MICROGram(s) Oral daily  enoxaparin Injectable 40 milliGRAM(s) SubCutaneous every 24 hours  folic acid 1 milliGRAM(s) Oral daily  influenza   Vaccine 0.5 milliLiter(s) IntraMuscular once  lactated ringers. 1000 milliLiter(s) (75 mL/Hr) IV Continuous <Continuous>  pyridoxine Injectable 25 milliGRAM(s) IV Push daily  thiamine 100 milliGRAM(s) Oral daily    MEDICATIONS  (PRN):  melatonin 3 milliGRAM(s) Oral at bedtime PRN Insomnia  sodium chloride 0.65% Nasal 1 Spray(s) Both Nostrils three times a day PRN Nasal Congestion      Allergies    unknown chemo medication (Anaphylaxis)  No Known Drug Allergies    Intolerances        REVIEW OF SYSTEMS  Constitutional: No fever  Eyes: No blurry vision  Neuro: No tremors  HEENT: No pain  Cardiovascular: No chest pain, palpitations  Respiratory: endorses SOB, no cough  GI: endorses nausea, vomiting, abdominal pain, diarrhea  : No dysuria  Skin: no rash  Psych: no depression  Endocrine: no polyuria, polydipsia  Hem/lymph: no swelling  Osteoporosis: no fractures  ALL OTHER SYSTEMS REVIEWED AND NEGATIVE       PHYSICAL EXAM   Vital Signs Last 24 Hrs  T(C): 37.6 (2024 06:58), Max: 37.6 (2024 16:59)  T(F): 99.6 (2024 06:58), Max: 99.6 (2024 16:59)  HR: 115 (2024 06:58) (115 - 123)  BP: 106/49 (2024 06:58) (93/62 - 106/49)  BP(mean): --  RR: 18 (2024 06:58) (18 - 18)  SpO2: 98% (2024 06:58) (97% - 100%)    Parameters below as of 2024 06:58  Patient On (Oxygen Delivery Method): room air      GENERAL: NAD, lying in bed  EYES: No proptosis, anicteric  HEENT:  Atraumatic, Normocephalic, mildly dry mucous membranes  THYROID: Normal size, no palpable nodules  RESPIRATORY: Clear to auscultation bilaterally; No rales, rhonchi, wheezing  CARDIOVASCULAR: Regular rate and rhythm; No murmurs; no peripheral edema  GI: Soft, nontender, non distended, normal bowel sounds  SKIN: Dry, intact, No rashes or lesions  MUSCULOSKELETAL: Full range of motion, normal strength  NEURO: sensation intact, no tremor  PSYCH: Answering questions appropriately, normal affect, normal mood  CUSHING'S SIGNS: no striae, no acanthosis, no dorsocervical fat pad    CAPILLARY BLOOD GLUCOSE      POCT Blood Glucose.: 107 mg/dL (2024 07:59)  POCT Blood Glucose.: 107 mg/dL (2024 22:31)  POCT Blood Glucose.: 125 mg/dL (2024 17:17)      A1C with Estimated Average Glucose Result: 4.8 % (24 @ 08:01)                            10.2   2.50  )-----------( 165      ( 2024 07:14 )             29.7           136  |  106  |  4<L>  ----------------------------<  115<H>  3.8   |  21<L>  |  0.45<L>    Ca    8.9      2024 07:14  Phos  3.2     01-05  Mg     1.70     -05    TPro  7.8  /  Alb  4.0  /  TBili  0.5  /  DBili  x   /  AST  24  /  ALT  12  /  AlkPhos  82  -          LIPIDS    RADIOLOGY

## 2024-01-05 NOTE — DISCHARGE NOTE PROVIDER - NSDCCONDITION_GEN_ALL_CORE
[facility]        OUTPATIENT PHYSICAL THERAPY FUNCTIONAL EVALUATION  PLAN OF TREATMENT FOR OUTPATIENT REHABILITATION  (COMPLETE FOR INITIAL CLAIMS ONLY)  Patient's Last Name, First Name, M.I.  YOB: 1959  Erika Diaz     Provider's Name   [facility]   Medical Record No.  [id number]     Start of Care Date:  04/24/20   Onset Date:  04/02/20   Type:     _X__PT   ____OT  ____SLP Medical Diagnosis:  BPPV     PT Diagnosis:  dizziness with functional mobility. Visits from SOC:  1                              __________________________________________________________________________________  Plan of Treatment/Functional Goals:  balance training, neuromuscular re-education, gait training           GOALS  DHI  Pt report decreased dizziness with functional activities as evidenced by DHI <16/100.  07/22/20    FGA  Pt demo improved balance via FGA >27/30 for improved community accessibility and decreased falls risk.  07/22/20          Therapy Frequency:  other (see comments)(2-3 visits anticipate)   Predicted Duration of Therapy Intervention:  90 days    Ayesha Mendes, PT                                    I CERTIFY THE NEED FOR THESE SERVICES FURNISHED UNDER        THIS PLAN OF TREATMENT AND WHILE UNDER MY CARE     (Physician co-signature of this document indicates review and certification of the therapy plan).                Certification Date From:  04/24/20   Certification Date To:  07/22/20    Referring Provider:  Karina Rivas CNP    Initial Assessment  See Epic Evaluation- Start of Care Date: 04/24/20            Stable

## 2024-01-05 NOTE — PHYSICAL THERAPY INITIAL EVALUATION ADULT - ADDITIONAL COMMENTS
Pt states she lives with her son in a house with 2 steps to enter and a flight inside. Prior to admission pt reports being independent in ADLs and ambulation without device. Pt states she was receiving outpatient PT services prior to admission.    Following evaluation, pt was left semireclined in bed in no distress, all lines in tact, call bell in reach.

## 2024-01-05 NOTE — DISCHARGE NOTE PROVIDER - NSFOLLOWUPCLINICS_GEN_ALL_ED_FT
Cardiology at [facility]  Cardiology  [address] [place] [id number]  Phone: [phone]  Fax:   Follow Up Time: 2 weeks     Cardiology at [facility]  Cardiology  [address] [place] [address]  Phone: [phone]  Fax:   Follow Up Time: 2 weeks

## 2024-01-05 NOTE — PROGRESS NOTE ADULT - PROBLEM SELECTOR PLAN 3
With report of orthostatic hypotension ?adrenal insufficiency vs. prolonged starvation in patient with low fat/glycogen storage  - AM cortisol low, acth pending  - A1c 4.8  - Monitor fingersticks q6, hypoglycemia protocol With report of orthostatic hypotension ?adrenal insufficiency vs. prolonged starvation in patient with low fat/glycogen storage  - AM cortisol low, acth pending  - A1c 4.8  - Monitor fingersticks q6, hypoglycemia protocol  - Endocrine consult for adrenal insufficiency

## 2024-01-05 NOTE — DISCHARGE NOTE PROVIDER - NSDCCPTREATMENT_GEN_ALL_CORE_FT
PRINCIPAL PROCEDURE  Procedure: CAT scan  Findings and Treatment:   < end of copied text >  IMPRESSION:  No main, lobar or segmental pulmonary embolism. Evaluation of the   subsegmental branches of the pulmonary arteries within the lung bases is   limited secondary to motion.  No acute pathology in the chest, abdomen or pelvis.  --- End of Report ---IMPRESSION:  1. Unremarkable, unenhanced CT imaging of the brain for the patient's age.  --- End of Report ---< from: CT Head No Cont (01.04.24 @ 00:57) >  < from: CT Abdomen and Pelvis w/ IV Cont (01.03.24 @ 14:46) >

## 2024-01-06 LAB
ANION GAP SERPL CALC-SCNC: 7 MMOL/L — SIGNIFICANT CHANGE UP (ref 7–14)
ANION GAP SERPL CALC-SCNC: 7 MMOL/L — SIGNIFICANT CHANGE UP (ref 7–14)
BASE EXCESS BLDV CALC-SCNC: 0.8 MMOL/L — SIGNIFICANT CHANGE UP (ref -2–3)
BASE EXCESS BLDV CALC-SCNC: 0.8 MMOL/L — SIGNIFICANT CHANGE UP (ref -2–3)
BASOPHILS # BLD AUTO: 0.01 K/UL — SIGNIFICANT CHANGE UP (ref 0–0.2)
BASOPHILS # BLD AUTO: 0.01 K/UL — SIGNIFICANT CHANGE UP (ref 0–0.2)
BASOPHILS NFR BLD AUTO: 0.4 % — SIGNIFICANT CHANGE UP (ref 0–2)
BASOPHILS NFR BLD AUTO: 0.4 % — SIGNIFICANT CHANGE UP (ref 0–2)
BLOOD GAS VENOUS COMPREHENSIVE RESULT: SIGNIFICANT CHANGE UP
BLOOD GAS VENOUS COMPREHENSIVE RESULT: SIGNIFICANT CHANGE UP
BUN SERPL-MCNC: 4 MG/DL — LOW (ref 7–23)
BUN SERPL-MCNC: 4 MG/DL — LOW (ref 7–23)
CALCIUM SERPL-MCNC: 9 MG/DL — SIGNIFICANT CHANGE UP (ref 8.4–10.5)
CALCIUM SERPL-MCNC: 9 MG/DL — SIGNIFICANT CHANGE UP (ref 8.4–10.5)
CHLORIDE BLDV-SCNC: 106 MMOL/L — SIGNIFICANT CHANGE UP (ref 96–108)
CHLORIDE BLDV-SCNC: 106 MMOL/L — SIGNIFICANT CHANGE UP (ref 96–108)
CHLORIDE SERPL-SCNC: 103 MMOL/L — SIGNIFICANT CHANGE UP (ref 98–107)
CHLORIDE SERPL-SCNC: 103 MMOL/L — SIGNIFICANT CHANGE UP (ref 98–107)
CO2 BLDV-SCNC: 27.3 MMOL/L — HIGH (ref 22–26)
CO2 BLDV-SCNC: 27.3 MMOL/L — HIGH (ref 22–26)
CO2 SERPL-SCNC: 26 MMOL/L — SIGNIFICANT CHANGE UP (ref 22–31)
CO2 SERPL-SCNC: 26 MMOL/L — SIGNIFICANT CHANGE UP (ref 22–31)
CREAT SERPL-MCNC: 0.5 MG/DL — SIGNIFICANT CHANGE UP (ref 0.5–1.3)
CREAT SERPL-MCNC: 0.5 MG/DL — SIGNIFICANT CHANGE UP (ref 0.5–1.3)
EGFR: 115 ML/MIN/1.73M2 — SIGNIFICANT CHANGE UP
EGFR: 115 ML/MIN/1.73M2 — SIGNIFICANT CHANGE UP
EOSINOPHIL # BLD AUTO: 0.23 K/UL — SIGNIFICANT CHANGE UP (ref 0–0.5)
EOSINOPHIL # BLD AUTO: 0.23 K/UL — SIGNIFICANT CHANGE UP (ref 0–0.5)
EOSINOPHIL NFR BLD AUTO: 9.6 % — HIGH (ref 0–6)
EOSINOPHIL NFR BLD AUTO: 9.6 % — HIGH (ref 0–6)
GAS PNL BLDV: 134 MMOL/L — LOW (ref 136–145)
GAS PNL BLDV: 134 MMOL/L — LOW (ref 136–145)
GLUCOSE BLDC GLUCOMTR-MCNC: 102 MG/DL — HIGH (ref 70–99)
GLUCOSE BLDC GLUCOMTR-MCNC: 102 MG/DL — HIGH (ref 70–99)
GLUCOSE BLDC GLUCOMTR-MCNC: 105 MG/DL — HIGH (ref 70–99)
GLUCOSE BLDC GLUCOMTR-MCNC: 105 MG/DL — HIGH (ref 70–99)
GLUCOSE BLDC GLUCOMTR-MCNC: 115 MG/DL — HIGH (ref 70–99)
GLUCOSE BLDC GLUCOMTR-MCNC: 115 MG/DL — HIGH (ref 70–99)
GLUCOSE BLDV-MCNC: 96 MG/DL — SIGNIFICANT CHANGE UP (ref 70–99)
GLUCOSE BLDV-MCNC: 96 MG/DL — SIGNIFICANT CHANGE UP (ref 70–99)
GLUCOSE SERPL-MCNC: 101 MG/DL — HIGH (ref 70–99)
GLUCOSE SERPL-MCNC: 101 MG/DL — HIGH (ref 70–99)
HCO3 BLDV-SCNC: 26 MMOL/L — SIGNIFICANT CHANGE UP (ref 22–29)
HCO3 BLDV-SCNC: 26 MMOL/L — SIGNIFICANT CHANGE UP (ref 22–29)
HCT VFR BLD CALC: 30.3 % — LOW (ref 34.5–45)
HCT VFR BLD CALC: 30.3 % — LOW (ref 34.5–45)
HCT VFR BLDA CALC: 30 % — LOW (ref 34.5–46.5)
HCT VFR BLDA CALC: 30 % — LOW (ref 34.5–46.5)
HGB BLD CALC-MCNC: 10.1 G/DL — LOW (ref 11.7–16.1)
HGB BLD CALC-MCNC: 10.1 G/DL — LOW (ref 11.7–16.1)
HGB BLD-MCNC: 10 G/DL — LOW (ref 11.5–15.5)
HGB BLD-MCNC: 10 G/DL — LOW (ref 11.5–15.5)
IANC: 1.01 K/UL — LOW (ref 1.8–7.4)
IANC: 1.01 K/UL — LOW (ref 1.8–7.4)
IMM GRANULOCYTES NFR BLD AUTO: 0 % — SIGNIFICANT CHANGE UP (ref 0–0.9)
IMM GRANULOCYTES NFR BLD AUTO: 0 % — SIGNIFICANT CHANGE UP (ref 0–0.9)
LACTATE BLDV-MCNC: 1.1 MMOL/L — SIGNIFICANT CHANGE UP (ref 0.5–2)
LACTATE BLDV-MCNC: 1.1 MMOL/L — SIGNIFICANT CHANGE UP (ref 0.5–2)
LYMPHOCYTES # BLD AUTO: 0.86 K/UL — LOW (ref 1–3.3)
LYMPHOCYTES # BLD AUTO: 0.86 K/UL — LOW (ref 1–3.3)
LYMPHOCYTES # BLD AUTO: 35.8 % — SIGNIFICANT CHANGE UP (ref 13–44)
LYMPHOCYTES # BLD AUTO: 35.8 % — SIGNIFICANT CHANGE UP (ref 13–44)
MAGNESIUM SERPL-MCNC: 1.5 MG/DL — LOW (ref 1.6–2.6)
MAGNESIUM SERPL-MCNC: 1.5 MG/DL — LOW (ref 1.6–2.6)
MCHC RBC-ENTMCNC: 26.5 PG — LOW (ref 27–34)
MCHC RBC-ENTMCNC: 26.5 PG — LOW (ref 27–34)
MCHC RBC-ENTMCNC: 33 GM/DL — SIGNIFICANT CHANGE UP (ref 32–36)
MCHC RBC-ENTMCNC: 33 GM/DL — SIGNIFICANT CHANGE UP (ref 32–36)
MCV RBC AUTO: 80.4 FL — SIGNIFICANT CHANGE UP (ref 80–100)
MCV RBC AUTO: 80.4 FL — SIGNIFICANT CHANGE UP (ref 80–100)
MONOCYTES # BLD AUTO: 0.29 K/UL — SIGNIFICANT CHANGE UP (ref 0–0.9)
MONOCYTES # BLD AUTO: 0.29 K/UL — SIGNIFICANT CHANGE UP (ref 0–0.9)
MONOCYTES NFR BLD AUTO: 12.1 % — SIGNIFICANT CHANGE UP (ref 2–14)
MONOCYTES NFR BLD AUTO: 12.1 % — SIGNIFICANT CHANGE UP (ref 2–14)
NEUTROPHILS # BLD AUTO: 1.01 K/UL — LOW (ref 1.8–7.4)
NEUTROPHILS # BLD AUTO: 1.01 K/UL — LOW (ref 1.8–7.4)
NEUTROPHILS NFR BLD AUTO: 42.1 % — LOW (ref 43–77)
NEUTROPHILS NFR BLD AUTO: 42.1 % — LOW (ref 43–77)
NRBC # BLD: 0 /100 WBCS — SIGNIFICANT CHANGE UP (ref 0–0)
NRBC # BLD: 0 /100 WBCS — SIGNIFICANT CHANGE UP (ref 0–0)
NRBC # FLD: 0 K/UL — SIGNIFICANT CHANGE UP (ref 0–0)
NRBC # FLD: 0 K/UL — SIGNIFICANT CHANGE UP (ref 0–0)
PCO2 BLDV: 43 MMHG — SIGNIFICANT CHANGE UP (ref 39–52)
PCO2 BLDV: 43 MMHG — SIGNIFICANT CHANGE UP (ref 39–52)
PH BLDV: 7.39 — SIGNIFICANT CHANGE UP (ref 7.32–7.43)
PH BLDV: 7.39 — SIGNIFICANT CHANGE UP (ref 7.32–7.43)
PHOSPHATE SERPL-MCNC: 3.7 MG/DL — SIGNIFICANT CHANGE UP (ref 2.5–4.5)
PHOSPHATE SERPL-MCNC: 3.7 MG/DL — SIGNIFICANT CHANGE UP (ref 2.5–4.5)
PLATELET # BLD AUTO: 156 K/UL — SIGNIFICANT CHANGE UP (ref 150–400)
PLATELET # BLD AUTO: 156 K/UL — SIGNIFICANT CHANGE UP (ref 150–400)
PO2 BLDV: 62 MMHG — HIGH (ref 25–45)
PO2 BLDV: 62 MMHG — HIGH (ref 25–45)
POTASSIUM BLDV-SCNC: 3.6 MMOL/L — SIGNIFICANT CHANGE UP (ref 3.5–5.1)
POTASSIUM BLDV-SCNC: 3.6 MMOL/L — SIGNIFICANT CHANGE UP (ref 3.5–5.1)
POTASSIUM SERPL-MCNC: 3.7 MMOL/L — SIGNIFICANT CHANGE UP (ref 3.5–5.3)
POTASSIUM SERPL-MCNC: 3.7 MMOL/L — SIGNIFICANT CHANGE UP (ref 3.5–5.3)
POTASSIUM SERPL-SCNC: 3.7 MMOL/L — SIGNIFICANT CHANGE UP (ref 3.5–5.3)
POTASSIUM SERPL-SCNC: 3.7 MMOL/L — SIGNIFICANT CHANGE UP (ref 3.5–5.3)
RBC # BLD: 3.77 M/UL — LOW (ref 3.8–5.2)
RBC # BLD: 3.77 M/UL — LOW (ref 3.8–5.2)
RBC # FLD: 17.3 % — HIGH (ref 10.3–14.5)
RBC # FLD: 17.3 % — HIGH (ref 10.3–14.5)
SAO2 % BLDV: 92.7 % — HIGH (ref 67–88)
SAO2 % BLDV: 92.7 % — HIGH (ref 67–88)
SODIUM SERPL-SCNC: 136 MMOL/L — SIGNIFICANT CHANGE UP (ref 135–145)
SODIUM SERPL-SCNC: 136 MMOL/L — SIGNIFICANT CHANGE UP (ref 135–145)
T4 FREE SERPL-MCNC: 0.9 NG/DL — SIGNIFICANT CHANGE UP (ref 0.9–1.8)
T4 FREE SERPL-MCNC: 0.9 NG/DL — SIGNIFICANT CHANGE UP (ref 0.9–1.8)
TSH SERPL-MCNC: 8.35 UIU/ML — HIGH (ref 0.27–4.2)
TSH SERPL-MCNC: 8.35 UIU/ML — HIGH (ref 0.27–4.2)
WBC # BLD: 2.4 K/UL — LOW (ref 3.8–10.5)
WBC # BLD: 2.4 K/UL — LOW (ref 3.8–10.5)
WBC # FLD AUTO: 2.4 K/UL — LOW (ref 3.8–10.5)
WBC # FLD AUTO: 2.4 K/UL — LOW (ref 3.8–10.5)

## 2024-01-06 PROCEDURE — 93010 ELECTROCARDIOGRAM REPORT: CPT

## 2024-01-06 PROCEDURE — 99233 SBSQ HOSP IP/OBS HIGH 50: CPT | Mod: GC

## 2024-01-06 RX ORDER — ISOPROPYL ALCOHOL, BENZOCAINE .7; .06 ML/ML; ML/ML
0 SWAB TOPICAL
Qty: 100 | Refills: 0
Start: 2024-01-06

## 2024-01-06 RX ORDER — HYDROCORTISONE 20 MG
30 TABLET ORAL
Refills: 0 | Status: DISCONTINUED | OUTPATIENT
Start: 2024-01-07 | End: 2024-01-07

## 2024-01-06 RX ORDER — HYDROCORTISONE 20 MG
1 TABLET ORAL
Qty: 150 | Refills: 0
Start: 2024-01-06

## 2024-01-06 RX ORDER — HYDROCORTISONE 20 MG
15 TABLET ORAL
Refills: 0 | Status: DISCONTINUED | OUTPATIENT
Start: 2024-01-07 | End: 2024-01-07

## 2024-01-06 RX ORDER — MAGNESIUM SULFATE 500 MG/ML
4 VIAL (ML) INJECTION ONCE
Refills: 0 | Status: DISCONTINUED | OUTPATIENT
Start: 2024-01-06 | End: 2024-01-06

## 2024-01-06 RX ORDER — POTASSIUM CHLORIDE 20 MEQ
40 PACKET (EA) ORAL ONCE
Refills: 0 | Status: COMPLETED | OUTPATIENT
Start: 2024-01-06 | End: 2024-01-06

## 2024-01-06 RX ORDER — MAGNESIUM SULFATE 500 MG/ML
2 VIAL (ML) INJECTION
Refills: 0 | Status: COMPLETED | OUTPATIENT
Start: 2024-01-06 | End: 2024-01-06

## 2024-01-06 RX ORDER — HYDROCORTISONE 20 MG
100 TABLET ORAL
Qty: 2 | Refills: 0
Start: 2024-01-06

## 2024-01-06 RX ORDER — LEVOTHYROXINE SODIUM 125 MCG
1 TABLET ORAL
Qty: 30 | Refills: 0
Start: 2024-01-06 | End: 2024-02-04

## 2024-01-06 RX ADMIN — Medication 25 MILLIGRAM(S): at 11:35

## 2024-01-06 RX ADMIN — Medication 20 MILLIGRAM(S): at 09:35

## 2024-01-06 RX ADMIN — PREGABALIN 1000 MICROGRAM(S): 225 CAPSULE ORAL at 11:36

## 2024-01-06 RX ADMIN — Medication 100 MILLIGRAM(S): at 11:37

## 2024-01-06 RX ADMIN — Medication 25 GRAM(S): at 10:38

## 2024-01-06 RX ADMIN — Medication 25 GRAM(S): at 11:35

## 2024-01-06 RX ADMIN — Medication 40 MILLIEQUIVALENT(S): at 09:32

## 2024-01-06 RX ADMIN — Medication 1 MILLIGRAM(S): at 11:36

## 2024-01-06 NOTE — CHART NOTE - NSCHARTNOTEFT_GEN_A_CORE
Tele sinus tach ~100s.     Lavon Schreiber MD  Cardiology Fellow - PGY 6  For all New Consults and Questions:  [email or web address]   Login: [email or web address] Tele sinus tach ~100s.     Lavon Schreiber MD  Cardiology Fellow - PGY 6  For all New Consults and Questions:  [email or web address]   Login: [email or web address]

## 2024-01-06 NOTE — DIETITIAN INITIAL EVALUATION ADULT - NAME AND PHONE
Jackelin Cohen MS RD CDN #[id number], available on [employer].  Jackelin Cohen MS RD CDN #[id number], available on [employer].

## 2024-01-06 NOTE — DIETITIAN INITIAL EVALUATION ADULT - OTHER INFO
Patient is A&O x 3-4 at baseline.  Seen for nutrition consult of MST scores 2 or greater.  Patient is tolerating a PO diet without restriction.  Appetite and PO intake was very poor for > 2.5 weeks s/p N/V, gradually improved in 1-2 days since admission.  Estimated PO intake ~25-50% per Patient reported.  No specific food and fluid preferences discussed.  No c/o chewing/swallowing difficulty.  No reported N/V/D/C at this time, not on bowel regimen.  Patient had LBM for 3 days PTA.  Skin noted intact without edema, no pressure injury per RN flowsheets.    CBW 67.13kg, height 175.3cm, BMI 21.9 suggesting a normal weight status.  Per [facility] weight history:  75.7kg(12-7-23), 72.4kg (11-11-23), and 68.9kg (7-14-23).  Noted a 8.5kg/18.8 lbs/11.2%BW loss in one month, Wt change is likely related to steroid use, poor oral intake, and chemotherapy.  Patient offered nutritional supplements, snacks, drinks however Patient declined.  Patient is currently on B12, magnesium sulfate, pyridoxine, thiamine,  folic acid, and potassium chloride for micronutrient support s/p not eating from starvation ketoacidosis.  Patient reported not taking vitamins/minerals during cancer treatment because she was told not to.  Labs ( 1/6 ) reviewed, Mg 1.5-remain low, continue to monitor and replete per MD and team.  Patient noted TSH@ 8.35 and adrenal insufficiency, endo consult note to start levothyroxine 50mcg and increase hydrocortisone to 30mg at 8am and 15mg at 3pm once daily.  Patient is fully aware of nutrition therapy for N/V however stating it is not helping much.  Patient does not want to have too much foods and drinks at this time due to unable to tolerate.  Patient observed with mild-moderate temple-buccal wasting from appearance, Pt is meeting criteria for malnutrition at this time.  Patient is A&O x 3-4 at baseline.  Seen for nutrition consult of MST scores 2 or greater.  Patient is tolerating a PO diet without restriction.  Appetite and PO intake was very poor for > 2.5 weeks s/p N/V, gradually improved in 1-2 days since admission.  Estimated PO intake ~25-50% per Patient reported.  No specific food and fluid preferences discussed.  No c/o chewing/swallowing difficulty.  No reported N/V/D/C at this time, not on bowel regimen.  Patient had LBM for 3 days PTA.  Skin noted intact without edema, no pressure injury per RN flowsheets.    CBW 67.13kg, height 175.3cm, BMI 21.9 suggesting a normal weight status.  Per [facility] weight history:  75.7kg(12-7-23), 72.4kg (11-11-23), and 68.9kg (7-14-23).  Noted a 8.5kg/18.8 lbs/11.2%BW loss in one month, Wt change is likely related to steroid use, poor oral intake, and chemotherapy.  Patient offered nutritional supplements, snacks, drinks however Patient declined.  Patient is currently on B12, magnesium sulfate, pyridoxine, thiamine,  folic acid, and potassium chloride for micronutrient support s/p not eating from starvation ketoacidosis.  Patient reported not taking vitamins/minerals during cancer treatment because she was told not to.  Labs ( 1/6 ) reviewed, Mg 1.5-remain low, continue to monitor and replete per MD and team.  Patient noted TSH@ 8.35 and adrenal insufficiency, endo consult note to start levothyroxine 50mcg and increase hydrocortisone to 30mg at 8am and 15mg at 3pm once daily.  Patient is fully aware of nutrition therapy for N/V however stating it is not helping much.  Patient does not want to have too much foods and drinks at this time due to unable to tolerate.  Patient observed with mild-moderate temple-buccal wasting from appearance, Pt is meeting criteria for malnutrition at this time.

## 2024-01-06 NOTE — PROGRESS NOTE ADULT - PROBLEM SELECTOR PLAN 4
- Unclear what prompted the nausea/vomiting - ?delayed onset from chemotherapy  - Ativan for nausea given prolonged QTc  - GI PCR, stool ova and parasites when able to produce stool sample - Unclear what prompted the nausea/vomiting - ?delayed onset from chemotherapy vs possible gastritis   - Ativan for nausea given prolonged QTc  - GI PCR, stool ova and parasites when able to produce stool sample  - Appears to  be resolved

## 2024-01-06 NOTE — DIETITIAN INITIAL EVALUATION ADULT - PERTINENT MEDS FT
MEDICATIONS  (STANDING):  cyanocobalamin 1000 MICROGram(s) Oral daily  enoxaparin Injectable 40 milliGRAM(s) SubCutaneous every 24 hours  folic acid 1 milliGRAM(s) Oral daily  influenza   Vaccine 0.5 milliLiter(s) IntraMuscular once  pyridoxine Injectable 25 milliGRAM(s) IV Push daily  thiamine 100 milliGRAM(s) Oral daily    MEDICATIONS  (PRN):  melatonin 3 milliGRAM(s) Oral at bedtime PRN Insomnia  sodium chloride 0.65% Nasal 1 Spray(s) Both Nostrils three times a day PRN Nasal Congestion

## 2024-01-06 NOTE — DIETITIAN INITIAL EVALUATION ADULT - PROBLEM SELECTOR PLAN 2
Sinus on ECG. Likely iso hypovolemia, possibly infection in immunocompromised patient. Note QTc 587  - Fluid resuscitation as above  - Blood cultures x2, urine culture pending  - Monitor off antibiotics as not febrile  - TTE - patient mentions that she was told she has a "hole in her heart" when she was 13  - Troponins negative

## 2024-01-06 NOTE — PROGRESS NOTE ADULT - ATTENDING COMMENTS
48F Breast CA s/p bilateral mastectomy/RT/chemo 11/23 p/w adrenal insufficiency  w/ Intractable N/V unable to tolerate PO, hypotension, hypoglycemia c/b prolonged QTC.  - Hydrocortisone 30mg in AM, 15mg in PM  - appreciate endocrine recommendations  - Synthroid started  - Appreciate EP input - no intervention for prolonged QTC.   - monitor clinical status.

## 2024-01-06 NOTE — DIETITIAN INITIAL EVALUATION ADULT - PERTINENT LABORATORY DATA
01-06    136  |  103  |  4<L>  ----------------------------<  101<H>  3.7   |  26  |  0.50    Ca    9.0      06 Jan 2024 06:08  Phos  3.7     01-06  Mg     1.50     01-06    POCT Blood Glucose.: 115 mg/dL (01-06-24 @ 12:20)  A1C with Estimated Average Glucose Result: 4.8 % (01-04-24 @ 08:01)

## 2024-01-06 NOTE — PROGRESS NOTE ADULT - SUBJECTIVE AND OBJECTIVE BOX
PGY-1 Mary Alvarez  TEAM  Progress Note    Patient is a 48y old  Female who presents with a chief complaint of Nausea/vomiting, tachycardia (04 Jan 2024 03:59)      SUBJECTIVE / OVERNIGHT EVENTS:  OVN: No acute events  Labs and Imaging Reviewed  Patient seen and examined bedside.        PHYSICAL EXAM:  GENERAL: Resting in bed.  HEAD:  Atraumatic, Normocephalic  EYES: Patient opening eyes, maintaining eye contact, tracking examiner   CHEST/LUNG: Clear to auscultation bilaterally; No wheeze  HEART: Regular rhythm, tachycardia; No murmurs, rubs, or gallops  ABDOMEN: Soft, Nontender, Nondistended; Bowel sounds present  EXTREMITIES:  2+ Peripheral Pulses, No clubbing, cyanosis, or edema  PSYCH: AAOx3  NEUROLOGY: non-focal  SKIN: No rashes or lesions      VITAL SIGNS:    Vital Signs Last 24 Hrs  T(C): 36.8 (06 Jan 2024 04:39), Max: 37.6 (05 Jan 2024 06:58)  T(F): 98.2 (06 Jan 2024 04:39), Max: 99.6 (05 Jan 2024 06:58)  HR: 109 (06 Jan 2024 04:39) (109 - 115)  BP: 104/61 (06 Jan 2024 04:39) (94/61 - 106/49)  BP(mean): --  RR: 18 (06 Jan 2024 04:39) (17 - 18)  SpO2: 98% (06 Jan 2024 04:39) (98% - 100%)    Parameters below as of 06 Jan 2024 04:39  Patient On (Oxygen Delivery Method): room air      CAPILLARY BLOOD GLUCOSE      POCT Blood Glucose.: 105 mg/dL (06 Jan 2024 06:09)  POCT Blood Glucose.: 102 mg/dL (06 Jan 2024 00:23)  POCT Blood Glucose.: 107 mg/dL (05 Jan 2024 07:59)    I&O's Summary    MEDICATIONS  (STANDING):  cyanocobalamin 1000 MICROGram(s) Oral daily  enoxaparin Injectable 40 milliGRAM(s) SubCutaneous every 24 hours  folic acid 1 milliGRAM(s) Oral daily  hydrocortisone 10 milliGRAM(s) Oral <User Schedule>  hydrocortisone 20 milliGRAM(s) Oral <User Schedule>  influenza   Vaccine 0.5 milliLiter(s) IntraMuscular once  lactated ringers. 1000 milliLiter(s) (75 mL/Hr) IV Continuous <Continuous>  magnesium sulfate  IVPB 2 Gram(s) IV Intermittent every 2 hours  pyridoxine Injectable 25 milliGRAM(s) IV Push daily  thiamine 100 milliGRAM(s) Oral daily    MEDICATIONS  (PRN):  melatonin 3 milliGRAM(s) Oral at bedtime PRN Insomnia  sodium chloride 0.65% Nasal 1 Spray(s) Both Nostrils three times a day PRN Nasal Congestion        LABS:                         IMAGING:  No new imaging to review    Consultant(s) Notes Reviewed     Care Discussed with Consultants/Other Providers PGY-1 Mary Alvarez  TEAM  Progress Note    Patient is a 48y old  Female who presents with a chief complaint of Nausea/vomiting, tachycardia (04 Jan 2024 03:59)      SUBJECTIVE / OVERNIGHT EVENTS:  OVN: No acute events  Labs and Imaging Reviewed  Patient seen and examined bedside.  Pt feels well, tolerated solid diet day prior without N/V. Feeling moderately better. Denies CP, SOB, N/V/D/C. Was able to ambulate to bathroom without assistance but notes still somewhat weak in legs easy fatigue.      PHYSICAL EXAM:  GENERAL: Resting in bed.  HEAD:  Atraumatic, Normocephalic  EYES: Patient opening eyes, maintaining eye contact, tracking examiner   CHEST/LUNG: Clear to auscultation bilaterally; No wheeze  HEART: Regular rhythm, tachycardia; No murmurs, rubs, or gallops  ABDOMEN: Soft, Nontender, Nondistended; Bowel sounds present  EXTREMITIES:  2+ Peripheral Pulses, No clubbing, cyanosis, or edema  PSYCH: AAOx3  NEUROLOGY: non-focal  SKIN: No rashes or lesions      VITAL SIGNS:    Vital Signs Last 24 Hrs  T(C): 36.8 (06 Jan 2024 04:39), Max: 37.6 (05 Jan 2024 06:58)  T(F): 98.2 (06 Jan 2024 04:39), Max: 99.6 (05 Jan 2024 06:58)  HR: 109 (06 Jan 2024 04:39) (109 - 115)  BP: 104/61 (06 Jan 2024 04:39) (94/61 - 106/49)  BP(mean): --  RR: 18 (06 Jan 2024 04:39) (17 - 18)  SpO2: 98% (06 Jan 2024 04:39) (98% - 100%)    Parameters below as of 06 Jan 2024 04:39  Patient On (Oxygen Delivery Method): room air      CAPILLARY BLOOD GLUCOSE      POCT Blood Glucose.: 105 mg/dL (06 Jan 2024 06:09)  POCT Blood Glucose.: 102 mg/dL (06 Jan 2024 00:23)  POCT Blood Glucose.: 107 mg/dL (05 Jan 2024 07:59)    I&O's Summary    MEDICATIONS  (STANDING):  cyanocobalamin 1000 MICROGram(s) Oral daily  enoxaparin Injectable 40 milliGRAM(s) SubCutaneous every 24 hours  folic acid 1 milliGRAM(s) Oral daily  hydrocortisone 10 milliGRAM(s) Oral <User Schedule>  hydrocortisone 20 milliGRAM(s) Oral <User Schedule>  influenza   Vaccine 0.5 milliLiter(s) IntraMuscular once  lactated ringers. 1000 milliLiter(s) (75 mL/Hr) IV Continuous <Continuous>  magnesium sulfate  IVPB 2 Gram(s) IV Intermittent every 2 hours  pyridoxine Injectable 25 milliGRAM(s) IV Push daily  thiamine 100 milliGRAM(s) Oral daily    MEDICATIONS  (PRN):  melatonin 3 milliGRAM(s) Oral at bedtime PRN Insomnia  sodium chloride 0.65% Nasal 1 Spray(s) Both Nostrils three times a day PRN Nasal Congestion        LABS:                        10.0   2.40  )-----------( 156      ( 06 Jan 2024 06:08 )             30.3     01-06    136  |  103  |  4<L>  ----------------------------<  101<H>  3.7   |  26  |  0.50    Ca    9.0      06 Jan 2024 06:08  Phos  3.7     01-06  Mg     1.50     01-06                      IMAGING:  No new imaging to review    Consultant(s) Notes Reviewed     Care Discussed with Consultants/Other Providers

## 2024-01-06 NOTE — DIETITIAN INITIAL EVALUATION ADULT - REASON FOR ADMISSION
Starvation, initial encounter  Per 1/6/24 internal medicine chart, Patient is a Ms. Sauer is a 47 YO woman with PMH of left breast cancer s/p chemo (last 11/2023) and mastectomy, who presents to the ED with nausea/vomiting, diarrhea, chest pain, shortness of breath, weakness and b/l groin pain for 2 weeks. Admitted for anion gap metabolic acidosis requiring electrolyte repletions.

## 2024-01-06 NOTE — DIETITIAN INITIAL EVALUATION ADULT - NSICDXPASTMEDICALHX_GEN_ALL_CORE_FT
complains of pain/discomfort PAST MEDICAL HISTORY:  Breast CA left breast    History of chemotherapy completed 5/9/23

## 2024-01-06 NOTE — PROGRESS NOTE ADULT - ASSESSMENT
Ms. Sauer is a 47 YO woman with PMH of left breast cancer s/p chemo (last 11/2023) and mastectomy, who presents to the ED with nausea/vomiting, diarrhea, chest pain, shortness of breath, weakness and b/l groin pain for 2 weeks. Admitted for anion gap metabolic acidosis requiring electrolyte repletions. Ms. Sauer is a 49 YO woman with PMH of left breast cancer s/p chemo (last 11/2023) and mastectomy, who presents to the ED with nausea/vomiting, diarrhea, chest pain, shortness of breath, weakness and b/l groin pain for 2 weeks. Admitted for anion gap metabolic acidosis requiring electrolyte repletions.

## 2024-01-06 NOTE — DIETITIAN INITIAL EVALUATION ADULT - PROBLEM SELECTOR PLAN 1
pH 7.17. Most recent anion gap 19. Lactate wnl, no salicylate use  - Likely starvation ketosis as hasn't had food in many days, added on BHB  - Monitor electrolytes for refeeding syndrome  - Continue on D5 LR, will wean as patient able to tolerate diet  - Thiamine supplementation

## 2024-01-06 NOTE — DIETITIAN INITIAL EVALUATION ADULT - ADD RECOMMEND
1. Encourage PO intake and honor food preferences as able.  2. Nursing to document PO in RN flow sheets and monitor weekly weights.   3. Continue to monitor skin integrity, bowel regimen, and nutrition pertinent labs.

## 2024-01-06 NOTE — PROGRESS NOTE ADULT - PROBLEM SELECTOR PLAN 6
DVT: Patient currently unable to walk, lovenox  Diet: Advance to Full liquid today  Dispo: Pending PT and clinical improvement DVT: Patient currently unable to walk, lovenox  Diet: Regular diet  Dispo: out pt PT, pending clinical improvement

## 2024-01-06 NOTE — PROGRESS NOTE ADULT - PROBLEM SELECTOR PLAN 3
With report of orthostatic hypotension ?adrenal insufficiency vs. prolonged starvation in patient with low fat/glycogen storage  - AM cortisol low, acth pending  - A1c 4.8  - Monitor fingersticks q6, hypoglycemia protocol  - Endocrine consult for adrenal insufficiency With report of orthostatic hypotension ?adrenal insufficiency vs. prolonged starvation in patient with low fat/glycogen storage  - AM cortisol low, acth pending  - A1c 4.8  - Monitor fingersticks q6, hypoglycemia protocol  - Endocrine consult for adrenal insufficiency; diagnostic, started on hydrocortisone supplementation, pending additional work up

## 2024-01-06 NOTE — PROVIDER CONTACT NOTE (OTHER) - ASSESSMENT
patient denies chest pain, shortness of breath, headache, dizziness, blurry vision; no change in mentation.

## 2024-01-06 NOTE — PROGRESS NOTE ADULT - PROBLEM SELECTOR PLAN 1
pH 7.17. Most recent anion gap 19. Lactate wnl, no salicylate use  - Likely starvation ketosis as hasn't had food in many days, BHB slightly elevated  -Total fluids resuscitation of 3L    Anion gap resolving     Plan:  - Monitor electrolytes for refeeding syndrome  - Continue LR MIVF  - Thiamine, Folate/Vitamin b12 supplementation  - Strict I&O pH 7.17. Most recent anion gap 19. Lactate wnl, no salicylate use  - Likely starvation ketosis as hasn't had food in many days, BHB slightly elevated  -Total fluids resuscitation of 3L    Anion gap resolved    Plan:  - Monitor electrolytes for refeeding syndrome  - Continue LR MIVF  - Thiamine, Folate/Vitamin b12 supplementation  - Strict I&O

## 2024-01-06 NOTE — CHART NOTE - NSCHARTNOTEFT_GEN_A_CORE
Endocrinology following Ms. 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 TSH 8.35, FT4 0.9, possible hypothyroidism as well. ACTH came back 2.3 suggestive of secondary AI not primary.    Recommend  In terms of adrenal insufficiency  - continue hydrocortisone 20mg at 8am and 10mg at 3pm for another 2 days  - on 1/8 can transition to hydrocortisone 10mg at 8am and 5mg at 3pm, if patient not feeling well, can go back to 20mg at 8am and 10mg at 3pm  FOR DISCHARGE  to help with dc planning-  - Please print and give the pt info section for patients under adrenal insufficiency (this will educate them regarding the warning signs of adrenal crisis )  - Patient should take 2-3x of home dose in cases of illness, ever, accidents, and surgery. If unable to take PO, use IM injection as below (Solu-Cortef)  - pt should receive stress dosing (IV hydrocortisone 50mg q8) with any major illness or surgical procedure  - Should pt be unable to tolerate PO and is unable to take hydrocortisone, pt will need an emergency injection. Please discharge with a prescription for 100 mg Solu-Cortef Act-O-Vial and the following instructions:  [email or web address]/emergency-injection/emergency-injection-cortico-steroids-solu-cortef-act-o-vial-[email or web address]-chamber-ampul/  - pt should obtain a medical alert bracelet or necklace to inform emergency providers of adrenal insufficiency diagnosis [[email or web address]]    In terms of hyperthyroidism  - can start levothyroxine 50mcg once daily (maintain on empty stomach (at least 1 hour before meals), separate by 4 hours from PPI or calcium supplementation which can inhibit its absorption)  - repeat TSH and free thyroxine in 6-8 weeks outpatient  - primary team to educate patient on signs of hypothyroidism, to let physician know if develop and can check TFTs sooner in case needs higher dose of levothyroxine    Will arrange follow up with Dr. Rivero, please have patient call our office on Monday to follow up regarding appointment.  [address] [place] [address]  Phone Number: [phone]    Discussed with Dr. Alvarez.    Patrice Brar MD  Endocrinology Fellow Endocrinology following Ms. 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 TSH 8.35, FT4 0.9, possible hypothyroidism as well. ACTH came back 2.3 suggestive of secondary AI not primary.    Recommend  In terms of adrenal insufficiency  - continue hydrocortisone 20mg at 8am and 10mg at 3pm for another 2 days  - on 1/8 can transition to hydrocortisone 10mg at 8am and 5mg at 3pm, if patient not feeling well, can go back to 20mg at 8am and 10mg at 3pm  FOR DISCHARGE  to help with dc planning-  - Please print and give the pt info section for patients under adrenal insufficiency (this will educate them regarding the warning signs of adrenal crisis )  - Patient should take 2-3x of home dose in cases of illness, ever, accidents, and surgery. If unable to take PO, use IM injection as below (Solu-Cortef)  - pt should receive stress dosing (IV hydrocortisone 50mg q8) with any major illness or surgical procedure  - Should pt be unable to tolerate PO and is unable to take hydrocortisone, pt will need an emergency injection. Please discharge with a prescription for 100 mg Solu-Cortef Act-O-Vial and the following instructions:  [email or web address]/emergency-injection/emergency-injection-cortico-steroids-solu-cortef-act-o-vial-two-chamber-ampul/  - pt should obtain a medical alert bracelet or necklace to inform emergency providers of adrenal insufficiency diagnosis [[email or web address]]    In terms of hyperthyroidism  - can start levothyroxine 50mcg once daily (maintain on empty stomach (at least 1 hour before meals), separate by 4 hours from PPI or calcium supplementation which can inhibit its absorption)  - repeat TSH and free thyroxine in 6-8 weeks outpatient  - primary team to educate patient on signs of hypothyroidism, to let physician know if develop and can check TFTs sooner in case needs higher dose of levothyroxine    Will arrange follow up with Dr. Rivero, please have patient call our office on Monday to follow up regarding appointment.  [address] [place] [address]  Phone Number: [phone]    Discussed with Dr. Alvarez.    Patrice Brar MD  Endocrinology Fellow Endocrinology following Ms. 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 TSH 8.35, FT4 0.9, possible hypothyroidism as well. ACTH came back 2.3 suggestive of secondary AI not primary. Patient is hypotensive to 80s and tachycardic to 110s.    Recommend  In terms of adrenal insufficiency  - Recommend increase hydrocortisone to 30mg at 8am and 15mg at 3pm given hypotension and tachycardia, if she clinically deteriorates would start hydrocortisone 50mg IV q8h  FOR DISCHARGE  to help with dc planning-  - Please print and give the pt info section for patients under adrenal insufficiency (this will educate them regarding the warning signs of adrenal crisis )  - Patient should take 2-3x of home dose in cases of illness, ever, accidents, and surgery. If unable to take PO, use IM injection as below (Solu-Cortef)  - pt should receive stress dosing (IV hydrocortisone 50mg q8) with any major illness or surgical procedure  - Should pt be unable to tolerate PO and is unable to take hydrocortisone, pt will need an emergency injection. Please discharge with a prescription for 100 mg Solu-Cortef Act-O-Vial and the following instructions:  [email or web address]/emergency-injection/emergency-injection-cortico-steroids-solu-cortef-act-o-vial-two-chamber-ampul/  - pt should obtain a medical alert bracelet or necklace to inform emergency providers of adrenal insufficiency diagnosis [[email or web address]]    In terms of hyperthyroidism  - can start levothyroxine 50mcg once daily (maintain on empty stomach (at least 1 hour before meals), separate by 4 hours from PPI or calcium supplementation which can inhibit its absorption)  - repeat TSH and free thyroxine in 6-8 weeks outpatient  - primary team to educate patient on signs of hypothyroidism, to let physician know if develop and can check TFTs sooner in case needs higher dose of levothyroxine    Will arrange follow up with Dr. Rivero, please have patient call our office on Monday to follow up regarding appointment.  [address] [place] [address]  Phone Number: [phone]    Discussed with Dr. Alvarez.    Patrice Brar MD  Endocrinology Fellow Endocrinology following Ms. 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 TSH 8.35, FT4 0.9, possible hypothyroidism as well. ACTH came back 2.3 suggestive of secondary AI not primary. Patient is hypotensive to 80s and tachycardic to 110s.    Recommend  In terms of adrenal insufficiency  - Recommend increase hydrocortisone to 30mg at 8am and 15mg at 3pm given hypotension and tachycardia, if she clinically deteriorates would start hydrocortisone 50mg IV q8h  FOR DISCHARGE  to help with dc planning-  - Please print and give the pt info section for patients under adrenal insufficiency (this will educate them regarding the warning signs of adrenal crisis )  - Patient should take 2-3x of home dose in cases of illness, ever, accidents, and surgery. If unable to take PO, use IM injection as below (Solu-Cortef)  - pt should receive stress dosing (IV hydrocortisone 50mg q8) with any major illness or surgical procedure  - Should pt be unable to tolerate PO and is unable to take hydrocortisone, pt will need an emergency injection. Please discharge with a prescription for 100 mg Solu-Cortef Act-O-Vial and the following instructions:  [email or web address]/emergency-injection/emergency-injection-cortico-steroids-solu-cortef-act-o-vial-two-chamber-ampul/  - pt should obtain a medical alert bracelet or necklace to inform emergency providers of adrenal insufficiency diagnosis [[email or web address]]    In terms of hyperthyroidism  - can start levothyroxine 50mcg once daily (maintain on empty stomach (at least 1 hour before meals), separate by 4 hours from PPI or calcium supplementation which can inhibit its absorption)  - repeat TSH and free thyroxine in 6-8 weeks outpatient  - primary team to educate patient on signs of hypothyroidism, to let physician know if develop and can check TFTs sooner in case needs higher dose of levothyroxine    Will arrange follow up with Dr. Rivero, please have patient call our office on Monday to follow up regarding appointment.  [address] [place] [address]  Phone Number: [phone]    Discussed with Dr. Alvarez.    Patrice Brar MD  Endocrinology Fellow

## 2024-01-07 DIAGNOSIS — E27.40 UNSPECIFIED ADRENOCORTICAL INSUFFICIENCY: ICD-10-CM

## 2024-01-07 LAB
ANION GAP SERPL CALC-SCNC: 10 MMOL/L — SIGNIFICANT CHANGE UP (ref 7–14)
ANION GAP SERPL CALC-SCNC: 10 MMOL/L — SIGNIFICANT CHANGE UP (ref 7–14)
BUN SERPL-MCNC: 6 MG/DL — LOW (ref 7–23)
BUN SERPL-MCNC: 6 MG/DL — LOW (ref 7–23)
CALCIUM SERPL-MCNC: 8.7 MG/DL — SIGNIFICANT CHANGE UP (ref 8.4–10.5)
CALCIUM SERPL-MCNC: 8.7 MG/DL — SIGNIFICANT CHANGE UP (ref 8.4–10.5)
CHLORIDE SERPL-SCNC: 103 MMOL/L — SIGNIFICANT CHANGE UP (ref 98–107)
CHLORIDE SERPL-SCNC: 103 MMOL/L — SIGNIFICANT CHANGE UP (ref 98–107)
CO2 SERPL-SCNC: 25 MMOL/L — SIGNIFICANT CHANGE UP (ref 22–31)
CO2 SERPL-SCNC: 25 MMOL/L — SIGNIFICANT CHANGE UP (ref 22–31)
CREAT SERPL-MCNC: 0.47 MG/DL — LOW (ref 0.5–1.3)
CREAT SERPL-MCNC: 0.47 MG/DL — LOW (ref 0.5–1.3)
EGFR: 117 ML/MIN/1.73M2 — SIGNIFICANT CHANGE UP
EGFR: 117 ML/MIN/1.73M2 — SIGNIFICANT CHANGE UP
GLUCOSE BLDC GLUCOMTR-MCNC: 100 MG/DL — HIGH (ref 70–99)
GLUCOSE BLDC GLUCOMTR-MCNC: 100 MG/DL — HIGH (ref 70–99)
GLUCOSE BLDC GLUCOMTR-MCNC: 111 MG/DL — HIGH (ref 70–99)
GLUCOSE BLDC GLUCOMTR-MCNC: 111 MG/DL — HIGH (ref 70–99)
GLUCOSE SERPL-MCNC: 90 MG/DL — SIGNIFICANT CHANGE UP (ref 70–99)
GLUCOSE SERPL-MCNC: 90 MG/DL — SIGNIFICANT CHANGE UP (ref 70–99)
HCT VFR BLD CALC: 29.3 % — LOW (ref 34.5–45)
HCT VFR BLD CALC: 29.3 % — LOW (ref 34.5–45)
HGB BLD-MCNC: 9.6 G/DL — LOW (ref 11.5–15.5)
HGB BLD-MCNC: 9.6 G/DL — LOW (ref 11.5–15.5)
MAGNESIUM SERPL-MCNC: 1.9 MG/DL — SIGNIFICANT CHANGE UP (ref 1.6–2.6)
MAGNESIUM SERPL-MCNC: 1.9 MG/DL — SIGNIFICANT CHANGE UP (ref 1.6–2.6)
MCHC RBC-ENTMCNC: 26.3 PG — LOW (ref 27–34)
MCHC RBC-ENTMCNC: 26.3 PG — LOW (ref 27–34)
MCHC RBC-ENTMCNC: 32.8 GM/DL — SIGNIFICANT CHANGE UP (ref 32–36)
MCHC RBC-ENTMCNC: 32.8 GM/DL — SIGNIFICANT CHANGE UP (ref 32–36)
MCV RBC AUTO: 80.3 FL — SIGNIFICANT CHANGE UP (ref 80–100)
MCV RBC AUTO: 80.3 FL — SIGNIFICANT CHANGE UP (ref 80–100)
NRBC # BLD: 0 /100 WBCS — SIGNIFICANT CHANGE UP (ref 0–0)
NRBC # BLD: 0 /100 WBCS — SIGNIFICANT CHANGE UP (ref 0–0)
NRBC # FLD: 0 K/UL — SIGNIFICANT CHANGE UP (ref 0–0)
NRBC # FLD: 0 K/UL — SIGNIFICANT CHANGE UP (ref 0–0)
PHOSPHATE SERPL-MCNC: 4 MG/DL — SIGNIFICANT CHANGE UP (ref 2.5–4.5)
PHOSPHATE SERPL-MCNC: 4 MG/DL — SIGNIFICANT CHANGE UP (ref 2.5–4.5)
PLATELET # BLD AUTO: 159 K/UL — SIGNIFICANT CHANGE UP (ref 150–400)
PLATELET # BLD AUTO: 159 K/UL — SIGNIFICANT CHANGE UP (ref 150–400)
POTASSIUM SERPL-MCNC: 3.8 MMOL/L — SIGNIFICANT CHANGE UP (ref 3.5–5.3)
POTASSIUM SERPL-MCNC: 3.8 MMOL/L — SIGNIFICANT CHANGE UP (ref 3.5–5.3)
POTASSIUM SERPL-SCNC: 3.8 MMOL/L — SIGNIFICANT CHANGE UP (ref 3.5–5.3)
POTASSIUM SERPL-SCNC: 3.8 MMOL/L — SIGNIFICANT CHANGE UP (ref 3.5–5.3)
RBC # BLD: 3.65 M/UL — LOW (ref 3.8–5.2)
RBC # BLD: 3.65 M/UL — LOW (ref 3.8–5.2)
RBC # FLD: 17.2 % — HIGH (ref 10.3–14.5)
RBC # FLD: 17.2 % — HIGH (ref 10.3–14.5)
SODIUM SERPL-SCNC: 138 MMOL/L — SIGNIFICANT CHANGE UP (ref 135–145)
SODIUM SERPL-SCNC: 138 MMOL/L — SIGNIFICANT CHANGE UP (ref 135–145)
WBC # BLD: 2.49 K/UL — LOW (ref 3.8–10.5)
WBC # BLD: 2.49 K/UL — LOW (ref 3.8–10.5)
WBC # FLD AUTO: 2.49 K/UL — LOW (ref 3.8–10.5)
WBC # FLD AUTO: 2.49 K/UL — LOW (ref 3.8–10.5)

## 2024-01-07 PROCEDURE — 99233 SBSQ HOSP IP/OBS HIGH 50: CPT | Mod: GC

## 2024-01-07 RX ORDER — HYDROCORTISONE 20 MG
10 TABLET ORAL
Refills: 0 | Status: DISCONTINUED | OUTPATIENT
Start: 2024-01-07 | End: 2024-01-08

## 2024-01-07 RX ORDER — HYDROCORTISONE 20 MG
20 TABLET ORAL
Refills: 0 | Status: DISCONTINUED | OUTPATIENT
Start: 2024-01-07 | End: 2024-01-08

## 2024-01-07 RX ORDER — POTASSIUM CHLORIDE 20 MEQ
40 PACKET (EA) ORAL ONCE
Refills: 0 | Status: COMPLETED | OUTPATIENT
Start: 2024-01-07 | End: 2024-01-07

## 2024-01-07 RX ORDER — MAGNESIUM OXIDE 400 MG ORAL TABLET 241.3 MG
400 TABLET ORAL ONCE
Refills: 0 | Status: COMPLETED | OUTPATIENT
Start: 2024-01-07 | End: 2024-01-07

## 2024-01-07 RX ORDER — LEVOTHYROXINE SODIUM 125 MCG
50 TABLET ORAL DAILY
Refills: 0 | Status: DISCONTINUED | OUTPATIENT
Start: 2024-01-07 | End: 2024-01-09

## 2024-01-07 RX ORDER — FLUDROCORTISONE ACETATE 0.1 MG/1
0.1 TABLET ORAL DAILY
Refills: 0 | Status: DISCONTINUED | OUTPATIENT
Start: 2024-01-07 | End: 2024-01-08

## 2024-01-07 RX ORDER — MAGNESIUM SULFATE 500 MG/ML
2 VIAL (ML) INJECTION ONCE
Refills: 0 | Status: DISCONTINUED | OUTPATIENT
Start: 2024-01-07 | End: 2024-01-07

## 2024-01-07 RX ADMIN — PREGABALIN 1000 MICROGRAM(S): 225 CAPSULE ORAL at 11:35

## 2024-01-07 RX ADMIN — Medication 10 MILLIGRAM(S): at 15:39

## 2024-01-07 RX ADMIN — MAGNESIUM OXIDE 400 MG ORAL TABLET 400 MILLIGRAM(S): 241.3 TABLET ORAL at 08:02

## 2024-01-07 RX ADMIN — Medication 40 MILLIEQUIVALENT(S): at 08:01

## 2024-01-07 RX ADMIN — FLUDROCORTISONE ACETATE 0.1 MILLIGRAM(S): 0.1 TABLET ORAL at 13:36

## 2024-01-07 RX ADMIN — Medication 30 MILLIGRAM(S): at 08:02

## 2024-01-07 RX ADMIN — Medication 100 MILLIGRAM(S): at 11:36

## 2024-01-07 RX ADMIN — Medication 1 MILLIGRAM(S): at 11:35

## 2024-01-07 NOTE — PROGRESS NOTE ADULT - ATTENDING COMMENTS
48F Breast CA s/p bilateral mastectomy/RT/chemo 11/23 p/w adrenal insufficiency  w/ Intractable N/V unable to tolerate PO, hypotension, hypoglycemia c/b qTC prolongation, possible hallucinations while on steroid.  - Hydrocortisone 30mg in AM, 15mg in PM - episode of hypotension y esterday.  - patient concerned about presence of 'vivid images when closing my eyes' while on steroid.  Explained the importance of steroid in a setting of adrenal insufficiency.  Agreeable to continue the steroid but will discuss with endocrine about management.  - appreciate endocrine recommendations  - Synthroid started  - Appreciate EP input - no intervention for prolonged QTC.   - monitor clinical status. 48F Breast CA s/p bilateral mastectomy/RT/chemo 11/23 p/w adrenal insufficiency  w/ Intractable N/V unable to tolerate PO, hypotension, hypoglycemia c/b prolonged QTC, sev protein-calorie malnutrition, acute encephalopathy while on steroid.  - Hydrocortisone 30mg in AM, 15mg in PM - episode of hypotension yesterday.  - patient concerned about presence of 'vivid images when closing my eyes' while on steroid.  Explained the importance of steroid in a setting of adrenal insufficiency.  Agreeable to continue the steroid but will discuss with endocrine about management.  - appreciate endocrine recommendations  - appreciate dietary consult for malnutrition.  - Synthroid started  - Appreciate EP input - no intervention for prolonged QTC.   - monitor clinical status.

## 2024-01-07 NOTE — PROGRESS NOTE ADULT - TIME BILLING
Preparing to see the patient including review of tests and other providers' notes, confirming history with patient, performing medical examination and evaluation, counseling and educating the patient, ordering medications, tests and procedures, communicating with other health care professionals, documenting clinical information in the EMR, independently interpreting results and communicating results to the patient, care coordination
Preparing to see the patient including review of tests and other providers' notes, confirming history with family member, performing medical examination and evaluation, counseling and educating the patient/family/caregiver, ordering medications, tests and procedures, communicating with other health care professionals, documenting clinical information in the EMR, independently interpreting results and communicating results to the patient/family/caregiven, care coordination.
Preparing to see the patient including review of tests and other providers' notes, confirming history with family member, performing medical examination and evaluation, counseling and educating the patient/family/caregiver, ordering medications, tests and procedures, communicating with other health care professionals, documenting clinical information in the EMR, independently interpreting results and communicating results to the patient/family/caregiven, care coordination.

## 2024-01-07 NOTE — CHART NOTE - NSCHARTNOTEFT_GEN_A_CORE
Endocrinology following Ms. 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 TSH 8.35, FT4 0.9, possible hypothyroidism as well. ACTH came back 2.3 suggestive of secondary AI not primary. Patient was hypotensive to 80s and tachycardic to 110s yesterday, hydrocortisone was increase to 30 mg in AM and 15 mg in PM.    Vital Signs Last 24 Hrs  T(C): 36.8 (07 Jan 2024 09:56), Max: 36.8 (07 Jan 2024 04:38)  T(F): 98.3 (07 Jan 2024 09:56), Max: 98.3 (07 Jan 2024 09:56)  HR: 96 (07 Jan 2024 09:56) (96 - 99)  BP: 91/58 (07 Jan 2024 09:56) (90/58 - 93/64)  BP(mean): --  RR: 18 (07 Jan 2024 09:56) (17 - 18)  SpO2: 99% (07 Jan 2024 09:56) (97% - 99%)    Parameters below as of 07 Jan 2024 09:56  Patient On (Oxygen Delivery Method): room air      In terms of adrenal insufficiency  Blood pressure has improved, however pt reports that she has developed hallucinations (seeing red dot) after hydrocortisone dose was increased and does not want to continue with high dose.  Agree with trial of starting fludrocortisone, 0.1 mg daily and decreasing hydrocortisone back to 20 mg in AM and 10 mg in PM  monitor BP and BMP  - if she clinically deteriorates would start hydrocortisone 50mg IV q8h  FOR DISCHARGE  to help with dc planning-  - Please print and give the pt info section for patients under adrenal insufficiency (this will educate them regarding the warning signs of adrenal crisis )  - Patient should take 2-3x of home dose in cases of illness, ever, accidents, and surgery. If unable to take PO, use IM injection as below (Solu-Cortef)  - pt should receive stress dosing (IV hydrocortisone 50mg q8) with any major illness or surgical procedure  - Should pt be unable to tolerate PO and is unable to take hydrocortisone, pt will need an emergency injection. Please discharge with a prescription for 100 mg Solu-Cortef Act-O-Vial and the following instructions:  [email or web address]/emergency-[email or web address]-injection-[email or web address]-[email or web address]-cortef-act-o-vial-[email or web address]-chamber-ampul/  - pt should obtain a medical alert bracelet or necklace to inform emergency providers of adrenal insufficiency diagnosis [[email or web address]]    In terms of hypothyroidism  -  start levothyroxine 50mcg once daily (maintain on empty stomach (at least 1 hour before meals), separate by 4 hours from PPI or calcium supplementation which can inhibit its absorption)  - repeat TSH and free thyroxine in 6-8 weeks outpatient  - primary team to educate patient on signs of hypothyroidism, to let physician know if develop and can check TFTs sooner in case needs higher dose of levothyroxine    Follow up with Dr. Rivero, please have patient call our office on Monday to follow up regarding appointment.  [address] [place] [address]  Phone Number: [phone]    Discussed with primary team    Candace Huynh MD  pager  or via Teams on 1/7/24  Other times:  Diabetes team: [phone]   or email [email or web address] Endocrinology following Ms. 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 TSH 8.35, FT4 0.9, possible hypothyroidism as well. ACTH came back 2.3 suggestive of secondary AI not primary. Patient was hypotensive to 80s and tachycardic to 110s yesterday, hydrocortisone was increase to 30 mg in AM and 15 mg in PM.    Vital Signs Last 24 Hrs  T(C): 36.8 (07 Jan 2024 09:56), Max: 36.8 (07 Jan 2024 04:38)  T(F): 98.3 (07 Jan 2024 09:56), Max: 98.3 (07 Jan 2024 09:56)  HR: 96 (07 Jan 2024 09:56) (96 - 99)  BP: 91/58 (07 Jan 2024 09:56) (90/58 - 93/64)  BP(mean): --  RR: 18 (07 Jan 2024 09:56) (17 - 18)  SpO2: 99% (07 Jan 2024 09:56) (97% - 99%)    Parameters below as of 07 Jan 2024 09:56  Patient On (Oxygen Delivery Method): room air      In terms of adrenal insufficiency  Blood pressure has improved, however pt reports that she has developed hallucinations (seeing red dot) after hydrocortisone dose was increased and does not want to continue with high dose.  Agree with trial of starting fludrocortisone, 0.1 mg daily and decreasing hydrocortisone back to 20 mg in AM and 10 mg in PM  monitor BP and BMP  - if she clinically deteriorates would start hydrocortisone 50mg IV q8h  FOR DISCHARGE  to help with dc planning-  - Please print and give the pt info section for patients under adrenal insufficiency (this will educate them regarding the warning signs of adrenal crisis )  - Patient should take 2-3x of home dose in cases of illness, ever, accidents, and surgery. If unable to take PO, use IM injection as below (Solu-Cortef)  - pt should receive stress dosing (IV hydrocortisone 50mg q8) with any major illness or surgical procedure  - Should pt be unable to tolerate PO and is unable to take hydrocortisone, pt will need an emergency injection. Please discharge with a prescription for 100 mg Solu-Cortef Act-O-Vial and the following instructions:  [email or web address]/emergency-injection/emergency-injection-[email or web address]-steroids-solu-cortef-act-o-vial-[email or web address]-chamber-ampul/  - pt should obtain a medical alert bracelet or necklace to inform emergency providers of adrenal insufficiency diagnosis [[email or web address]]    In terms of hypothyroidism  -  start levothyroxine 50mcg once daily (maintain on empty stomach (at least 1 hour before meals), separate by 4 hours from PPI or calcium supplementation which can inhibit its absorption)  - repeat TSH and free thyroxine in 6-8 weeks outpatient  - primary team to educate patient on signs of hypothyroidism, to let physician know if develop and can check TFTs sooner in case needs higher dose of levothyroxine    Follow up with Dr. Rivero, please have patient call our office on Monday to follow up regarding appointment.  [address] [place] [address]  Phone Number: [phone]    Discussed with primary team    Candace Huynh MD  pager  or via Teams on 1/7/24  Other times:  Diabetes team: [phone]   or email [email or web address]

## 2024-01-07 NOTE — PROGRESS NOTE ADULT - SUBJECTIVE AND OBJECTIVE BOX
Kwesi Nassar, PGY3    DATE OF SERVICE: 01-07-24 @ 07:06    SUBJECTIVE / OVERNIGHT EVENTS: Patient hypotensive yesterday so endocrine team preferred her to stay in hospital for further titration of steroids. Patient seen and examined this AM.     MEDICATIONS  (STANDING):  cyanocobalamin 1000 MICROGram(s) Oral daily  enoxaparin Injectable 40 milliGRAM(s) SubCutaneous every 24 hours  folic acid 1 milliGRAM(s) Oral daily  hydrocortisone 30 milliGRAM(s) Oral <User Schedule>  hydrocortisone 15 milliGRAM(s) Oral <User Schedule>  influenza   Vaccine 0.5 milliLiter(s) IntraMuscular once  pyridoxine Injectable 25 milliGRAM(s) IV Push daily  thiamine 100 milliGRAM(s) Oral daily    MEDICATIONS  (PRN):  melatonin 3 milliGRAM(s) Oral at bedtime PRN Insomnia  sodium chloride 0.65% Nasal 1 Spray(s) Both Nostrils three times a day PRN Nasal Congestion      Vital Signs Last 24 Hrs  T(C): 36.8 (07 Jan 2024 04:38), Max: 36.8 (06 Jan 2024 11:49)  T(F): 98.2 (07 Jan 2024 04:38), Max: 98.3 (06 Jan 2024 11:49)  HR: 98 (07 Jan 2024 04:38) (98 - 102)  BP: 90/58 (07 Jan 2024 04:38) (80/49 - 93/64)  BP(mean): --  RR: 17 (07 Jan 2024 04:38) (17 - 18)  SpO2: 97% (07 Jan 2024 04:38) (97% - 99%)    Parameters below as of 07 Jan 2024 04:38  Patient On (Oxygen Delivery Method): room air      CAPILLARY BLOOD GLUCOSE      POCT Blood Glucose.: 100 mg/dL (07 Jan 2024 05:53)  POCT Blood Glucose.: 111 mg/dL (07 Jan 2024 00:29)  POCT Blood Glucose.: 115 mg/dL (06 Jan 2024 12:20)    I&O's Summary      PHYSICAL EXAM:  GENERAL: Resting in bed.  HEAD:  Atraumatic, Normocephalic  EYES: Patient opening eyes, maintaining eye contact, tracking examiner   CHEST/LUNG: Clear to auscultation bilaterally; No wheeze  HEART: Regular rhythm, tachycardia; No murmurs, rubs, or gallops  ABDOMEN: Soft, Nontender, Nondistended; Bowel sounds present  EXTREMITIES:  2+ Peripheral Pulses, No clubbing, cyanosis, or edema  PSYCH: AAOx3  NEUROLOGY: non-focal  SKIN: No rashes or lesions    LABS:                        9.6    2.49  )-----------( 159      ( 07 Jan 2024 05:55 )             29.3     01-06    136  |  103  |  4<L>  ----------------------------<  101<H>  3.7   |  26  |  0.50    Ca    9.0      06 Jan 2024 06:08  Phos  3.7     01-06  Mg     1.50     01-06            Urinalysis Basic - ( 06 Jan 2024 06:08 )    Color: x / Appearance: x / SG: x / pH: x  Gluc: 101 mg/dL / Ketone: x  / Bili: x / Urobili: x   Blood: x / Protein: x / Nitrite: x   Leuk Esterase: x / RBC: x / WBC x   Sq Epi: x / Non Sq Epi: x / Bacteria: x        RADIOLOGY & ADDITIONAL TESTS:    Imaging Personally Reviewed:    Consultant(s) Notes Reviewed:      Care Discussed with Consultants/Other Providers:   Kwesi Nassar, PGY3    DATE OF SERVICE: 01-07-24 @ 07:06    SUBJECTIVE / OVERNIGHT EVENTS: Patient hypotensive yesterday so endocrine team preferred her to stay in hospital for further titration of steroids. Patient seen and examined this AM. Patient notes ?hallucinations this AM after steroid dose change.     MEDICATIONS  (STANDING):  cyanocobalamin 1000 MICROGram(s) Oral daily  enoxaparin Injectable 40 milliGRAM(s) SubCutaneous every 24 hours  folic acid 1 milliGRAM(s) Oral daily  hydrocortisone 30 milliGRAM(s) Oral <User Schedule>  hydrocortisone 15 milliGRAM(s) Oral <User Schedule>  influenza   Vaccine 0.5 milliLiter(s) IntraMuscular once  pyridoxine Injectable 25 milliGRAM(s) IV Push daily  thiamine 100 milliGRAM(s) Oral daily    MEDICATIONS  (PRN):  melatonin 3 milliGRAM(s) Oral at bedtime PRN Insomnia  sodium chloride 0.65% Nasal 1 Spray(s) Both Nostrils three times a day PRN Nasal Congestion      Vital Signs Last 24 Hrs  T(C): 36.8 (07 Jan 2024 04:38), Max: 36.8 (06 Jan 2024 11:49)  T(F): 98.2 (07 Jan 2024 04:38), Max: 98.3 (06 Jan 2024 11:49)  HR: 98 (07 Jan 2024 04:38) (98 - 102)  BP: 90/58 (07 Jan 2024 04:38) (80/49 - 93/64)  BP(mean): --  RR: 17 (07 Jan 2024 04:38) (17 - 18)  SpO2: 97% (07 Jan 2024 04:38) (97% - 99%)    Parameters below as of 07 Jan 2024 04:38  Patient On (Oxygen Delivery Method): room air      CAPILLARY BLOOD GLUCOSE      POCT Blood Glucose.: 100 mg/dL (07 Jan 2024 05:53)  POCT Blood Glucose.: 111 mg/dL (07 Jan 2024 00:29)  POCT Blood Glucose.: 115 mg/dL (06 Jan 2024 12:20)    I&O's Summary      PHYSICAL EXAM:  GENERAL: Resting in bed.  HEAD:  Atraumatic, Normocephalic  EYES: Patient opening eyes, maintaining eye contact, tracking examiner   CHEST/LUNG: Clear to auscultation bilaterally; No wheeze  HEART: Regular rhythm, tachycardia; No murmurs, rubs, or gallops  ABDOMEN: Soft, Nontender, Nondistended; Bowel sounds present  EXTREMITIES:  2+ Peripheral Pulses, No clubbing, cyanosis, or edema  PSYCH: AAOx3  NEUROLOGY: non-focal  SKIN: No rashes or lesions    LABS:                        9.6    2.49  )-----------( 159      ( 07 Jan 2024 05:55 )             29.3     01-06    136  |  103  |  4<L>  ----------------------------<  101<H>  3.7   |  26  |  0.50    Ca    9.0      06 Jan 2024 06:08  Phos  3.7     01-06  Mg     1.50     01-06            Urinalysis Basic - ( 06 Jan 2024 06:08 )    Color: x / Appearance: x / SG: x / pH: x  Gluc: 101 mg/dL / Ketone: x  / Bili: x / Urobili: x   Blood: x / Protein: x / Nitrite: x   Leuk Esterase: x / RBC: x / WBC x   Sq Epi: x / Non Sq Epi: x / Bacteria: x        RADIOLOGY & ADDITIONAL TESTS:    Imaging Personally Reviewed:    Consultant(s) Notes Reviewed:      Care Discussed with Consultants/Other Providers:

## 2024-01-07 NOTE — PROGRESS NOTE ADULT - PROBLEM SELECTOR PLAN 1
- likely secondary to immunotherapy  - endocrine following, appreciate recs  - started on hydrocortisone 30mg in AM and 15mg in PM

## 2024-01-07 NOTE — PROGRESS NOTE ADULT - PROBLEM SELECTOR PLAN 2
pH 7.17. Most recent anion gap 19. Lactate wnl, no salicylate use  - Likely starvation ketosis as hasn't had food in many days, BHB slightly elevated  -Total fluids resuscitation of 3L    Anion gap resolved    Plan:  - Monitor electrolytes for refeeding syndrome  - Continue LR MIVF  - Thiamine, Folate/Vitamin b12 supplementation  - Strict I&O

## 2024-01-07 NOTE — PROGRESS NOTE ADULT - PROBLEM SELECTOR PLAN 4
With report of orthostatic hypotension ?adrenal insufficiency vs. prolonged starvation in patient with low fat/glycogen storage  - AM cortisol low, acth pending  - A1c 4.8  - Monitor fingersticks q6, hypoglycemia protocol  - Endocrine consult for adrenal insufficiency; diagnostic, started on hydrocortisone supplementation, pending additional work up

## 2024-01-07 NOTE — PROGRESS NOTE ADULT - ASSESSMENT
Ms. Sauer is a 49 YO woman with PMH of left breast cancer s/p chemo (last 11/2023) and mastectomy, who presents to the ED with nausea/vomiting, diarrhea, chest pain, shortness of breath, weakness and b/l groin pain for 2 weeks. Admitted for anion gap metabolic acidosis requiring electrolyte repletions. Ms. Sauer is a 47 YO woman with PMH of left breast cancer s/p chemo (last 11/2023) and mastectomy, who presents to the ED with nausea/vomiting, diarrhea, chest pain, shortness of breath, weakness and b/l groin pain for 2 weeks. Admitted for anion gap metabolic acidosis requiring electrolyte repletions.

## 2024-01-07 NOTE — PROGRESS NOTE ADULT - PROBLEM SELECTOR PLAN 3
Sinus on ECG. Likely iso hypovolemia, possibly infection in immunocompromised patient. Note QTc 587  - Fluid resuscitation as above  - Blood cultures x2, urine culture pending  - Monitor off antibiotics as not febrile  - TTE - Grossly normal, preserved EF  - Troponins negative  -EP consulted for prolonged Qtc, awaiting dc recs, likely related to electrolyte abnormalities

## 2024-01-08 ENCOUNTER — APPOINTMENT (OUTPATIENT)
Dept: PLASTIC SURGERY | Facility: CLINIC | Age: 49
End: 2024-01-08

## 2024-01-08 DIAGNOSIS — Z79.899 OTHER LONG TERM (CURRENT) DRUG THERAPY: ICD-10-CM

## 2024-01-08 DIAGNOSIS — E03.9 HYPOTHYROIDISM, UNSPECIFIED: ICD-10-CM

## 2024-01-08 LAB
ANION GAP SERPL CALC-SCNC: 9 MMOL/L — SIGNIFICANT CHANGE UP (ref 7–14)
ANION GAP SERPL CALC-SCNC: 9 MMOL/L — SIGNIFICANT CHANGE UP (ref 7–14)
BUN SERPL-MCNC: 10 MG/DL — SIGNIFICANT CHANGE UP (ref 7–23)
BUN SERPL-MCNC: 10 MG/DL — SIGNIFICANT CHANGE UP (ref 7–23)
CALCIUM SERPL-MCNC: 8.7 MG/DL — SIGNIFICANT CHANGE UP (ref 8.4–10.5)
CALCIUM SERPL-MCNC: 8.7 MG/DL — SIGNIFICANT CHANGE UP (ref 8.4–10.5)
CHLORIDE SERPL-SCNC: 105 MMOL/L — SIGNIFICANT CHANGE UP (ref 98–107)
CHLORIDE SERPL-SCNC: 105 MMOL/L — SIGNIFICANT CHANGE UP (ref 98–107)
CO2 SERPL-SCNC: 27 MMOL/L — SIGNIFICANT CHANGE UP (ref 22–31)
CO2 SERPL-SCNC: 27 MMOL/L — SIGNIFICANT CHANGE UP (ref 22–31)
CREAT SERPL-MCNC: 0.46 MG/DL — LOW (ref 0.5–1.3)
CREAT SERPL-MCNC: 0.46 MG/DL — LOW (ref 0.5–1.3)
CULTURE RESULTS: SIGNIFICANT CHANGE UP
EGFR: 117 ML/MIN/1.73M2 — SIGNIFICANT CHANGE UP
EGFR: 117 ML/MIN/1.73M2 — SIGNIFICANT CHANGE UP
GLUCOSE BLDC GLUCOMTR-MCNC: 152 MG/DL — HIGH (ref 70–99)
GLUCOSE BLDC GLUCOMTR-MCNC: 152 MG/DL — HIGH (ref 70–99)
GLUCOSE BLDC GLUCOMTR-MCNC: 98 MG/DL — SIGNIFICANT CHANGE UP (ref 70–99)
GLUCOSE BLDC GLUCOMTR-MCNC: 98 MG/DL — SIGNIFICANT CHANGE UP (ref 70–99)
GLUCOSE SERPL-MCNC: 95 MG/DL — SIGNIFICANT CHANGE UP (ref 70–99)
GLUCOSE SERPL-MCNC: 95 MG/DL — SIGNIFICANT CHANGE UP (ref 70–99)
MAGNESIUM SERPL-MCNC: 2 MG/DL — SIGNIFICANT CHANGE UP (ref 1.6–2.6)
MAGNESIUM SERPL-MCNC: 2 MG/DL — SIGNIFICANT CHANGE UP (ref 1.6–2.6)
PHOSPHATE SERPL-MCNC: 3.4 MG/DL — SIGNIFICANT CHANGE UP (ref 2.5–4.5)
PHOSPHATE SERPL-MCNC: 3.4 MG/DL — SIGNIFICANT CHANGE UP (ref 2.5–4.5)
POTASSIUM SERPL-MCNC: 3.4 MMOL/L — LOW (ref 3.5–5.3)
POTASSIUM SERPL-MCNC: 3.4 MMOL/L — LOW (ref 3.5–5.3)
POTASSIUM SERPL-SCNC: 3.4 MMOL/L — LOW (ref 3.5–5.3)
POTASSIUM SERPL-SCNC: 3.4 MMOL/L — LOW (ref 3.5–5.3)
SODIUM SERPL-SCNC: 141 MMOL/L — SIGNIFICANT CHANGE UP (ref 135–145)
SODIUM SERPL-SCNC: 141 MMOL/L — SIGNIFICANT CHANGE UP (ref 135–145)
SPECIMEN SOURCE: SIGNIFICANT CHANGE UP

## 2024-01-08 PROCEDURE — 99233 SBSQ HOSP IP/OBS HIGH 50: CPT

## 2024-01-08 PROCEDURE — 99232 SBSQ HOSP IP/OBS MODERATE 35: CPT

## 2024-01-08 PROCEDURE — 93010 ELECTROCARDIOGRAM REPORT: CPT

## 2024-01-08 PROCEDURE — 99233 SBSQ HOSP IP/OBS HIGH 50: CPT | Mod: GC

## 2024-01-08 RX ORDER — POTASSIUM CHLORIDE 20 MEQ
40 PACKET (EA) ORAL EVERY 4 HOURS
Refills: 0 | Status: COMPLETED | OUTPATIENT
Start: 2024-01-08 | End: 2024-01-08

## 2024-01-08 RX ORDER — HYDROCORTISONE 20 MG
7.5 TABLET ORAL
Refills: 0 | Status: DISCONTINUED | OUTPATIENT
Start: 2024-01-08 | End: 2024-01-09

## 2024-01-08 RX ORDER — HYDROCORTISONE 20 MG
15 TABLET ORAL
Refills: 0 | Status: DISCONTINUED | OUTPATIENT
Start: 2024-01-09 | End: 2024-01-09

## 2024-01-08 RX ADMIN — Medication 7.5 MILLIGRAM(S): at 16:45

## 2024-01-08 RX ADMIN — Medication 1 MILLIGRAM(S): at 16:47

## 2024-01-08 RX ADMIN — Medication 20 MILLIGRAM(S): at 09:53

## 2024-01-08 RX ADMIN — Medication 40 MILLIEQUIVALENT(S): at 16:33

## 2024-01-08 RX ADMIN — ENOXAPARIN SODIUM 40 MILLIGRAM(S): 100 INJECTION SUBCUTANEOUS at 16:46

## 2024-01-08 RX ADMIN — Medication 100 MILLIGRAM(S): at 16:46

## 2024-01-08 RX ADMIN — FLUDROCORTISONE ACETATE 0.1 MILLIGRAM(S): 0.1 TABLET ORAL at 05:22

## 2024-01-08 RX ADMIN — Medication 50 MICROGRAM(S): at 04:22

## 2024-01-08 RX ADMIN — PREGABALIN 1000 MICROGRAM(S): 225 CAPSULE ORAL at 16:47

## 2024-01-08 RX ADMIN — Medication 40 MILLIEQUIVALENT(S): at 09:53

## 2024-01-08 NOTE — PROGRESS NOTE ADULT - SUBJECTIVE AND OBJECTIVE BOX
Lavern Souza PYGY1  Internal Medicine    Patient is a 48y old  Female who presents with a chief complaint of Nausea/vomiting, tachycardia (07 Jan 2024 07:05)      SUBJECTIVE/INTERVAL EVENTS: Patient seen and examined at bedside.    MEDICATIONS  (STANDING):  cyanocobalamin 1000 MICROGram(s) Oral daily  enoxaparin Injectable 40 milliGRAM(s) SubCutaneous every 24 hours  fludroCORTISONE 0.1 milliGRAM(s) Oral daily  folic acid 1 milliGRAM(s) Oral daily  hydrocortisone 20 milliGRAM(s) Oral <User Schedule>  hydrocortisone 10 milliGRAM(s) Oral <User Schedule>  influenza   Vaccine 0.5 milliLiter(s) IntraMuscular once  levothyroxine 50 MICROGram(s) Oral daily  pyridoxine Injectable 25 milliGRAM(s) IV Push daily  thiamine 100 milliGRAM(s) Oral daily    MEDICATIONS  (PRN):  melatonin 3 milliGRAM(s) Oral at bedtime PRN Insomnia  sodium chloride 0.65% Nasal 1 Spray(s) Both Nostrils three times a day PRN Nasal Congestion      VITAL SIGNS:  T(F): 97.4 (01-08-24 @ 05:01), Max: 98.3 (01-07-24 @ 09:56)  HR: 81 (01-08-24 @ 05:01) (81 - 96)  BP: 91/61 (01-08-24 @ 05:01) (91/58 - 100/69)  RR: 16 (01-08-24 @ 05:01) (16 - 18)  SpO2: 99% (01-08-24 @ 05:01) (97% - 99%)    I&O's Summary    07 Jan 2024 07:01  -  08 Jan 2024 07:00  --------------------------------------------------------  IN: 0 mL / OUT: 1 mL / NET: -1 mL      Daily     Daily     PHYSICAL EXAM:  Gen: Alert, NAD  HEENT: NCAT, conjunctiva clear, sclera anicteric, no erythema or exudates in the oropharynx, mmm  Neck: Supple, no JVD  CV: RRR, S1S2, no m/r/g  Resp: CTAB, normal respiratory effort  Abd: Soft, nontender, nondistended, normal bowel sounds  Ext: no edema, no clubbing or cyanosis  Neuro: AOx3, CN2-12 grossly intact, ABREU  SKIN: warm, perfused    LABS:                        9.6    2.49  )-----------( 159      ( 07 Jan 2024 05:55 )             29.3     Hgb Trend: 9.6<--, 10.0<--, 10.2<--, 14.1<--  01-07    138  |  103  |  6<L>  ----------------------------<  90  3.8   |  25  |  0.47<L>    Ca    8.7      07 Jan 2024 05:55  Phos  4.0     01-07  Mg     1.90     01-07      Creatinine Trend: 0.47<--, 0.50<--, 0.42<--, 0.45<--, 0.43<--, 0.49<--          Urinalysis Basic - ( 07 Jan 2024 05:55 )    Color: x / Appearance: x / SG: x / pH: x  Gluc: 90 mg/dL / Ketone: x  / Bili: x / Urobili: x   Blood: x / Protein: x / Nitrite: x   Leuk Esterase: x / RBC: x / WBC x   Sq Epi: x / Non Sq Epi: x / Bacteria: x        CAPILLARY BLOOD GLUCOSE      POCT Blood Glucose.: 98 mg/dL (08 Jan 2024 06:19)  POCT Blood Glucose.: 152 mg/dL (08 Jan 2024 00:26)      RADIOLOGY & ADDITIONAL TESTS: Reviewed    Imaging Personally Reviewed:    Consultant(s) Notes Reviewed:      Care Discussed with Consultants/Other Providers:   Lavern Souza PYGY1  Internal Medicine    Patient is a 48y old  Female who presents with a chief complaint of Nausea/vomiting, tachycardia (07 Jan 2024 07:05)      SUBJECTIVE/INTERVAL EVENTS: Patient seen and examined at bedside. No overnight events, patient reported feeling slightly dizzy this AM while getting up, but improved with eating. Patient reports hallucinations while closing eyes is less compared to yesterday, but still experienced some hallucination while closing her eyes, no auditory hallucination.  Otherwise, denies any chest pain, SOB, n/v, diarrhea, having normal BMs, no dysuria.     MEDICATIONS  (STANDING):  cyanocobalamin 1000 MICROGram(s) Oral daily  enoxaparin Injectable 40 milliGRAM(s) SubCutaneous every 24 hours  fludroCORTISONE 0.1 milliGRAM(s) Oral daily  folic acid 1 milliGRAM(s) Oral daily  hydrocortisone 20 milliGRAM(s) Oral <User Schedule>  hydrocortisone 10 milliGRAM(s) Oral <User Schedule>  influenza   Vaccine 0.5 milliLiter(s) IntraMuscular once  levothyroxine 50 MICROGram(s) Oral daily  pyridoxine Injectable 25 milliGRAM(s) IV Push daily  thiamine 100 milliGRAM(s) Oral daily    MEDICATIONS  (PRN):  melatonin 3 milliGRAM(s) Oral at bedtime PRN Insomnia  sodium chloride 0.65% Nasal 1 Spray(s) Both Nostrils three times a day PRN Nasal Congestion      VITAL SIGNS:  T(F): 97.4 (01-08-24 @ 05:01), Max: 98.3 (01-07-24 @ 09:56)  HR: 81 (01-08-24 @ 05:01) (81 - 96)  BP: 91/61 (01-08-24 @ 05:01) (91/58 - 100/69)  RR: 16 (01-08-24 @ 05:01) (16 - 18)  SpO2: 99% (01-08-24 @ 05:01) (97% - 99%)    I&O's Summary    07 Jan 2024 07:01  -  08 Jan 2024 07:00  --------------------------------------------------------  IN: 0 mL / OUT: 1 mL / NET: -1 mL      Daily     Daily     PHYSICAL EXAM:  Gen: Alert, NAD  HEENT: NCAT, conjunctiva clear, sclera anicteric, no erythema or exudates in the oropharynx, mmm  Neck: Supple, no JVD  CV: RRR, S1S2, no m/r/g  Resp: CTAB, normal respiratory effort  Abd: Soft, nontender, nondistended, normal bowel sounds  Ext: no edema, no clubbing or cyanosis  Neuro: AOx3, CN2-12 grossly intact, ABREU  SKIN: warm, perfused    LABS:                        9.6    2.49  )-----------( 159      ( 07 Jan 2024 05:55 )             29.3     Hgb Trend: 9.6<--, 10.0<--, 10.2<--, 14.1<--  01-07    138  |  103  |  6<L>  ----------------------------<  90  3.8   |  25  |  0.47<L>    Ca    8.7      07 Jan 2024 05:55  Phos  4.0     01-07  Mg     1.90     01-07      Creatinine Trend: 0.47<--, 0.50<--, 0.42<--, 0.45<--, 0.43<--, 0.49<--          Urinalysis Basic - ( 07 Jan 2024 05:55 )    Color: x / Appearance: x / SG: x / pH: x  Gluc: 90 mg/dL / Ketone: x  / Bili: x / Urobili: x   Blood: x / Protein: x / Nitrite: x   Leuk Esterase: x / RBC: x / WBC x   Sq Epi: x / Non Sq Epi: x / Bacteria: x        CAPILLARY BLOOD GLUCOSE      POCT Blood Glucose.: 98 mg/dL (08 Jan 2024 06:19)  POCT Blood Glucose.: 152 mg/dL (08 Jan 2024 00:26)      RADIOLOGY & ADDITIONAL TESTS: Reviewed    Imaging Personally Reviewed:    Consultant(s) Notes Reviewed:      Care Discussed with Consultants/Other Providers:

## 2024-01-08 NOTE — PROGRESS NOTE ADULT - ASSESSMENT
Ms. Sauer is a 47 YO woman with PMH of left breast cancer on chemo, last session 11/2023 s/p b/l nipple sparing mastectomy/left axillary sentinel lymph node biopsy, JOHN flap reconstruction, who presents for 2 week progressive history of dyspnea on exertion. Endocrinology consulted for evaluation of new adrenal insufficiency. Complex patient high level decision making.    #Adrenal Insufficiency  #Hypotension  #Tachycardia  #Hyponatremia  #Hypoglycemia  #Hx immunotherapy  - 7:14am serum cortisol on 1/5 0.5, diagnostic of adrenal insufficiency  Low ACTH (2.3) diagnostic of secondary adrenal insufficiency    - patient had hypoglycemia to 64 on BMP 1/3 and hyponatremia to 131  - history of chemotherapy for breast cancer, last 11/23  - patient hypotensive and tachycardic  - has experienced weight loss, nausea and vomiting  - per primary team, patient had received keytruda in 11/23 which could cause AI    PLAN  - Patient reporting seeing vision/hallucination when closing her eyes since being on hydrocortisone  -BP is soft systolic 90s  -fludrocortisone was trialed to raise BP but ACTH low secondary AI does not require fludrocortisone and patient is developing hypokalemia from this med. Please stop fludrocortisone.  - Reduce hydrocortisone to 15 mg PO at 8am and 7.5mg PO at 3pm to see if this reduces side effects.  Follow BP closely and bolus IV fluids if needed.  -Not to be cleared for discharge from endocrine end until medication regimen is clarified with stable BP, potassium and improvement of hallucinations.    FOR DISCHARGE  to help with dc planning-  - Please print and give the pt info section for patients under adrenal insufficiency (this will educate them regarding the warning signs of adrenal crisis )  - Patient should take 2-3x of home dose in cases of illness, ever, accidents, and surgery. If unable to take PO, use IM injection as below (Solu-Cortef)  - pt should receive stress dosing (IV hydrocortisone 50mg q8) with any major illness or surgical procedure  - Should pt be unable to tolerate PO and is unable to take hydrocortisone, pt will need an emergency injection. Please discharge with a prescription for 100 mg Solu-Cortef Act-O-Vial and the following instructions:  [email or web address]/emergency-injection/emergency-injection-cortico-steroids-solu-cortef-act-o-vial-[email or web address]-chamber-ampul/  - pt should obtain a medical alert bracelet or necklace to inform emergency providers of adrenal insufficiency diagnosis [[email or web address]]      #Hypothyrodism  - 11/23 TSH 1.51, FT4 1.1  new issue - TSH 8.4, Free T4 0.9  may be due to Keytruda  started on levothyroxine 50 mcg po daily  recheck TFTs 4-6 weeks as outpatient    Will arrange for follow up and contact patient with appointment details. Will schedule with Dr. Dinorah Rivero.  [address] [place] [address]  Phone Number: [phone]    Discussed with primary team.     Moreno Cifuentes MD  Division of Endocrinology  Pager: [phone]    If after 6PM or before 9AM, or on weekends/holidays, please call endocrine answering service for assistance ([phone]).  For nonurgent matters email [email or web address] for assistance. Ms. Sauer is a 47 YO woman with PMH of left breast cancer on chemo, last session 11/2023 s/p b/l nipple sparing mastectomy/left axillary sentinel lymph node biopsy, JOHN flap reconstruction, who presents for 2 week progressive history of dyspnea on exertion. Endocrinology consulted for evaluation of new adrenal insufficiency. Complex patient high level decision making.    #Adrenal Insufficiency  #Hypotension  #Tachycardia  #Hyponatremia  #Hypoglycemia  #Hx immunotherapy  - 7:14am serum cortisol on 1/5 0.5, diagnostic of adrenal insufficiency  Low ACTH (2.3) diagnostic of secondary adrenal insufficiency    - patient had hypoglycemia to 64 on BMP 1/3 and hyponatremia to 131  - history of chemotherapy for breast cancer, last 11/23  - patient hypotensive and tachycardic  - has experienced weight loss, nausea and vomiting  - per primary team, patient had received keytruda in 11/23 which could cause AI    PLAN  - Patient reporting seeing vision/hallucination when closing her eyes since being on hydrocortisone  -BP is soft systolic 90s  -fludrocortisone was trialed to raise BP but ACTH low secondary AI does not require fludrocortisone and patient is developing hypokalemia from this med. Please stop fludrocortisone.  - Reduce hydrocortisone to 15 mg PO at 8am and 7.5mg PO at 3pm to see if this reduces side effects.  Follow BP closely and bolus IV fluids if needed.  -Not to be cleared for discharge from endocrine end until medication regimen is clarified with stable BP, potassium and improvement of hallucinations.    FOR DISCHARGE  to help with dc planning-  - Please print and give the pt info section for patients under adrenal insufficiency (this will educate them regarding the warning signs of adrenal crisis )  - Patient should take 2-3x of home dose in cases of illness, ever, accidents, and surgery. If unable to take PO, use IM injection as below (Solu-Cortef)  - pt should receive stress dosing (IV hydrocortisone 50mg q8) with any major illness or surgical procedure  - Should pt be unable to tolerate PO and is unable to take hydrocortisone, pt will need an emergency injection. Please discharge with a prescription for 100 mg Solu-Cortef Act-O-Vial and the following instructions:  [email or web address].[email or web address]/emergency-injection/emergency-injection-cortico-steroids-solu-cortef-act-o-vial-[email or web address]-chamber-ampul/  - pt should obtain a medical alert bracelet or necklace to inform emergency providers of adrenal insufficiency diagnosis [[email or web address].[email or web address]]      #Hypothyrodism  - 11/23 TSH 1.51, FT4 1.1  new issue - TSH 8.4, Free T4 0.9  may be due to Keytruda  started on levothyroxine 50 mcg po daily  recheck TFTs 4-6 weeks as outpatient    Will arrange for follow up and contact patient with appointment details. Will schedule with Dr. Dinorah Rivero.  [address] [place] [address]  Phone Number: [phone]    Discussed with primary team.     Moreno Cifuentes MD  Division of Endocrinology  Pager: [phone]    If after 6PM or before 9AM, or on weekends/holidays, please call endocrine answering service for assistance ([phone]).  For nonurgent matters email [email or web address] for assistance.

## 2024-01-08 NOTE — PROGRESS NOTE ADULT - ASSESSMENT
Patient is a 49 YO woman with PMH of left breast cancer diagnosed 5/2023 on chemo (Xeloda)  last session 11/2023 s/p b/l nipple sparing mastectomy/left axillary sentinel lymph node biopsy, JOHN flap reconstruction, who presented with 2 week progressive history of dyspnea on exertion, nausea, vomiting, diarrhea, fatigue,18 lb weight loss,(not eating),  palpitations and bilateral lower extremity weakness.  COVID and flu negative.  Seen at urgent care and sent to [facility] for heart rate in the 140s and suspected dehydration. Patient found to have secondary adrenal insufficiency, hypothyroidism, metabolic derangement,  and orthostatic hypotension. EKG and telemetry with sinus tachycardia with prolonged QTc >600ms.  Patient now on hydrocortisone and levothyroxine. Electrolytes (Mg and K) corrected. Patient no longer tachycardic and QTc < 500ms. No ectopy on telemetry.   Prolonged QTc and neuromuscular sx's including lower extremity weakness most likely multifactorial due to hypomagnesemia/hypokalemia/Xeloda. Patient off Xeloda x 5 weeks.   Tachycardia likely a physiologic response.    Echo LVEF 60%.    Troponins negative   Blood cx's negative.     Plan:  Keep Mg > 2.2 and K+ > 4.0  EKG today. Patient is a 49 YO woman with PMH of left breast cancer diagnosed 5/2023 on chemo (Xeloda)  last session 11/2023 s/p b/l nipple sparing mastectomy/left axillary sentinel lymph node biopsy, JOHN flap reconstruction, who presented with 2 week progressive history of dyspnea on exertion, nausea, vomiting, diarrhea, fatigue,18 lb weight loss,(not eating),  palpitations and bilateral lower extremity weakness.  COVID and flu negative.  Seen at urgent care and sent to [facility] for heart rate in the 140s and suspected dehydration. Patient found to have secondary adrenal insufficiency, hypothyroidism, metabolic derangement,  and orthostatic hypotension. EKG and telemetry with sinus tachycardia with prolonged QTc >600ms.  Patient now on hydrocortisone and levothyroxine. Electrolytes (Mg and K) corrected. Patient no longer tachycardic and QTc < 500ms. No ectopy on telemetry.   Prolonged QTc and neuromuscular sx's including lower extremity weakness most likely multifactorial due to hypomagnesemia/hypokalemia/Xeloda. Patient off Xeloda x 5 weeks.   Tachycardia likely a physiologic response.    Echo LVEF 60%.    Troponins negative   Blood cx's negative.     Plan:  Keep Mg > 2.2 and K+ > 4.0  EKG today.

## 2024-01-08 NOTE — PROGRESS NOTE ADULT - PROBLEM SELECTOR PLAN 1
- likely secondary to immunotherapy  - endocrine following, appreciate recs  - started on hydrocortisone 30mg in AM and 15mg in PM - likely secondary to immunotherapy  - endocrine following, appreciate recs  - endocrine recommended switching to fl - likely secondary to immunotherapy  - endocrine following, appreciate recs  - endocrine recommended discontinuing fludrocortisone in setting of patient experiencing hypokalemia   - would decrease hydrocortisone to 15mg qam and 7.5mg qhs   - endo recs appreciated, would like to monitor patient in setting of hallucinations while on hydrocortisone and hypotension

## 2024-01-08 NOTE — PROGRESS NOTE ADULT - SUBJECTIVE AND OBJECTIVE BOX
Chief Complaint: Secondary adrenal insufficiency    History: reports when closes eye sees a vision/hallucination that disappears when opening her eyes.    MEDICATIONS  (STANDING):  cyanocobalamin 1000 MICROGram(s) Oral daily  enoxaparin Injectable 40 milliGRAM(s) SubCutaneous every 24 hours  fludroCORTISONE 0.1 milliGRAM(s) Oral daily  folic acid 1 milliGRAM(s) Oral daily  hydrocortisone 7.5 milliGRAM(s) Oral <User Schedule>  influenza   Vaccine 0.5 milliLiter(s) IntraMuscular once  levothyroxine 50 MICROGram(s) Oral daily  potassium chloride    Tablet ER 40 milliEquivalent(s) Oral every 4 hours  pyridoxine Injectable 25 milliGRAM(s) IV Push daily  thiamine 100 milliGRAM(s) Oral daily    MEDICATIONS  (PRN):  melatonin 3 milliGRAM(s) Oral at bedtime PRN Insomnia  sodium chloride 0.65% Nasal 1 Spray(s) Both Nostrils three times a day PRN Nasal Congestion      Allergies    unknown chemo medication (Anaphylaxis)  No Known Drug Allergies    Intolerances      Review of Systems:    ALL OTHER SYSTEMS REVIEWED AND NEGATIVE      PHYSICAL EXAM:  VITALS: T(C): 36.8 (01-08-24 @ 11:33)  T(F): 98.2 (01-08-24 @ 11:33), Max: 98.2 (01-08-24 @ 11:33)  HR: 85 (01-08-24 @ 11:33) (81 - 89)  BP: 93/57 (01-08-24 @ 11:33) (91/61 - 100/69)  RR:  (16 - 18)  SpO2:  (97% - 99%)  Wt(kg): --  GENERAL: NAD, well-groomed, well-developed  EYES: No proptosis, no lid lag, anicteric  HEENT:  Atraumatic, Normocephalic, moist mucous membranes  RESPIRATORY: nonlabored respirations, no wheezing  PSYCH: Alert and oriented x 3, normal affect, normal mood    CAPILLARY BLOOD GLUCOSE      POCT Blood Glucose.: 98 mg/dL (08 Jan 2024 06:19)  POCT Blood Glucose.: 152 mg/dL (08 Jan 2024 00:26)      01-08    141  |  105  |  10  ----------------------------<  95  3.4<L>   |  27  |  0.46<L>    eGFR: 117    Ca    8.7      01-08  Mg     2.00     01-08  Phos  3.4     01-08        A1C with Estimated Average Glucose Result: 4.8 % (01-04-24 @ 08:01)      Thyroid Function Tests:  01-06 @ 06:08 TSH 8.35 FreeT4 0.9 T3 -- Anti TPO -- Anti Thyroglobulin Ab -- TSI --

## 2024-01-08 NOTE — PROGRESS NOTE ADULT - SUBJECTIVE AND OBJECTIVE BOX
Patient states she is feeling much better. Just very anxious about long term medication use.   Denies chest pain, shortness of breath, palpitations or lightheadedness.   No events overnight.    Vital Signs Last 24 Hrs  T(C): 36.3 (08 Jan 2024 05:01), Max: 36.6 (07 Jan 2024 16:15)  T(F): 97.4 (08 Jan 2024 05:01), Max: 97.9 (07 Jan 2024 16:15)  HR: 81 (08 Jan 2024 05:01) (81 - 89)  BP: 91/61 (08 Jan 2024 05:01) (91/61 - 100/69)  BP(mean): --  RR: 16 (08 Jan 2024 05:01) (16 - 18)  SpO2: 99% (08 Jan 2024 05:01) (97% - 99%)    Parameters below as of 08 Jan 2024 05:01  Patient On (Oxygen Delivery Method): room air          EKG: pending  Telemetry: Normal sinus rhythm 80-90's.   MEDICATIONS  (STANDING):  cyanocobalamin 1000 MICROGram(s) Oral daily  enoxaparin Injectable 40 milliGRAM(s) SubCutaneous every 24 hours  fludroCORTISONE 0.1 milliGRAM(s) Oral daily  folic acid 1 milliGRAM(s) Oral daily  hydrocortisone 10 milliGRAM(s) Oral <User Schedule>  hydrocortisone 20 milliGRAM(s) Oral <User Schedule>  influenza   Vaccine 0.5 milliLiter(s) IntraMuscular once  levothyroxine 50 MICROGram(s) Oral daily  potassium chloride    Tablet ER 40 milliEquivalent(s) Oral every 4 hours  pyridoxine Injectable 25 milliGRAM(s) IV Push daily  thiamine 100 milliGRAM(s) Oral daily    MEDICATIONS  (PRN):  melatonin 3 milliGRAM(s) Oral at bedtime PRN Insomnia  sodium chloride 0.65% Nasal 1 Spray(s) Both Nostrils three times a day PRN Nasal Congestion          Physical exam:   Gen-well developed well nourished in NAD  Resp- clear to auscultation. No wheezing, rales or rhonchi  CV- S1 and S2 RRR. No murmurs, gallops or rubs  ABD- soft nontender + bowel sounds   EXT- no edema No calf tenderness Lower extremities warm and dry. Mild tingling in lower legs intermittently.  Neuro- grossly nonfocal                            9.6    2.49  )-----------( 159      ( 07 Jan 2024 05:55 )             29.3       01-08    141  |  105  |  10  ----------------------------<  95  3.4<L>   |  27  |  0.46<L>    Ca    8.7      08 Jan 2024 06:01  Phos  3.4     01-08  Mg     2.00     01-08      < from: TTE W or WO Ultrasound Enhancing Agent (01.05.24 @ 10:27) >  TRANSTHORACIC ECHOCARDIOGRAM REPORT  ________________________________________________________________________________                                      _______       Pt. Name:       JUSTIN DE LA CRUZ Study Date:    1/5/2024  MRN:            [id number]     YOB: 1975  Accession #:    [id number]     Age:           48 years  Account#:       [id number]      Gender:        F  Heart Rate:                   Height:        69.00 in (175.26 cm)  Rhythm:                       Weight:        159.00 lb (72.12 kg)  Blood Pressure: 106/49 mmHg   BSA/BMI:       1.87 m² / 23.48 kg/m²  ________________________________________________________________________________________  Referring Physician:    [id number] Ariadna Paniagua  Interpreting Physician: Kenny Partida M.D.  Primary Sonographer:    Michelle VENTURA    CPT:               ECHO TTE WO CON COMP W DOPP - 97871.m  Indication(s):     Palpitations - R00.2  Procedure:         Transthoracic echocardiogram with 2-D, M-mode and complete          spectral and color flow Doppler.  Ordering Location: [facility]  Study Information: Image quality for this study is fair.    _______________________________________________________________________________________     CONCLUSIONS:      1. Left ventricular cavity is normal. Left ventricular wall thickness is normal. Left ventricular systolic function is normal with an ejection fraction of 60 % by Ortiz's method of disks. There are no regional wall motion abnormalities seen.   2. Normal right ventricular cavity size and probably normal systolic function.   3. Structurally normal mitral valve with normal leaflet excursion. There is trace mitral regurgitation.    ________________________________________________________________________________________  FINDINGS:     Left Ventricle:  The left ventricular cavity is normal. Left ventricular wall thickness is normal. Left ventricular systolic function is normal with a calculated ejection fraction of 60 % by the Ortiz's biplane method of disks. There are no regional wall motion abnormalities seen.     Right Ventricle:  The right ventricular cavity is normal in size and probably normal systolic function. Tricuspid annular plane systolic excursion (TAPSE) is 2.1 cm (normal >=1.7 cm).     Left Atrium:  The left atrium is normal with an indexed volume of 6.48 ml/m².     Right Atrium:  The right atrium is normal in size with an indexed area of 4.97 cm²/m².     Aortic Valve:  The aortic valve anatomy cannot be determined. There is no evidence of aortic regurgitation.     Mitral Valve:  Structurally normal mitral valve with normal leaflet excursion. There is trace mitral regurgitation.     Tricuspid Valve:  Structurally normal tricuspid valve with normal leaflet excursion. There is trace tricuspid regurgitation.     Pulmonic Valve:  Structurally normal pulmonic valve with normal leaflet excursion. There is trace pulmonic regurgitation.     Aorta:  The aortic root at the sinuses of Valsalva is normal in size, measuring 3.10 cm (indexed1.65 cm/m²). The ascending aorta diameter is normal in size, measuring 2.80 cm (indexed 1.49 cm/m²).     Pericardium:  No pericardial effusion seen.     Systemic Veins:  The inferior vena cava was not well visualized.  ____________________________________________________________________  QUANTITATIVE DATA:  Left Ventricle Measurements: (Indexed to BSA)                          Patient states she is feeling much better. Just very anxious about long term medication use.   Denies chest pain, shortness of breath, palpitations or lightheadedness.   No events overnight.    Vital Signs Last 24 Hrs  T(C): 36.3 (08 Jan 2024 05:01), Max: 36.6 (07 Jan 2024 16:15)  T(F): 97.4 (08 Jan 2024 05:01), Max: 97.9 (07 Jan 2024 16:15)  HR: 81 (08 Jan 2024 05:01) (81 - 89)  BP: 91/61 (08 Jan 2024 05:01) (91/61 - 100/69)  BP(mean): --  RR: 16 (08 Jan 2024 05:01) (16 - 18)  SpO2: 99% (08 Jan 2024 05:01) (97% - 99%)    Parameters below as of 08 Jan 2024 05:01  Patient On (Oxygen Delivery Method): room air          EKG: pending  Telemetry: Normal sinus rhythm 80-90's.   MEDICATIONS  (STANDING):  cyanocobalamin 1000 MICROGram(s) Oral daily  enoxaparin Injectable 40 milliGRAM(s) SubCutaneous every 24 hours  fludroCORTISONE 0.1 milliGRAM(s) Oral daily  folic acid 1 milliGRAM(s) Oral daily  hydrocortisone 10 milliGRAM(s) Oral <User Schedule>  hydrocortisone 20 milliGRAM(s) Oral <User Schedule>  influenza   Vaccine 0.5 milliLiter(s) IntraMuscular once  levothyroxine 50 MICROGram(s) Oral daily  potassium chloride    Tablet ER 40 milliEquivalent(s) Oral every 4 hours  pyridoxine Injectable 25 milliGRAM(s) IV Push daily  thiamine 100 milliGRAM(s) Oral daily    MEDICATIONS  (PRN):  melatonin 3 milliGRAM(s) Oral at bedtime PRN Insomnia  sodium chloride 0.65% Nasal 1 Spray(s) Both Nostrils three times a day PRN Nasal Congestion          Physical exam:   Gen-well developed well nourished in NAD  Resp- clear to auscultation. No wheezing, rales or rhonchi  CV- S1 and S2 RRR. No murmurs, gallops or rubs  ABD- soft nontender + bowel sounds   EXT- no edema No calf tenderness Lower extremities warm and dry. Mild tingling in lower legs intermittently.  Neuro- grossly nonfocal                            9.6    2.49  )-----------( 159      ( 07 Jan 2024 05:55 )             29.3       01-08    141  |  105  |  10  ----------------------------<  95  3.4<L>   |  27  |  0.46<L>    Ca    8.7      08 Jan 2024 06:01  Phos  3.4     01-08  Mg     2.00     01-08      < from: TTE W or WO Ultrasound Enhancing Agent (01.05.24 @ 10:27) >  TRANSTHORACIC ECHOCARDIOGRAM REPORT  ________________________________________________________________________________                                      _______       Pt. Name:       JUSTIN DE LA CRUZ Study Date:    1/5/2024  MRN:            [id number]     YOB: 1975  Accession #:    [id number]     Age:           48 years  Account#:       [id number]      Gender:        F  Heart Rate:                   Height:        69.00 in (175.26 cm)  Rhythm:                       Weight:        159.00 lb (72.12 kg)  Blood Pressure: 106/49 mmHg   BSA/BMI:       1.87 m² / 23.48 kg/m²  ________________________________________________________________________________________  Referring Physician:    [id number] Ariadna Paniagua  Interpreting Physician: Kenny Partida M.D.  Primary Sonographer:    Michelle VENTURA    CPT:               ECHO TTE WO CON COMP W DOPP - 51287.m  Indication(s):     Palpitations - R00.2  Procedure:         Transthoracic echocardiogram with 2-D, M-mode and complete          spectral and color flow Doppler.  Ordering Location: [facility]  Study Information: Image quality for this study is fair.    _______________________________________________________________________________________     CONCLUSIONS:      1. Left ventricular cavity is normal. Left ventricular wall thickness is normal. Left ventricular systolic function is normal with an ejection fraction of 60 % by Ortiz's method of disks. There are no regional wall motion abnormalities seen.   2. Normal right ventricular cavity size and probably normal systolic function.   3. Structurally normal mitral valve with normal leaflet excursion. There is trace mitral regurgitation.    ________________________________________________________________________________________  FINDINGS:     Left Ventricle:  The left ventricular cavity is normal. Left ventricular wall thickness is normal. Left ventricular systolic function is normal with a calculated ejection fraction of 60 % by the Ortiz's biplane method of disks. There are no regional wall motion abnormalities seen.     Right Ventricle:  The right ventricular cavity is normal in size and probably normal systolic function. Tricuspid annular plane systolic excursion (TAPSE) is 2.1 cm (normal >=1.7 cm).     Left Atrium:  The left atrium is normal with an indexed volume of 6.48 ml/m².     Right Atrium:  The right atrium is normal in size with an indexed area of 4.97 cm²/m².     Aortic Valve:  The aortic valve anatomy cannot be determined. There is no evidence of aortic regurgitation.     Mitral Valve:  Structurally normal mitral valve with normal leaflet excursion. There is trace mitral regurgitation.     Tricuspid Valve:  Structurally normal tricuspid valve with normal leaflet excursion. There is trace tricuspid regurgitation.     Pulmonic Valve:  Structurally normal pulmonic valve with normal leaflet excursion. There is trace pulmonic regurgitation.     Aorta:  The aortic root at the sinuses of Valsalva is normal in size, measuring 3.10 cm (indexed1.65 cm/m²). The ascending aorta diameter is normal in size, measuring 2.80 cm (indexed 1.49 cm/m²).     Pericardium:  No pericardial effusion seen.     Systemic Veins:  The inferior vena cava was not well visualized.  ____________________________________________________________________  QUANTITATIVE DATA:  Left Ventricle Measurements: (Indexed to BSA)

## 2024-01-08 NOTE — PROGRESS NOTE ADULT - ASSESSMENT
Ms. Sauer is a 49 YO woman with PMH of left breast cancer s/p chemo (last 11/2023) and mastectomy, who presents to the ED with nausea/vomiting, diarrhea, chest pain, shortness of breath, weakness and b/l groin pain for 2 weeks. Admitted for anion gap metabolic acidosis requiring electrolyte repletions.

## 2024-01-08 NOTE — PROGRESS NOTE ADULT - ATTENDING COMMENTS
Pt starting to eat more, generally feeling better today. Reports a visual hallucination with eyes closed that disappears on opening eyes, likely related to steroids. Appreciate Endocrinology recs. F/c fludrocortisone and reduce hydrocortisone. Will encourage oral hydration and diet, and ambulate pt to ensure she is steady in light of somewhat low BP.

## 2024-01-08 NOTE — PROGRESS NOTE ADULT - PROBLEM SELECTOR PLAN 3
Sinus on ECG. Likely iso hypovolemia, possibly infection in immunocompromised patient. Note QTc 587  - Fluid resuscitation as above  - Blood cultures x2, urine culture pending  - Monitor off antibiotics as not febrile  - TTE - Grossly normal, preserved EF  - Troponins negative  -EP consulted for prolonged Qtc, awaiting dc recs, likely related to electrolyte abnormalities Sinus on ECG. Likely iso hypovolemia, possibly infection in immunocompromised patient. Note QTc 587  - Fluid resuscitation as above  - Blood cultures x2, urine culture pending  - Monitor off antibiotics as not febrile  - TTE - Grossly normal, preserved EF  - Troponins negative  -EP consulted for prolonged Qtc, awaiting dc recs, likely related to electrolyte abnormalities  - ekg 1/8 qtc 465ms

## 2024-01-09 ENCOUNTER — TRANSCRIPTION ENCOUNTER (OUTPATIENT)
Age: 49
End: 2024-01-09

## 2024-01-09 VITALS
RESPIRATION RATE: 16 BRPM | DIASTOLIC BLOOD PRESSURE: 71 MMHG | OXYGEN SATURATION: 98 % | TEMPERATURE: 98 F | HEART RATE: 88 BPM | SYSTOLIC BLOOD PRESSURE: 105 MMHG

## 2024-01-09 LAB
ANION GAP SERPL CALC-SCNC: 9 MMOL/L — SIGNIFICANT CHANGE UP (ref 7–14)
ANION GAP SERPL CALC-SCNC: 9 MMOL/L — SIGNIFICANT CHANGE UP (ref 7–14)
BUN SERPL-MCNC: 9 MG/DL — SIGNIFICANT CHANGE UP (ref 7–23)
BUN SERPL-MCNC: 9 MG/DL — SIGNIFICANT CHANGE UP (ref 7–23)
CALCIUM SERPL-MCNC: 9 MG/DL — SIGNIFICANT CHANGE UP (ref 8.4–10.5)
CALCIUM SERPL-MCNC: 9 MG/DL — SIGNIFICANT CHANGE UP (ref 8.4–10.5)
CHLORIDE SERPL-SCNC: 106 MMOL/L — SIGNIFICANT CHANGE UP (ref 98–107)
CHLORIDE SERPL-SCNC: 106 MMOL/L — SIGNIFICANT CHANGE UP (ref 98–107)
CO2 SERPL-SCNC: 27 MMOL/L — SIGNIFICANT CHANGE UP (ref 22–31)
CO2 SERPL-SCNC: 27 MMOL/L — SIGNIFICANT CHANGE UP (ref 22–31)
CREAT SERPL-MCNC: 0.54 MG/DL — SIGNIFICANT CHANGE UP (ref 0.5–1.3)
CREAT SERPL-MCNC: 0.54 MG/DL — SIGNIFICANT CHANGE UP (ref 0.5–1.3)
EGFR: 113 ML/MIN/1.73M2 — SIGNIFICANT CHANGE UP
EGFR: 113 ML/MIN/1.73M2 — SIGNIFICANT CHANGE UP
GLUCOSE SERPL-MCNC: 90 MG/DL — SIGNIFICANT CHANGE UP (ref 70–99)
GLUCOSE SERPL-MCNC: 90 MG/DL — SIGNIFICANT CHANGE UP (ref 70–99)
HCT VFR BLD CALC: 31.3 % — LOW (ref 34.5–45)
HCT VFR BLD CALC: 31.3 % — LOW (ref 34.5–45)
HGB BLD-MCNC: 10.1 G/DL — LOW (ref 11.5–15.5)
HGB BLD-MCNC: 10.1 G/DL — LOW (ref 11.5–15.5)
MAGNESIUM SERPL-MCNC: 1.9 MG/DL — SIGNIFICANT CHANGE UP (ref 1.6–2.6)
MAGNESIUM SERPL-MCNC: 1.9 MG/DL — SIGNIFICANT CHANGE UP (ref 1.6–2.6)
MCHC RBC-ENTMCNC: 26.5 PG — LOW (ref 27–34)
MCHC RBC-ENTMCNC: 26.5 PG — LOW (ref 27–34)
MCHC RBC-ENTMCNC: 32.3 GM/DL — SIGNIFICANT CHANGE UP (ref 32–36)
MCHC RBC-ENTMCNC: 32.3 GM/DL — SIGNIFICANT CHANGE UP (ref 32–36)
MCV RBC AUTO: 82.2 FL — SIGNIFICANT CHANGE UP (ref 80–100)
MCV RBC AUTO: 82.2 FL — SIGNIFICANT CHANGE UP (ref 80–100)
NRBC # BLD: 0 /100 WBCS — SIGNIFICANT CHANGE UP (ref 0–0)
NRBC # BLD: 0 /100 WBCS — SIGNIFICANT CHANGE UP (ref 0–0)
NRBC # FLD: 0 K/UL — SIGNIFICANT CHANGE UP (ref 0–0)
NRBC # FLD: 0 K/UL — SIGNIFICANT CHANGE UP (ref 0–0)
PHOSPHATE SERPL-MCNC: 3.9 MG/DL — SIGNIFICANT CHANGE UP (ref 2.5–4.5)
PHOSPHATE SERPL-MCNC: 3.9 MG/DL — SIGNIFICANT CHANGE UP (ref 2.5–4.5)
PLATELET # BLD AUTO: 176 K/UL — SIGNIFICANT CHANGE UP (ref 150–400)
PLATELET # BLD AUTO: 176 K/UL — SIGNIFICANT CHANGE UP (ref 150–400)
POTASSIUM SERPL-MCNC: 4 MMOL/L — SIGNIFICANT CHANGE UP (ref 3.5–5.3)
POTASSIUM SERPL-MCNC: 4 MMOL/L — SIGNIFICANT CHANGE UP (ref 3.5–5.3)
POTASSIUM SERPL-SCNC: 4 MMOL/L — SIGNIFICANT CHANGE UP (ref 3.5–5.3)
POTASSIUM SERPL-SCNC: 4 MMOL/L — SIGNIFICANT CHANGE UP (ref 3.5–5.3)
RBC # BLD: 3.81 M/UL — SIGNIFICANT CHANGE UP (ref 3.8–5.2)
RBC # BLD: 3.81 M/UL — SIGNIFICANT CHANGE UP (ref 3.8–5.2)
RBC # FLD: 16.9 % — HIGH (ref 10.3–14.5)
RBC # FLD: 16.9 % — HIGH (ref 10.3–14.5)
SODIUM SERPL-SCNC: 142 MMOL/L — SIGNIFICANT CHANGE UP (ref 135–145)
SODIUM SERPL-SCNC: 142 MMOL/L — SIGNIFICANT CHANGE UP (ref 135–145)
WBC # BLD: 2.34 K/UL — LOW (ref 3.8–10.5)
WBC # BLD: 2.34 K/UL — LOW (ref 3.8–10.5)
WBC # FLD AUTO: 2.34 K/UL — LOW (ref 3.8–10.5)
WBC # FLD AUTO: 2.34 K/UL — LOW (ref 3.8–10.5)

## 2024-01-09 PROCEDURE — 99231 SBSQ HOSP IP/OBS SF/LOW 25: CPT

## 2024-01-09 PROCEDURE — 99233 SBSQ HOSP IP/OBS HIGH 50: CPT

## 2024-01-09 PROCEDURE — 99239 HOSP IP/OBS DSCHRG MGMT >30: CPT | Mod: GC

## 2024-01-09 RX ORDER — HYDROCORTISONE 20 MG
1 TABLET ORAL
Qty: 150 | Refills: 0
Start: 2024-01-09

## 2024-01-09 RX ORDER — ISOPROPYL ALCOHOL, BENZOCAINE .7; .06 ML/ML; ML/ML
0 SWAB TOPICAL
Qty: 100 | Refills: 0
Start: 2024-01-09

## 2024-01-09 RX ADMIN — Medication 7.5 MILLIGRAM(S): at 15:08

## 2024-01-09 RX ADMIN — Medication 15 MILLIGRAM(S): at 08:46

## 2024-01-09 RX ADMIN — Medication 50 MICROGRAM(S): at 05:18

## 2024-01-09 NOTE — PROGRESS NOTE ADULT - PROBLEM SELECTOR PLAN 3
Sinus on ECG. Likely iso hypovolemia, possibly infection in immunocompromised patient. Note QTc 587  - Fluid resuscitation as above  - Blood cultures x2, urine culture pending  - Monitor off antibiotics as not febrile  - TTE - Grossly normal, preserved EF  - Troponins negative  -EP consulted for prolonged Qtc, awaiting dc recs, likely related to electrolyte abnormalities  - ekg 1/8 qtc 465ms

## 2024-01-09 NOTE — PROGRESS NOTE ADULT - PROBLEM SELECTOR PROBLEM 4
Hypoglycemia
Hypotension
Hypotension
Nausea & vomiting
Hypoglycemia
Hypoglycemia

## 2024-01-09 NOTE — PROGRESS NOTE ADULT - NUTRITIONAL ASSESSMENT
This patient has been assessed with a concern for Malnutrition and has been determined to have a diagnosis/diagnoses of Severe protein-calorie malnutrition.    This patient is being managed with:   Diet Regular-  Entered: Jan 5 2024  3:20PM  

## 2024-01-09 NOTE — PROGRESS NOTE ADULT - ATTENDING COMMENTS
Pt feeling better, eating more, ambulating independently without orthostasis. BP improved, electrolytes stable. Plan d/c home today on hydrocortisone; will ask Endocrinology for final recommendations. Time planning discharge 35 minutes.

## 2024-01-09 NOTE — DISCHARGE NOTE NURSING/CASE MANAGEMENT/SOCIAL WORK - NSDCPEFALRISK_GEN_ALL_CORE
For information on Fall & Injury Prevention, visit: [email or web address] OR  [email or web address] OR  https://www.cdc.gov/steadi/patient.html For information on Fall & Injury Prevention, visit: [email or web address] OR  [email or web address]://[email or web address]/[email or web address]-[email or web address]-tips-to-[email or web address]-injury OR  https://www.cdc.gov/steadi/patient.html

## 2024-01-09 NOTE — PROGRESS NOTE ADULT - PROBLEM SELECTOR PROBLEM 1
Adrenal insufficiency
High anion gap metabolic acidosis
Adrenal insufficiency
High anion gap metabolic acidosis
High anion gap metabolic acidosis

## 2024-01-09 NOTE — PROGRESS NOTE ADULT - NS ATTEND AMEND GEN_ALL_CORE FT
QTc improving. Continue to avoid QT prolonging agents.
QTc improved. Avoid QT prolonging agents. EP to sign off.

## 2024-01-09 NOTE — DISCHARGE NOTE NURSING/CASE MANAGEMENT/SOCIAL WORK - PATIENT PORTAL LINK FT
You can access the FollowMyHealth Patient Portal offered by [facility] by registering at the following website: [email or web address]. By joining [employer]’s FollowMyHealth portal, you will also be able to view your health information using other applications (apps) compatible with our system. You can access the FollowMyHealth Patient Portal offered by [facility] by registering at the following website: [email or web address]. By joining [email or web address] FollowMyHealth portal, you will also be able to view your health information using other applications (apps) compatible with our system.

## 2024-01-09 NOTE — PROGRESS NOTE ADULT - PROBLEM SELECTOR PLAN 7
DVT: Patient currently unable to walk, lovenox  Diet: Regular diet  Dispo: out pt PT, pending clinical improvement

## 2024-01-09 NOTE — PROGRESS NOTE ADULT - PROBLEM SELECTOR PROBLEM 6
Need for prophylactic measure
History of immunotherapy
Hypercalcemia
Need for prophylactic measure
Need for prophylactic measure
History of immunotherapy
Hypercalcemia
Hypercalcemia

## 2024-01-09 NOTE — PROGRESS NOTE ADULT - PROBLEM SELECTOR PROBLEM 7
Need for prophylactic measure
Hypothyroid
Need for prophylactic measure
Hypothyroid
Need for prophylactic measure

## 2024-01-09 NOTE — PROGRESS NOTE ADULT - PROBLEM SELECTOR PROBLEM 3
Hypoglycemia
Tachycardia
Hypoglycemia
Tachycardia
Tachycardia

## 2024-01-09 NOTE — PROGRESS NOTE ADULT - SUBJECTIVE AND OBJECTIVE BOX
Patient feeling better. Denies chest pain, shortness of breath, palpitations or lightheadedness.   Now off telemetry.    Vital Signs Last 24 Hrs  T(C): 36.6 (09 Jan 2024 04:36), Max: 36.8 (08 Jan 2024 11:33)  T(F): 97.9 (09 Jan 2024 04:36), Max: 98.2 (08 Jan 2024 11:33)  HR: 84 (09 Jan 2024 04:36) (84 - 91)  BP: 104/69 (09 Jan 2024 04:36) (93/57 - 108/79)  BP(mean): --  RR: 17 (09 Jan 2024 04:36) (17 - 18)  SpO2: 100% (09 Jan 2024 04:36) (98% - 100%)    Parameters below as of 09 Jan 2024 04:36  Patient On (Oxygen Delivery Method): room air          EKG: Normal sinus rhythm 84 bpm QRSd 80 ms  QTc 465ms.   Telemetry: off telemetry    MEDICATIONS  (STANDING):  cyanocobalamin 1000 MICROGram(s) Oral daily  enoxaparin Injectable 40 milliGRAM(s) SubCutaneous every 24 hours  folic acid 1 milliGRAM(s) Oral daily  hydrocortisone 7.5 milliGRAM(s) Oral <User Schedule>  hydrocortisone 15 milliGRAM(s) Oral <User Schedule>  influenza   Vaccine 0.5 milliLiter(s) IntraMuscular once  levothyroxine 50 MICROGram(s) Oral daily  pyridoxine Injectable 25 milliGRAM(s) IV Push daily  thiamine 100 milliGRAM(s) Oral daily    MEDICATIONS  (PRN):  melatonin 3 milliGRAM(s) Oral at bedtime PRN Insomnia  sodium chloride 0.65% Nasal 1 Spray(s) Both Nostrils three times a day PRN Nasal Congestion          Physical exam:   Gen- well developed well nourished in NAD  Resp- clear to auscultation. no wheezing, rales or rhonchi  CV- S1 and S2 RRR. No murmurs, gallops or rubs  ABD- soft nontender + bowel  sounds  EXT- no edema no calf tenderness.   Neuro- grossly nonfocal                            10.1   2.34  )-----------( 176      ( 09 Jan 2024 06:40 )             31.3       01-09    142  |  106  |  9   ----------------------------<  90  4.0   |  27  |  0.54    Ca    9.0      09 Jan 2024 06:40  Phos  3.9     01-09  Mg     1.90     01-09        < from: TTE W or WO Ultrasound Enhancing Agent (01.05.24 @ 10:27) >  TRANSTHORACIC ECHOCARDIOGRAM REPORT  ________________________________________________________________________________                                      _______       Pt. Name:       JUSTIN DE LA CRUZ Study Date:    1/5/2024  MRN:            [id number]     YOB: 1975  Accession #:    [id number]     Age:           48 years  Account#:       [id number]      Gender:        F  Heart Rate:                   Height:        69.00 in (175.26 cm)  Rhythm:                       Weight:        159.00 lb (72.12 kg)  Blood Pressure: 106/49 mmHg   BSA/BMI:       1.87 m² / 23.48 kg/m²  ________________________________________________________________________________________  Referring Physician:    [id number] Ariadna Paniagua  Interpreting Physician: Kenny Partida M.D.  Primary Sonographer:    Michelle VENTURA    CPT:               ECHO TTE WO CON COMP W DOPP - 29015.m  Indication(s):     Palpitations - R00.2  Procedure:         Transthoracic echocardiogram with 2-D, M-mode and complete          spectral and color flow Doppler.  Ordering Location: [facility]  Study Information: Image quality for this study is fair.    _______________________________________________________________________________________     CONCLUSIONS:      1. Left ventricular cavity is normal. Left ventricular wall thickness is normal. Left ventricular systolic function is normal with an ejection fraction of 60 % by Ortiz's method of disks. There are no regional wall motion abnormalities seen.   2. Normal right ventricular cavity size and probably normal systolic function.   3. Structurally normal mitral valve with normal leaflet excursion. There is trace mitral regurgitation.    ________________________________________________________________________________________  FINDINGS:     Left Ventricle:  The left ventricular cavity is normal. Left ventricular wall thickness is normal. Left ventricular systolic function is normal with a calculated ejection fraction of 60 % by the Ortiz's biplane method of disks. There are no regional wall motion abnormalities seen.     Right Ventricle:  The right ventricular cavity is normal in size and probably normal systolic function. Tricuspid annular plane systolic excursion (TAPSE) is 2.1 cm (normal >=1.7 cm).     Left Atrium:  The left atrium is normal with an indexed volume of 6.48 ml/m².     Right Atrium:  The right atrium is normal in size with an indexed area of 4.97 cm²/m².     Aortic Valve:  The aortic valve anatomy cannot be determined. There is no evidence of aortic regurgitation.  Mitral Valve:  Structurally normal mitral valve with normal leaflet excursion. There is trace mitral regurgitation.     Tricuspid Valve:  Structurally normal tricuspid valve with normal leaflet excursion. There is trace tricuspid regurgitation.     Pulmonic Valve:  Structurally normal pulmonic valve with normal leaflet excursion. There is trace pulmonic regurgitation.     Aorta:  The aortic root at the sinuses of Valsalva is normal in size, measuring 3.10 cm (indexed1.65 cm/m²). The ascending aorta diameter is normal in size, measuring 2.80 cm (indexed 1.49 cm/m²).     Pericardium:  No pericardial effusion seen.     Systemic Veins:  The inferior vena cava was not well visualized.  ____________________________________________________________________           Patient feeling better. Denies chest pain, shortness of breath, palpitations or lightheadedness.   Now off telemetry.    Vital Signs Last 24 Hrs  T(C): 36.6 (09 Jan 2024 04:36), Max: 36.8 (08 Jan 2024 11:33)  T(F): 97.9 (09 Jan 2024 04:36), Max: 98.2 (08 Jan 2024 11:33)  HR: 84 (09 Jan 2024 04:36) (84 - 91)  BP: 104/69 (09 Jan 2024 04:36) (93/57 - 108/79)  BP(mean): --  RR: 17 (09 Jan 2024 04:36) (17 - 18)  SpO2: 100% (09 Jan 2024 04:36) (98% - 100%)    Parameters below as of 09 Jan 2024 04:36  Patient On (Oxygen Delivery Method): room air          EKG: Normal sinus rhythm 84 bpm QRSd 80 ms  QTc 465ms.   Telemetry: off telemetry    MEDICATIONS  (STANDING):  cyanocobalamin 1000 MICROGram(s) Oral daily  enoxaparin Injectable 40 milliGRAM(s) SubCutaneous every 24 hours  folic acid 1 milliGRAM(s) Oral daily  hydrocortisone 7.5 milliGRAM(s) Oral <User Schedule>  hydrocortisone 15 milliGRAM(s) Oral <User Schedule>  influenza   Vaccine 0.5 milliLiter(s) IntraMuscular once  levothyroxine 50 MICROGram(s) Oral daily  pyridoxine Injectable 25 milliGRAM(s) IV Push daily  thiamine 100 milliGRAM(s) Oral daily    MEDICATIONS  (PRN):  melatonin 3 milliGRAM(s) Oral at bedtime PRN Insomnia  sodium chloride 0.65% Nasal 1 Spray(s) Both Nostrils three times a day PRN Nasal Congestion          Physical exam:   Gen- well developed well nourished in NAD  Resp- clear to auscultation. no wheezing, rales or rhonchi  CV- S1 and S2 RRR. No murmurs, gallops or rubs  ABD- soft nontender + bowel  sounds  EXT- no edema no calf tenderness.   Neuro- grossly nonfocal                            10.1   2.34  )-----------( 176      ( 09 Jan 2024 06:40 )             31.3       01-09    142  |  106  |  9   ----------------------------<  90  4.0   |  27  |  0.54    Ca    9.0      09 Jan 2024 06:40  Phos  3.9     01-09  Mg     1.90     01-09        < from: TTE W or WO Ultrasound Enhancing Agent (01.05.24 @ 10:27) >  TRANSTHORACIC ECHOCARDIOGRAM REPORT  ________________________________________________________________________________                                      _______       Pt. Name:       JUSTIN DE LA CRUZ Study Date:    1/5/2024  MRN:            [id number]     YOB: 1975  Accession #:    [id number]     Age:           48 years  Account#:       [id number]      Gender:        F  Heart Rate:                   Height:        69.00 in (175.26 cm)  Rhythm:                       Weight:        159.00 lb (72.12 kg)  Blood Pressure: 106/49 mmHg   BSA/BMI:       1.87 m² / 23.48 kg/m²  ________________________________________________________________________________________  Referring Physician:    [id number] Ariadna Paniagua  Interpreting Physician: Kenny Partida M.D.  Primary Sonographer:    Michelle VENTURA    CPT:               ECHO TTE WO CON COMP W DOPP - 03988.m  Indication(s):     Palpitations - R00.2  Procedure:         Transthoracic echocardiogram with 2-D, M-mode and complete          spectral and color flow Doppler.  Ordering Location: [facility]  Study Information: Image quality for this study is fair.    _______________________________________________________________________________________     CONCLUSIONS:      1. Left ventricular cavity is normal. Left ventricular wall thickness is normal. Left ventricular systolic function is normal with an ejection fraction of 60 % by Ortiz's method of disks. There are no regional wall motion abnormalities seen.   2. Normal right ventricular cavity size and probably normal systolic function.   3. Structurally normal mitral valve with normal leaflet excursion. There is trace mitral regurgitation.    ________________________________________________________________________________________  FINDINGS:     Left Ventricle:  The left ventricular cavity is normal. Left ventricular wall thickness is normal. Left ventricular systolic function is normal with a calculated ejection fraction of 60 % by the Ortiz's biplane method of disks. There are no regional wall motion abnormalities seen.     Right Ventricle:  The right ventricular cavity is normal in size and probably normal systolic function. Tricuspid annular plane systolic excursion (TAPSE) is 2.1 cm (normal >=1.7 cm).     Left Atrium:  The left atrium is normal with an indexed volume of 6.48 ml/m².     Right Atrium:  The right atrium is normal in size with an indexed area of 4.97 cm²/m².     Aortic Valve:  The aortic valve anatomy cannot be determined. There is no evidence of aortic regurgitation.  Mitral Valve:  Structurally normal mitral valve with normal leaflet excursion. There is trace mitral regurgitation.     Tricuspid Valve:  Structurally normal tricuspid valve with normal leaflet excursion. There is trace tricuspid regurgitation.     Pulmonic Valve:  Structurally normal pulmonic valve with normal leaflet excursion. There is trace pulmonic regurgitation.     Aorta:  The aortic root at the sinuses of Valsalva is normal in size, measuring 3.10 cm (indexed1.65 cm/m²). The ascending aorta diameter is normal in size, measuring 2.80 cm (indexed 1.49 cm/m²).     Pericardium:  No pericardial effusion seen.     Systemic Veins:  The inferior vena cava was not well visualized.  ____________________________________________________________________

## 2024-01-09 NOTE — PROGRESS NOTE ADULT - ASSESSMENT
Ms. Sauer is a 47 YO woman with PMH of left breast cancer s/p chemo (last 11/2023) and mastectomy, who presents to the ED with nausea/vomiting, diarrhea, chest pain, shortness of breath, weakness and b/l groin pain for 2 weeks. Admitted for anion gap metabolic acidosis requiring electrolyte repletions.

## 2024-01-09 NOTE — PROGRESS NOTE ADULT - ASSESSMENT
Ms. Sauer is a 47 YO woman with PMH of left breast cancer on chemo, last session 11/2023 s/p b/l nipple sparing mastectomy/left axillary sentinel lymph node biopsy, JOHN flap reconstruction, who presents for 2 week progressive history of dyspnea on exertion. Endocrinology consulted for evaluation of new adrenal insufficiency. Complex patient high level decision making.    #Adrenal Insufficiency  #Hypotension  #Tachycardia  #Hyponatremia  #Hypoglycemia  #Hx immunotherapy  - 7:14am serum cortisol on 1/5 0.5, diagnostic of adrenal insufficiency  Low ACTH (2.3) diagnostic of secondary adrenal insufficiency    - patient had hypoglycemia to 64 on BMP 1/3 and hyponatremia to 131  - history of chemotherapy for breast cancer, last 11/23  - patient hypotensive and tachycardic  - has experienced weight loss, nausea and vomiting  - per primary team, patient had received keytruda in 11/23 which could cause AI    PLAN  - Patient reporting seeing vision/hallucination when closing her eyes since being on hydrocortisone - symptoms are improved today on reduced dose of hydrocortisone.  -BP is improved today, checked BP at lunch and 105/71 stable. Last dose of fludrocortisone yesterday at 5AM.  -fludrocortisone was trialed to raise BP but ACTH low secondary AI does not require fludrocortisone and patient is developing hypokalemia from this med. Stopped fludrocortisone.  - Continue hydrocortisone to 15 mg PO at 8am and 7.5mg PO at 3pm and stay on this dose until follow up with Dr. Rivero. Discharge on this dose. She received 5mg tabs and reviewed to take 3 tabs AM and 1.5 tabs in afternoon. Reviewed with patient. Double dose for illness.    FOR DISCHARGE  to help with dc planning-  - Please print and give the pt info section for patients under adrenal insufficiency (this will educate them regarding the warning signs of adrenal crisis )  - Patient should take 2-3x of home dose in cases of illness, ever, accidents, and surgery. If unable to take PO, use IM injection as below (Solu-Cortef)  - pt should receive stress dosing (IV hydrocortisone 50mg q8) with any major illness or surgical procedure  - Should pt be unable to tolerate PO and is unable to take hydrocortisone, pt will need an emergency injection. Please discharge with a prescription for 100 mg Solu-Cortef Act-O-Vial and the following instructions:  http://www.addisoncrisis.info/emergency-injection/emergency-injection-cortico-steroids-solu-cortef-act-o-vial-two-chamber-ampul/  - pt should obtain a medical alert bracelet or necklace to inform emergency providers of adrenal insufficiency diagnosis [[email or web address].medicalert.[email or web address]]      #Hypothyrodism  - 11/23 TSH 1.51, FT4 1.1  new issue - TSH 8.4, Free T4 0.9  may be due to Keytruda  started on levothyroxine 50 mcg po daily - script given  recheck TFTs 4-6 weeks as outpatient    Will arrange for follow up and contact patient with appointment details. Will schedule with Dr. Dinorah Rivero. Office to call patient to finalize appointment details.  [address] [place] [address]  Phone Number: [phone]    Discussed with primary team throughout the day.  Total time 50 minutes including coordinating care and counselling patient.    Moreno Cifuentes MD  Division of Endocrinology  Pager: [phone]    If after 6PM or before 9AM, or on weekends/holidays, please call endocrine answering service for assistance ([phone]).  For nonurgent matters email [email or web address] for assistance. Ms. Sauer is a 49 YO woman with PMH of left breast cancer on chemo, last session 11/2023 s/p b/l nipple sparing mastectomy/left axillary sentinel lymph node biopsy, JOHN flap reconstruction, who presents for 2 week progressive history of dyspnea on exertion. Endocrinology consulted for evaluation of new adrenal insufficiency. Complex patient high level decision making.    #Adrenal Insufficiency  #Hypotension  #Tachycardia  #Hyponatremia  #Hypoglycemia  #Hx immunotherapy  - 7:14am serum cortisol on 1/5 0.5, diagnostic of adrenal insufficiency  Low ACTH (2.3) diagnostic of secondary adrenal insufficiency    - patient had hypoglycemia to 64 on BMP 1/3 and hyponatremia to 131  - history of chemotherapy for breast cancer, last 11/23  - patient hypotensive and tachycardic  - has experienced weight loss, nausea and vomiting  - per primary team, patient had received keytruda in 11/23 which could cause AI    PLAN  - Patient reporting seeing vision/hallucination when closing her eyes since being on hydrocortisone - symptoms are improved today on reduced dose of hydrocortisone.  -BP is improved today, checked BP at lunch and 105/71 stable. Last dose of fludrocortisone yesterday at 5AM.  -fludrocortisone was trialed to raise BP but ACTH low secondary AI does not require fludrocortisone and patient is developing hypokalemia from this med. Stopped fludrocortisone.  - Continue hydrocortisone to 15 mg PO at 8am and 7.5mg PO at 3pm and stay on this dose until follow up with Dr. Rivero. Discharge on this dose. She received 5mg tabs and reviewed to take 3 tabs AM and 1.5 tabs in afternoon. Reviewed with patient. Double dose for illness.    FOR DISCHARGE  to help with dc planning-  - Please print and give the pt info section for patients under adrenal insufficiency (this will educate them regarding the warning signs of adrenal crisis )  - Patient should take 2-3x of home dose in cases of illness, ever, accidents, and surgery. If unable to take PO, use IM injection as below (Solu-Cortef)  - pt should receive stress dosing (IV hydrocortisone 50mg q8) with any major illness or surgical procedure  - Should pt be unable to tolerate PO and is unable to take hydrocortisone, pt will need an emergency injection. Please discharge with a prescription for 100 mg Solu-Cortef Act-O-Vial and the following instructions:  http://www.addisoncrisis.info/emergency-injection/emergency-injection-cortico-steroids-solu-cortef-act-o-vial-two-chamber-ampul/  - pt should obtain a medical alert bracelet or necklace to inform emergency providers of adrenal insufficiency diagnosis [[email or web address].medicalert.[email or web address]]      #Hypothyrodism  - 11/23 TSH 1.51, FT4 1.1  new issue - TSH 8.4, Free T4 0.9  may be due to Keytruda  started on levothyroxine 50 mcg po daily - script given  recheck TFTs 4-6 weeks as outpatient    Will arrange for follow up and contact patient with appointment details. Will schedule with Dr. Dinorah Rivero. Office to call patient to finalize appointment details.  [address] [place] [address]  Phone Number: [phone]    Discussed with primary team throughout the day.  Total time 50 minutes including coordinating care and counselling patient.    Moreno Cifuentes MD  Division of Endocrinology  Pager: [phone]    If after 6PM or before 9AM, or on weekends/holidays, please call endocrine answering service for assistance ([phone]).  For nonurgent matters email [email or web address] for assistance.

## 2024-01-09 NOTE — PROGRESS NOTE ADULT - SUBJECTIVE AND OBJECTIVE BOX
Lavern Souza PGY1  Internal medicine     Patient is a 48y old  Female who presents with a chief complaint of Nausea/vomiting, tachycardia (08 Jan 2024 13:52)      SUBJECTIVE/INTERVAL EVENTS: Patient seen and examined at bedside.    MEDICATIONS  (STANDING):  cyanocobalamin 1000 MICROGram(s) Oral daily  enoxaparin Injectable 40 milliGRAM(s) SubCutaneous every 24 hours  folic acid 1 milliGRAM(s) Oral daily  hydrocortisone 7.5 milliGRAM(s) Oral <User Schedule>  hydrocortisone 15 milliGRAM(s) Oral <User Schedule>  influenza   Vaccine 0.5 milliLiter(s) IntraMuscular once  levothyroxine 50 MICROGram(s) Oral daily  pyridoxine Injectable 25 milliGRAM(s) IV Push daily  thiamine 100 milliGRAM(s) Oral daily    MEDICATIONS  (PRN):  melatonin 3 milliGRAM(s) Oral at bedtime PRN Insomnia  sodium chloride 0.65% Nasal 1 Spray(s) Both Nostrils three times a day PRN Nasal Congestion      VITAL SIGNS:  T(F): 97.9 (01-09-24 @ 04:36), Max: 98.2 (01-08-24 @ 11:33)  HR: 84 (01-09-24 @ 04:36) (84 - 91)  BP: 104/69 (01-09-24 @ 04:36) (93/57 - 108/79)  RR: 17 (01-09-24 @ 04:36) (17 - 18)  SpO2: 100% (01-09-24 @ 04:36) (98% - 100%)    I&O's Summary    08 Jan 2024 07:01  -  09 Jan 2024 07:00  --------------------------------------------------------  IN: 500 mL / OUT: 0 mL / NET: 500 mL      Daily     Daily     PHYSICAL EXAM:  Gen: Alert, NAD  HEENT: NCAT, conjunctiva clear, sclera anicteric, no erythema or exudates in the oropharynx, mmm  Neck: Supple, no JVD  CV: RRR, S1S2, no m/r/g  Resp: CTAB, normal respiratory effort  Abd: Soft, nontender, nondistended, normal bowel sounds  Ext: no edema, no clubbing or cyanosis  Neuro: AOx3, CN2-12 grossly intact, ABREU  SKIN: warm, perfused    LABS:    Hgb Trend: 9.6<--, 10.0<--, 10.2<--, 14.1<--  01-08    141  |  105  |  10  ----------------------------<  95  3.4<L>   |  27  |  0.46<L>    Ca    8.7      08 Jan 2024 06:01  Phos  3.4     01-08  Mg     2.00     01-08      Creatinine Trend: 0.46<--, 0.47<--, 0.50<--, 0.42<--, 0.45<--, 0.43<--          Urinalysis Basic - ( 08 Jan 2024 06:01 )    Color: x / Appearance: x / SG: x / pH: x  Gluc: 95 mg/dL / Ketone: x  / Bili: x / Urobili: x   Blood: x / Protein: x / Nitrite: x   Leuk Esterase: x / RBC: x / WBC x   Sq Epi: x / Non Sq Epi: x / Bacteria: x        CAPILLARY BLOOD GLUCOSE          RADIOLOGY & ADDITIONAL TESTS: Reviewed    Imaging Personally Reviewed:    Consultant(s) Notes Reviewed:      Care Discussed with Consultants/Other Providers:

## 2024-01-09 NOTE — DISCHARGE NOTE NURSING/CASE MANAGEMENT/SOCIAL WORK - NSDCFUADDAPPT_GEN_ALL_CORE_FT
APPTS ARE READY TO BE MADE: [X ] YES    Best Family or Patient Contact (if needed):    Additional Information about above appointments (if needed):    1: Endocrine- [phone]  2: PCP   3: Onc  4: cardiology    Other comments or requests:    APPTS ARE READY TO BE MADE: [X ] YES    Best Family or Patient Contact (if needed):    Additional Information about above appointments (if needed):    1: Endocrine- [phone]  2: PCP   3: Onc  4: cardiology    Other comments or requests:

## 2024-01-09 NOTE — PROGRESS NOTE ADULT - PROBLEM SELECTOR PLAN 1
- likely secondary to immunotherapy  - endocrine following, appreciate recs  - endocrine recommended discontinuing fludrocortisone in setting of patient experiencing hypokalemia   - would decrease hydrocortisone to 15mg qam and 7.5mg qhs   - endo recs appreciated, would like to monitor patient in setting of hallucinations while on hydrocortisone and hypotension

## 2024-01-09 NOTE — PROGRESS NOTE ADULT - ASSESSMENT
Patient is a 49 YO woman with PMH of left breast cancer diagnosed 5/2023 on chemo (Xeloda)  last session 11/2023 s/p b/l nipple sparing mastectomy/left axillary sentinel lymph node biopsy, JOHN flap reconstruction, who presented with 2 week progressive history of dyspnea on exertion, nausea, vomiting, diarrhea, fatigue,18 lb weight loss,(not eating),  palpitations and bilateral lower extremity weakness. Found to have secondary adrenal insufficiency, hypothyroidism, metabolic derangement,  and orthostatic hypotension. EKG and telemetry initially with sinus tachycardia with prolonged QTc >600ms.  Patient now on hydrocortisone and levothyroxine. Electrolytes (Mg and K) corrected. Symptoms resolving and EKG demonstrating NSR 84ms and QTc 460ms.  No ectopy on telemetry overnight. Echo with normal LV systolic function.   Prolonged QTc and neuromuscular sx's including lower extremity weakness were most likely multifactorial due to hypomagnesemia/hypokalemia/Xeloda.  Patient now off telemetry.   Will sign off. Can reconsult if necessary.

## 2024-01-09 NOTE — PROGRESS NOTE ADULT - PROBLEM SELECTOR PROBLEM 2
Tachycardia
High anion gap metabolic acidosis
Tachycardia
Tachycardia
High anion gap metabolic acidosis
Tachycardia
Tachycardia
High anion gap metabolic acidosis

## 2024-01-09 NOTE — PROGRESS NOTE ADULT - PROVIDER SPECIALTY LIST ADULT
Electrophysiology
Endocrinology
Electrophysiology
Internal Medicine
Internal Medicine
Endocrinology
Internal Medicine

## 2024-01-09 NOTE — PROGRESS NOTE ADULT - PROBLEM SELECTOR PROBLEM 5
Hypercalcemia
Hypercalcemia
Nausea & vomiting
Nausea & vomiting
Hypercalcemia
Hyponatremia
Hyponatremia
Nausea & vomiting

## 2024-01-09 NOTE — PROGRESS NOTE ADULT - SUBJECTIVE AND OBJECTIVE BOX
Chief Complaint: Secondary adrenal insufficiency    History: reports the hallucinations are improved  feels overall improved  BP has been stable  Potassium normalized    MEDICATIONS  (STANDING):  cyanocobalamin 1000 MICROGram(s) Oral daily  enoxaparin Injectable 40 milliGRAM(s) SubCutaneous every 24 hours  folic acid 1 milliGRAM(s) Oral daily  hydrocortisone 15 milliGRAM(s) Oral <User Schedule>  hydrocortisone 7.5 milliGRAM(s) Oral <User Schedule>  influenza   Vaccine 0.5 milliLiter(s) IntraMuscular once  levothyroxine 50 MICROGram(s) Oral daily  pyridoxine Injectable 25 milliGRAM(s) IV Push daily  thiamine 100 milliGRAM(s) Oral daily    MEDICATIONS  (PRN):  melatonin 3 milliGRAM(s) Oral at bedtime PRN Insomnia  sodium chloride 0.65% Nasal 1 Spray(s) Both Nostrils three times a day PRN Nasal Congestion      Allergies    unknown chemo medication (Anaphylaxis)  No Known Drug Allergies    Intolerances      Review of Systems:    ALL OTHER SYSTEMS REVIEWED AND NEGATIVE      PHYSICAL EXAM:  VITALS: T(C): 36.5 (01-09-24 @ 12:06)  T(F): 97.7 (01-09-24 @ 12:06), Max: 97.9 (01-09-24 @ 04:36)  HR: 88 (01-09-24 @ 12:06) (84 - 91)  BP: 105/71 (01-09-24 @ 12:06) (104/69 - 108/79)  RR:  (16 - 18)  SpO2:  (98% - 100%)  Wt(kg): --  GENERAL: NAD, well-groomed, well-developed  EYES: No proptosis, no lid lag, anicteric  HEENT:  Atraumatic, Normocephalic, moist mucous membranes  RESPIRATORY: nonlabored respirations, no wheezing  PSYCH: Alert and oriented x 3, normal affect, normal mood    CAPILLARY BLOOD GLUCOSE          01-09    142  |  106  |  9   ----------------------------<  90  4.0   |  27  |  0.54    eGFR: 113    Ca    9.0      01-09  Mg     1.90     01-09  Phos  3.9     01-09        A1C with Estimated Average Glucose Result: 4.8 % (01-04-24 @ 08:01)      Thyroid Function Tests:  01-06 @ 06:08 TSH 8.35 FreeT4 0.9 T3 -- Anti TPO -- Anti Thyroglobulin Ab -- TSI --

## 2024-01-09 NOTE — PROGRESS NOTE ADULT - PROBLEM SELECTOR PLAN 5
- Unclear what prompted the nausea/vomiting - ?delayed onset from chemotherapy vs possible gastritis   - Ativan for nausea given prolonged QTc  - GI PCR, stool ova and parasites when able to produce stool sample  - Appears to  be resolved
- Unclear what prompted the nausea/vomiting - ?delayed onset from chemotherapy vs possible gastritis   - Ativan for nausea given prolonged QTc  - GI PCR, stool ova and parasites when able to produce stool sample  - Appears to  be resolved
11.1 improved with IVF
11.1 improved with IVF
- Unclear what prompted the nausea/vomiting - ?delayed onset from chemotherapy vs possible gastritis   - Ativan for nausea given prolonged QTc  - GI PCR, stool ova and parasites when able to produce stool sample  - Appears to  be resolved
11.1, likely will improve with fluids  - CTM SCa

## 2024-01-09 NOTE — PROGRESS NOTE ADULT - REASON FOR ADMISSION
Nausea/vomiting, tachycardia

## 2024-01-09 NOTE — PHARMACOTHERAPY INTERVENTION NOTE - COMMENTS
Discharge medications reviewed with the patient. Current medication schedule was discussed in detail including: medication name, indication, dose, administration times, treatment duration, side effects, and special instructions. Questions and concerns were answered and addressed. Patient demonstrated understanding. Patient provided with educational handout.    New medications: hydrocortisone, levothyroxine    Merry Bob, PharmD, [facility]  Clinical Pharmacy Specialist  [id number]  Discharge medications reviewed with the patient. Current medication schedule was discussed in detail including: medication name, indication, dose, administration times, treatment duration, side effects, and special instructions. Questions and concerns were answered and addressed. Patient demonstrated understanding. Patient provided with educational handout.    New medications: hydrocortisone, levothyroxine    Merry Bob, [facility]D, [facility]  Clinical Pharmacy Specialist  [id number]  Discharge medications reviewed with the patient. Current medication schedule was discussed in detail including: medication name, indication, dose, administration times, treatment duration, side effects, and special instructions. Patient reports she was already taught how to use IM hydrocortisone. We reviewed instructions again and patient able to verbalize correctly how to inject. Questions and concerns were answered and addressed. Patient demonstrated understanding. Patient provided with educational handout.    New medications: hydrocortisone, levothyroxine    Merry Bob PharmD, [facility]  Clinical Pharmacy Specialist  [id number]  Discharge medications reviewed with the patient. Current medication schedule was discussed in detail including: medication name, indication, dose, administration times, treatment duration, side effects, and special instructions. Patient reports she was already taught how to use IM hydrocortisone. We reviewed instructions again and patient able to verbalize correctly how to inject. Questions and concerns were answered and addressed. Patient demonstrated understanding. Patient provided with educational handout.    New medications: hydrocortisone, levothyroxine    Merry Bob PharmD, [facility]  Clinical Pharmacy Specialist  [id number]

## 2024-01-10 ENCOUNTER — NON-APPOINTMENT (OUTPATIENT)
Age: 49
End: 2024-01-10

## 2024-01-11 ENCOUNTER — APPOINTMENT (OUTPATIENT)
Dept: INFUSION THERAPY | Facility: HOSPITAL | Age: 49
End: 2024-01-11

## 2024-01-11 ENCOUNTER — APPOINTMENT (OUTPATIENT)
Dept: HEMATOLOGY ONCOLOGY | Facility: CLINIC | Age: 49
End: 2024-01-11

## 2024-01-17 ENCOUNTER — OUTPATIENT (OUTPATIENT)
Dept: OUTPATIENT SERVICES | Facility: HOSPITAL | Age: 49
LOS: 1 days | Discharge: HOME | End: 2024-01-17

## 2024-01-17 DIAGNOSIS — Z98.891 HISTORY OF UTERINE SCAR FROM PREVIOUS SURGERY: Chronic | ICD-10-CM

## 2024-01-17 DIAGNOSIS — Z95.828 PRESENCE OF OTHER VASCULAR IMPLANTS AND GRAFTS: Chronic | ICD-10-CM

## 2024-01-17 DIAGNOSIS — Z90.13 ACQUIRED ABSENCE OF BILATERAL BREASTS AND NIPPLES: Chronic | ICD-10-CM

## 2024-01-17 DIAGNOSIS — Z98.890 OTHER SPECIFIED POSTPROCEDURAL STATES: Chronic | ICD-10-CM

## 2024-01-17 DIAGNOSIS — C50.919 MALIGNANT NEOPLASM OF UNSPECIFIED SITE OF UNSPECIFIED FEMALE BREAST: ICD-10-CM

## 2024-01-23 ENCOUNTER — RESULT REVIEW (OUTPATIENT)
Age: 49
End: 2024-01-23

## 2024-01-23 ENCOUNTER — APPOINTMENT (OUTPATIENT)
Dept: HEMATOLOGY ONCOLOGY | Facility: CLINIC | Age: 49
End: 2024-01-23
Payer: COMMERCIAL

## 2024-01-23 ENCOUNTER — APPOINTMENT (OUTPATIENT)
Dept: INTERNAL MEDICINE | Facility: CLINIC | Age: 49
End: 2024-01-23
Payer: COMMERCIAL

## 2024-01-23 ENCOUNTER — APPOINTMENT (OUTPATIENT)
Dept: INFUSION THERAPY | Facility: HOSPITAL | Age: 49
End: 2024-01-23

## 2024-01-23 VITALS
SYSTOLIC BLOOD PRESSURE: 106 MMHG | BODY MASS INDEX: 22.51 KG/M2 | HEIGHT: 69 IN | TEMPERATURE: 97.9 F | OXYGEN SATURATION: 100 % | DIASTOLIC BLOOD PRESSURE: 77 MMHG | WEIGHT: 152 LBS | HEART RATE: 91 BPM | RESPIRATION RATE: 16 BRPM

## 2024-01-23 LAB
ALBUMIN SERPL ELPH-MCNC: 4.1 G/DL — SIGNIFICANT CHANGE UP (ref 3.3–5)
ALP SERPL-CCNC: 88 U/L — SIGNIFICANT CHANGE UP (ref 40–120)
ALT FLD-CCNC: 13 U/L — SIGNIFICANT CHANGE UP (ref 10–45)
ANION GAP SERPL CALC-SCNC: 11 MMOL/L — SIGNIFICANT CHANGE UP (ref 5–17)
AST SERPL-CCNC: 24 U/L — SIGNIFICANT CHANGE UP (ref 10–40)
BASOPHILS # BLD AUTO: 0.04 K/UL — SIGNIFICANT CHANGE UP (ref 0–0.2)
BASOPHILS NFR BLD AUTO: 1 % — SIGNIFICANT CHANGE UP (ref 0–2)
BILIRUB SERPL-MCNC: 0.4 MG/DL — SIGNIFICANT CHANGE UP (ref 0.2–1.2)
BUN SERPL-MCNC: 14 MG/DL — SIGNIFICANT CHANGE UP (ref 7–23)
CALCIUM SERPL-MCNC: 9.3 MG/DL — SIGNIFICANT CHANGE UP (ref 8.4–10.5)
CHLORIDE SERPL-SCNC: 105 MMOL/L — SIGNIFICANT CHANGE UP (ref 96–108)
CO2 SERPL-SCNC: 25 MMOL/L — SIGNIFICANT CHANGE UP (ref 22–31)
CREAT SERPL-MCNC: 0.55 MG/DL — SIGNIFICANT CHANGE UP (ref 0.5–1.3)
EGFR: 112 ML/MIN/1.73M2 — SIGNIFICANT CHANGE UP
EOSINOPHIL # BLD AUTO: 0.28 K/UL — SIGNIFICANT CHANGE UP (ref 0–0.5)
EOSINOPHIL NFR BLD AUTO: 7 % — HIGH (ref 0–6)
GLUCOSE SERPL-MCNC: 104 MG/DL — HIGH (ref 70–99)
HCT VFR BLD CALC: 34.2 % — LOW (ref 34.5–45)
HGB BLD-MCNC: 11.1 G/DL — LOW (ref 11.5–15.5)
IMM GRANULOCYTES NFR BLD AUTO: 0.5 % — SIGNIFICANT CHANGE UP (ref 0–0.9)
LYMPHOCYTES # BLD AUTO: 1.07 K/UL — SIGNIFICANT CHANGE UP (ref 1–3.3)
LYMPHOCYTES # BLD AUTO: 26.6 % — SIGNIFICANT CHANGE UP (ref 13–44)
MCHC RBC-ENTMCNC: 26.9 PG — LOW (ref 27–34)
MCHC RBC-ENTMCNC: 32.5 G/DL — SIGNIFICANT CHANGE UP (ref 32–36)
MCV RBC AUTO: 83 FL — SIGNIFICANT CHANGE UP (ref 80–100)
MONOCYTES # BLD AUTO: 0.27 K/UL — SIGNIFICANT CHANGE UP (ref 0–0.9)
MONOCYTES NFR BLD AUTO: 6.7 % — SIGNIFICANT CHANGE UP (ref 2–14)
NEUTROPHILS # BLD AUTO: 2.34 K/UL — SIGNIFICANT CHANGE UP (ref 1.8–7.4)
NEUTROPHILS NFR BLD AUTO: 58.2 % — SIGNIFICANT CHANGE UP (ref 43–77)
NRBC # BLD: 0 /100 WBCS — SIGNIFICANT CHANGE UP (ref 0–0)
PLATELET # BLD AUTO: 163 K/UL — SIGNIFICANT CHANGE UP (ref 150–400)
POTASSIUM SERPL-MCNC: 4.1 MMOL/L — SIGNIFICANT CHANGE UP (ref 3.5–5.3)
POTASSIUM SERPL-SCNC: 4.1 MMOL/L — SIGNIFICANT CHANGE UP (ref 3.5–5.3)
PROT SERPL-MCNC: 6.4 G/DL — SIGNIFICANT CHANGE UP (ref 6–8.3)
RBC # BLD: 4.12 M/UL — SIGNIFICANT CHANGE UP (ref 3.8–5.2)
RBC # FLD: 17.4 % — HIGH (ref 10.3–14.5)
SODIUM SERPL-SCNC: 141 MMOL/L — SIGNIFICANT CHANGE UP (ref 135–145)
WBC # BLD: 4.02 K/UL — SIGNIFICANT CHANGE UP (ref 3.8–10.5)
WBC # FLD AUTO: 4.02 K/UL — SIGNIFICANT CHANGE UP (ref 3.8–10.5)

## 2024-01-23 PROCEDURE — 99495 TRANSJ CARE MGMT MOD F2F 14D: CPT

## 2024-01-23 PROCEDURE — 99214 OFFICE O/P EST MOD 30 MIN: CPT

## 2024-01-23 NOTE — HISTORY OF PRESENT ILLNESS
[de-identified] : Since the last visit the patient was hospitalized with severe weakness and found to have hypoadrenalism and hypothyroid state.  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  She currently is feeling better and returns for follow-up.

## 2024-01-23 NOTE — ASSESSMENT
[FreeTextEntry1] : A discussion took place with the patient regarding further management.  The patient has been feeling much better on replacement cortisol along with thyroid replacement.  She is scheduled to undergo endocrinology evaluation and follow-up with Dr. Hayward.  Most likely this is the result of the immunotherapy with Keytruda as treatment for her triple negative breast cancer.  The patient has undergone further evaluations with CAT scans indicating no evidence of recurrence of her breast cancer.  She will continue to be monitored and followed.  She will undergo medical, GI, GYN and surgical follow-up and testing.  The patient will return in 1 month or sooner.  We also repeated the blood count showing improvement in her hemoglobin level on replacement therapy. [Curative] : Goals of care discussed with patient: Curative

## 2024-01-24 ENCOUNTER — NON-APPOINTMENT (OUTPATIENT)
Age: 49
End: 2024-01-24

## 2024-01-24 ENCOUNTER — APPOINTMENT (OUTPATIENT)
Dept: SURGICAL ONCOLOGY | Facility: CLINIC | Age: 49
End: 2024-01-24
Payer: COMMERCIAL

## 2024-01-24 VITALS
DIASTOLIC BLOOD PRESSURE: 69 MMHG | BODY MASS INDEX: 22.51 KG/M2 | HEIGHT: 69 IN | SYSTOLIC BLOOD PRESSURE: 101 MMHG | WEIGHT: 152 LBS | OXYGEN SATURATION: 99 % | HEART RATE: 93 BPM

## 2024-01-24 LAB — CANCER AG27-29 SERPL-ACNC: 17.1 U/ML — SIGNIFICANT CHANGE UP (ref 0–38.6)

## 2024-01-24 PROCEDURE — 10005 FNA BX W/US GDN 1ST LES: CPT

## 2024-01-24 NOTE — ASSESSMENT
[FreeTextEntry1] : T3 triple negative left breast cancer Completed neoadjuvant chemotherapy May 2023 with excellent clinical response Status post bilateral nipple sparing mastectomy with reconstruction Repeat FNA of right breast 8:00 performed today Continue Keytruda as per medical oncology Continue follow up with Dr. Thompson of plastic surgery  Continue follow up with endocrinology  RTO 3 months

## 2024-01-24 NOTE — ADDENDUM
[FreeTextEntry1] : I, Lisa Culver, acted solely as a scribe for Dr. Edward Jean on this date 01/24/2024.

## 2024-01-24 NOTE — HISTORY OF PRESENT ILLNESS
[de-identified] : Patient is a 49 y/o female who presents for a follow up visit for triple negative left breast cancer.   Consensus at [facility] was to treat with neoadjuvant chemo/immunotherapy, Taxol/ carbo/ Pembro AC Keynote 522. She completed chemotherapy on 5/9/23. S/p bilateral mastectomy/breast reconstruction with JOHN flaps (Dr. Thompson), prepectoral implants and ADM on 6/9/23.  To continue pembro x 1 year w/ Dr. Cuadra who would also like to add Xeloda, however, blood counts have prohibited initiation of Xeloda thus far.  In addition, she was recommended for adjuvant RT by Dr. Pierce, however, she declined.   Pt was hospitalized 1/2/2024-1/19/2024 for severe weakness and found to have hypoadrenalism and hypothyroid state. She currently is on thyroid replacement and cortisone as treatment for the [place]'s disease.   FNA (11/29/23): BREAST, RIGHT, JOHN FLAP, 8:00 OCLOCK, FNA NEGATIVE FOR MALIGNANT CELLS IN THE SUBMITTED SAMPLE. Compatible with cyst contents.  She went for PT and her therapist noticed a lump in the right lateral reconstructed breast/chest wall.  This is painless.  She is s/p FNA of the mass on 10/25/23- pathology was negative for malignant cells, can't r/o seroma or fat necrosis.  Final pathology (6/9/23): 1. Portion of right areola ducts, excision:-Breast tissue with  lactiferous ducts, no abnormality seen 2. Right breast, nipple sparing mastectomy:-Fibroadenomatoid nodules, stromal fibrosis and cysts with apocrine metaplasia and duct ectasia -Microcalcifications identified 3. Right retro areolar tissue, excision:-Breast tissue with  lactiferous duct, no abnormality seen 4. Left axillary sentinel lymph nodes, biopsy:-Three (3) lymph nodes with histiocyte aggregates -No tumor identified 5. Left breast, nipple sparing mastectomy:-Foci of residual infiltrating ductal, Grade 2, arising in a fibrotic tumor bed -The largest focus measures 1.1 mm -Margins of resection, no tumor seen -Biopsy site reaction with fibrosis and chronic inflammation -Cysts with apocrine metaplasia and adenosis with microcalcifications 6. Right internal mammary lymph node, biopsy:-Fibroadipose tissue 7. Left internal mammary lymph node, biopsy:-One (1) lymph node, no tumor identified  11/23/22- NM bone scan negative for osseous metastasis.  CT C/AP negative for distant metastatic disease.  11/21/22- s/p USG FNA right breast 1:00, negative for malignant cells and consistent with cyst contents.  US evaluation of the left axilla demonstrates normal-sized lymph nodes with benign morphology.  Evaluation of the right breast 3:00 demonstrates normal fatty lobule in the area of initial concern.    Breast MRI 11/9/22- left breast malignancy with multiple satellite masses, overall greatest AP dimension of 6.7 cm.  Mildly prominent lymph nodes in the left axilla for which targeted evaluation and possible USGB is recommended.  Probably benign mass right breast 3:00, targeted re evaluation is recommended for confirmation with USGB if warranted.   Indeterminate mass right breast 1:00, targeted re evaluation is recommended for confirmation with USGB if warranted.  Biopsy (10/17/22): Site 1: Left ultrasound biopsy irregular mass at 10-12:00  -Invasive carcinoma consistent with invasive mammary ductal carcinoma Site 2: Right breast posterior upper central calcifications, stereotactic biopsy -Benign breast tissue with apocrine/papillary apocrine cysts -Calcification associated with ductules, lobules, cystic change, and stroma  Patient underwent sonogram on 9/19/22 which showed highly suspicious palpable hypoechoic left breast mass at 10:00-12:00 for which an us-guided biopsy is recommended. Indeterminate calcifications in the posterior upper central right breast for which stereotactic biopsy is recommended. Probably benign right 3:00 sonographic nodule for which a 6 month follow up targeted right breast ultrasound is recommended. (BI-RADS 5)  Denies any family history of breast cancer.

## 2024-01-24 NOTE — PROCEDURE
[FreeTextEntry1] : Ultrasound-guided fine-needle aspiration right breast cyst/seroma 8:00 performed under sterile conditions using 1% lidocaine with epinephrine 4 ccs serosanguineous fluid aspirated and sent for cytology Patient tolerated procedure well Sterile dressing applied

## 2024-01-24 NOTE — PHYSICAL EXAM
[Normal] : supple, no neck mass and thyroid not enlarged [Normal Neck Lymph Nodes] : normal neck lymph nodes  [Normal Supraclavicular Lymph Nodes] : normal supraclavicular lymph nodes [Normal Groin Lymph Nodes] : normal groin lymph nodes [Normal Axillary Lymph Nodes] : normal axillary lymph nodes [Normal] : oriented to person, place and time, with appropriate affect [de-identified] : Ursula flap is well-healed, 3 cm mass right breast 8:00 near IMF, ultrasound shows simple fluid collection consistent with seroma

## 2024-01-25 ENCOUNTER — APPOINTMENT (OUTPATIENT)
Dept: ENDOCRINOLOGY | Facility: CLINIC | Age: 49
End: 2024-01-25
Payer: COMMERCIAL

## 2024-01-25 VITALS
HEART RATE: 91 BPM | SYSTOLIC BLOOD PRESSURE: 113 MMHG | OXYGEN SATURATION: 98 % | DIASTOLIC BLOOD PRESSURE: 78 MMHG | BODY MASS INDEX: 22.51 KG/M2 | WEIGHT: 152 LBS | HEIGHT: 69 IN | TEMPERATURE: 97.7 F

## 2024-01-25 PROCEDURE — 99205 OFFICE O/P NEW HI 60 MIN: CPT

## 2024-01-29 LAB — FNA, BREAST: NORMAL

## 2024-01-29 RX ORDER — LEVOTHYROXINE SODIUM 0.05 MG/1
50 TABLET ORAL
Qty: 90 | Refills: 0 | Status: ACTIVE | COMMUNITY
Start: 2024-01-23

## 2024-02-01 ENCOUNTER — APPOINTMENT (OUTPATIENT)
Dept: PLASTIC SURGERY | Facility: CLINIC | Age: 49
End: 2024-02-01
Payer: COMMERCIAL

## 2024-02-01 PROCEDURE — 99214 OFFICE O/P EST MOD 30 MIN: CPT

## 2024-02-08 NOTE — HISTORY OF PRESENT ILLNESS
[Post-hospitalization from ___ Hospital] : Post-hospitalization from [unfilled] Hospital [Admitted on: ___] : The patient was admitted on [unfilled] [Discharged on ___] : discharged on [unfilled] [Discharge Summary] : discharge summary [Discharge Med List] : discharge medication list [Patient Contacted By: ____] : and contacted by [unfilled] [FreeTextEntry2] : Patient presented to the ER with a 2-week progressive history of dyspnea weakness and nausea.  Patient was admitted after being found to have severe electrolyte derangements and hypoglycemia.  Her workup she was found to have adrenal insufficiency as well as hypothyroidism.  Patient's EKG was also found to be abnormal and EPS was consulted

## 2024-02-13 ENCOUNTER — APPOINTMENT (OUTPATIENT)
Dept: HEMATOLOGY ONCOLOGY | Facility: CLINIC | Age: 49
End: 2024-02-13
Payer: COMMERCIAL

## 2024-02-13 ENCOUNTER — RESULT REVIEW (OUTPATIENT)
Age: 49
End: 2024-02-13

## 2024-02-13 ENCOUNTER — APPOINTMENT (OUTPATIENT)
Dept: HEMATOLOGY ONCOLOGY | Facility: CLINIC | Age: 49
End: 2024-02-13

## 2024-02-13 ENCOUNTER — APPOINTMENT (OUTPATIENT)
Dept: INFUSION THERAPY | Facility: HOSPITAL | Age: 49
End: 2024-02-13

## 2024-02-13 VITALS
DIASTOLIC BLOOD PRESSURE: 75 MMHG | HEART RATE: 88 BPM | SYSTOLIC BLOOD PRESSURE: 112 MMHG | TEMPERATURE: 97 F | WEIGHT: 152.12 LBS | BODY MASS INDEX: 22.46 KG/M2 | RESPIRATION RATE: 16 BRPM | OXYGEN SATURATION: 97 %

## 2024-02-13 DIAGNOSIS — Z92.89 PERSONAL HISTORY OF OTHER MEDICAL TREATMENT: ICD-10-CM

## 2024-02-13 PROBLEM — C50.919 MALIGNANT NEOPLASM OF UNSPECIFIED SITE OF UNSPECIFIED FEMALE BREAST: Chronic | Status: ACTIVE | Noted: 2023-12-29

## 2024-02-13 LAB
ALBUMIN SERPL ELPH-MCNC: 4.1 G/DL — SIGNIFICANT CHANGE UP (ref 3.3–5)
ALP SERPL-CCNC: 98 U/L — SIGNIFICANT CHANGE UP (ref 40–120)
ALT FLD-CCNC: 9 U/L — LOW (ref 10–45)
ANION GAP SERPL CALC-SCNC: 8 MMOL/L — SIGNIFICANT CHANGE UP (ref 5–17)
AST SERPL-CCNC: 21 U/L — SIGNIFICANT CHANGE UP (ref 10–40)
BASOPHILS # BLD AUTO: 0.03 K/UL — SIGNIFICANT CHANGE UP (ref 0–0.2)
BASOPHILS NFR BLD AUTO: 0.9 % — SIGNIFICANT CHANGE UP (ref 0–2)
BILIRUB SERPL-MCNC: 0.3 MG/DL — SIGNIFICANT CHANGE UP (ref 0.2–1.2)
BUN SERPL-MCNC: 17 MG/DL — SIGNIFICANT CHANGE UP (ref 7–23)
CALCIUM SERPL-MCNC: 9.6 MG/DL — SIGNIFICANT CHANGE UP (ref 8.4–10.5)
CHLORIDE SERPL-SCNC: 106 MMOL/L — SIGNIFICANT CHANGE UP (ref 96–108)
CO2 SERPL-SCNC: 28 MMOL/L — SIGNIFICANT CHANGE UP (ref 22–31)
CREAT SERPL-MCNC: 0.5 MG/DL — SIGNIFICANT CHANGE UP (ref 0.5–1.3)
EGFR: 115 ML/MIN/1.73M2 — SIGNIFICANT CHANGE UP
EOSINOPHIL # BLD AUTO: 0.22 K/UL — SIGNIFICANT CHANGE UP (ref 0–0.5)
EOSINOPHIL NFR BLD AUTO: 6.4 % — HIGH (ref 0–6)
GLUCOSE SERPL-MCNC: 104 MG/DL — HIGH (ref 70–99)
HCT VFR BLD CALC: 35.1 % — SIGNIFICANT CHANGE UP (ref 34.5–45)
HGB BLD-MCNC: 11.4 G/DL — LOW (ref 11.5–15.5)
IMM GRANULOCYTES NFR BLD AUTO: 0.6 % — SIGNIFICANT CHANGE UP (ref 0–0.9)
LYMPHOCYTES # BLD AUTO: 0.97 K/UL — LOW (ref 1–3.3)
LYMPHOCYTES # BLD AUTO: 28.2 % — SIGNIFICANT CHANGE UP (ref 13–44)
MCHC RBC-ENTMCNC: 26.8 PG — LOW (ref 27–34)
MCHC RBC-ENTMCNC: 32.5 G/DL — SIGNIFICANT CHANGE UP (ref 32–36)
MCV RBC AUTO: 82.4 FL — SIGNIFICANT CHANGE UP (ref 80–100)
MONOCYTES # BLD AUTO: 0.28 K/UL — SIGNIFICANT CHANGE UP (ref 0–0.9)
MONOCYTES NFR BLD AUTO: 8.1 % — SIGNIFICANT CHANGE UP (ref 2–14)
NEUTROPHILS # BLD AUTO: 1.92 K/UL — SIGNIFICANT CHANGE UP (ref 1.8–7.4)
NEUTROPHILS NFR BLD AUTO: 55.8 % — SIGNIFICANT CHANGE UP (ref 43–77)
NRBC # BLD: 0 /100 WBCS — SIGNIFICANT CHANGE UP (ref 0–0)
PLATELET # BLD AUTO: 153 K/UL — SIGNIFICANT CHANGE UP (ref 150–400)
POTASSIUM SERPL-MCNC: 3.6 MMOL/L — SIGNIFICANT CHANGE UP (ref 3.5–5.3)
POTASSIUM SERPL-SCNC: 3.6 MMOL/L — SIGNIFICANT CHANGE UP (ref 3.5–5.3)
PROT SERPL-MCNC: 6.7 G/DL — SIGNIFICANT CHANGE UP (ref 6–8.3)
RBC # BLD: 4.26 M/UL — SIGNIFICANT CHANGE UP (ref 3.8–5.2)
RBC # FLD: 15.4 % — HIGH (ref 10.3–14.5)
SODIUM SERPL-SCNC: 141 MMOL/L — SIGNIFICANT CHANGE UP (ref 135–145)
WBC # BLD: 3.44 K/UL — LOW (ref 3.8–10.5)
WBC # FLD AUTO: 3.44 K/UL — LOW (ref 3.8–10.5)

## 2024-02-13 PROCEDURE — 99443: CPT

## 2024-02-13 NOTE — HISTORY OF PRESENT ILLNESS
[Disease: _____________________] : Disease: [unfilled] [de-identified] : This is a  47-year-old woman with history of left breast carcinoma who presents for evaluation.  The patient's history dates back to May 2022 when she noted a mass in the left breast.   In 2022  a mammogram and sonogram were performed.  These revealed a mass in the left breast along with calcifications in the right breast.  The biopsy revealed poorly differentiated infiltrating duct carcinoma with Isaias score 8/9, ER KS -0% and HER2/roxann 1+ negative consistent with triple negative breast cancer.  The Ki-67 was 50%.\par  \par  There was no obvious left axillary lymph nodes.  The right breast calcifications were biopsied with benign results including fibrocystic changes.  An MRI revealed that the left breast mass with touching the pectoral muscle to the lateral breast area along with satellite.  There was prominence in the left axilla but no definite enlargement of nodes.  The patient has undergone surgical oncology evaluation and the plan is to evaluate patient for neoadjuvant chemotherapy and she is considering bilateral mastectomies.  The patient has undergone genetic testing which is pending.  The patient did have a previous biopsy of the breast in 2018 involving the right breast which showed a benign biopsy.\par  \par  The patient will undergo targeted reevaluation of the nonspecific foci on the right breast and also left axillary lymph nodes with biopsy to assess for bilateral disease and possible dayna metastases.\par  \par  Patient's menarche was age 12 and her periods have been regular.  She is  1 para 1 age at first pregnancy was 19.  She denies hormonal therapy or fertility treatments.  The patient will undergo CAT scans and bone scans to evaluate any evidence for systemic disease. [de-identified] : 9/22/poorly diff IDC ERneg, KS neg, Her2 neg 1+, Ki67-50%  TNBC, MRI mass touching pectoral muscle, [de-identified] : Preop left breast mass ,no axillary nodes but tumor touching pectoral muscle.Plan neoadjuvant chemo/immunotherapy, Taxol/ carbo/ Pembro AC Keynote 522 Post op  minimal residual 3 foci of residual malignancy maximal size 1.1 mm.3 nodes neg. Plan - continue prmbro to complete 1 year, add xeloda x 6 months , if genetics are BRCA pos consider switch to parp. Started Xeloda- 11/1/23- not able to tolerate xeloda due to oc counts and pt declined further rx. Completed Keytruda- 11/21/23

## 2024-02-13 NOTE — REVIEW OF SYSTEMS
[Fatigue] : fatigue [Constipation] : constipation [Diarrhea: Grade 0] : Diarrhea: Grade 0 [Negative] : Allergic/Immunologic [de-identified] : Patient continues on thyroid replacement and cortisol replacement and being followed by endocrinology.

## 2024-02-13 NOTE — REASON FOR VISIT
[Other Location: e.g. School (Enter Location, City,State)___] : at [unfilled], at the time of the visit. [Other Location: e.g. Home (Enter Location, City,State)___] : at [unfilled] [Verbal consent obtained from patient] : the patient, [unfilled] [Follow-Up Visit] : a follow-up [FreeTextEntry2] : ca breast

## 2024-02-13 NOTE — ASSESSMENT
[FreeTextEntry1] : The patient continues to do improved on replacement therapy with thyroxine and cortisol treatment.  She continues endocrine evaluation.  The patient has been recommended to start decreasing dose of hydrocortisone and will be monitored for follow-up symptoms.  The patient is scheduling plastic surgery plastic surgery and was told to speak with endocrinology whether she will need a booster IV dose of cortisone during the procedure while under anesthesia.  The patient will continue medical, GI, GYN follow-up and surgical evaluation.  She will also continue endocrinology follow-up.  The patient has bilateral mastectomies and reconstruction and therefore does not require further breast imaging.  Once we have more information further recommendations will be made. [Curative] : Goals of care discussed with patient: Curative

## 2024-02-14 ENCOUNTER — OUTPATIENT (OUTPATIENT)
Dept: OUTPATIENT SERVICES | Facility: HOSPITAL | Age: 49
LOS: 1 days | End: 2024-02-14
Payer: COMMERCIAL

## 2024-02-14 VITALS
RESPIRATION RATE: 18 BRPM | OXYGEN SATURATION: 100 % | SYSTOLIC BLOOD PRESSURE: 117 MMHG | HEART RATE: 76 BPM | WEIGHT: 154.32 LBS | HEIGHT: 69 IN | DIASTOLIC BLOOD PRESSURE: 78 MMHG | TEMPERATURE: 98 F

## 2024-02-14 DIAGNOSIS — Z01.818 ENCOUNTER FOR OTHER PREPROCEDURAL EXAMINATION: ICD-10-CM

## 2024-02-14 DIAGNOSIS — E03.9 HYPOTHYROIDISM, UNSPECIFIED: ICD-10-CM

## 2024-02-14 DIAGNOSIS — Z98.890 OTHER SPECIFIED POSTPROCEDURAL STATES: Chronic | ICD-10-CM

## 2024-02-14 DIAGNOSIS — Z90.13 ACQUIRED ABSENCE OF BILATERAL BREASTS AND NIPPLES: Chronic | ICD-10-CM

## 2024-02-14 DIAGNOSIS — E27.40 UNSPECIFIED ADRENOCORTICAL INSUFFICIENCY: ICD-10-CM

## 2024-02-14 DIAGNOSIS — C50.919 MALIGNANT NEOPLASM OF UNSPECIFIED SITE OF UNSPECIFIED FEMALE BREAST: ICD-10-CM

## 2024-02-14 DIAGNOSIS — Z98.891 HISTORY OF UTERINE SCAR FROM PREVIOUS SURGERY: Chronic | ICD-10-CM

## 2024-02-14 DIAGNOSIS — Z95.828 PRESENCE OF OTHER VASCULAR IMPLANTS AND GRAFTS: Chronic | ICD-10-CM

## 2024-02-14 LAB — CANCER AG27-29 SERPL-ACNC: 19.6 U/ML — SIGNIFICANT CHANGE UP (ref 0–38.6)

## 2024-02-14 PROCEDURE — 93010 ELECTROCARDIOGRAM REPORT: CPT | Mod: NC

## 2024-02-14 PROCEDURE — 93005 ELECTROCARDIOGRAM TRACING: CPT

## 2024-02-14 PROCEDURE — G0463: CPT

## 2024-02-14 NOTE — H&P PST ADULT - CONSTITUTIONAL
Leti Wesley has an apt with Dr Kay Carter next week and needs refills on colchincine and indomethacon    [facility] [address] [place]    Thank you normal/well-groomed/no distress

## 2024-02-14 NOTE — H&P PST ADULT - ASSESSMENT
Malignant neoplasm of breast  acquired absence of bilateral breasts and nipples Malignant neoplasm of unspecified site of unspecified female breast

## 2024-02-14 NOTE — H&P PST ADULT - HISTORY OF PRESENT ILLNESS
This is a 48 y/o female with pre-operative diagnosis of left breast cancer.  Pt noted lump in left breast.  Biopsy done in 11/2022 confirmed malignancy.  s/p chemo which was completed in 5/9/23.  Otherwise patient feels well today and denies any acute symptoms.        Patient is a 48 year old F presents for perioperative testing for resection right breast mass with Dr. Edward Jean scheduled for 2/20/2024. Denies any acute symptoms at this time. Otherwise patient reports feeling well overall.  Of note, reports having a delayed Keytruda reaction, had symptoms for 2 weeks, nausea, trouble walking, therefore went to the ER multiple times, after syncopal episode with heme/onc, went to [facility] and was admitted due to adrenal insufficieny and is under the care of an endocrinologist   Patient is a 48 year old F presents for perioperative testing for resection right breast mass with Dr. Edward Jean scheduled for 02/20/2024. Denies any acute symptoms at this time. Reports needing to "remove a cavity of my right breast" before my plastic surgery part of the reconstruction. Of note, reports having a delayed Keytruda reaction, had symptoms for 2 weeks, nausea, trouble walking, therefore went to the ER multiple times, after syncopal episode with heme/onc, went to [facility] and was admitted due to adrenal insufficieny and is currently under the care of an endocrinologist (last endo note in chart). Otherwise patient reports feeling well overall.

## 2024-02-14 NOTE — H&P PST ADULT - NSANTHOSAYNRD_GEN_A_CORE
neck 15 inches/No. ANIBAL screening performed.  STOP BANG Legend: 0-2 = LOW Risk; 3-4 = INTERMEDIATE Risk; 5-8 = HIGH Risk

## 2024-02-14 NOTE — H&P PST ADULT - NEUROLOGICAL
negative sensation intact/responds to pain sensation intact/responds to pain/responds to verbal commands/no spontaneous movement

## 2024-02-14 NOTE — H&P PST ADULT - NSICDXPASTSURGICALHX_GEN_ALL_CORE_FT
PAST SURGICAL HISTORY:  H/O bilateral breast biopsy markers both breasts    Port-A-Cath in place right chest    S/P bilateral mastectomy     S/P  X1 1996

## 2024-02-14 NOTE — H&P PST ADULT - NEGATIVE BREAST SYMPTOMS
no breast tenderness L/no breast tenderness R/no breast lump L/no breast lump R/no nipple discharge L/no nipple discharge R 28-Apr-2019 23:16

## 2024-02-14 NOTE — H&P PST ADULT - PSYCHIATRIC
negative normal affect/alert and oriented x3 normal/normal affect/alert and oriented x3/normal behavior

## 2024-02-14 NOTE — H&P PST ADULT - NSICDXPASTMEDICALHX_GEN_ALL_CORE_FT
PAST MEDICAL HISTORY:  Adrenal insufficiency     Breast CA left breast    Breast cancer     History of chemotherapy completed 5/9/23    Hypothyroid      PAST MEDICAL HISTORY:  Acquired hypothyroidism     Adrenal insufficiency     Anemia     Breast CA left breast    Breast cancer     History of chemotherapy completed 5/9/23

## 2024-02-14 NOTE — H&P PST ADULT - ENDOCRINE COMMENTS
being worked up for adrenal insuffiencey acquired hypothyroid attributes to chemo, being worked up for adrenal insuffiencey, see HPI

## 2024-02-14 NOTE — H&P PST ADULT - PROBLEM SELECTOR PLAN 1
Patient provided with pre-operative instructions and verbalized understanding.  Patient will be NPO on day of surgery. Patient will stop NSAIDs, aspirin, herbal supplements or vitamins 1 week prior to surgery.  Chlorhexidine wash provided with instructions.  Pending medical clearance as per surgeon Patient provided with pre-operative instructions and verbalized understanding.  Patient will be NPO on day of surgery unless otherwise intrusted by endocrinologist with hydrocortisone dosage. Patient will stop NSAIDs, aspirin, herbal supplements or vitamins 1 week prior to surgery.  Chlorhexidine wash provided with instructions.

## 2024-02-15 ENCOUNTER — NON-APPOINTMENT (OUTPATIENT)
Age: 49
End: 2024-02-15

## 2024-02-15 LAB
T4 FREE SERPL-MCNC: 1.5 NG/DL
TSH SERPL-ACNC: 0.61 UIU/ML

## 2024-02-19 ENCOUNTER — TRANSCRIPTION ENCOUNTER (OUTPATIENT)
Age: 49
End: 2024-02-19

## 2024-02-19 NOTE — HISTORY OF PRESENT ILLNESS
[Disease: _____________________] : Disease: [unfilled] [de-identified] : This is a  47-year-old woman with history of left breast carcinoma who presents for evaluation.  The patient's history dates back to May 2022 when she noted a mass in the left breast.   In 2022  a mammogram and sonogram were performed.  These revealed a mass in the left breast along with calcifications in the right breast.  The biopsy revealed poorly differentiated infiltrating duct carcinoma with Isaias score 8/9, ER [place] -0% and HER2/roxann 1+ negative consistent with triple negative breast cancer.  The Ki-67 was 50%.\par  \par  There was no obvious left axillary lymph nodes.  The right breast calcifications were biopsied with benign results including fibrocystic changes.  An MRI revealed that the left breast mass with touching the pectoral muscle to the lateral breast area along with satellite.  There was prominence in the left axilla but no definite enlargement of nodes.  The patient has undergone surgical oncology evaluation and the plan is to evaluate patient for neoadjuvant chemotherapy and she is considering bilateral mastectomies.  The patient has undergone genetic testing which is pending.  The patient did have a previous biopsy of the breast in 2018 involving the right breast which showed a benign biopsy.\par  \par  The patient will undergo targeted reevaluation of the nonspecific foci on the right breast and also left axillary lymph nodes with biopsy to assess for bilateral disease and possible dayna metastases.\par  \par  Patient's menarche was age 12 and her periods have been regular.  She is  1 para 1 age at first pregnancy was 19.  She denies hormonal therapy or fertility treatments.  The patient will undergo CAT scans and bone scans to evaluate any evidence for systemic disease. [de-identified] : poorly diff IDC ERneg, MO neg, Her2 neg 1+, Ki67-50%  TNBC, MRI mass touching pectoral muscle, [de-identified] : Preop left breast mass ,no axillary nodes but tumor touching pectoral muscle.Plan neoadjuvant chemo/immunotherapy, Taxol/ carbo/ Pembro AC Keynote 522 Post op  minimal residual 3 foci of residual malignancy maximal size 1.1 mm.3 nodes neg. Plan - continue prmbro to complete 1 year, add xeloda x 6 months , if genetics are BRCA pos consider switch to parp. Started Xeloda- 11/1/23 Completed Keytruda- 11/21/23

## 2024-02-19 NOTE — REASON FOR VISIT
[Other Location: e.g. School (Enter Location, City,State)___] : at [unfilled], at the time of the visit. [Other Location: e.g. Home (Enter Location, City,State)___] : at [unfilled] [Follow-Up Visit] : a follow-up [FreeTextEntry2] : ca breast,

## 2024-02-20 ENCOUNTER — TRANSCRIPTION ENCOUNTER (OUTPATIENT)
Age: 49
End: 2024-02-20

## 2024-02-20 ENCOUNTER — OUTPATIENT (OUTPATIENT)
Dept: OUTPATIENT SERVICES | Facility: HOSPITAL | Age: 49
LOS: 1 days | End: 2024-02-20
Payer: COMMERCIAL

## 2024-02-20 ENCOUNTER — RESULT REVIEW (OUTPATIENT)
Age: 49
End: 2024-02-20

## 2024-02-20 ENCOUNTER — APPOINTMENT (OUTPATIENT)
Dept: SURGICAL ONCOLOGY | Facility: HOSPITAL | Age: 49
End: 2024-02-20

## 2024-02-20 VITALS
HEART RATE: 88 BPM | RESPIRATION RATE: 16 BRPM | DIASTOLIC BLOOD PRESSURE: 73 MMHG | SYSTOLIC BLOOD PRESSURE: 111 MMHG | OXYGEN SATURATION: 98 % | TEMPERATURE: 98 F

## 2024-02-20 VITALS
SYSTOLIC BLOOD PRESSURE: 105 MMHG | OXYGEN SATURATION: 98 % | HEART RATE: 89 BPM | DIASTOLIC BLOOD PRESSURE: 74 MMHG | WEIGHT: 154.32 LBS | RESPIRATION RATE: 13 BRPM | TEMPERATURE: 97 F | HEIGHT: 69 IN

## 2024-02-20 DIAGNOSIS — C50.919 MALIGNANT NEOPLASM OF UNSPECIFIED SITE OF UNSPECIFIED FEMALE BREAST: ICD-10-CM

## 2024-02-20 DIAGNOSIS — Z90.13 ACQUIRED ABSENCE OF BILATERAL BREASTS AND NIPPLES: Chronic | ICD-10-CM

## 2024-02-20 DIAGNOSIS — Z98.890 OTHER SPECIFIED POSTPROCEDURAL STATES: Chronic | ICD-10-CM

## 2024-02-20 DIAGNOSIS — Z95.828 PRESENCE OF OTHER VASCULAR IMPLANTS AND GRAFTS: Chronic | ICD-10-CM

## 2024-02-20 DIAGNOSIS — Z98.891 HISTORY OF UTERINE SCAR FROM PREVIOUS SURGERY: Chronic | ICD-10-CM

## 2024-02-20 PROCEDURE — 19120 REMOVAL OF BREAST LESION: CPT | Mod: RT

## 2024-02-20 PROCEDURE — 88305 TISSUE EXAM BY PATHOLOGIST: CPT | Mod: 26

## 2024-02-20 PROCEDURE — 88305 TISSUE EXAM BY PATHOLOGIST: CPT

## 2024-02-20 PROCEDURE — C1889: CPT

## 2024-02-20 DEVICE — ARISTA 3GR: Type: IMPLANTABLE DEVICE | Site: RIGHT | Status: FUNCTIONAL

## 2024-02-20 RX ORDER — SODIUM CHLORIDE 9 MG/ML
1000 INJECTION, SOLUTION INTRAVENOUS
Refills: 0 | Status: DISCONTINUED | OUTPATIENT
Start: 2024-02-20 | End: 2024-02-20

## 2024-02-20 RX ORDER — HYDROMORPHONE HYDROCHLORIDE 2 MG/ML
1 INJECTION INTRAMUSCULAR; INTRAVENOUS; SUBCUTANEOUS
Refills: 0 | Status: DISCONTINUED | OUTPATIENT
Start: 2024-02-20 | End: 2024-02-20

## 2024-02-20 RX ORDER — IBUPROFEN 200 MG
2 TABLET ORAL
Qty: 0 | Refills: 0 | DISCHARGE

## 2024-02-20 RX ORDER — HYDROMORPHONE HYDROCHLORIDE 2 MG/ML
0.5 INJECTION INTRAMUSCULAR; INTRAVENOUS; SUBCUTANEOUS
Refills: 0 | Status: DISCONTINUED | OUTPATIENT
Start: 2024-02-20 | End: 2024-02-20

## 2024-02-20 RX ORDER — ONDANSETRON 8 MG/1
4 TABLET, FILM COATED ORAL ONCE
Refills: 0 | Status: DISCONTINUED | OUTPATIENT
Start: 2024-02-20 | End: 2024-02-20

## 2024-02-20 RX ORDER — ACETAMINOPHEN 500 MG
2 TABLET ORAL
Qty: 0 | Refills: 0 | DISCHARGE

## 2024-02-20 RX ORDER — HYDROCORTISONE 20 MG
1 TABLET ORAL
Qty: 150 | Refills: 0
Start: 2024-02-20

## 2024-02-20 NOTE — ASU PATIENT PROFILE, ADULT - NSICDXPASTMEDICALHX_GEN_ALL_CORE_FT
PAST MEDICAL HISTORY:  Acquired hypothyroidism     Adrenal insufficiency     Anemia     Breast CA left breast    Breast cancer     History of chemotherapy completed 5/9/23

## 2024-02-20 NOTE — BRIEF OPERATIVE NOTE - NSICDXBRIEFPROCEDURE_GEN_ALL_CORE_FT
PROCEDURES:  Breast mass excision 20-Feb-2024 17:08:15 right (s/p bilateral JHON flap reconstruction) Sandra Amado

## 2024-02-20 NOTE — ASU DISCHARGE PLAN (ADULT/PEDIATRIC) - CARE PROVIDER_API CALL
Edward Jean [facility]  Surgery  [address] [place] [id number]  Phone: [phone]  Fax: [phone]  Follow Up Time: 2 weeks

## 2024-02-20 NOTE — ASU DISCHARGE PLAN (ADULT/PEDIATRIC) - ASU DC SPECIAL INSTRUCTIONSFT
Follow-up Dr. Jean in 2 weeks; please call office for appointment.  Tylenol (500mg) 2 tabs by mouth every 8 hours as needed for discomfort.  Ibuprofen (200mg) 1 or 2 tabs by mouth every 6 to 8 hours if needed for discomfort (take with food).

## 2024-02-20 NOTE — ASU PREOP CHECKLIST - NS PREOP CHK CHLOROHEX WASH
Genetic Counseling Consultation    It was a pleasure to meet with Brooke and her mother Mitzi, at her recent appointment at the [facility] at the [facility] on May 22, 2017. We met as a follow up to their previous sweat tests and appointment with Dr. Estrella. Brooke's sweat test results were as follows:  3/29/17: 28 and 31 mmol/L and 4/18/17: 34 and 40 mmol/L. (Reference range: 0-29 mmol/L is normal, 30-59 mmol/l is borderline, and 60 mmol/L is greater is positive and indicative of CF)  Genetic counseling was requested to obtain a family history today and to discuss additional genetic testing available for cystic fibrosis.    Family History:   We began our discussion by taking a family history. A three generation pedigree was obtained and scanned into the EMR.  1. Brooke has two older brothers, ages 9 and 11, who are healthy.  2. Maternal history is remarkable for extended family member with history of cystic fibrosis (grandmother's cousin). History also significant for mother with endometriosis and depression, and grandfather who passed away at 67 from emphysema. It is thought that the he had some sort of exposure during the [place] war, resulting in earlier onset. Heritage is British, Egyptian, and Romansh.   3. Paternal history is remarkable for macular degeneration in a few aunts and uncles and grandmother. An aunt passed away at 50 from a history of complicated health issues resulting from a stroke and anesthesia event. Grandmother also had Alzheimers and heart failure. Grandfather passed away from prostate cancer. Heritage is Syriac [place] and [place].   4. There is no family history of recurrent respiratory infections, severe asthma, known infertility, pancreatitis, or consanguinity.    Genetic Counseling:   During our discussion, we reviewed what CF is and the wide range of symptoms that can be caused by changes (mutations) in the gene for CF. Classical cystic  fibrosis causes a person to produce thick, sticky mucus due to an imbalance of salt and water in and out of the cells. This affects the lungs, digestive system, and reproductive system, among other areas of the body. Children who are diagnosed with classical cystic fibrosis need respiratory therapy several times a day, and may need to take pills to help their body to digest food. However, it is important to remember that there can be great variation in the symptoms that a person may have. There is great variability in symptoms in individuals with CF, where some individuals have very mild symptoms or are only diagnosed in adulthood as a result of an infertility work-up, while others have significant symptoms as infants and are diagnosed soon after birth.     Prior to our discussion about genetic testing, we had reviewed some genetics concepts. Genes are the instruction code for our body, and tell our body how to grow and function. Our genes are in every cell of our body, and are packaged in our chromosomes. Genes and chromosomes come in pairs. We inherit one copy out of each pair from our mother and one copy of each pair from our father. No one has control over which copy they pass on to their child since it is a random process. Every person has two copies of the gene for cystic fibrosis. This gene is called CFTR. Individuals with cystic fibrosis have a change, or mutation, in each of their CFTR genes. This means that both of their copies of the CFTR genes do not work properly. Individuals that are carriers of cystic fibrosis have a mutation in only one copy of their CFTR gene. The other copy does not have a mutation and is functioning normally, compensating for the copy of the gene with the mutation.     Cystic fibrosis is inherited in an autosomal recessive pattern, meaning that an individual must inherit a mutation in the CFTR gene from both their mother and father in order to have CF. When two parents are  carriers, then with each pregnancy together there is a 25% chance to have a child with cystic fibrosis. With each pregnancy there is also a 50% chance to have a child that is a carrier, and a 25% chance of having a child that is not a carrier and does not have CF.     We discussed that there are genetic blood tests available that can help determine if an individual has changes in their CFTR genes. We discussed testing options, including the following options which included full gene analysis (FGA), CF Amplified done through [employer] (sequencing with CFTR deletion/duplication studies). FGA would be expected to detect nearly all CF mutations, with a detection rate of approximately 99%. As genetic testing is recommended for diagnostic reasons, the most comprehensive genetic testing would be the most useful as this will give us the most information about the CFTR gene.     Result Outcomes:  We discussed the following possible results:  1. One disease causing mutation found: it is possible that only one genetic change would be found, indicating that an individual is likely a carrier of cystic fibrosis.  However, given that she did already have an elevated sweat chloride we would also need to consider the possibility that Brooke could have a second mutation not identified by the genetic testing.   2. Two mutations identified:  Individuals with cystic fibrosis or cystic fibrosis-related disorder usually have two mutations in this gene, one inherited from their mother and one from their father. Depending on the changes in the gene and other factors, some people may have respiratory, GI symptoms, and/or infertility issues.  We will discuss this in greater detail if found.   3. No mutations in these genes: No genetic evidence to support cystic fibrosis.  4. The finding of an unknown change: This happens when the laboratory detects a change but they do not know if it is found only in individuals with the condition, or if  the change is found in individuals without cystic fibrosis as well. If you have this type of result, we will discuss it further at that time.      Plan:   1) A three generation pedigree was obtained today and scanned into the medical record (see Media tab in EPIC).  2) We discussed the genetics and inheritance of cystic fibrosis today and reviewed the current comprehensive CF Amplified genetic testing through [facility].   3) Plan made to coordinate blood draw with her bronchoscopy on June 30, so it can be performed while she is sedated. Test kit and paperwork provided to mom. Will relay plan to pulmonary nurse coordinator to assist in coordination.   4) Once blood is drawn and acceptable insurance coverage is verified, results will be available in approximately 2-4 weeks.         Prudence Wilkins MS, [facility]   Certified Genetic Counselor   [facility]   [facility]   VM: [phone]     Approximate Time Spent in Consultation:35 minutes     CC:   Delores CHUNG, [place]   in ASU:

## 2024-02-21 NOTE — REVIEW OF SYSTEMS
[Fatigue] : fatigue [Recent Change In Weight] : ~T recent weight change [Vomiting] : vomiting [Diarrhea: Grade 0] : Diarrhea: Grade 0 [Joint Stiffness] : joint stiffness [Negative] : Allergic/Immunologic [SOB on Exertion] : shortness of breath during exertion [Muscle Weakness] : muscle weakness [Abdominal Pain] : no abdominal pain [Constipation] : no constipation [FreeTextEntry2] : weakness, +weight loss 15 pounds in 2 months

## 2024-02-21 NOTE — ASSESSMENT
[Curative] : Goals of care discussed with patient: Curative [FreeTextEntry1] : 47 yo female with PMHX left breast cancer triple negative. S/p neoadjuvant chemotherapy, s/p Bilateral mastectomy, s/p bilateral reconstruction with JOHN.  Patient was unable to tolerate Xeloda after 1 cycle. Her last treatment was November 21, 2023. Patient c/o increased SOB with exertion, weakness/fatigue/ and weight loss of about 15 pounds in 2 months. Patient has been sent to [facility] for evaluation to r/o PE/infection. Patient will follow up in our office post discharge. Patient she will continue medical, GI, GYN follow-up and surgical evaluation.  She will return to the office in 1 month or sooner as needed.

## 2024-02-21 NOTE — HISTORY OF PRESENT ILLNESS
[Disease: _____________________] : Disease: [unfilled] [de-identified] : This is a  47-year-old woman with history of left breast carcinoma who presents for evaluation.  The patient's history dates back to May 2022 when she noted a mass in the left breast.   In 2022  a mammogram and sonogram were performed.  These revealed a mass in the left breast along with calcifications in the right breast.  The biopsy revealed poorly differentiated infiltrating duct carcinoma with Isaias score 8/9, ER [place] -0% and HER2/roxann 1+ negative consistent with triple negative breast cancer.  The Ki-67 was 50%.\par  \par  There was no obvious left axillary lymph nodes.  The right breast calcifications were biopsied with benign results including fibrocystic changes.  An MRI revealed that the left breast mass with touching the pectoral muscle to the lateral breast area along with satellite.  There was prominence in the left axilla but no definite enlargement of nodes.  The patient has undergone surgical oncology evaluation and the plan is to evaluate patient for neoadjuvant chemotherapy and she is considering bilateral mastectomies.  The patient has undergone genetic testing which is pending.  The patient did have a previous biopsy of the breast in 2018 involving the right breast which showed a benign biopsy.\par  \par  The patient will undergo targeted reevaluation of the nonspecific foci on the right breast and also left axillary lymph nodes with biopsy to assess for bilateral disease and possible dayna metastases.\par  \par  Patient's menarche was age 12 and her periods have been regular.  She is  1 para 1 age at first pregnancy was 19.  She denies hormonal therapy or fertility treatments.  The patient will undergo CAT scans and bone scans to evaluate any evidence for systemic disease. [de-identified] : poorly diff IDC ERneg, CT neg, Her2 neg 1+, Ki67-50%  TNBC, MRI mass touching pectoral muscle, [de-identified] : Preop left breast mass ,no axillary nodes but tumor touching pectoral muscle. Plan neoadjuvant chemo/immunotherapy, Taxol/ carbo/ Pembro AC Keynote 522 Pembrolizumab every 3 weeks: 12/2/2022- 11/21/2023 Carboplatin weekly x 12 cycles:12/2/2022-02/28/2023 Taxol weekly x 12 cycles: 12/02/2022-02/28/2023 Adriamycin/Cyclophosphamide x 4 cycles: 03/28/2023-05/09/2023 S/p Bilateral mastectomy/nipple with nipple sparing; s/p Bilateral Breast reconstruction with JOHN flaps; Perpectoral implants. Post op  minimal residual 3 foci of residual malignancy maximal size 1.1 mm.3 nodes neg. Plan - continue prembro to complete 1 year, add xeloda x 6 months , if genetics are BRCA pos consider switch to parp. Started Xeloda- 11/1/23 Completed Keytruda- 11/21/23 [de-identified] : Patient is here for urgent evaluation. Patient has not been on any treatment since November 21, 2023. She c/o increased fatigue/weakness. She has persistent nausea and vomiting with weight loss of 15 pounds in 2 months. She states that she was se seen at her local hospital in [facility]. However, she was sent home with no abnormality found, and she was advised to follow with her oncologist.

## 2024-02-23 LAB — SURGICAL PATHOLOGY STUDY: SIGNIFICANT CHANGE UP

## 2024-02-23 RX ORDER — HYDROCORTISONE 5 MG/1
5 TABLET ORAL
Qty: 90 | Refills: 0 | Status: ACTIVE | COMMUNITY
Start: 2024-02-05 | End: 1900-01-01

## 2024-02-28 ENCOUNTER — NON-APPOINTMENT (OUTPATIENT)
Age: 49
End: 2024-02-28

## 2024-03-05 ENCOUNTER — APPOINTMENT (OUTPATIENT)
Dept: HEMATOLOGY ONCOLOGY | Facility: CLINIC | Age: 49
End: 2024-03-05

## 2024-03-05 ENCOUNTER — APPOINTMENT (OUTPATIENT)
Dept: INFUSION THERAPY | Facility: HOSPITAL | Age: 49
End: 2024-03-05

## 2024-03-06 ENCOUNTER — APPOINTMENT (OUTPATIENT)
Dept: SURGICAL ONCOLOGY | Facility: CLINIC | Age: 49
End: 2024-03-06
Payer: COMMERCIAL

## 2024-03-06 VITALS
DIASTOLIC BLOOD PRESSURE: 70 MMHG | WEIGHT: 152 LBS | OXYGEN SATURATION: 99 % | SYSTOLIC BLOOD PRESSURE: 107 MMHG | BODY MASS INDEX: 22.51 KG/M2 | HEART RATE: 79 BPM | HEIGHT: 69 IN

## 2024-03-06 PROBLEM — E03.9 HYPOTHYROIDISM, UNSPECIFIED: Chronic | Status: ACTIVE | Noted: 2024-02-14

## 2024-03-06 PROBLEM — D64.9 ANEMIA, UNSPECIFIED: Chronic | Status: ACTIVE | Noted: 2024-02-14

## 2024-03-06 PROCEDURE — 99024 POSTOP FOLLOW-UP VISIT: CPT

## 2024-03-15 LAB
T3 SERPL-MCNC: 126 NG/DL
T4 FREE SERPL-MCNC: 1.4 NG/DL
TSH SERPL-ACNC: 0.34 UIU/ML

## 2024-03-15 RX ORDER — HYDROCORTISONE 5 MG/1
5 TABLET ORAL
Qty: 90 | Refills: 0 | Status: ACTIVE | COMMUNITY
Start: 2024-01-23 | End: 1900-01-01

## 2024-03-15 RX ORDER — LEVOTHYROXINE SODIUM 0.05 MG/1
50 TABLET ORAL
Qty: 90 | Refills: 3 | Status: ACTIVE | COMMUNITY
Start: 2024-01-29 | End: 1900-01-01

## 2024-03-18 ENCOUNTER — OUTPATIENT (OUTPATIENT)
Dept: OUTPATIENT SERVICES | Facility: HOSPITAL | Age: 49
LOS: 1 days | Discharge: HOME | End: 2024-03-18

## 2024-03-18 DIAGNOSIS — Z98.890 OTHER SPECIFIED POSTPROCEDURAL STATES: Chronic | ICD-10-CM

## 2024-03-18 DIAGNOSIS — Z90.13 ACQUIRED ABSENCE OF BILATERAL BREASTS AND NIPPLES: Chronic | ICD-10-CM

## 2024-03-18 DIAGNOSIS — Z98.891 HISTORY OF UTERINE SCAR FROM PREVIOUS SURGERY: Chronic | ICD-10-CM

## 2024-03-18 DIAGNOSIS — C50.919 MALIGNANT NEOPLASM OF UNSPECIFIED SITE OF UNSPECIFIED FEMALE BREAST: ICD-10-CM

## 2024-03-18 DIAGNOSIS — Z95.828 PRESENCE OF OTHER VASCULAR IMPLANTS AND GRAFTS: Chronic | ICD-10-CM

## 2024-03-18 PROBLEM — E27.40 UNSPECIFIED ADRENOCORTICAL INSUFFICIENCY: Chronic | Status: ACTIVE | Noted: 2024-02-14

## 2024-03-26 ENCOUNTER — RESULT REVIEW (OUTPATIENT)
Age: 49
End: 2024-03-26

## 2024-03-26 ENCOUNTER — APPOINTMENT (OUTPATIENT)
Dept: HEMATOLOGY ONCOLOGY | Facility: CLINIC | Age: 49
End: 2024-03-26
Payer: COMMERCIAL

## 2024-03-26 ENCOUNTER — APPOINTMENT (OUTPATIENT)
Dept: INFUSION THERAPY | Facility: HOSPITAL | Age: 49
End: 2024-03-26

## 2024-03-26 VITALS
RESPIRATION RATE: 16 BRPM | TEMPERATURE: 97.5 F | OXYGEN SATURATION: 99 % | BODY MASS INDEX: 22.79 KG/M2 | HEART RATE: 76 BPM | WEIGHT: 154.32 LBS | DIASTOLIC BLOOD PRESSURE: 81 MMHG | SYSTOLIC BLOOD PRESSURE: 123 MMHG

## 2024-03-26 LAB
ALBUMIN SERPL ELPH-MCNC: 4.2 G/DL — SIGNIFICANT CHANGE UP (ref 3.3–5)
ALP SERPL-CCNC: 106 U/L — SIGNIFICANT CHANGE UP (ref 40–120)
ALT FLD-CCNC: 9 U/L — LOW (ref 10–45)
ANION GAP SERPL CALC-SCNC: 9 MMOL/L — SIGNIFICANT CHANGE UP (ref 5–17)
AST SERPL-CCNC: 21 U/L — SIGNIFICANT CHANGE UP (ref 10–40)
BASOPHILS # BLD AUTO: 0.02 K/UL — SIGNIFICANT CHANGE UP (ref 0–0.2)
BASOPHILS NFR BLD AUTO: 0.4 % — SIGNIFICANT CHANGE UP (ref 0–2)
BILIRUB SERPL-MCNC: 0.3 MG/DL — SIGNIFICANT CHANGE UP (ref 0.2–1.2)
BUN SERPL-MCNC: 11 MG/DL — SIGNIFICANT CHANGE UP (ref 7–23)
CALCIUM SERPL-MCNC: 9.6 MG/DL — SIGNIFICANT CHANGE UP (ref 8.4–10.5)
CHLORIDE SERPL-SCNC: 108 MMOL/L — SIGNIFICANT CHANGE UP (ref 96–108)
CO2 SERPL-SCNC: 26 MMOL/L — SIGNIFICANT CHANGE UP (ref 22–31)
CREAT SERPL-MCNC: 0.55 MG/DL — SIGNIFICANT CHANGE UP (ref 0.5–1.3)
EGFR: 112 ML/MIN/1.73M2 — SIGNIFICANT CHANGE UP
EOSINOPHIL # BLD AUTO: 0.18 K/UL — SIGNIFICANT CHANGE UP (ref 0–0.5)
EOSINOPHIL NFR BLD AUTO: 3.6 % — SIGNIFICANT CHANGE UP (ref 0–6)
GLUCOSE SERPL-MCNC: 102 MG/DL — HIGH (ref 70–99)
HCT VFR BLD CALC: 36 % — SIGNIFICANT CHANGE UP (ref 34.5–45)
HGB BLD-MCNC: 12 G/DL — SIGNIFICANT CHANGE UP (ref 11.5–15.5)
IMM GRANULOCYTES NFR BLD AUTO: 0.2 % — SIGNIFICANT CHANGE UP (ref 0–0.9)
LYMPHOCYTES # BLD AUTO: 1.18 K/UL — SIGNIFICANT CHANGE UP (ref 1–3.3)
LYMPHOCYTES # BLD AUTO: 23.6 % — SIGNIFICANT CHANGE UP (ref 13–44)
MCHC RBC-ENTMCNC: 26.8 PG — LOW (ref 27–34)
MCHC RBC-ENTMCNC: 33.3 G/DL — SIGNIFICANT CHANGE UP (ref 32–36)
MCV RBC AUTO: 80.5 FL — SIGNIFICANT CHANGE UP (ref 80–100)
MONOCYTES # BLD AUTO: 0.3 K/UL — SIGNIFICANT CHANGE UP (ref 0–0.9)
MONOCYTES NFR BLD AUTO: 6 % — SIGNIFICANT CHANGE UP (ref 2–14)
NEUTROPHILS # BLD AUTO: 3.31 K/UL — SIGNIFICANT CHANGE UP (ref 1.8–7.4)
NEUTROPHILS NFR BLD AUTO: 66.2 % — SIGNIFICANT CHANGE UP (ref 43–77)
NRBC # BLD: 0 /100 WBCS — SIGNIFICANT CHANGE UP (ref 0–0)
PLATELET # BLD AUTO: 161 K/UL — SIGNIFICANT CHANGE UP (ref 150–400)
POTASSIUM SERPL-MCNC: 4 MMOL/L — SIGNIFICANT CHANGE UP (ref 3.5–5.3)
POTASSIUM SERPL-SCNC: 4 MMOL/L — SIGNIFICANT CHANGE UP (ref 3.5–5.3)
PROT SERPL-MCNC: 6.7 G/DL — SIGNIFICANT CHANGE UP (ref 6–8.3)
RBC # BLD: 4.47 M/UL — SIGNIFICANT CHANGE UP (ref 3.8–5.2)
RBC # FLD: 14.3 % — SIGNIFICANT CHANGE UP (ref 10.3–14.5)
SODIUM SERPL-SCNC: 144 MMOL/L — SIGNIFICANT CHANGE UP (ref 135–145)
WBC # BLD: 5 K/UL — SIGNIFICANT CHANGE UP (ref 3.8–10.5)
WBC # FLD AUTO: 5 K/UL — SIGNIFICANT CHANGE UP (ref 3.8–10.5)

## 2024-03-26 PROCEDURE — 99214 OFFICE O/P EST MOD 30 MIN: CPT

## 2024-03-26 NOTE — HISTORY OF PRESENT ILLNESS
[Disease: _____________________] : Disease: [unfilled] [de-identified] : This is a  47-year-old woman with history of left breast carcinoma who presents for evaluation.  The patient's history dates back to May 2022 when she noted a mass in the left breast.   In 2022  a mammogram and sonogram were performed.  These revealed a mass in the left breast along with calcifications in the right breast.  The biopsy revealed poorly differentiated infiltrating duct carcinoma with Isaias score 8/9, ER [place] -0% and HER2/roxann 1+ negative consistent with triple negative breast cancer.  The Ki-67 was 50%.\par  \par  There was no obvious left axillary lymph nodes.  The right breast calcifications were biopsied with benign results including fibrocystic changes.  An MRI revealed that the left breast mass with touching the pectoral muscle to the lateral breast area along with satellite.  There was prominence in the left axilla but no definite enlargement of nodes.  The patient has undergone surgical oncology evaluation and the plan is to evaluate patient for neoadjuvant chemotherapy and she is considering bilateral mastectomies.  The patient has undergone genetic testing which is pending.  The patient did have a previous biopsy of the breast in 2018 involving the right breast which showed a benign biopsy.\par  \par  The patient will undergo targeted reevaluation of the nonspecific foci on the right breast and also left axillary lymph nodes with biopsy to assess for bilateral disease and possible dayna metastases.\par  \par  Patient's menarche was age 12 and her periods have been regular.  She is  1 para 1 age at first pregnancy was 19.  She denies hormonal therapy or fertility treatments.  The patient will undergo CAT scans and bone scans to evaluate any evidence for systemic disease. [de-identified] : 9/22/poorly diff IDC ERneg, [place] neg, Her2 neg 1+, Ki67-50%  TNBC, MRI mass touching pectoral muscle, [de-identified] : Preop left breast mass ,no axillary nodes but tumor touching pectoral muscle.Plan neoadjuvant chemo/immunotherapy, Taxol/ carbo/ Pembro AC Keynote 522 Post op  minimal residual 3 foci of residual malignancy maximal size 1.1 mm.3 nodes neg. Plan - continue prmbro to complete 1 year, add xeloda x 6 months , if genetics are BRCA pos consider switch to parp. Started Xeloda- 11/1/23- not able to tolerate xeloda due to oc counts and pt declined further rx. Completed Keytruda- 11/21/23 [de-identified] : Since the last visit the pt remains well and sees Endo and lowering the cortisone.

## 2024-03-26 NOTE — REVIEW OF SYSTEMS
[Constipation] : constipation [Diarrhea: Grade 0] : Diarrhea: Grade 0 [Muscle Weakness] : muscle weakness [Negative] : Allergic/Immunologic [FreeTextEntry8] : pt had slight vaginal bleeding

## 2024-03-26 NOTE — PHYSICAL EXAM
[Restricted in physically strenuous activity but ambulatory and able to carry out work of a light or sedentary nature] : Status 1- Restricted in physically strenuous activity but ambulatory and able to carry out work of a light or sedentary nature, e.g., light house work, office work [Normal] : affect appropriate [de-identified] : bilateral mastectomies and recon

## 2024-03-26 NOTE — ASSESSMENT
[FreeTextEntry1] : The patient continues to do well status post bilateral mastectomies and reconstruction with revisions for her triple negative breast carcinoma node positive status post chemoimmunotherapy.  The patient was not able to tolerate Xeloda and only had minimal residual disease post surgery.  The patient has been undergoing endocrinology treatment for adrenal insufficiency and she states she is starting to taper down the cortisol medication.  She will continue surveillance.  She also noted slight vaginal bleeding which she believes may be related to possible resuming of her menstrual periods.  The patient will continue medical, GI, GYN and surgical follow-up and testing.  She will return to the office in 3 months or sooner. [Curative] : Goals of care discussed with patient: Curative

## 2024-03-27 LAB — CANCER AG27-29 SERPL-ACNC: 23.8 U/ML — SIGNIFICANT CHANGE UP (ref 0–38.6)

## 2024-03-31 NOTE — HISTORY OF PRESENT ILLNESS
[de-identified] : Patient is a 49 y/o female who presents for a follow up visit for triple negative left breast cancer.   Consensus at [facility] was to treat with neoadjuvant chemo/immunotherapy, Taxol/ carbo/ Pembro AC Keynote 522. She completed chemotherapy on 5/9/23. S/p bilateral mastectomy/breast reconstruction with JOHN flaps (Dr. Thompson), prepectoral implants and ADM on 6/9/23.  To continue pembro x 1 year w/ Dr. Cuadra who would also like to add Xeloda, however, blood counts have prohibited initiation of Xeloda thus far.  In addition, she was recommended for adjuvant RT by Dr. Pierce, however, she declined.   2/20/2024 Excision R breast mass @ 8:00- fat necrosis  Pt was hospitalized 1/2/2024-1/19/2024 for severe weakness and found to have hypoadrenalism and hypothyroid state. 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   FNA (11/29/23): BREAST, RIGHT, JOHN FLAP, 8:00 OCLOCK, FNA NEGATIVE FOR MALIGNANT CELLS IN THE SUBMITTED SAMPLE. Compatible with cyst contents.  She went for PT and her therapist noticed a lump in the right lateral reconstructed breast/chest wall.  This is painless.  She is s/p FNA of the mass on 10/25/23- pathology was negative for malignant cells, can't r/o seroma or fat necrosis.  Final pathology (6/9/23): 1. Portion of right areola ducts, excision:-Breast tissue with  lactiferous ducts, no abnormality seen 2. Right breast, nipple sparing mastectomy:-Fibroadenomatoid nodules, stromal fibrosis and cysts with apocrine metaplasia and duct ectasia -Microcalcifications identified 3. Right retro areolar tissue, excision:-Breast tissue with  lactiferous duct, no abnormality seen 4. Left axillary sentinel lymph nodes, biopsy:-Three (3) lymph nodes with histiocyte aggregates -No tumor identified 5. Left breast, nipple sparing mastectomy:-Foci of residual infiltrating ductal, Grade 2, arising in a fibrotic tumor bed -The largest focus measures 1.1 mm -Margins of resection, no tumor seen -Biopsy site reaction with fibrosis and chronic inflammation -Cysts with apocrine metaplasia and adenosis with microcalcifications 6. Right internal mammary lymph node, biopsy:-Fibroadipose tissue 7. Left internal mammary lymph node, biopsy:-One (1) lymph node, no tumor identified  11/23/22- NM bone scan negative for osseous metastasis.  CT C/AP negative for distant metastatic disease.  11/21/22- s/p USG FNA right breast 1:00, negative for malignant cells and consistent with cyst contents.  US evaluation of the left axilla demonstrates normal-sized lymph nodes with benign morphology.  Evaluation of the right breast 3:00 demonstrates normal fatty lobule in the area of initial concern.    Breast MRI 11/9/22- left breast malignancy with multiple satellite masses, overall greatest AP dimension of 6.7 cm.  Mildly prominent lymph nodes in the left axilla for which targeted evaluation and possible USGB is recommended.  Probably benign mass right breast 3:00, targeted re evaluation is recommended for confirmation with USGB if warranted.   Indeterminate mass right breast 1:00, targeted re evaluation is recommended for confirmation with USGB if warranted.  Biopsy (10/17/22): Site 1: Left ultrasound biopsy irregular mass at 10-12:00  -Invasive carcinoma consistent with invasive mammary ductal carcinoma Site 2: Right breast posterior upper central calcifications, stereotactic biopsy -Benign breast tissue with apocrine/papillary apocrine cysts -Calcification associated with ductules, lobules, cystic change, and stroma  Patient underwent sonogram on 9/19/22 which showed highly suspicious palpable hypoechoic left breast mass at 10:00-12:00 for which an us-guided biopsy is recommended. Indeterminate calcifications in the posterior upper central right breast for which stereotactic biopsy is recommended. Probably benign right 3:00 sonographic nodule for which a 6 month follow up targeted right breast ultrasound is recommended. (BI-RADS 5)  Denies any family history of breast cancer.

## 2024-03-31 NOTE — ASSESSMENT
[FreeTextEntry1] : T3 triple negative left breast cancer Completed neoadjuvant chemotherapy May 2023 with excellent clinical response Status post bilateral nipple sparing mastectomy with reconstruction S/P resection right breast mass 8:00 - fat necrosis RTO 3 months

## 2024-04-24 ENCOUNTER — APPOINTMENT (OUTPATIENT)
Dept: SURGICAL ONCOLOGY | Facility: CLINIC | Age: 49
End: 2024-04-24

## 2024-04-30 ENCOUNTER — APPOINTMENT (OUTPATIENT)
Dept: ENDOCRINOLOGY | Facility: CLINIC | Age: 49
End: 2024-04-30
Payer: COMMERCIAL

## 2024-04-30 VITALS
BODY MASS INDEX: 22.81 KG/M2 | SYSTOLIC BLOOD PRESSURE: 112 MMHG | WEIGHT: 154 LBS | TEMPERATURE: 97.8 F | DIASTOLIC BLOOD PRESSURE: 80 MMHG | OXYGEN SATURATION: 100 % | HEIGHT: 69 IN | HEART RATE: 83 BPM

## 2024-04-30 DIAGNOSIS — E27.40 UNSPECIFIED ADRENOCORTICAL INSUFFICIENCY: ICD-10-CM

## 2024-04-30 PROCEDURE — 99214 OFFICE O/P EST MOD 30 MIN: CPT

## 2024-04-30 NOTE — HISTORY OF PRESENT ILLNESS
[FreeTextEntry1] : 48 year old female with PMH of L) breast cancer and hypothyroidism, presents for work up of potential adrenal insufficiency  #L) breast cancer (triple negative) -Diagnosed 20233 -Treatment Taxol/carbo (12/2/22-2/28/23) /pembro -> last dose of keytruda in December 2023 (12/2/22-11/21/23) Adrimycin/cyclophosphamide 3/23/23-5/9/23 Bilateral mastectomy w JOHN flap May 2023 Xeloda 11/1/23 only for 2 weeks due to low blood counts  #Primary hypothyroidism -Diagnosed in hospital admission in Jan 5th - possibly secondary to keytruda (normal TFTs in November) -TSH 8.35 FT4 0.9 -Commenced on levothyroxine 50mcg daily, latest TFTs wnl  #? Adrenal insufficiency ? secondary to chronic steroids as part of chemo vs 2/2 keytruda December 23 -> started vomiting presented to oncology office, collapsed sent to [facility] (weak, hypoglycemic) 7am cortisol 0.5 no ACTH Started on higher dose 20/10 -> 15mg morning, 7.5mg 3PM (had hallucinations on higher doses) -> 10/5mg currently  Last dose of steroids in late November as per patient Patient feels much better now

## 2024-04-30 NOTE — ASSESSMENT
[FreeTextEntry1] : 48 year old female with PMH of L) breast cancer and hypothyroidism, presents for work up of potential adrenal insufficiency  #?adrenal insufficiency -possibly due to long term steroid use vs keytruda -Given patient has just dc from hospital not long ago, would ideally like patient to stabilize clinically before adjusting doses/further testing -Continue 10mg morning and 5mg PM -Will reassess now to see if patient requires ongoing hydrocortisone replacement - pt will hold PM dose and check labs in the AM -Patient educated on sick day dosing (double doses for 3-5 days), has solu-cortef IM at home and advised to get a medi-alert bracelet  #Hypothyrodiism -Possibly due to keytruda -Continue 50mcg levothyroxine daily for now  #History of immunotherapy -Patient educated that there is risk of endocrinopathies for up to 12 months post last dose of keytruda -Patient educated on red flag symptoms to look out for that would require ED -Will continue to monitor TFTs and glucose intermittently  Review in 3 months.

## 2024-05-07 ENCOUNTER — APPOINTMENT (OUTPATIENT)
Dept: INFUSION THERAPY | Facility: HOSPITAL | Age: 49
End: 2024-05-07

## 2024-05-07 ENCOUNTER — APPOINTMENT (OUTPATIENT)
Dept: OBGYN | Facility: CLINIC | Age: 49
End: 2024-05-07
Payer: COMMERCIAL

## 2024-05-07 ENCOUNTER — RESULT REVIEW (OUTPATIENT)
Age: 49
End: 2024-05-07

## 2024-05-07 ENCOUNTER — APPOINTMENT (OUTPATIENT)
Dept: OBGYN | Facility: CLINIC | Age: 49
End: 2024-05-07

## 2024-05-07 VITALS
DIASTOLIC BLOOD PRESSURE: 80 MMHG | HEIGHT: 69 IN | WEIGHT: 154 LBS | BODY MASS INDEX: 22.81 KG/M2 | HEART RATE: 82 BPM | SYSTOLIC BLOOD PRESSURE: 114 MMHG

## 2024-05-07 DIAGNOSIS — Z01.419 ENCOUNTER FOR GYNECOLOGICAL EXAMINATION (GENERAL) (ROUTINE) W/OUT ABNORMAL FINDINGS: ICD-10-CM

## 2024-05-07 LAB
ALBUMIN SERPL ELPH-MCNC: 4.3 G/DL — SIGNIFICANT CHANGE UP (ref 3.3–5)
ALP SERPL-CCNC: 107 U/L — SIGNIFICANT CHANGE UP (ref 40–120)
ALT FLD-CCNC: 14 U/L — SIGNIFICANT CHANGE UP (ref 10–45)
ANION GAP SERPL CALC-SCNC: 11 MMOL/L — SIGNIFICANT CHANGE UP (ref 5–17)
AST SERPL-CCNC: 24 U/L — SIGNIFICANT CHANGE UP (ref 10–40)
BASOPHILS # BLD AUTO: 0.01 K/UL — SIGNIFICANT CHANGE UP (ref 0–0.2)
BASOPHILS NFR BLD AUTO: 0.2 % — SIGNIFICANT CHANGE UP (ref 0–2)
BILIRUB SERPL-MCNC: 0.4 MG/DL — SIGNIFICANT CHANGE UP (ref 0.2–1.2)
BUN SERPL-MCNC: 16 MG/DL — SIGNIFICANT CHANGE UP (ref 7–23)
CALCIUM SERPL-MCNC: 9.6 MG/DL — SIGNIFICANT CHANGE UP (ref 8.4–10.5)
CHLORIDE SERPL-SCNC: 104 MMOL/L — SIGNIFICANT CHANGE UP (ref 96–108)
CO2 SERPL-SCNC: 27 MMOL/L — SIGNIFICANT CHANGE UP (ref 22–31)
CREAT SERPL-MCNC: 0.6 MG/DL — SIGNIFICANT CHANGE UP (ref 0.5–1.3)
EGFR: 110 ML/MIN/1.73M2 — SIGNIFICANT CHANGE UP
EOSINOPHIL # BLD AUTO: 0.12 K/UL — SIGNIFICANT CHANGE UP (ref 0–0.5)
EOSINOPHIL NFR BLD AUTO: 2.3 % — SIGNIFICANT CHANGE UP (ref 0–6)
GLUCOSE SERPL-MCNC: 100 MG/DL — HIGH (ref 70–99)
HCT VFR BLD CALC: 36.4 % — SIGNIFICANT CHANGE UP (ref 34.5–45)
HGB BLD-MCNC: 11.9 G/DL — SIGNIFICANT CHANGE UP (ref 11.5–15.5)
IMM GRANULOCYTES NFR BLD AUTO: 0.2 % — SIGNIFICANT CHANGE UP (ref 0–0.9)
LYMPHOCYTES # BLD AUTO: 0.98 K/UL — LOW (ref 1–3.3)
LYMPHOCYTES # BLD AUTO: 18.9 % — SIGNIFICANT CHANGE UP (ref 13–44)
MCHC RBC-ENTMCNC: 26.4 PG — LOW (ref 27–34)
MCHC RBC-ENTMCNC: 32.7 G/DL — SIGNIFICANT CHANGE UP (ref 32–36)
MCV RBC AUTO: 80.7 FL — SIGNIFICANT CHANGE UP (ref 80–100)
MONOCYTES # BLD AUTO: 0.32 K/UL — SIGNIFICANT CHANGE UP (ref 0–0.9)
MONOCYTES NFR BLD AUTO: 6.2 % — SIGNIFICANT CHANGE UP (ref 2–14)
NEUTROPHILS # BLD AUTO: 3.75 K/UL — SIGNIFICANT CHANGE UP (ref 1.8–7.4)
NEUTROPHILS NFR BLD AUTO: 72.2 % — SIGNIFICANT CHANGE UP (ref 43–77)
NRBC # BLD: 0 /100 WBCS — SIGNIFICANT CHANGE UP (ref 0–0)
PLATELET # BLD AUTO: 149 K/UL — LOW (ref 150–400)
POTASSIUM SERPL-MCNC: 3.8 MMOL/L — SIGNIFICANT CHANGE UP (ref 3.5–5.3)
POTASSIUM SERPL-SCNC: 3.8 MMOL/L — SIGNIFICANT CHANGE UP (ref 3.5–5.3)
PROT SERPL-MCNC: 6.8 G/DL — SIGNIFICANT CHANGE UP (ref 6–8.3)
RBC # BLD: 4.51 M/UL — SIGNIFICANT CHANGE UP (ref 3.8–5.2)
RBC # FLD: 15 % — HIGH (ref 10.3–14.5)
SODIUM SERPL-SCNC: 141 MMOL/L — SIGNIFICANT CHANGE UP (ref 135–145)
WBC # BLD: 5.19 K/UL — SIGNIFICANT CHANGE UP (ref 3.8–10.5)
WBC # FLD AUTO: 5.19 K/UL — SIGNIFICANT CHANGE UP (ref 3.8–10.5)

## 2024-05-07 PROCEDURE — 99386 PREV VISIT NEW AGE 40-64: CPT

## 2024-05-07 NOTE — PHYSICAL EXAM
[Appropriately responsive] : appropriately responsive [Alert] : alert [No Acute Distress] : no acute distress [Soft] : soft [Non-tender] : non-tender [Non-distended] : non-distended [No HSM] : No HSM [No Lesions] : no lesions [No Mass] : no mass [Oriented x3] : oriented x3 [Right Breast Absent] : a total mastectomy [Left Breast Absent] : a total mastectomy [Labia Majora] : normal [Labia Minora] : normal [Normal] : normal [Uterine Adnexae] : normal [Breast Reconstruction Right] : breast reconstruction [Breast Reconstruction Left] : breast reconstruction

## 2024-05-07 NOTE — HISTORY OF PRESENT ILLNESS
[FreeTextEntry1] :  48 year y/o female presents for annual visit. Pt stopped receiving period about a year ago during chemo treatment for breast cancer. Pt also c/o vaginal dryness.   Refereed by Dr. Cuadra  OBH:, c/s( ), terminated pregnancy() GYNH: Menarche: 11,  PMH: Anemia, Breast cancer PSH: bilateral mastectomy , c/s SHx: past smoker Medications: Hydro cortisol (10mg) , levothyroxine (50mg)  Allergies: NKDA

## 2024-05-07 NOTE — PLAN
[FreeTextEntry1] :  48 year y/o female presents for annual visit   HCM -pap done today - discussed vaginal dryness - f/u oncologist for breast cancer  -f/u PCP for annual and appropriate immunizations -rto 1 year

## 2024-05-07 NOTE — END OF VISIT
[FreeTextEntry3] : I, Lilian Geovanna, acted as a scribe on behalf of Dr. Elis Desai D.O  on 05/07/2024.   All medical entries made by the scribe were at my, Dr. Elis Desai D.O, direction and personally dictated by me on 05/07/2024. I have reviewed the chart and agree that the record accurately reflects my personal performance of the history, physical exam, assessment and plan. I have also personally directed, reviewed, and agreed with the chart.

## 2024-05-08 LAB
CANCER AG27-29 SERPL-ACNC: 19.3 U/ML — SIGNIFICANT CHANGE UP (ref 0–38.6)
HPV HIGH+LOW RISK DNA PNL CVX: NOT DETECTED

## 2024-05-10 LAB — CYTOLOGY CVX/VAG DOC THIN PREP: NORMAL

## 2024-05-17 ENCOUNTER — APPOINTMENT (OUTPATIENT)
Dept: INTERNAL MEDICINE | Facility: CLINIC | Age: 49
End: 2024-05-17
Payer: COMMERCIAL

## 2024-05-17 VITALS
SYSTOLIC BLOOD PRESSURE: 114 MMHG | HEART RATE: 86 BPM | DIASTOLIC BLOOD PRESSURE: 71 MMHG | OXYGEN SATURATION: 100 % | WEIGHT: 157.5 LBS | BODY MASS INDEX: 23.33 KG/M2 | HEIGHT: 69 IN

## 2024-05-17 VITALS — TEMPERATURE: 98.2 F

## 2024-05-17 DIAGNOSIS — R30.0 DYSURIA: ICD-10-CM

## 2024-05-17 LAB
BILIRUB UR QL STRIP: NORMAL
CLARITY UR: CLEAR
COLLECTION METHOD: NORMAL
GLUCOSE UR-MCNC: NORMAL
HCG UR QL: 0.2 EU/DL
HGB UR QL STRIP.AUTO: NORMAL
KETONES UR-MCNC: NORMAL
LEUKOCYTE ESTERASE UR QL STRIP: NORMAL
NITRITE UR QL STRIP: NORMAL
PH UR STRIP: 5.5
PROT UR STRIP-MCNC: NORMAL
SP GR UR STRIP: 1.01

## 2024-05-17 PROCEDURE — 99213 OFFICE O/P EST LOW 20 MIN: CPT

## 2024-05-17 PROCEDURE — 81003 URINALYSIS AUTO W/O SCOPE: CPT | Mod: QW

## 2024-05-17 RX ORDER — NITROFURANTOIN (MONOHYDRATE/MACROCRYSTALS) 25; 75 MG/1; MG/1
100 CAPSULE ORAL
Qty: 10 | Refills: 0 | Status: ACTIVE | COMMUNITY
Start: 2024-05-17 | End: 1900-01-01

## 2024-05-17 NOTE — HISTORY OF PRESENT ILLNESS
[FreeTextEntry8] : CC: thinks she has UTI  2 days ago woke up in the middle of the night to use the bathroom and experienced dysuria. this has not yet resolved. no hematuria, urgency or change in frequency. no fevers, nausea or vomiting. she does have some chills but unsure if this is related to her hot flashes or her current urinary symptoms.  she is going to call her endocrinologist re steroid dosing

## 2024-05-21 LAB
APPEARANCE: CLEAR
BACTERIA UR CULT: ABNORMAL
BACTERIA: ABNORMAL /HPF
BILIRUBIN URINE: NEGATIVE
BLOOD URINE: ABNORMAL
CAST: 0 /LPF
COLOR: YELLOW
EPITHELIAL CELLS: 2 /HPF
GLUCOSE QUALITATIVE U: NEGATIVE MG/DL
KETONES URINE: NEGATIVE MG/DL
LEUKOCYTE ESTERASE URINE: ABNORMAL
MICROSCOPIC-UA: NORMAL
NITRITE URINE: NEGATIVE
PH URINE: 6
PROTEIN URINE: NEGATIVE MG/DL
RED BLOOD CELLS URINE: 1 /HPF
SPECIFIC GRAVITY URINE: 1.01
UROBILINOGEN URINE: 0.2 MG/DL
WHITE BLOOD CELLS URINE: 22 /HPF

## 2024-05-30 LAB — ACTH SER-ACNC: <1.5 PG/ML

## 2024-06-10 LAB — CORTIS SERPL-MCNC: <1 UG/DL

## 2024-06-13 ENCOUNTER — APPOINTMENT (OUTPATIENT)
Dept: PLASTIC SURGERY | Facility: CLINIC | Age: 49
End: 2024-06-13
Payer: COMMERCIAL

## 2024-06-13 ENCOUNTER — OUTPATIENT (OUTPATIENT)
Dept: OUTPATIENT SERVICES | Facility: HOSPITAL | Age: 49
LOS: 1 days | Discharge: HOME | End: 2024-06-13

## 2024-06-13 VITALS
TEMPERATURE: 97.7 F | OXYGEN SATURATION: 100 % | HEART RATE: 71 BPM | WEIGHT: 156 LBS | SYSTOLIC BLOOD PRESSURE: 114 MMHG | DIASTOLIC BLOOD PRESSURE: 76 MMHG | BODY MASS INDEX: 23.11 KG/M2 | HEIGHT: 69 IN | RESPIRATION RATE: 61 BRPM

## 2024-06-13 DIAGNOSIS — Z90.13 ACQUIRED ABSENCE OF BILATERAL BREASTS AND NIPPLES: Chronic | ICD-10-CM

## 2024-06-13 DIAGNOSIS — Z95.828 PRESENCE OF OTHER VASCULAR IMPLANTS AND GRAFTS: Chronic | ICD-10-CM

## 2024-06-13 DIAGNOSIS — Z98.891 HISTORY OF UTERINE SCAR FROM PREVIOUS SURGERY: Chronic | ICD-10-CM

## 2024-06-13 DIAGNOSIS — Z98.890 OTHER SPECIFIED POSTPROCEDURAL STATES: Chronic | ICD-10-CM

## 2024-06-13 DIAGNOSIS — Z90.13 ACQUIRED ABSENCE OF BILATERAL BREASTS AND NIPPLES: ICD-10-CM

## 2024-06-13 DIAGNOSIS — C50.919 MALIGNANT NEOPLASM OF UNSPECIFIED SITE OF UNSPECIFIED FEMALE BREAST: ICD-10-CM

## 2024-06-13 PROCEDURE — 99214 OFFICE O/P EST MOD 30 MIN: CPT

## 2024-06-18 ENCOUNTER — RESULT REVIEW (OUTPATIENT)
Age: 49
End: 2024-06-18

## 2024-06-18 ENCOUNTER — APPOINTMENT (OUTPATIENT)
Dept: INFUSION THERAPY | Facility: HOSPITAL | Age: 49
End: 2024-06-18

## 2024-06-18 ENCOUNTER — APPOINTMENT (OUTPATIENT)
Dept: HEMATOLOGY ONCOLOGY | Facility: CLINIC | Age: 49
End: 2024-06-18
Payer: COMMERCIAL

## 2024-06-18 VITALS
OXYGEN SATURATION: 100 % | WEIGHT: 156.53 LBS | RESPIRATION RATE: 16 BRPM | DIASTOLIC BLOOD PRESSURE: 84 MMHG | HEART RATE: 81 BPM | SYSTOLIC BLOOD PRESSURE: 121 MMHG | TEMPERATURE: 98.3 F | BODY MASS INDEX: 23.12 KG/M2

## 2024-06-18 DIAGNOSIS — C50.919 MALIGNANT NEOPLASM OF UNSPECIFIED SITE OF UNSPECIFIED FEMALE BREAST: ICD-10-CM

## 2024-06-18 LAB
ALBUMIN SERPL ELPH-MCNC: 4.5 G/DL — SIGNIFICANT CHANGE UP (ref 3.3–5)
ALP SERPL-CCNC: 114 U/L — SIGNIFICANT CHANGE UP (ref 40–120)
ALT FLD-CCNC: 6 U/L — LOW (ref 10–45)
ANION GAP SERPL CALC-SCNC: 13 MMOL/L — SIGNIFICANT CHANGE UP (ref 5–17)
AST SERPL-CCNC: 25 U/L — SIGNIFICANT CHANGE UP (ref 10–40)
BASOPHILS # BLD AUTO: 0.01 K/UL — SIGNIFICANT CHANGE UP (ref 0–0.2)
BASOPHILS NFR BLD AUTO: 0.3 % — SIGNIFICANT CHANGE UP (ref 0–2)
BILIRUB SERPL-MCNC: 0.4 MG/DL — SIGNIFICANT CHANGE UP (ref 0.2–1.2)
BUN SERPL-MCNC: 14 MG/DL — SIGNIFICANT CHANGE UP (ref 7–23)
CALCIUM SERPL-MCNC: 9.6 MG/DL — SIGNIFICANT CHANGE UP (ref 8.4–10.5)
CHLORIDE SERPL-SCNC: 104 MMOL/L — SIGNIFICANT CHANGE UP (ref 96–108)
CO2 SERPL-SCNC: 22 MMOL/L — SIGNIFICANT CHANGE UP (ref 22–31)
CREAT SERPL-MCNC: 0.54 MG/DL — SIGNIFICANT CHANGE UP (ref 0.5–1.3)
EGFR: 113 ML/MIN/1.73M2 — SIGNIFICANT CHANGE UP
EOSINOPHIL # BLD AUTO: 0.11 K/UL — SIGNIFICANT CHANGE UP (ref 0–0.5)
EOSINOPHIL NFR BLD AUTO: 3 % — SIGNIFICANT CHANGE UP (ref 0–6)
GLUCOSE SERPL-MCNC: 95 MG/DL — SIGNIFICANT CHANGE UP (ref 70–99)
HCT VFR BLD CALC: 37.2 % — SIGNIFICANT CHANGE UP (ref 34.5–45)
HGB BLD-MCNC: 12.4 G/DL — SIGNIFICANT CHANGE UP (ref 11.5–15.5)
IMM GRANULOCYTES NFR BLD AUTO: 0.3 % — SIGNIFICANT CHANGE UP (ref 0–0.9)
LYMPHOCYTES # BLD AUTO: 0.95 K/UL — LOW (ref 1–3.3)
LYMPHOCYTES # BLD AUTO: 25.5 % — SIGNIFICANT CHANGE UP (ref 13–44)
MCHC RBC-ENTMCNC: 26.6 PG — LOW (ref 27–34)
MCHC RBC-ENTMCNC: 33.3 G/DL — SIGNIFICANT CHANGE UP (ref 32–36)
MCV RBC AUTO: 79.8 FL — LOW (ref 80–100)
MONOCYTES # BLD AUTO: 0.2 K/UL — SIGNIFICANT CHANGE UP (ref 0–0.9)
MONOCYTES NFR BLD AUTO: 5.4 % — SIGNIFICANT CHANGE UP (ref 2–14)
NEUTROPHILS # BLD AUTO: 2.44 K/UL — SIGNIFICANT CHANGE UP (ref 1.8–7.4)
NEUTROPHILS NFR BLD AUTO: 65.5 % — SIGNIFICANT CHANGE UP (ref 43–77)
NRBC # BLD: 0 /100 WBCS — SIGNIFICANT CHANGE UP (ref 0–0)
PLATELET # BLD AUTO: 137 K/UL — LOW (ref 150–400)
POTASSIUM SERPL-MCNC: 4 MMOL/L — SIGNIFICANT CHANGE UP (ref 3.5–5.3)
POTASSIUM SERPL-SCNC: 4 MMOL/L — SIGNIFICANT CHANGE UP (ref 3.5–5.3)
PROT SERPL-MCNC: 7.3 G/DL — SIGNIFICANT CHANGE UP (ref 6–8.3)
RBC # BLD: 4.66 M/UL — SIGNIFICANT CHANGE UP (ref 3.8–5.2)
RBC # FLD: 15.3 % — HIGH (ref 10.3–14.5)
SODIUM SERPL-SCNC: 139 MMOL/L — SIGNIFICANT CHANGE UP (ref 135–145)
WBC # BLD: 3.72 K/UL — LOW (ref 3.8–10.5)
WBC # FLD AUTO: 3.72 K/UL — LOW (ref 3.8–10.5)

## 2024-06-18 PROCEDURE — 99214 OFFICE O/P EST MOD 30 MIN: CPT

## 2024-06-18 NOTE — PHYSICAL EXAM
[Fully active, able to carry on all pre-disease performance without restriction] : Status 0 - Fully active, able to carry on all pre-disease performance without restriction [Normal] : affect appropriate [de-identified] : Bilateral mastectomies and TRAM flap reconstruction.  The left breast is larger than the right breast.

## 2024-06-18 NOTE — ASSESSMENT
[Curative] : Goals of care discussed with patient: Curative [FreeTextEntry1] : A discussion took place regarding the fact that the patient is concerned about the fact that the left breast is larger than the right breast.  As result we will be ordering an MRI of the left breast to confirm the fact that there is no evidence of recurrence of her breast cancer.  Otherwise she continues to do well and has been gaining strength and feeling stronger.  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  She continues plastic surgery follow-up.  She has also undergone GYN evaluation and according to the note from Dr. Desai the patient is currently postmenopausal.  The patient will continue medical, GI, GYN and surgical follow-up and testing as indicated.  She will return to the office in 6 months or sooner as needed.

## 2024-06-18 NOTE — HISTORY OF PRESENT ILLNESS
[Disease: _____________________] : Disease: [unfilled] [de-identified] : This is a  47-year-old woman with history of left breast carcinoma who presents for evaluation.  The patient's history dates back to May 2022 when she noted a mass in the left breast.   In 2022  a mammogram and sonogram were performed.  These revealed a mass in the left breast along with calcifications in the right breast.  The biopsy revealed poorly differentiated infiltrating duct carcinoma with Isaias score 8/9, ER ND -0% and HER2/roxann 1+ negative consistent with triple negative breast cancer.  The Ki-67 was 50%.\par  \par  There was no obvious left axillary lymph nodes.  The right breast calcifications were biopsied with benign results including fibrocystic changes.  An MRI revealed that the left breast mass with touching the pectoral muscle to the lateral breast area along with satellite.  There was prominence in the left axilla but no definite enlargement of nodes.  The patient has undergone surgical oncology evaluation and the plan is to evaluate patient for neoadjuvant chemotherapy and she is considering bilateral mastectomies.  The patient has undergone genetic testing which is pending.  The patient did have a previous biopsy of the breast in 2018 involving the right breast which showed a benign biopsy.\par  \par  The patient will undergo targeted reevaluation of the nonspecific foci on the right breast and also left axillary lymph nodes with biopsy to assess for bilateral disease and possible dayna metastases.\par  \par  Patient's menarche was age 12 and her periods have been regular.  She is  1 para 1 age at first pregnancy was 19.  She denies hormonal therapy or fertility treatments.  The patient will undergo CAT scans and bone scans to evaluate any evidence for systemic disease. [de-identified] : 9/22/poorly diff IDC ERneg, OR neg, Her2 neg 1+, Ki67-50%  TNBC, MRI mass touching pectoral muscle, [de-identified] : Preop left breast mass ,no axillary nodes but tumor touching pectoral muscle.Plan neoadjuvant chemo/immunotherapy, Taxol/ carbo/ Pembro AC Keynote 522 Post op  minimal residual 3 foci of residual malignancy maximal size 1.1 mm.3 nodes neg. Plan - continue prmbro to complete 1 year, add xeloda x 6 months , if genetics are BRCA pos consider switch to parp. Started Xeloda- 11/1/23- not able to tolerate xeloda due to oc counts and pt declined further rx. Completed Keytruda- 11/21/23 [de-identified] : Since the last visit the patient continues to do well and has no symptoms indicating recurrence or progression of disease.  She does state that she is concerned as the left breast reconstruction is larger than the right breast.  She does however have no symptoms of pain redness or fever.  She has been continuing follow-up with plastic surgery also.

## 2024-06-19 LAB — CANCER AG27-29 SERPL-ACNC: 22 U/ML — SIGNIFICANT CHANGE UP (ref 0–38.6)

## 2024-06-24 ENCOUNTER — EMERGENCY (EMERGENCY)
Facility: HOSPITAL | Age: 49
LOS: 1 days | Discharge: HOME | End: 2024-06-24
Attending: STUDENT IN AN ORGANIZED HEALTH CARE EDUCATION/TRAINING PROGRAM | Admitting: EMERGENCY MEDICINE
Payer: COMMERCIAL

## 2024-06-24 VITALS
OXYGEN SATURATION: 97 % | WEIGHT: 156.09 LBS | RESPIRATION RATE: 15 BRPM | DIASTOLIC BLOOD PRESSURE: 75 MMHG | SYSTOLIC BLOOD PRESSURE: 118 MMHG | TEMPERATURE: 98 F | HEART RATE: 87 BPM

## 2024-06-24 DIAGNOSIS — Z90.13 ACQUIRED ABSENCE OF BILATERAL BREASTS AND NIPPLES: Chronic | ICD-10-CM

## 2024-06-24 DIAGNOSIS — Z98.890 OTHER SPECIFIED POSTPROCEDURAL STATES: Chronic | ICD-10-CM

## 2024-06-24 DIAGNOSIS — Z98.891 HISTORY OF UTERINE SCAR FROM PREVIOUS SURGERY: Chronic | ICD-10-CM

## 2024-06-24 DIAGNOSIS — Z95.828 PRESENCE OF OTHER VASCULAR IMPLANTS AND GRAFTS: Chronic | ICD-10-CM

## 2024-06-24 PROCEDURE — 99284 EMERGENCY DEPT VISIT MOD MDM: CPT

## 2024-06-24 RX ORDER — ACETAMINOPHEN 500 MG
1000 TABLET ORAL ONCE
Refills: 0 | Status: COMPLETED | OUTPATIENT
Start: 2024-06-24 | End: 2024-06-24

## 2024-06-24 RX ORDER — FAMOTIDINE 10 MG/ML
20 INJECTION INTRAVENOUS ONCE
Refills: 0 | Status: COMPLETED | OUTPATIENT
Start: 2024-06-24 | End: 2024-06-24

## 2024-06-24 RX ORDER — METOCLOPRAMIDE HCL 10 MG
10 TABLET ORAL ONCE
Refills: 0 | Status: COMPLETED | OUTPATIENT
Start: 2024-06-24 | End: 2024-06-24

## 2024-06-24 RX ORDER — SODIUM CHLORIDE 9 MG/ML
1000 INJECTION INTRAMUSCULAR; INTRAVENOUS; SUBCUTANEOUS ONCE
Refills: 0 | Status: COMPLETED | OUTPATIENT
Start: 2024-06-24 | End: 2024-06-24

## 2024-06-24 NOTE — ED ADULT TRIAGE NOTE - CHIEF COMPLAINT QUOTE
presents C/o vomiting. (NBNB) No complaints of chest pain, headache,  dizziness,  SOB, fever, chills verbalized. no Phx

## 2024-06-24 NOTE — ED ADULT TRIAGE NOTE - BP NONINVASIVE DIASTOLIC (MM HG)
Patient calling with questions/issues in regards to:     Feet pain and skin feels very tight. Wondering if there is a cream she can use.    If patient would require a prescription, please us the following Pharmacy:   [id number] - [place] - [address]  [address]  [place] [address]  Phone: [phone] Fax: [phone]    [facility] [address]  [address]  [address]  [facility] [address]  [facility] [address]  Phone: [phone] Fax: [phone]    [id number] - [place] - [address] AT in the [facility]  [address] [place] [address]  Phone: [phone] Fax: [phone]       Please call patient at the following number:[phone] (home)   Patient states that it is okay to leave a detailed message.    Patient was informed that the patient would receive a phone call back within 48-72 hours unless this request is STAT.     75

## 2024-06-25 VITALS
TEMPERATURE: 99 F | HEART RATE: 84 BPM | RESPIRATION RATE: 18 BRPM | DIASTOLIC BLOOD PRESSURE: 69 MMHG | OXYGEN SATURATION: 98 % | SYSTOLIC BLOOD PRESSURE: 110 MMHG

## 2024-06-25 LAB
ALBUMIN SERPL ELPH-MCNC: 4 G/DL — SIGNIFICANT CHANGE UP (ref 3.3–5)
ALP SERPL-CCNC: 109 U/L — SIGNIFICANT CHANGE UP (ref 40–120)
ALT FLD-CCNC: 12 U/L — SIGNIFICANT CHANGE UP (ref 4–33)
ANION GAP SERPL CALC-SCNC: 12 MMOL/L — SIGNIFICANT CHANGE UP (ref 7–14)
APPEARANCE UR: CLEAR — SIGNIFICANT CHANGE UP
AST SERPL-CCNC: 21 U/L — SIGNIFICANT CHANGE UP (ref 4–32)
BASE EXCESS BLDV CALC-SCNC: 2 MMOL/L — SIGNIFICANT CHANGE UP (ref -2–3)
BASOPHILS # BLD AUTO: 0.01 K/UL — SIGNIFICANT CHANGE UP (ref 0–0.2)
BASOPHILS NFR BLD AUTO: 0.3 % — SIGNIFICANT CHANGE UP (ref 0–2)
BILIRUB SERPL-MCNC: 0.4 MG/DL — SIGNIFICANT CHANGE UP (ref 0.2–1.2)
BILIRUB UR-MCNC: NEGATIVE — SIGNIFICANT CHANGE UP
BLOOD GAS VENOUS COMPREHENSIVE RESULT: SIGNIFICANT CHANGE UP
BUN SERPL-MCNC: 11 MG/DL — SIGNIFICANT CHANGE UP (ref 7–23)
CALCIUM SERPL-MCNC: 9.3 MG/DL — SIGNIFICANT CHANGE UP (ref 8.4–10.5)
CHLORIDE BLDV-SCNC: 103 MMOL/L — SIGNIFICANT CHANGE UP (ref 96–108)
CHLORIDE SERPL-SCNC: 104 MMOL/L — SIGNIFICANT CHANGE UP (ref 98–107)
CO2 BLDV-SCNC: 30.7 MMOL/L — HIGH (ref 22–26)
CO2 SERPL-SCNC: 25 MMOL/L — SIGNIFICANT CHANGE UP (ref 22–31)
COLOR SPEC: YELLOW — SIGNIFICANT CHANGE UP
CREAT SERPL-MCNC: 0.53 MG/DL — SIGNIFICANT CHANGE UP (ref 0.5–1.3)
DIFF PNL FLD: NEGATIVE — SIGNIFICANT CHANGE UP
EGFR: 113 ML/MIN/1.73M2 — SIGNIFICANT CHANGE UP
EOSINOPHIL # BLD AUTO: 0.05 K/UL — SIGNIFICANT CHANGE UP (ref 0–0.5)
EOSINOPHIL NFR BLD AUTO: 1.3 % — SIGNIFICANT CHANGE UP (ref 0–6)
GAS PNL BLDV: 138 MMOL/L — SIGNIFICANT CHANGE UP (ref 136–145)
GAS PNL BLDV: SIGNIFICANT CHANGE UP
GLUCOSE BLDV-MCNC: 90 MG/DL — SIGNIFICANT CHANGE UP (ref 70–99)
GLUCOSE SERPL-MCNC: 93 MG/DL — SIGNIFICANT CHANGE UP (ref 70–99)
GLUCOSE UR QL: NEGATIVE MG/DL — SIGNIFICANT CHANGE UP
HCG SERPL-ACNC: 1.6 MIU/ML — SIGNIFICANT CHANGE UP
HCO3 BLDV-SCNC: 29 MMOL/L — SIGNIFICANT CHANGE UP (ref 22–29)
HCT VFR BLD CALC: 37.4 % — SIGNIFICANT CHANGE UP (ref 34.5–45)
HCT VFR BLDA CALC: 38 % — SIGNIFICANT CHANGE UP (ref 34.5–46.5)
HGB BLD CALC-MCNC: 12.6 G/DL — SIGNIFICANT CHANGE UP (ref 11.7–16.1)
HGB BLD-MCNC: 12.3 G/DL — SIGNIFICANT CHANGE UP (ref 11.5–15.5)
IANC: 2.48 K/UL — SIGNIFICANT CHANGE UP (ref 1.8–7.4)
IMM GRANULOCYTES NFR BLD AUTO: 0.3 % — SIGNIFICANT CHANGE UP (ref 0–0.9)
KETONES UR-MCNC: NEGATIVE MG/DL — SIGNIFICANT CHANGE UP
LACTATE BLDV-MCNC: 1.2 MMOL/L — SIGNIFICANT CHANGE UP (ref 0.5–2)
LEUKOCYTE ESTERASE UR-ACNC: NEGATIVE — SIGNIFICANT CHANGE UP
LIDOCAIN IGE QN: 46 U/L — SIGNIFICANT CHANGE UP (ref 7–60)
LYMPHOCYTES # BLD AUTO: 0.98 K/UL — LOW (ref 1–3.3)
LYMPHOCYTES # BLD AUTO: 25.1 % — SIGNIFICANT CHANGE UP (ref 13–44)
MCHC RBC-ENTMCNC: 26.7 PG — LOW (ref 27–34)
MCHC RBC-ENTMCNC: 32.9 GM/DL — SIGNIFICANT CHANGE UP (ref 32–36)
MCV RBC AUTO: 81.3 FL — SIGNIFICANT CHANGE UP (ref 80–100)
MONOCYTES # BLD AUTO: 0.38 K/UL — SIGNIFICANT CHANGE UP (ref 0–0.9)
MONOCYTES NFR BLD AUTO: 9.7 % — SIGNIFICANT CHANGE UP (ref 2–14)
NEUTROPHILS # BLD AUTO: 2.48 K/UL — SIGNIFICANT CHANGE UP (ref 1.8–7.4)
NEUTROPHILS NFR BLD AUTO: 63.3 % — SIGNIFICANT CHANGE UP (ref 43–77)
NITRITE UR-MCNC: NEGATIVE — SIGNIFICANT CHANGE UP
NRBC # BLD: 0 /100 WBCS — SIGNIFICANT CHANGE UP (ref 0–0)
NRBC # FLD: 0 K/UL — SIGNIFICANT CHANGE UP (ref 0–0)
PCO2 BLDV: 55 MMHG — HIGH (ref 39–52)
PH BLDV: 7.33 — SIGNIFICANT CHANGE UP (ref 7.32–7.43)
PH UR: 5.5 — SIGNIFICANT CHANGE UP (ref 5–8)
PLATELET # BLD AUTO: 134 K/UL — LOW (ref 150–400)
PO2 BLDV: 23 MMHG — LOW (ref 25–45)
POTASSIUM BLDV-SCNC: 3.9 MMOL/L — SIGNIFICANT CHANGE UP (ref 3.5–5.1)
POTASSIUM SERPL-MCNC: 3.9 MMOL/L — SIGNIFICANT CHANGE UP (ref 3.5–5.3)
POTASSIUM SERPL-SCNC: 3.9 MMOL/L — SIGNIFICANT CHANGE UP (ref 3.5–5.3)
PROT SERPL-MCNC: 7 G/DL — SIGNIFICANT CHANGE UP (ref 6–8.3)
PROT UR-MCNC: NEGATIVE MG/DL — SIGNIFICANT CHANGE UP
RBC # BLD: 4.6 M/UL — SIGNIFICANT CHANGE UP (ref 3.8–5.2)
RBC # FLD: 15.7 % — HIGH (ref 10.3–14.5)
SAO2 % BLDV: 33.9 % — LOW (ref 67–88)
SODIUM SERPL-SCNC: 141 MMOL/L — SIGNIFICANT CHANGE UP (ref 135–145)
SP GR SPEC: 1.02 — SIGNIFICANT CHANGE UP (ref 1–1.03)
UROBILINOGEN FLD QL: 0.2 MG/DL — SIGNIFICANT CHANGE UP (ref 0.2–1)
WBC # BLD: 3.91 K/UL — SIGNIFICANT CHANGE UP (ref 3.8–10.5)
WBC # FLD AUTO: 3.91 K/UL — SIGNIFICANT CHANGE UP (ref 3.8–10.5)

## 2024-06-25 PROCEDURE — 70450 CT HEAD/BRAIN W/O DYE: CPT | Mod: 26,MC

## 2024-06-25 RX ADMIN — FAMOTIDINE 20 MILLIGRAM(S): 10 INJECTION INTRAVENOUS at 00:16

## 2024-06-25 RX ADMIN — Medication 10 MILLIGRAM(S): at 00:16

## 2024-06-25 RX ADMIN — Medication 400 MILLIGRAM(S): at 00:21

## 2024-06-25 RX ADMIN — SODIUM CHLORIDE 1000 MILLILITER(S): 9 INJECTION INTRAMUSCULAR; INTRAVENOUS; SUBCUTANEOUS at 00:16

## 2024-06-25 NOTE — ED PROVIDER NOTE - NSFOLLOWUPINSTRUCTIONS_ED_ALL_ED_FT
The CT scan of your brain did not show any concerning findings, you had normal blood work as well including normal blood counts, electrolytes, kidney function and liver enzymes.  Please return to the emergency department if you have any new or worsening symptoms otherwise please follow-up with your primary care doctor in the next 48 hours and bring copy of your results.

## 2024-06-25 NOTE — ED PROVIDER NOTE - CLINICAL SUMMARY MEDICAL DECISION MAKING FREE TEXT BOX
Constellation of symptoms likely represent viral illness.  Neurologic exam without focal abnormality.  Patient overall well-appearing.  Suspect mild dehydration.  Plan: Labs, symptom relief, IV fluids, CT brain (nausea vomiting with headache without abdominal pain or tenderness and remote history of breast cancer) reassess.

## 2024-06-25 NOTE — ED PROVIDER NOTE - OBJECTIVE STATEMENT
48-year-old female past medical history breast cancer status postmastectomy and chemotherapy, adrenal insufficiency and hypothyroidism presents to ED for headache, nausea, vomiting, chills, sore throat.  Symptoms started headache approximately 3 AM associated with chills at this time.  Patient later developed sore throat and then approximately 3 PM started having nausea and vomiting.  She denies fevers, vision or hearing changes, cough, shortness of breath, chest pain.  She denies abdominal pain, diarrhea, constipation, dysuria, hematuria, rash.  No known sick contacts.  Patient denies drugs, tobacco, alcohol.  Patient seen earlier urgent care, negative strep, COVID, flu.  Patient doubled her hydrocortisone today normally 10 mg a.m. and 5 mg p.m. but took 20 mg and 10 mg as directed by her doctor.

## 2024-06-25 NOTE — ED PROVIDER NOTE - PATIENT PORTAL LINK FT
You can access the FollowMyHealth Patient Portal offered by [facility] by registering at the following website: [email or web address]. By joining [facility]’s FollowMyHealth portal, you will also be able to view your health information using other applications (apps) compatible with our system.

## 2024-06-25 NOTE — ED PROVIDER NOTE - PHYSICAL EXAMINATION
GEN: no acute respiratory distress. nontoxic, speaking comfortably in full sentences  HEENT: NCAT. face symmetrical. PERRL 4mm, EOMI,  MMM, oropharynx wnl.  Neck: no JVD, trachea midline, no lymphadenopathy, FROM  CV: RRR. +S1S2, no murmur. 2+ pulses in 4 extremities, cap refill <2 sec  Chest: CTA B/l. no wheezing, rales, rhonchi. no retractions. good air movement.   ABD: +BS, soft, non distended, non tender.   : no cva or suprapubic tenderness  MSK: No clubbing, cyanosis, edema. FROM of all extremities. no tenderness to palpation. No midline or paraspinal tenderness. no spinal step-offs.  Neuro: AAOX3.  CN 2-12 intact; Sensation intact, motor 5/5 throughout.   SKIN: No  rash

## 2024-06-25 NOTE — ED PROVIDER NOTE - PROGRESS NOTE DETAILS
Kerry: Patient signed out to me from Dr. Watson pending CT head and blood work.  I reviewed all lab results as well as CT imaging and there is no concerning findings.  I reassessed the patient and she feeling much better now and passed p.o. trial.  She is post to see her doctor later today for follow-up.  Will provide copy of all results.

## 2024-06-25 NOTE — ED ADULT NURSE NOTE - OBJECTIVE STATEMENT
Pt a&ox4, ambulatory arrives c/o migraine with nausea and vomiting. Pmh breast CA s/p b/l mastectomy, hypothyroidism. On assessment pt well appearing. Respirations even and unlabored. Abdomen soft, nondistended, nontender to touch. Pt denies chest pain, shortness of breath, abdominal pain, fevers, chills. 20G IV placed to right AC. Labs collected and sent. Pt pending CT. Stretcher in lowest position, wheels locked, appropriate side rails in place, call bell in reach.

## 2024-06-25 NOTE — ED ADULT NURSE NOTE - NSFALLUNIVINTERV_ED_ALL_ED
Bed/Stretcher in lowest position, wheels locked, appropriate side rails in place/Call bell, personal items and telephone in reach/Instruct patient to call for assistance before getting out of bed/chair/stretcher/Non-slip footwear applied when patient is off stretcher/Schneider to call system/Physically safe environment - no spills, clutter or unnecessary equipment/Purposeful proactive rounding/Room/bathroom lighting operational, light cord in reach

## 2024-06-25 NOTE — ED ADULT NURSE NOTE - PRIMARY CARE PROVIDER
Patient Education     Erythema Multiforme (Child)  Erythema multiforme is a skin rash. It s caused by a hypersensitivity reaction. The reaction can be caused by many things These include viruses, bacteria, fungi, medicines, vaccines, or food.  At first, the skin may have round red bumps, fluid-filled blisters, or pimples. Most of these turn into a round [place] with a small dark center. The entire area of the skin may be surrounded by a white ring. The sores may cause pain, burning, or itching. They occur most often on the forearms, legs, and back of hands and feet. In more severe cases, they may happen in the mouth or on the genitals. They are not contagious.   Treatment includes finding and treating or removing the cause. If a medicine may be the cause, you will be told to stop giving it to your child. The sores will likely go away in about 2 to 4 weeks. It may take longer in severe cases. The sores may come back. Medicine to reduce pain and inflammation may be given. Depending on the cause, your child may be given antiviral or antibiotic medicines  Home care  Your child s healthcare provider may prescribe medicines for swelling, pain, and itching. Follow all instructions for giving these to your child.  General care    Let your child rest as needed.    Clean the sores as advised by the healthcare provider.    Ask your child s healthcare provider if you can use colloidal oatmeal baths, wet compresses, or unscented lotion to ease your child s discomfort. You can use a clean, damp washcloth as a wet compress.    Wash your hands with soap and clean running water before and after caring for your child. This is to prevent infecting the sores.    You can give your child acetaminophen or ibuprofen to ease fever or pain. Don't give aspirin to children. It can cause a serious illness called Reye syndrome.    Follow-up care  Follow up with your child s healthcare provider, or as advised.  Special note to parents  The sores  are not dangerous. Your child is not contagious. Make sure to tell family, friends, and caregivers that contact with your child is safe.  When to seek medical advice  Call your child's healthcare provider or seek medical attention right away if any of these occur:     Lack of interest in feeding or drinking in a baby or young child    The rash spreads to the eyes, mouth, or genitals.    Shortness of breath or trouble breathing    Fever of 100.4 F (38 C) or higher, or as directed by your child's healthcare provider    Sores that don t go away after 6 weeks    Vomiting or diarrhea    Redness or swelling that gets worse    Pain that gets worse    Foul-smelling fluid leaking from the sores    Sores that come back after going away  [employer] last reviewed this educational content on 8/1/2019 2000-2020 [employer]. [address] [place] [address], [place] [address]. All rights reserved. This information is not intended as a substitute for professional medical care. Always follow your healthcare professional's instructions.            unknown.

## 2024-06-26 ENCOUNTER — APPOINTMENT (OUTPATIENT)
Dept: INTERNAL MEDICINE | Facility: CLINIC | Age: 49
End: 2024-06-26
Payer: COMMERCIAL

## 2024-06-26 VITALS
HEIGHT: 69 IN | DIASTOLIC BLOOD PRESSURE: 70 MMHG | HEART RATE: 86 BPM | BODY MASS INDEX: 22.66 KG/M2 | OXYGEN SATURATION: 98 % | WEIGHT: 153 LBS | SYSTOLIC BLOOD PRESSURE: 104 MMHG | TEMPERATURE: 97.5 F

## 2024-06-26 DIAGNOSIS — R09.81 NASAL CONGESTION: ICD-10-CM

## 2024-06-26 DIAGNOSIS — R11.10 VOMITING, UNSPECIFIED: ICD-10-CM

## 2024-06-26 LAB
CULTURE RESULTS: SIGNIFICANT CHANGE UP
SPECIMEN SOURCE: SIGNIFICANT CHANGE UP

## 2024-06-26 PROCEDURE — G2211 COMPLEX E/M VISIT ADD ON: CPT | Mod: NC

## 2024-06-26 PROCEDURE — 99214 OFFICE O/P EST MOD 30 MIN: CPT

## 2024-06-26 RX ORDER — FLUTICASONE PROPIONATE 50 UG/1
50 SPRAY, METERED NASAL TWICE DAILY
Qty: 1 | Refills: 3 | Status: ACTIVE | COMMUNITY
Start: 2024-06-26 | End: 1900-01-01

## 2024-06-27 LAB
RAPID RVP RESULT: DETECTED
SARS-COV-2 RNA PNL RESP NAA+PROBE: DETECTED

## 2024-07-09 ENCOUNTER — APPOINTMENT (OUTPATIENT)
Dept: ENDOCRINOLOGY | Facility: CLINIC | Age: 49
End: 2024-07-09
Payer: COMMERCIAL

## 2024-07-09 VITALS
DIASTOLIC BLOOD PRESSURE: 77 MMHG | HEIGHT: 69 IN | HEART RATE: 74 BPM | BODY MASS INDEX: 22.51 KG/M2 | SYSTOLIC BLOOD PRESSURE: 108 MMHG | TEMPERATURE: 98.3 F | OXYGEN SATURATION: 98 % | WEIGHT: 152 LBS

## 2024-07-09 DIAGNOSIS — E03.9 HYPOTHYROIDISM, UNSPECIFIED: ICD-10-CM

## 2024-07-09 DIAGNOSIS — E27.40 UNSPECIFIED ADRENOCORTICAL INSUFFICIENCY: ICD-10-CM

## 2024-07-09 PROCEDURE — 99214 OFFICE O/P EST MOD 30 MIN: CPT

## 2024-07-10 ENCOUNTER — APPOINTMENT (OUTPATIENT)
Dept: SURGICAL ONCOLOGY | Facility: CLINIC | Age: 49
End: 2024-07-10
Payer: COMMERCIAL

## 2024-07-10 VITALS
DIASTOLIC BLOOD PRESSURE: 65 MMHG | HEART RATE: 81 BPM | RESPIRATION RATE: 16 BRPM | OXYGEN SATURATION: 98 % | SYSTOLIC BLOOD PRESSURE: 90 MMHG | BODY MASS INDEX: 22.22 KG/M2 | HEIGHT: 69 IN | WEIGHT: 150 LBS

## 2024-07-10 DIAGNOSIS — C50.919 MALIGNANT NEOPLASM OF UNSPECIFIED SITE OF UNSPECIFIED FEMALE BREAST: ICD-10-CM

## 2024-07-10 PROCEDURE — 99214 OFFICE O/P EST MOD 30 MIN: CPT

## 2024-07-26 NOTE — DISCHARGE NOTE PROVIDER - NSDCFUSCHEDAPPT_GEN_ALL_CORE_FT
The patient is Watcher - Medium risk of patient condition declining or worsening    Shift Goals  Clinical Goals: Pt will remain free from falls and eat > 50 % of meals during shift  Patient Goals: FELIPA  Family Goals: FELIPA    Progress made toward(s) clinical / shift goals:      Pt remained free from falls while ambulating to the commode during shift. Bed alarm on and in place. Call light/personal belongings within reach. Pt does not call appropriately when needing assistance or needing to get out of bed. Telesitter placed at bedside. Pt turns in bed by self. Reoriented patient throughout shift regarding POC, importance of peripheral IV, and fall precaution rationale.    Patient is not progressing towards the following goals:    Pt refused dinner meal tray and ate 0% of meal tray. At 2111, pt encouraged to eat and drink after blood glucose was 84. Pt drank about 60 mL of orange juice. At 2314, blood glucose 106.   [facility]  [facility]  Scheduled Appointment: 02/13/2024    [facility]  [facility]  [facility]  Scheduled Appointment: 02/13/2024    [facility]  [facility]  Scheduled Appointment: 03/05/2024    Kirk Cuadra  [facility]  [facility]  Scheduled Appointment: 03/05/2024    [facility]  [facility]  Scheduled Appointment: 03/26/2024    Kirk Cuadra  [facility]  [facility]  Scheduled Appointment: 03/26/2024    [facility]  [facility]  Scheduled Appointment: 04/16/2024    [facility]  [facility]  [facility]  Scheduled Appointment: 04/16/2024    Edward Jean  [facility]  SURGONC [address]  Scheduled Appointment: 04/24/2024    Dinorah Rivero  [facility]  ENDOCRIN [address]  Scheduled Appointment: 04/30/2024     [facility]  [facility]  Scheduled Appointment: 02/13/2024    [facility]  [facility]  [facility]  Scheduled Appointment: 02/13/2024    [facility]  [facility]  Scheduled Appointment: 03/05/2024    Kirk Cuadra  [facility]  [facility]  Scheduled Appointment: 03/05/2024    [facility]  [facility]  Scheduled Appointment: 03/26/2024    Kirk Cuadra  [facility]  [facility]  Scheduled Appointment: 03/26/2024    [facility]  [facility]  Scheduled Appointment: 04/16/2024    [facility]  [facility]  [facility]  Scheduled Appointment: 04/16/2024    Edward Jean  [facility]  SURGONC [address]  Scheduled Appointment: 04/24/2024    Dinorah Rivero  [facility]  [facility]CRIN [address]  Scheduled Appointment: 04/30/2024

## 2024-07-30 ENCOUNTER — APPOINTMENT (OUTPATIENT)
Dept: INFUSION THERAPY | Facility: HOSPITAL | Age: 49
End: 2024-07-30

## 2024-08-02 ENCOUNTER — APPOINTMENT (OUTPATIENT)
Dept: INTERNAL MEDICINE | Facility: CLINIC | Age: 49
End: 2024-08-02
Payer: COMMERCIAL

## 2024-08-02 VITALS
HEIGHT: 69 IN | WEIGHT: 155 LBS | SYSTOLIC BLOOD PRESSURE: 123 MMHG | DIASTOLIC BLOOD PRESSURE: 80 MMHG | BODY MASS INDEX: 22.96 KG/M2 | OXYGEN SATURATION: 99 % | HEART RATE: 69 BPM

## 2024-08-02 DIAGNOSIS — H00.012 HORDEOLUM EXTERNUM RIGHT LOWER EYELID: ICD-10-CM

## 2024-08-02 PROCEDURE — G2211 COMPLEX E/M VISIT ADD ON: CPT | Mod: NC

## 2024-08-02 PROCEDURE — 99213 OFFICE O/P EST LOW 20 MIN: CPT

## 2024-08-02 NOTE — PHYSICAL EXAM
[No Acute Distress] : no acute distress [Normal Sclera/Conjunctiva] : normal sclera/conjunctiva [PERRL] : pupils equal round and reactive to light [EOMI] : extraocular movements intact [de-identified] : outter lower corner the right eye with small 0.25cm area of induration

## 2024-08-02 NOTE — HISTORY OF PRESENT ILLNESS
[FreeTextEntry8] : CC: bump in eye   on Tuesday night felt a little tingling on the outer lower corner of the right eye. on Wednesday woke up with a little bump in the area. no changes to vision, no changes to make up products, skin care products. no drainage. eyes are not crusting. no fevers, chills, nausea, vomiting.

## 2024-08-26 ENCOUNTER — OUTPATIENT (OUTPATIENT)
Dept: OUTPATIENT SERVICES | Facility: HOSPITAL | Age: 49
LOS: 1 days | Discharge: HOME | End: 2024-08-26

## 2024-08-26 DIAGNOSIS — C50.919 MALIGNANT NEOPLASM OF UNSPECIFIED SITE OF UNSPECIFIED FEMALE BREAST: ICD-10-CM

## 2024-08-26 DIAGNOSIS — Z98.890 OTHER SPECIFIED POSTPROCEDURAL STATES: Chronic | ICD-10-CM

## 2024-08-26 DIAGNOSIS — Z95.828 PRESENCE OF OTHER VASCULAR IMPLANTS AND GRAFTS: Chronic | ICD-10-CM

## 2024-08-26 DIAGNOSIS — Z98.891 HISTORY OF UTERINE SCAR FROM PREVIOUS SURGERY: Chronic | ICD-10-CM

## 2024-08-26 DIAGNOSIS — Z90.13 ACQUIRED ABSENCE OF BILATERAL BREASTS AND NIPPLES: Chronic | ICD-10-CM

## 2024-09-06 ENCOUNTER — APPOINTMENT (OUTPATIENT)
Dept: INFUSION THERAPY | Facility: HOSPITAL | Age: 49
End: 2024-09-06

## 2024-09-06 ENCOUNTER — RESULT REVIEW (OUTPATIENT)
Age: 49
End: 2024-09-06

## 2024-09-06 LAB
ALBUMIN SERPL ELPH-MCNC: 3.9 G/DL — SIGNIFICANT CHANGE UP (ref 3.3–5)
ALP SERPL-CCNC: 104 U/L — SIGNIFICANT CHANGE UP (ref 40–120)
ALT FLD-CCNC: 8 U/L — LOW (ref 10–45)
ANION GAP SERPL CALC-SCNC: 9 MMOL/L — SIGNIFICANT CHANGE UP (ref 5–17)
AST SERPL-CCNC: 22 U/L — SIGNIFICANT CHANGE UP (ref 10–40)
BASOPHILS # BLD AUTO: 0.02 K/UL — SIGNIFICANT CHANGE UP (ref 0–0.2)
BASOPHILS NFR BLD AUTO: 0.6 % — SIGNIFICANT CHANGE UP (ref 0–2)
BILIRUB SERPL-MCNC: 0.3 MG/DL — SIGNIFICANT CHANGE UP (ref 0.2–1.2)
BUN SERPL-MCNC: 12 MG/DL — SIGNIFICANT CHANGE UP (ref 7–23)
CALCIUM SERPL-MCNC: 9.6 MG/DL — SIGNIFICANT CHANGE UP (ref 8.4–10.5)
CHLORIDE SERPL-SCNC: 104 MMOL/L — SIGNIFICANT CHANGE UP (ref 96–108)
CO2 SERPL-SCNC: 27 MMOL/L — SIGNIFICANT CHANGE UP (ref 22–31)
CREAT SERPL-MCNC: 0.6 MG/DL — SIGNIFICANT CHANGE UP (ref 0.5–1.3)
EGFR: 110 ML/MIN/1.73M2 — SIGNIFICANT CHANGE UP
EOSINOPHIL # BLD AUTO: 0.07 K/UL — SIGNIFICANT CHANGE UP (ref 0–0.5)
EOSINOPHIL NFR BLD AUTO: 2.1 % — SIGNIFICANT CHANGE UP (ref 0–6)
GLUCOSE SERPL-MCNC: 114 MG/DL — HIGH (ref 70–99)
HCT VFR BLD CALC: 35.7 % — SIGNIFICANT CHANGE UP (ref 34.5–45)
HGB BLD-MCNC: 11.9 G/DL — SIGNIFICANT CHANGE UP (ref 11.5–15.5)
IMM GRANULOCYTES NFR BLD AUTO: 0.3 % — SIGNIFICANT CHANGE UP (ref 0–0.9)
LYMPHOCYTES # BLD AUTO: 1 K/UL — SIGNIFICANT CHANGE UP (ref 1–3.3)
LYMPHOCYTES # BLD AUTO: 30.6 % — SIGNIFICANT CHANGE UP (ref 13–44)
MCHC RBC-ENTMCNC: 27.5 PG — SIGNIFICANT CHANGE UP (ref 27–34)
MCHC RBC-ENTMCNC: 33.3 G/DL — SIGNIFICANT CHANGE UP (ref 32–36)
MCV RBC AUTO: 82.4 FL — SIGNIFICANT CHANGE UP (ref 80–100)
MONOCYTES # BLD AUTO: 0.2 K/UL — SIGNIFICANT CHANGE UP (ref 0–0.9)
MONOCYTES NFR BLD AUTO: 6.1 % — SIGNIFICANT CHANGE UP (ref 2–14)
NEUTROPHILS # BLD AUTO: 1.97 K/UL — SIGNIFICANT CHANGE UP (ref 1.8–7.4)
NEUTROPHILS NFR BLD AUTO: 60.3 % — SIGNIFICANT CHANGE UP (ref 43–77)
NRBC # BLD: 0 /100 WBCS — SIGNIFICANT CHANGE UP (ref 0–0)
PLATELET # BLD AUTO: 149 K/UL — LOW (ref 150–400)
POTASSIUM SERPL-MCNC: 4.1 MMOL/L — SIGNIFICANT CHANGE UP (ref 3.5–5.3)
POTASSIUM SERPL-SCNC: 4.1 MMOL/L — SIGNIFICANT CHANGE UP (ref 3.5–5.3)
PROT SERPL-MCNC: 6.6 G/DL — SIGNIFICANT CHANGE UP (ref 6–8.3)
RBC # BLD: 4.33 M/UL — SIGNIFICANT CHANGE UP (ref 3.8–5.2)
RBC # FLD: 14.3 % — SIGNIFICANT CHANGE UP (ref 10.3–14.5)
SODIUM SERPL-SCNC: 141 MMOL/L — SIGNIFICANT CHANGE UP (ref 135–145)
WBC # BLD: 3.27 K/UL — LOW (ref 3.8–10.5)
WBC # FLD AUTO: 3.27 K/UL — LOW (ref 3.8–10.5)

## 2024-09-07 LAB — CANCER AG27-29 SERPL-ACNC: 20.4 U/ML — SIGNIFICANT CHANGE UP (ref 0–38.6)

## 2024-10-01 ENCOUNTER — OUTPATIENT (OUTPATIENT)
Dept: OUTPATIENT SERVICES | Facility: HOSPITAL | Age: 49
LOS: 1 days | End: 2024-10-01
Payer: COMMERCIAL

## 2024-10-01 ENCOUNTER — APPOINTMENT (OUTPATIENT)
Dept: ENDOCRINOLOGY | Facility: CLINIC | Age: 49
End: 2024-10-01
Payer: COMMERCIAL

## 2024-10-01 VITALS
BODY MASS INDEX: 22.74 KG/M2 | HEART RATE: 67 BPM | DIASTOLIC BLOOD PRESSURE: 85 MMHG | WEIGHT: 154 LBS | TEMPERATURE: 97.7 F | SYSTOLIC BLOOD PRESSURE: 124 MMHG | OXYGEN SATURATION: 100 %

## 2024-10-01 VITALS
DIASTOLIC BLOOD PRESSURE: 83 MMHG | HEIGHT: 69 IN | WEIGHT: 156.09 LBS | SYSTOLIC BLOOD PRESSURE: 133 MMHG | RESPIRATION RATE: 16 BRPM | HEART RATE: 76 BPM | TEMPERATURE: 96 F | OXYGEN SATURATION: 99 %

## 2024-10-01 DIAGNOSIS — Z98.890 OTHER SPECIFIED POSTPROCEDURAL STATES: Chronic | ICD-10-CM

## 2024-10-01 DIAGNOSIS — Z01.818 ENCOUNTER FOR OTHER PREPROCEDURAL EXAMINATION: ICD-10-CM

## 2024-10-01 DIAGNOSIS — N65.0 DEFORMITY OF RECONSTRUCTED BREAST: ICD-10-CM

## 2024-10-01 DIAGNOSIS — N65.1 DISPROPORTION OF RECONSTRUCTED BREAST: ICD-10-CM

## 2024-10-01 DIAGNOSIS — E27.40 UNSPECIFIED ADRENOCORTICAL INSUFFICIENCY: ICD-10-CM

## 2024-10-01 DIAGNOSIS — Z85.3 PERSONAL HISTORY OF MALIGNANT NEOPLASM OF BREAST: ICD-10-CM

## 2024-10-01 DIAGNOSIS — Z90.13 ACQUIRED ABSENCE OF BILATERAL BREASTS AND NIPPLES: ICD-10-CM

## 2024-10-01 DIAGNOSIS — Z90.13 ACQUIRED ABSENCE OF BILATERAL BREASTS AND NIPPLES: Chronic | ICD-10-CM

## 2024-10-01 DIAGNOSIS — Z98.891 HISTORY OF UTERINE SCAR FROM PREVIOUS SURGERY: Chronic | ICD-10-CM

## 2024-10-01 DIAGNOSIS — Z92.89 PERSONAL HISTORY OF OTHER MEDICAL TREATMENT: ICD-10-CM

## 2024-10-01 DIAGNOSIS — Z95.828 PRESENCE OF OTHER VASCULAR IMPLANTS AND GRAFTS: Chronic | ICD-10-CM

## 2024-10-01 DIAGNOSIS — E03.9 HYPOTHYROIDISM, UNSPECIFIED: ICD-10-CM

## 2024-10-01 LAB
WBC # BLD: 3.13 K/UL — LOW (ref 3.8–10.5)
WBC # FLD AUTO: 3.13 K/UL — LOW (ref 3.8–10.5)

## 2024-10-01 PROCEDURE — 36415 COLL VENOUS BLD VENIPUNCTURE: CPT

## 2024-10-01 PROCEDURE — G0463: CPT

## 2024-10-01 PROCEDURE — 85048 AUTOMATED LEUKOCYTE COUNT: CPT

## 2024-10-01 PROCEDURE — 99214 OFFICE O/P EST MOD 30 MIN: CPT

## 2024-10-01 PROCEDURE — 93010 ELECTROCARDIOGRAM REPORT: CPT | Mod: NC

## 2024-10-01 PROCEDURE — 93005 ELECTROCARDIOGRAM TRACING: CPT

## 2024-10-01 NOTE — HISTORY OF PRESENT ILLNESS
[FreeTextEntry1] : 49 year old female with PMH of L) breast cancer and hypothyroidism, presents for work up of potential adrenal insufficiency  Interim history Feeling well Cosmetic surgery for breast in October 22nd 2024 (4-5hr surgery) Surgeon Kael Thompson 4 week recovery likely  #L) breast cancer (triple negative) -Diagnosed 20233 -Treatment Taxol/carbo (12/2/22-2/28/23) /pembro -> last dose of keytruda in December 2023 (12/2/22-11/21/23) Adrimycin/cyclophosphamide 3/23/23-5/9/23 Bilateral mastectomy w JOHN flap May 2023 Xeloda 11/1/23 only for 2 weeks due to low blood counts  #Primary hypothyroidism -Diagnosed in hospital admission in Jan 5th - possibly secondary to keytruda (normal TFTs in November) -TSH 8.35 FT4 0.9 -Commenced on levothyroxine 50mcg daily, latest TFTs wnl  #? Adrenal insufficiency ? secondary to chronic steroids as part of chemo vs 2/2 keytruda December 23 -> started vomiting presented to oncology office, collapsed sent to [facility] (weak, hypoglycemic) 7am cortisol 0.5 no ACTH Started on higher dose 20/10 -> 15mg morning, 7.5mg 3PM (had hallucinations on higher doses) -> 10/5mg currently Last dose of steroids in late November as per patient Patient feels much better now  Repeat testing in end of May 2024 (approx 5 months post diagnosis) Cortisol remains <1 with low ACTH

## 2024-10-01 NOTE — H&P PST ADULT - HISTORY OF PRESENT ILLNESS
Patient is a 49 year old F with PMHX of adrenal insufficiency, breast cancer (s/p chemo 5/2023), double mastectomy with JOHN FLAP) who presents for perioperative testing for breast reconstruction revision and removal of port.  Pt feels well today and denies any acute symptoms.

## 2024-10-01 NOTE — H&P PST ADULT - PROBLEM SELECTOR PROBLEM 2
Acquired hypothyroidism You can access the FollowMyHealth Patient Portal offered by [facility] by registering at the following website: [email or web address]. By joining [employer]’s FollowMyHealth portal, you will also be able to view your health information using other applications (apps) compatible with our system.

## 2024-10-01 NOTE — ASSESSMENT
[FreeTextEntry1] : 49 year old female with PMH of L) breast cancer and hypothyroidism, presents for work up of potential adrenal insufficiency  #Likely secondary adrenal insufficiency -LIkely 2/2 keytruda -Continue 10mg morning and 5mg PM -Will reassess axis again at 12 months (Dec 2024) to see if patient requires ongoing hydrocortisone replacement - pt will hold PM dose and check labs in the AM -Requires IV stress dosing olga-op - will liase with surgeon directly with plan -> Given her history of adrenal insufficiency would recommend: 50mg IV hydrocortisone on induction and the patient will triple her oral doses of hydrocortisone for 3 days before dropping back down to her maintenance dose of 10mg morning 5mg in the PM  Patient has prior history of hallucinations with high doses of hydrocortisone so have advised patient to have someone at home with her post discharge for first 24 hours.  -Patient educated on sick day dosing (double doses for 3-5 days), has solu-cortef IM at home and advised to get a medi-alert bracelet  #Hypothyrodiism -Possibly due to keytruda -Continue 50mcg levothyroxine daily for now  #History of immunotherapy -Last dose of keytruda December 2023 -Patient educated that there is risk of endocrinopathies for up to 12 months post last dose of keytruda -Patient educated on red flag symptoms to look out for that would require ED -Will continue to monitor TFTs and glucose intermittently  Review in 3 months

## 2024-10-01 NOTE — HISTORY OF PRESENT ILLNESS
[FreeTextEntry1] : 49 year old female with PMH of L) breast cancer and hypothyroidism, presents for work up of potential adrenal insufficiency  Interim history Feeling well Cosmetic surgery for breast in October 22nd 2024 (4-5hr surgery) Surgeon Kael Thompson 4 week recovery likely  #L) breast cancer (triple negative) -Diagnosed 20233 -Treatment Taxol/carbo (12/2/22-2/28/23) /pembro -> last dose of keytruda in December 2023 (12/2/22-11/21/23) Adrimycin/cyclophosphamide 3/23/23-5/9/23 Bilateral mastectomy w JOHN flap May 2023 Xeloda 11/1/23 only for 2 weeks due to low blood counts  #Primary hypothyroidism -Diagnosed in hospital admission in Jan 5th - possibly secondary to keytruda (normal TFTs in November) -TSH 8.35 FT4 0.9 -Commenced on levothyroxine 50mcg daily, latest TFTs wnl  #? Adrenal insufficiency ? secondary to chronic steroids as part of chemo vs 2/2 keytruda December 23 -> started vomiting presented to oncology office, collapsed sent to [facility] (weak, hypoglycemic) 7am cortisol 0.5 no ACTH Started on higher dose 20/10 -> 15mg morning, 7.5mg 3PM (had hallucinations on higher doses) -> 10/5mg currently Last dose of steroids in late November as per patient Patient feels much better now  Repeat testing in end of May 2024 (approx 5 months post diagnosis) Cortisol remains <1 with low ACTH

## 2024-10-18 RX ORDER — OXYCODONE AND ACETAMINOPHEN 5; 325 MG/1; MG/1
5-325 TABLET ORAL
Qty: 15 | Refills: 0 | Status: ACTIVE | COMMUNITY
Start: 2024-10-18 | End: 1900-01-01

## 2024-10-18 RX ORDER — IBUPROFEN 800 MG/1
800 TABLET, FILM COATED ORAL EVERY 6 HOURS
Qty: 15 | Refills: 0 | Status: ACTIVE | COMMUNITY
Start: 2024-10-18 | End: 1900-01-01

## 2024-10-21 ENCOUNTER — APPOINTMENT (OUTPATIENT)
Dept: PLASTIC SURGERY | Facility: CLINIC | Age: 49
End: 2024-10-21
Payer: COMMERCIAL

## 2024-10-21 VITALS
TEMPERATURE: 97.6 F | HEART RATE: 81 BPM | WEIGHT: 154 LBS | BODY MASS INDEX: 22.81 KG/M2 | SYSTOLIC BLOOD PRESSURE: 106 MMHG | DIASTOLIC BLOOD PRESSURE: 74 MMHG | OXYGEN SATURATION: 96 % | HEIGHT: 69 IN

## 2024-10-21 VITALS — WEIGHT: 154 LBS | BODY MASS INDEX: 22.81 KG/M2 | HEIGHT: 69 IN

## 2024-10-21 PROCEDURE — 99212 OFFICE O/P EST SF 10 MIN: CPT

## 2024-10-21 NOTE — ASU PATIENT PROFILE, ADULT - INTERNATIONAL TRAVEL
Parker Acevedo  [id number]  male  48 year old      Reason for procedure/surgery: Bacteremia (strep bovus)    Patient Active Problem List   Diagnosis     Peptic ulcer disease     Gastric bypass status for obesity     Chronic abdominal pain     Vomiting     Anemia     ADHD (attention deficit hyperactivity disorder), inattentive type     Vitamin B12 deficiency without anemia     Thiamine deficiency     Dehydration     Dysphagia     Weight loss, non-intentional     Malnutrition (H)     Chronic anxiety     Constipation     Bile reflux esophagitis     Former smoker     Vitamin D deficiency     Iron deficiency     Health Care Home     Chronic pain     Insomnia     Positive blood culture     Fungemia     Chronic nausea     Short gut syndrome     Anxiety     Status post cervical spinal arthrodesis     Port or reservoir infection, initial encounter     Abnormal echocardiogram     Low serum iron     Anemia, iron deficiency     Catheter-related bloodstream infection (CRBSI)     Generalized weakness     S/P bariatric surgery     Hyperlipidemia LDL goal <130     Bacteremia     Infection by Candida species     PICC line infiltration, sequela     Gram-positive bacteremia     On total parenteral nutrition     Gastric outlet obstruction     Short bowel syndrome     Bloodstream infection associated with central venous catheter, initial encounter     Fever, unknown origin     Acute deep vein thrombosis (DVT) of axillary vein of right upper extremity (H)       Past Surgical History:    Past Surgical History:   Procedure Laterality Date     AMPUTATION       APPENDECTOMY       BACK SURGERY  11/3/2014    curve in the spine     BIOPSY LYMPH NODE CERVICAL N/A 2/20/2015    Procedure: BIOPSY LYMPH NODE CERVICAL;  Surgeon: Baron Scanlon MD;  [facility]     CHOLECYSTECTOMY       COLONOSCOPY N/A 7/14/2021    Procedure: COLONOSCOPY;  Surgeon: Jimbo Estrada MD;  Location: [facility] OR     DISCECTOMY, FUSION CERVICAL ANTERIOR  ONE LEVEL, COMBINED N/A 2/15/2017    Procedure: COMBINED DISCECTOMY, FUSION CERVICAL ANTERIOR ONE LEVEL;  Surgeon: Darren Campos MD;  [facility]     ENDOSCOPIC INSERTION TUBE GASTROSTOMY  9/9/2013    Procedure: ENDOSCOPIC INSERTION TUBE GASTROSTOMY;;  Surgeon: Francis Vyas MD;  [facility]     ENDOSCOPIC ULTRASOUND UPPER GASTROINTESTINAL TRACT (GI)  4/29/2011    Procedure:ENDOSCOPIC ULTRASOUND UPPER GASTROINTESTINAL TRACT (GI); Both Procedures done Conjointly; Surgeon:NEREIDA HOUSER; [facility]     ENDOSCOPIC ULTRASOUND UPPER GASTROINTESTINAL TRACT (GI)  9/9/2013    Procedure: ENDOSCOPIC ULTRASOUND UPPER GASTROINTESTINAL TRACT (GI);  Endoscopic Ultrasound Guide Gastrostomy Tube Placement  C-arm;  Surgeon: Noe Lizarraga MD;  [facility]     ENDOSCOPIC ULTRASOUND UPPER GASTROINTESTINAL TRACT (GI) N/A 2/24/2021    Procedure: ENDOSCOPIC ULTRASOUND, ESOPHAGOSCOPY / UPPER GASTROINTESTINAL TRACT (GI), esophagastrogastroduodenoscopy;  Surgeon: Berny Bach MD;  [facility]     ENDOSCOPY  03/25/11    EGD, [facility]     ENDOSCOPY  08/04/09    Upper Endoscopy, [facility]     ENDOSCOPY  01/05/09    Upper Endoscopy, [facility]     ESOPHAGOSCOPY, GASTROSCOPY, DUODENOSCOPY (EGD), COMBINED  4/20/2011    Procedure:COMBINED ESOPHAGOSCOPY, GASTROSCOPY, DUODENOSCOPY (EGD); Surgeon:FRANCIS VYAS; [facility]     ESOPHAGOSCOPY, GASTROSCOPY, DUODENOSCOPY (EGD), COMBINED  6/15/2011    Procedure:COMBINED ESOPHAGOSCOPY, GASTROSCOPY, DUODENOSCOPY (EGD); Surgeon:FRANCIS VYAS; [facility]     ESOPHAGOSCOPY, GASTROSCOPY, DUODENOSCOPY (EGD), COMBINED  6/12/2013    Procedure: COMBINED ESOPHAGOSCOPY, GASTROSCOPY, DUODENOSCOPY (EGD);;  Surgeon: Francis Vyas MD;  [facility]     ESOPHAGOSCOPY, GASTROSCOPY, DUODENOSCOPY (EGD), COMBINED  11/22/2013    Procedure: COMBINED ESOPHAGOSCOPY, GASTROSCOPY, DUODENOSCOPY (EGD);;  Surgeon: Lilliam  Francis Castanon MD;  [facility]     ESOPHAGOSCOPY, GASTROSCOPY, DUODENOSCOPY (EGD), COMBINED  4/30/2014    Procedure: COMBINED ESOPHAGOSCOPY, GASTROSCOPY, DUODENOSCOPY (EGD);  Surgeon: Francis Vyas MD;  Location:  GI     ESOPHAGOSCOPY, GASTROSCOPY, DUODENOSCOPY (EGD), COMBINED N/A 2/20/2015    Procedure: COMBINED ESOPHAGOSCOPY, GASTROSCOPY, DUODENOSCOPY (EGD), BIOPSY SINGLE OR MULTIPLE;  Surgeon: Baron Scanlon MD;  Location:  OR     ESOPHAGOSCOPY, GASTROSCOPY, DUODENOSCOPY (EGD), COMBINED N/A 9/30/2015    Procedure: COMBINED ESOPHAGOSCOPY, GASTROSCOPY, DUODENOSCOPY (EGD);  Surgeon: Francis Vyas MD;  Location:  GI     ESOPHAGOSCOPY, GASTROSCOPY, DUODENOSCOPY (EGD), COMBINED N/A 10/3/2019    Procedure: Upper Endoscopy;  Surgeon: Clif Morrow MD;  Location:  OR     GASTRECTOMY  6/22/2012    Procedure: GASTRECTOMY;  Open Approach, Excise Ulcers,Partial Gastrectomy, Esophagojejunostomy, Hiatal Hernia Repair, Extensive Lysis of Adhesions and Esaphagogastrodudenoscopy.;  Surgeon: Francis Vyas MD;  [facility]     GASTROJEJUNOSTOMY  08/26/09    Extensice enterolysis, partial resect. jejunum, part. resect gastric pouch, gastrojejunostomy anastomosis     HC ESOPH/GAS REFLUX TEST W NASAL IMPED ELECTRODE  8/5/2013    Procedure: ESOPHAGEAL IMPEDENCE FUNCTION TEST 1 HOUR OR LESS;  Surgeon: Halie Lang MD;  Location:  GI     HEAD & NECK SURGERY  2/15/2017    C5-C6     HERNIA REPAIR  2006    Umbilical hernia     HERNIORRHAPHY HIATAL  6/22/2012    Procedure: HERNIORRHAPHY HIATAL;;  Surgeon: Francis Vyas MD;  Location:  OR     HERNIORRHAPHY INGUINAL  11/22/2013    Procedure: HERNIORRHAPHY INGUINAL;;  Surgeon: Francis Vyas MD;  Location:  OR     INSERT PICC LINE Right 12/19/2019    Procedure: Picc Placement;  Surgeon: Per Dumont PA-C;  [facility]     INSERT PICC LINE Right 2/21/2020    Procedure: INSERTION, PICC;  Surgeon: Julia  Per Bhardwaj PA-C;  [facility]     INSERT PORT VASCULAR ACCESS Right 12/19/2017    Procedure: INSERT PORT VASCULAR ACCESS;  Right Chest Port Placement ;  Surgeon: Lisandro Alejandro PA-C;  [facility]     INSERT PORT VASCULAR ACCESS Right 8/2/2018    Procedure: INSERT PORT VASCULAR ACCESS;  Place single lumen tunneled central venous access catheter;  Surgeon: Guy Jamil PA-C;  [facility]     IR CVC TUNNEL PLACEMENT > 5 YRS OF AGE  8/7/2019     IR CVC TUNNEL PLACEMENT > 5 YRS OF AGE  4/14/2020     IR CVC TUNNEL PLACEMENT > 5 YRS OF AGE  8/3/2020     IR CVC TUNNEL PLACEMENT > 5 YRS OF AGE  9/4/2020     IR CVC TUNNEL PLACEMENT > 5 YRS OF AGE  2/5/2021     IR CVC TUNNEL PLACEMENT > 5 YRS OF AGE  3/23/2021     IR CVC TUNNEL PLACEMENT > 5 YRS OF AGE  1/13/2022     IR CVC TUNNEL REMOVAL RIGHT  10/1/2019     IR CVC TUNNEL REMOVAL RIGHT  7/30/2020     IR CVC TUNNEL REMOVAL RIGHT  9/2/2020     IR CVC TUNNEL REMOVAL RIGHT  2/3/2021     IR CVC TUNNEL REMOVAL RIGHT  3/19/2021     IR CVC TUNNEL REMOVAL RIGHT  1/10/2022     IR CVC TUNNEL REVISION RIGHT  5/7/2021     IR FOLLOW UP VISIT OUTPATIENT  8/7/2019     IR PICC PLACEMENT > 5 YRS OF AGE  3/7/2019     IR PICC PLACEMENT > 5 YRS OF AGE  12/19/2019     IR PICC PLACEMENT > 5 YRS OF AGE  2/21/2020     LAPAROTOMY EXPLORATORY  11/22/2013    Procedure: LAPAROTOMY EXPLORATORY;  Exploratory Laparotomy, Upper Endoscopy, Left Inguinal Hernia Repair;  Surgeon: Francis Vyas MD;  [facility]     ORTHOPEDIC SURGERY       PICC INSERTION Right 03/16/2017    5fr DL BioFlo PICC, 42cm (3cm external) in the R medial brachial vein w/ tip in the SVC RA junction.     PICC INSERTION Left 09/23/2017    5fr DL BioFlo PICC, 45cm (1cm external) in the L basilic vein w/ tip in the SVC RA junction.     PICC INSERTION Right 05/16/2019    5Fr - 43cm, Medial brachial vein, low SVC     PICC INSERTION Right 10/02/2019    5Fr - 43cm (2cm external), basilic vein, low SVC      SHAYLEE EN Y BOWEL  2003     SOFT TISSUE SURGERY       THORACIC SURGERY       TONSILLECTOMY       TRANSESOPHAGEAL ECHOCARDIOGRAM INTRAOPERATIVE N/A 1/8/2019    Procedure: TRANSESOPHAGEAL ECHOCARDIOGRAM INTRAOPERATIVE;  Surgeon: GENERIC ANESTHESIA PROVIDER;  [facility]     [facility] GASTRIC BYPASS,OBESE<100CM SHAYLEE-EN-Y  2002    lost 300 pounds       Past Medical History:   Past Medical History:   Diagnosis Date     ADHD (attention deficit hyperactivity disorder)      Anxiety      Cardiomyopathy in nutritional diseases (H)     mild EF ~45% on rest 2/13/17, improves with stressing     Chronic abdominal pain      CLABSI (central line-associated bloodstream infection)     recurrent     Complication of anesthesia      Difficulty swallowing      Gastric ulcer, unspecified as acute or chronic, without mention of hemorrhage, perforation, or obstruction      Gastro-oesophageal reflux disease      Head injury      Hiatal hernia      Other bladder disorder      Other chronic pain      PONV (postoperative nausea and vomiting)      Severe malnutrition (H)     TPN     Short gut syndrome      Tobacco abuse        Social History:   Social History     Tobacco Use     Smoking status: Light Tobacco Smoker     Packs/day: 0.10     Years: 3.00     Pack years: 0.30     Types: Cigarettes     Smokeless tobacco: Never Used     Tobacco comment: 2/4/2021    smokes 3 cigarettes/day   Substance Use Topics     Alcohol use: No     Comment: quit 2002       Family History:   Family History   Problem Relation Age of Onset     Gastrointestinal Disease Mother         Crohns disease     Anxiety Disorder Mother      Thyroid Disease Mother         Grave's disease     Cancer Father         ear cancer-skin cancer/melanoma     Breast Cancer Maternal Grandmother      Macular Degeneration Maternal Grandfather      Anxiety Disorder Sister      Diabetes Maternal Uncle      Breast Cancer Other      Hypertension No family hx of      Hyperlipidemia No family hx of       Cerebrovascular Disease No family hx of      Prostate Cancer No family hx of      Depression No family hx of      Anesthesia Reaction No family hx of      Asthma No family hx of      Osteoporosis No family hx of      Genetic Disorder No family hx of      Obesity No family hx of      Mental Illness No family hx of      Substance Abuse No family hx of      Glaucoma No family hx of        Allergies:   Allergies   Allergen Reactions     Bactrim [Sulfamethoxazole W/Trimethoprim] Rash     Penicillins Anaphylaxis     Please see Antimicrobial Management Team allergy assessment note 10/10/2018. Patient reported tolerating amoxicillin.     Doxycycline Rash     Vancomycin Rash     Rash after receiving vancomycin 3/28/16 (infusion reaction?). Tolerated with slower infusion and diphenhydramine premed.       Active Medications:   Current Outpatient Medications   Medication Sig Dispense Refill     acetaminophen (TYLENOL) 32 mg/mL liquid Take 15.65 mLs (500 mg) by mouth every 4 hours as needed for fever or mild pain 455 mL 1     albuterol (PROAIR HFA/PROVENTIL HFA/VENTOLIN HFA) 108 (90 Base) MCG/ACT inhaler Inhale 2 puffs into the lungs every 6 hours       albuterol (PROAIR HFA/PROVENTIL HFA/VENTOLIN HFA) 108 (90 Base) MCG/ACT inhaler Inhale 2 puffs into the lungs every 6 hours as needed       amphetamine-dextroamphetamine (ADDERALL) 20 MG tablet TAKE ONE TABLET BY MOUTH ONCE DAILY 30 tablet 0     carvedilol (COREG) 6.25 MG tablet Take 12.5 mg by mouth 2 times daily (with meals)        cyanocobalamin (CYANOCOBALAMIN) 1000 MCG/ML injection INJECT 1 ML INTO THE MUSCLE EVERY 30 DAYS 1 mL 3     diphenhydrAMINE (BENADRYL) 12.5 MG/5ML syrup Take 25 mg by mouth every 4 hours as needed for itching, allergies or sleep 237 mL 0     enoxaparin ANTICOAGULANT (LOVENOX) 120 MG/0.8ML syringe Inject 0.8 mLs (120 mg) Subcutaneous daily 30 mL 3     enoxaparin ANTICOAGULANT (LOVENOX) 80 MG/0.8ML syringe Inject 0.8 mLs (80 mg) Subcutaneous 2 times  "daily 75 mL 1     EPINEPHrine (ANY BX GENERIC EQUIV) 0.3 MG/0.3ML injection 2-pack        [facility] MANAGED PATIENT Contact [facility] for patient specific medication information at [phone] on admission and discharge from the hospital.  Phones are answered 24 hours a day 7 days a week 365 days a year.    Providers - Choose \"CONTINUE HOME MED (no script)\" at discharge if patient treatment with home infusion will continue.       fentaNYL (DURAGESIC) 25 mcg/hr 72 hr patch Place 1 patch onto the skin every 48 hours remove old patch. Fill Fill 03/31/22 and start 04/02/22 15 patch 0     insulin syringe-needle U-100 (29G X 1/2\" 1 ML) 29G X 1/2\" 1 ML miscellaneous Use to inject b12 every 30 days 3 each 4     lactated ringers infusion Inject 1,000-2,000 mLs into the vein daily as needed       lidocaine (LIDODERM) 5 % patch Place 1-2 patches onto the skin every 24 hours Wear for 12 hours, remove for 12 hours.  OK to cut to better fit to size. 60 patch 6     LORazepam (ATIVAN) 1 MG tablet TAKE 1 TABLET (1MG) BY MOUTH AS NEEDED FOR ANXIETY WITH TPN AND MEDICATIONS . JUST ONCE A DAY (30 TO LAST 30 DAYS) 30 tablet 0     naloxone (NARCAN) 4 MG/0.1ML nasal spray Spray 1 spray (4 mg) into one nostril alternating nostrils once as needed for opioid reversal every 2-3 minutes until assistance arrives 0.2 mL 0     nystatin (MYCOSTATIN) 034699 UNIT/GM external cream APPLY TOPICALLY 2 TIMES DAILY 30 g 0     ondansetron (ZOFRAN-ODT) 8 MG ODT tab DISSOLVE ONE TABLET ON TONGUE EVERY 8 HOURS AS NEEDED FOR NAUSEA 90 tablet 1     oxyCODONE (ROXICODONE) 5 MG/5ML solution Take 5-10 mLs (5-10 mg) by mouth 3 times daily as needed for pain Max of 30mg/day. Put at least 4 hours between doses. Fill 03/31/22 and start 04/02/22. 30 day supply. 900 mL 0     pantoprazole (PROTONIX) 40 MG EC tablet Take 1 tablet (40 mg) by mouth daily 30 tablet 5     parenteral nutrition - PTA/DISCHARGE ORDER Patient receives 2050 mL TPN " No "every 14 hours through PICC at [facility].       polyethylene glycol (MIRALAX) 17 GM/Dose powder Take 17 g by mouth daily 1020 g 3     sucralfate (CARAFATE) 1 GM/10ML suspension TAKE 10MLS  BY MOUTH FOUR TIMES A DAY AS NEEDED 420 mL 1     VENTOLIN  (90 Base) MCG/ACT inhaler INHALE TWO PUFFS BY MOUTH EVERY 4 HOURS AS NEEDED FOR SHORTNESS OF BREATH /DYSPNEA OR WHEEZING 18 g 0     vitamin D2 (ERGOCALCIFEROL) 12542 units (1250 mcg) capsule TAKE 1 CAPSULE (50,000 UNITS) BY MOUTH ONCE A WEEK 12 capsule 3       Systemic Review:   CONSTITUTIONAL: NEGATIVE for fever, chills, change in weight  ENT/MOUTH: NEGATIVE for ear, mouth and throat problems  RESP: NEGATIVE for significant cough or SOB  CV: NEGATIVE for chest pain, palpitations or peripheral edema    Physical Examination:   Vital Signs: /78   Pulse 71   Temp 98.2  F (36.8  C) (Skin)   Resp 18   Ht 1.803 m (5' 11\")   Wt 86.2 kg (190 lb)   SpO2 100%   BMI 26.50 kg/m    GENERAL: healthy, alert and no distress  NECK: no adenopathy, no asymmetry, masses, or scars  RESP: lungs clear to auscultation - no rales, rhonchi or wheezes  CV: regular rate and rhythm, normal S1 S2, no S3 or S4, no murmur, click or rub, no peripheral edema and peripheral pulses strong  ABDOMEN: soft, nontender, no hepatosplenomegaly, no masses and bowel sounds normal  MS: no gross musculoskeletal defects noted, no edema    Plan: Appropriate to proceed as scheduled.      Jimbo Estrada MD  4/13/2022    PCP:  Chuy Isaac    "

## 2024-10-21 NOTE — ASU PATIENT PROFILE, ADULT - NSICDXPASTSURGICALHX_GEN_ALL_CORE_FT
PAST SURGICAL HISTORY:  H/O bilateral breast biopsy markers both breasts    Port-A-Cath in place right chest    S/P bilateral mastectomy JOHN flap/ bilateral implants    S/P  X1

## 2024-10-22 ENCOUNTER — TRANSCRIPTION ENCOUNTER (OUTPATIENT)
Age: 49
End: 2024-10-22

## 2024-10-22 ENCOUNTER — RESULT REVIEW (OUTPATIENT)
Age: 49
End: 2024-10-22

## 2024-10-22 ENCOUNTER — OUTPATIENT (OUTPATIENT)
Dept: OUTPATIENT SERVICES | Facility: HOSPITAL | Age: 49
LOS: 1 days | End: 2024-10-22
Payer: COMMERCIAL

## 2024-10-22 ENCOUNTER — APPOINTMENT (OUTPATIENT)
Dept: PLASTIC SURGERY | Facility: HOSPITAL | Age: 49
End: 2024-10-22
Payer: COMMERCIAL

## 2024-10-22 ENCOUNTER — APPOINTMENT (OUTPATIENT)
Dept: SURGICAL ONCOLOGY | Facility: HOSPITAL | Age: 49
End: 2024-10-22

## 2024-10-22 VITALS
DIASTOLIC BLOOD PRESSURE: 70 MMHG | OXYGEN SATURATION: 98 % | SYSTOLIC BLOOD PRESSURE: 104 MMHG | TEMPERATURE: 97 F | RESPIRATION RATE: 16 BRPM | HEART RATE: 87 BPM

## 2024-10-22 VITALS
WEIGHT: 156.09 LBS | DIASTOLIC BLOOD PRESSURE: 70 MMHG | RESPIRATION RATE: 16 BRPM | TEMPERATURE: 97 F | HEART RATE: 80 BPM | HEIGHT: 69 IN | SYSTOLIC BLOOD PRESSURE: 100 MMHG | OXYGEN SATURATION: 99 %

## 2024-10-22 DIAGNOSIS — Z98.891 HISTORY OF UTERINE SCAR FROM PREVIOUS SURGERY: Chronic | ICD-10-CM

## 2024-10-22 DIAGNOSIS — Z90.13 ACQUIRED ABSENCE OF BILATERAL BREASTS AND NIPPLES: ICD-10-CM

## 2024-10-22 DIAGNOSIS — Z98.890 OTHER SPECIFIED POSTPROCEDURAL STATES: Chronic | ICD-10-CM

## 2024-10-22 DIAGNOSIS — Z95.828 PRESENCE OF OTHER VASCULAR IMPLANTS AND GRAFTS: Chronic | ICD-10-CM

## 2024-10-22 DIAGNOSIS — N65.1 DISPROPORTION OF RECONSTRUCTED BREAST: ICD-10-CM

## 2024-10-22 DIAGNOSIS — C50.919 MALIGNANT NEOPLASM OF UNSPECIFIED SITE OF UNSPECIFIED FEMALE BREAST: ICD-10-CM

## 2024-10-22 DIAGNOSIS — N65.0 DEFORMITY OF RECONSTRUCTED BREAST: ICD-10-CM

## 2024-10-22 DIAGNOSIS — Z85.3 PERSONAL HISTORY OF MALIGNANT NEOPLASM OF BREAST: ICD-10-CM

## 2024-10-22 DIAGNOSIS — Z90.13 ACQUIRED ABSENCE OF BILATERAL BREASTS AND NIPPLES: Chronic | ICD-10-CM

## 2024-10-22 PROCEDURE — 36590 REMOVAL TUNNELED CV CATH: CPT | Mod: RT

## 2024-10-22 PROCEDURE — 19380 REVJ RECONSTRUCTED BREAST: CPT | Mod: LT

## 2024-10-22 PROCEDURE — 22901 EXC ABDL TUM DEEP 5 CM/>: CPT

## 2024-10-22 PROCEDURE — 15734 MUSCLE-SKIN GRAFT TRUNK: CPT

## 2024-10-22 PROCEDURE — 88300 SURGICAL PATH GROSS: CPT

## 2024-10-22 PROCEDURE — 36590 REMOVAL TUNNELED CV CATH: CPT

## 2024-10-22 PROCEDURE — 15877 SUCTION LIPECTOMY TRUNK: CPT

## 2024-10-22 PROCEDURE — 19380 REVJ RECONSTRUCTED BREAST: CPT | Mod: 50

## 2024-10-22 PROCEDURE — 88300 SURGICAL PATH GROSS: CPT | Mod: 26

## 2024-10-22 RX ORDER — HYDROMORPHONE HCL/0.9% NACL/PF 6 MG/30 ML
0.5 PATIENT CONTROLLED ANALGESIA SYRINGE INTRAVENOUS ONCE
Refills: 0 | Status: DISCONTINUED | OUTPATIENT
Start: 2024-10-22 | End: 2024-10-22

## 2024-10-22 RX ORDER — HYDROCORTISONE 10 MG/1
50 TABLET ORAL ONCE
Refills: 0 | Status: DISCONTINUED | OUTPATIENT
Start: 2024-10-22 | End: 2024-10-22

## 2024-10-22 RX ORDER — HYDROCORTISONE 10 MG/1
2 TABLET ORAL
Refills: 0 | DISCHARGE

## 2024-10-22 RX ORDER — HYDROMORPHONE HCL/0.9% NACL/PF 6 MG/30 ML
1 PATIENT CONTROLLED ANALGESIA SYRINGE INTRAVENOUS ONCE
Refills: 0 | Status: DISCONTINUED | OUTPATIENT
Start: 2024-10-22 | End: 2024-10-22

## 2024-10-22 RX ORDER — ONDANSETRON HYDROCHLORIDE 2 MG/ML
4 INJECTION, SOLUTION INTRAMUSCULAR; INTRAVENOUS ONCE
Refills: 0 | Status: DISCONTINUED | OUTPATIENT
Start: 2024-10-22 | End: 2024-10-22

## 2024-10-22 RX ADMIN — Medication 50 MILLILITER(S): at 06:00

## 2024-10-22 NOTE — ASU DISCHARGE PLAN (ADULT/PEDIATRIC) - FINANCIAL ASSISTANCE
[facility] provides services at a reduced cost to those who are determined to be eligible through [facility]’s financial assistance program. Information regarding [facility]’s financial assistance program can be found by going to [email or web address] or by calling [phone].

## 2024-10-22 NOTE — ASU DISCHARGE PLAN (ADULT/PEDIATRIC) - ASU DC SPECIAL INSTRUCTIONSFT
Activity:  No heavy lifting or strenuous activity.    Diet: Progress slowly.    Play shower in 48 hours.    Medications: May start Ibuprofen in 48 hrs. May take Percocet as needed. Activity:  No heavy lifting or strenuous activity.    Diet: Progress slowly.    Play shower in 48 hours.    Medications: Eat before taking medications. May start Ibuprofen in 48 hrs. May take Percocet as needed. Activity:  No heavy lifting or strenuous activity.    Diet: Progress slowly.    May shower in 48 hours.    Medications: Eat before taking medications. May start Ibuprofen in 48 hrs. May take Percocet as needed.

## 2024-10-22 NOTE — ASU DISCHARGE PLAN (ADULT/PEDIATRIC) - CALL YOUR DOCTOR IF YOU HAVE ANY OF THE FOLLOWING:
Bleeding that does not stop/Swelling that gets worse/Pain not relieved by Medications/Fever greater than (need to indicate Fahrenheit or Celsius)/Wound/Surgical Site with redness, or foul smelling discharge or pus/Numbness, tingling, color or temperature change to extremity/Nausea and vomiting that does not stop/Unable to urinate Bleeding that does not stop/Pain not relieved by Medications/Fever greater than (need to indicate Fahrenheit or Celsius)/Wound/Surgical Site with redness, or foul smelling discharge or pus/Numbness, tingling, color or temperature change to extremity/Nausea and vomiting that does not stop/Unable to urinate

## 2024-10-23 LAB — SURGICAL PATHOLOGY STUDY: SIGNIFICANT CHANGE UP

## 2024-10-25 RX ORDER — SULFAMETHOXAZOLE AND TRIMETHOPRIM 800; 160 MG/1; MG/1
800-160 TABLET ORAL TWICE DAILY
Qty: 14 | Refills: 0 | Status: ACTIVE | COMMUNITY
Start: 2024-10-25 | End: 1900-01-01

## 2024-10-28 ENCOUNTER — APPOINTMENT (OUTPATIENT)
Dept: PLASTIC SURGERY | Facility: CLINIC | Age: 49
End: 2024-10-28
Payer: COMMERCIAL

## 2024-10-28 PROCEDURE — 99024 POSTOP FOLLOW-UP VISIT: CPT

## 2024-11-01 ENCOUNTER — APPOINTMENT (OUTPATIENT)
Dept: DERMATOLOGY | Facility: CLINIC | Age: 49
End: 2024-11-01

## 2024-11-01 DIAGNOSIS — R58 HEMORRHAGE, NOT ELSEWHERE CLASSIFIED: ICD-10-CM

## 2024-11-01 DIAGNOSIS — L90.5 SCAR CONDITIONS AND FIBROSIS OF SKIN: ICD-10-CM

## 2024-11-01 PROCEDURE — 99203 OFFICE O/P NEW LOW 30 MIN: CPT

## 2024-11-04 ENCOUNTER — APPOINTMENT (OUTPATIENT)
Dept: PLASTIC SURGERY | Facility: CLINIC | Age: 49
End: 2024-11-04
Payer: COMMERCIAL

## 2024-11-04 VITALS
WEIGHT: 153 LBS | HEART RATE: 77 BPM | TEMPERATURE: 97.9 F | SYSTOLIC BLOOD PRESSURE: 111 MMHG | RESPIRATION RATE: 16 BRPM | OXYGEN SATURATION: 100 % | BODY MASS INDEX: 22.66 KG/M2 | DIASTOLIC BLOOD PRESSURE: 75 MMHG | HEIGHT: 69 IN

## 2024-11-04 DIAGNOSIS — Z90.13 ACQUIRED ABSENCE OF BILATERAL BREASTS AND NIPPLES: ICD-10-CM

## 2024-11-04 PROCEDURE — 99024 POSTOP FOLLOW-UP VISIT: CPT

## 2024-11-13 ENCOUNTER — APPOINTMENT (OUTPATIENT)
Dept: SURGICAL ONCOLOGY | Facility: CLINIC | Age: 49
End: 2024-11-13
Payer: COMMERCIAL

## 2024-11-13 VITALS
OXYGEN SATURATION: 98 % | HEART RATE: 66 BPM | WEIGHT: 153 LBS | BODY MASS INDEX: 22.66 KG/M2 | DIASTOLIC BLOOD PRESSURE: 70 MMHG | HEIGHT: 69 IN | SYSTOLIC BLOOD PRESSURE: 110 MMHG

## 2024-11-13 DIAGNOSIS — C50.919 MALIGNANT NEOPLASM OF UNSPECIFIED SITE OF UNSPECIFIED FEMALE BREAST: ICD-10-CM

## 2024-11-13 PROCEDURE — 99212 OFFICE O/P EST SF 10 MIN: CPT

## 2024-11-20 ENCOUNTER — APPOINTMENT (OUTPATIENT)
Dept: PLASTIC SURGERY | Facility: CLINIC | Age: 49
End: 2024-11-20
Payer: COMMERCIAL

## 2024-11-20 VITALS
TEMPERATURE: 97.7 F | SYSTOLIC BLOOD PRESSURE: 108 MMHG | DIASTOLIC BLOOD PRESSURE: 75 MMHG | OXYGEN SATURATION: 99 % | WEIGHT: 153 LBS | HEART RATE: 76 BPM | RESPIRATION RATE: 16 BRPM | HEIGHT: 69 IN | BODY MASS INDEX: 22.66 KG/M2

## 2024-11-20 PROCEDURE — 99024 POSTOP FOLLOW-UP VISIT: CPT

## 2024-11-26 ENCOUNTER — OUTPATIENT (OUTPATIENT)
Dept: OUTPATIENT SERVICES | Facility: HOSPITAL | Age: 49
LOS: 1 days | Discharge: HOME | End: 2024-11-26

## 2024-11-26 DIAGNOSIS — Z98.890 OTHER SPECIFIED POSTPROCEDURAL STATES: Chronic | ICD-10-CM

## 2024-11-26 DIAGNOSIS — Z98.891 HISTORY OF UTERINE SCAR FROM PREVIOUS SURGERY: Chronic | ICD-10-CM

## 2024-11-26 DIAGNOSIS — Z90.13 ACQUIRED ABSENCE OF BILATERAL BREASTS AND NIPPLES: Chronic | ICD-10-CM

## 2024-11-26 DIAGNOSIS — Z95.828 PRESENCE OF OTHER VASCULAR IMPLANTS AND GRAFTS: Chronic | ICD-10-CM

## 2024-11-26 DIAGNOSIS — C50.919 MALIGNANT NEOPLASM OF UNSPECIFIED SITE OF UNSPECIFIED FEMALE BREAST: ICD-10-CM

## 2024-12-03 ENCOUNTER — RESULT REVIEW (OUTPATIENT)
Age: 49
End: 2024-12-03

## 2024-12-03 ENCOUNTER — APPOINTMENT (OUTPATIENT)
Dept: HEMATOLOGY ONCOLOGY | Facility: CLINIC | Age: 49
End: 2024-12-03
Payer: COMMERCIAL

## 2024-12-03 VITALS
SYSTOLIC BLOOD PRESSURE: 123 MMHG | HEART RATE: 73 BPM | BODY MASS INDEX: 22.98 KG/M2 | TEMPERATURE: 97.8 F | OXYGEN SATURATION: 100 % | DIASTOLIC BLOOD PRESSURE: 80 MMHG | WEIGHT: 155.64 LBS | RESPIRATION RATE: 16 BRPM

## 2024-12-03 DIAGNOSIS — C50.919 MALIGNANT NEOPLASM OF UNSPECIFIED SITE OF UNSPECIFIED FEMALE BREAST: ICD-10-CM

## 2024-12-03 DIAGNOSIS — Z90.13 ACQUIRED ABSENCE OF BILATERAL BREASTS AND NIPPLES: ICD-10-CM

## 2024-12-03 LAB
ALBUMIN SERPL ELPH-MCNC: 4.4 G/DL
ALP BLD-CCNC: 114 U/L
ALT SERPL-CCNC: 8 U/L
ANION GAP SERPL CALC-SCNC: 15 MMOL/L
AST SERPL-CCNC: 17 U/L
BASOPHILS # BLD AUTO: 0.02 K/UL — SIGNIFICANT CHANGE UP (ref 0–0.2)
BASOPHILS NFR BLD AUTO: 0.5 % — SIGNIFICANT CHANGE UP (ref 0–2)
BILIRUB SERPL-MCNC: 0.3 MG/DL
BUN SERPL-MCNC: 8 MG/DL
CALCIUM SERPL-MCNC: 9.5 MG/DL
CANCER AG27-29 SERPL-ACNC: 29 U/ML
CHLORIDE SERPL-SCNC: 100 MMOL/L
CO2 SERPL-SCNC: 27 MMOL/L
CREAT SERPL-MCNC: 0.58 MG/DL
EGFR: 110 ML/MIN/1.73M2
EOSINOPHIL # BLD AUTO: 0.14 K/UL — SIGNIFICANT CHANGE UP (ref 0–0.5)
EOSINOPHIL NFR BLD AUTO: 3.7 % — SIGNIFICANT CHANGE UP (ref 0–6)
GLUCOSE SERPL-MCNC: 96 MG/DL
HCT VFR BLD CALC: 40.7 % — SIGNIFICANT CHANGE UP (ref 34.5–45)
HGB BLD-MCNC: 13.3 G/DL — SIGNIFICANT CHANGE UP (ref 11.5–15.5)
IMM GRANULOCYTES NFR BLD AUTO: 0.3 % — SIGNIFICANT CHANGE UP (ref 0–0.9)
LYMPHOCYTES # BLD AUTO: 0.82 K/UL — LOW (ref 1–3.3)
LYMPHOCYTES # BLD AUTO: 21.8 % — SIGNIFICANT CHANGE UP (ref 13–44)
MCHC RBC-ENTMCNC: 28 PG — SIGNIFICANT CHANGE UP (ref 27–34)
MCHC RBC-ENTMCNC: 32.7 G/DL — SIGNIFICANT CHANGE UP (ref 32–36)
MCV RBC AUTO: 85.7 FL — SIGNIFICANT CHANGE UP (ref 80–100)
MONOCYTES # BLD AUTO: 0.22 K/UL — SIGNIFICANT CHANGE UP (ref 0–0.9)
MONOCYTES NFR BLD AUTO: 5.8 % — SIGNIFICANT CHANGE UP (ref 2–14)
NEUTROPHILS # BLD AUTO: 2.56 K/UL — SIGNIFICANT CHANGE UP (ref 1.8–7.4)
NEUTROPHILS NFR BLD AUTO: 67.9 % — SIGNIFICANT CHANGE UP (ref 43–77)
NRBC # BLD: 0 /100 WBCS — SIGNIFICANT CHANGE UP (ref 0–0)
NRBC BLD-RTO: 0 /100 WBCS — SIGNIFICANT CHANGE UP (ref 0–0)
PLATELET # BLD AUTO: 131 K/UL — LOW (ref 150–400)
POTASSIUM SERPL-SCNC: 4.2 MMOL/L
PROT SERPL-MCNC: 7.1 G/DL
RBC # BLD: 4.75 M/UL — SIGNIFICANT CHANGE UP (ref 3.8–5.2)
RBC # FLD: 13.9 % — SIGNIFICANT CHANGE UP (ref 10.3–14.5)
SODIUM SERPL-SCNC: 141 MMOL/L
WBC # BLD: 3.77 K/UL — LOW (ref 3.8–10.5)
WBC # FLD AUTO: 3.77 K/UL — LOW (ref 3.8–10.5)

## 2024-12-03 PROCEDURE — 99213 OFFICE O/P EST LOW 20 MIN: CPT

## 2024-12-09 ENCOUNTER — APPOINTMENT (OUTPATIENT)
Dept: PLASTIC SURGERY | Facility: CLINIC | Age: 49
End: 2024-12-09
Payer: COMMERCIAL

## 2024-12-09 VITALS
SYSTOLIC BLOOD PRESSURE: 125 MMHG | RESPIRATION RATE: 16 BRPM | DIASTOLIC BLOOD PRESSURE: 77 MMHG | BODY MASS INDEX: 22.96 KG/M2 | WEIGHT: 155 LBS | HEIGHT: 69 IN | TEMPERATURE: 98.2 F | OXYGEN SATURATION: 97 % | HEART RATE: 97 BPM

## 2024-12-09 PROCEDURE — 99212 OFFICE O/P EST SF 10 MIN: CPT | Mod: 24

## 2024-12-09 PROCEDURE — 99024 POSTOP FOLLOW-UP VISIT: CPT

## 2024-12-16 ENCOUNTER — APPOINTMENT (OUTPATIENT)
Dept: ENDOCRINOLOGY | Facility: CLINIC | Age: 49
End: 2024-12-16
Payer: COMMERCIAL

## 2024-12-16 VITALS
OXYGEN SATURATION: 100 % | HEART RATE: 79 BPM | TEMPERATURE: 98.2 F | DIASTOLIC BLOOD PRESSURE: 74 MMHG | SYSTOLIC BLOOD PRESSURE: 108 MMHG

## 2024-12-16 DIAGNOSIS — E27.40 UNSPECIFIED ADRENOCORTICAL INSUFFICIENCY: ICD-10-CM

## 2024-12-16 DIAGNOSIS — E03.9 HYPOTHYROIDISM, UNSPECIFIED: ICD-10-CM

## 2024-12-16 PROCEDURE — 99214 OFFICE O/P EST MOD 30 MIN: CPT

## 2024-12-23 LAB
T4 FREE SERPL-MCNC: 1.3 NG/DL
TSH SERPL-ACNC: 1.7 UIU/ML

## 2024-12-25 LAB
ACTH SER-ACNC: 2.1 PG/ML
CORTIS SERPL-MCNC: 0.1 UG/DL

## 2025-02-07 ENCOUNTER — APPOINTMENT (OUTPATIENT)
Dept: PLASTIC SURGERY | Facility: CLINIC | Age: 50
End: 2025-02-07
Payer: COMMERCIAL

## 2025-02-07 VITALS
OXYGEN SATURATION: 99 % | SYSTOLIC BLOOD PRESSURE: 119 MMHG | TEMPERATURE: 97 F | HEIGHT: 69 IN | BODY MASS INDEX: 23.25 KG/M2 | HEART RATE: 80 BPM | DIASTOLIC BLOOD PRESSURE: 77 MMHG | WEIGHT: 157 LBS

## 2025-02-07 DIAGNOSIS — Z90.13 ACQUIRED ABSENCE OF BILATERAL BREASTS AND NIPPLES: ICD-10-CM

## 2025-02-07 PROCEDURE — 99212 OFFICE O/P EST SF 10 MIN: CPT

## 2025-02-14 ENCOUNTER — APPOINTMENT (OUTPATIENT)
Dept: INTERNAL MEDICINE | Facility: CLINIC | Age: 50
End: 2025-02-14

## 2025-02-14 VITALS
WEIGHT: 157 LBS | HEART RATE: 77 BPM | SYSTOLIC BLOOD PRESSURE: 138 MMHG | BODY MASS INDEX: 23.25 KG/M2 | DIASTOLIC BLOOD PRESSURE: 83 MMHG | HEIGHT: 69 IN | OXYGEN SATURATION: 100 %

## 2025-02-14 DIAGNOSIS — M26.609 UNSPECIFIED TEMPOROMANDIBULAR JOINT DISORDER: ICD-10-CM

## 2025-02-14 PROCEDURE — 99213 OFFICE O/P EST LOW 20 MIN: CPT

## 2025-03-12 ENCOUNTER — APPOINTMENT (OUTPATIENT)
Dept: SURGICAL ONCOLOGY | Facility: CLINIC | Age: 50
End: 2025-03-12
Payer: COMMERCIAL

## 2025-03-12 VITALS
WEIGHT: 159 LBS | HEIGHT: 69 IN | SYSTOLIC BLOOD PRESSURE: 115 MMHG | HEART RATE: 90 BPM | DIASTOLIC BLOOD PRESSURE: 80 MMHG | BODY MASS INDEX: 23.55 KG/M2 | OXYGEN SATURATION: 99 %

## 2025-03-12 DIAGNOSIS — C50.919 MALIGNANT NEOPLASM OF UNSPECIFIED SITE OF UNSPECIFIED FEMALE BREAST: ICD-10-CM

## 2025-03-12 PROCEDURE — 99215 OFFICE O/P EST HI 40 MIN: CPT

## 2025-03-14 NOTE — ASU PATIENT PROFILE, ADULT - IS PATIENT PREGNANT?
Outpatient Rehab    Occupational Therapy Evaluation    Patient Name: Charlie Mazariegos Jr.  MRN: [id number]  YOB: 1983  Encounter Date: 3/13/2025    Therapy Diagnosis:   Encounter Diagnoses   Name Primary?    Closed fracture of left elbow, initial encounter     Left elbow pain Yes    Elbow stiffness, left      Physician: Lisandra Lazaro PA-C    Physician Orders: Eval and Treat  Medical Diagnosis: PROCEDURE(S) PERFORMED: Open reduction internal fixation intra-articular left elbow fracture with coronoid fracture ORIF Primary repair left elbow ulnar collateral ligament, CPT code 05959 Loose body removal left elbow, multiple CPT code 22042 Ulnar nerve decompression and extensive external neurolysis left elbow, CPT code 11490 Primary repair flexor pronator myotendinous rupture left elbow, CPT code 28599     Visit # / Visits Authorized:  1 / 1   Date of Evaluation:  3/13/2025   Insurance Authorization Period: 3/12/2025 to 3/12/2026  Plan of Care Certification:  3/13/2025 to 6/6/2025      Time In: 1600   Time Out: 1645  Total Time: 45   Total Billable Time: 45    Intake Outcome Measure for FOTO Survey    Therapist reviewed FOTO scores for Charlie Mazariegos Jr. on 3/13/2025.   FOTO report - see Media section or FOTO account episode details.     Intake Score: 50%    Precautions  Left Upper Extremity Weight-Bearing Status: Non-weight-bearing  Range of Motion Restrictions: limited to  degrees with elbow hinge in palce         Subjective   History of Present Illness  Charlie is a 41 y.o. male who reports to occupational therapy with a chief concern of Limited function due to ipost-op restrictions and trauma.     The patient reports a medical diagnosis of Left elbow dislocation status post closed reduction  Left coronoid fracture  Left elbow ulnar collateral ligament tear  Loose bodies left elbow  Torn flexor pronator mass myotendinous units left elbow at the origin from the medial epicondyle.  Patient  reports a surgery of PROCEDURE(S) PERFORMED: Open reduction internal fixation intra-articular left elbow fracture with coronoid fracture ORIF Primary repair left elbow ulnar collateral ligament, CPT code 52152 Loose body removal left elbow, multiple CPT code 94065 Ulnar nerve decompression and extensive external neurolysis left elbow, CPT code 60967 Primary repair flexor pronator myotendinous rupture left elbow, CPT code 49809.          Dominant Hand: Right  History of Present Condition/Illness: Slipped on ice while on vacation and sustained injury to the left elbow. Went to Er where the dislocation was reduced. Splinted and placed in sling. Occurred on 2/19/2025/ Seen by ortho on 2/24 and Surgery on 2/26. Partial long arm cast until yesterday. Then placed in hinged elbow brace.     Activities of Daily Living  Social history was obtained from Patient.    General Prior Level of Function Comments: Full use  General Current Level of Function Comments: no use of Left UE  Patient Responsibilities: Driving, Financial management, Home management, Laundry, Meal prep, Personal ADL, Shopping, Yard work    Previously independent with activities of daily living? Yes     Currently independent with activities of daily living? No     Modified independent using r R UE for light ADL's. Not able to perform activities that require both hands.    Previously independent with instrumental activities of daily living? Yes     Currently independent with instrumental activities of daily living? No  Activities currently needing assistance include: Home establishment and management.            Pain     Patient reports a current pain level of 2/10. Pain at best is reported as 2/10. Pain at worst is reported as 6/10.   Location: elbow, frank posterior elbow.  Clinical Progression (since onset): Stable  Pain Qualities: Dull, Aching, Tightness  Pain-Relieving Factors: Other (Comment)  Other Pain-Relieving Factors: Advil  Pain-Aggravating Factors:  Movement         Employment  Patient reports: Does the patient's condition impact their ability to work?  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       Past Medical History/Physical Systems Review:   Charlie Mazariegos Jr.  has a past medical history of Pre-hypertension.    Charlie Mazariegos Jr.  has a past surgical history that includes Cervical fusion (01/01/2011); Shoulder surgery; Fracture surgery; Spine surgery; and Open reduction and internal fixation (ORIF) of injury of elbow (Left, 2/26/2025).    Charlie has a current medication list which includes the following prescription(s): acetaminophen, cholecalciferol (vitamin d3), cyanocobalamin (vitamin b-12), fluticasone propionate, ibuprofen, methocarbamol, oxycodone, and oxycodone-acetaminophen.    Review of patient's allergies indicates:  No Known Allergies     Objective   Elbow Observations     Significant edema visible. Surgical incision covered with steristrips.       Upper Extremity Sensation            Ulnar nerve distribution - partial numbness.         Elbow Circumferential Measurements  Right Location of Measurement Right  (cm) Left Location of  Measurement Left  (cm)   At Joint Line 31.2 At Joint Line 35.7     cm Above Joint Line     cm Above Joint Line 34.3   4 cm Below Joint Line 30.8 4 cm Below Joint Line 34.3        Wrist/Hand Swelling Details  Pitting Edema Details: L WRIST 19.3, R 18              Wrist Range of Motion     WFL with pain.              Treatment:  Therapeutic Exercise  Therapeutic Exercise Activity 1: AROM wrist flex/ext. 10 reps,  radial/ulnar deviation 10r eps  Therapeutic Exercise Activity 2: AROM fist 10 reps each  Therapeutic Exercise Activity 3: Elbow AROM: FA in neutral  with hinge brace in place and opened 70 to 120 x 10 reps  Therapeutic Exercise Activity 4: Educated in HEP    Therapeutic Activity  Therapeutic Activity 1: Educated in ededma management  Therapeutic Activity 2: Fit with  applied medium Edema Wear. Educated to wear only during the day. Educated in proper application  Therapeutic Activity 3: Adjusted hinge brace to accommodate edema wear and to decrease pressure from cuff that is promoting accumulation of edema at proximal fa and elbow.         Assessment & Plan   Assessment  Charlie presents with a condition of Low complexity.   Presentation of Symptoms: Stable  Will Comorbidities Impact Care: No       ADL Limitations : Functional mobility  IADL Limitations: Driving, Grocery/shopping, Home establishment and management, Meal preparation and cleanup  Rest and Sleep Limitations: Disrupted sleep pattern  Work Limitations: Job performance  Leisure Limitations: Leisure participation  Functional Limitations: Activity tolerance, Carrying objects, Fine motor coordination, Functional mobility, Gross motor coordination, Manipulating objects, Pain with ADLs/IADLs, Range of motion, Proprioception                 Evaluation/Treatment Response: Patient responded to treatment well  Prognosis: Good    Plan  From an occupational therapy perspective, the patient would benefit from: Skilled Rehab Services    Planned therapy interventions include: Therapeutic exercise, Therapeutic activities, Neuromuscular re-education, ADLs/IADLs, and Orthotic management and training.    Planned modalities to include: Fluidotherapy, Paraffin bath, Ultrasound, and Whirlpool.        Visit Frequency: 2 times Per Week for 12 Weeks.       This plan was discussed with Patient.   Discussion participants: Agreed Upon Plan of Care             Patient's spiritual, cultural, and educational needs considered and patient agreeable to plan of care and goals.           Goals:   Active       Long Term Goals       Charlie will demonstrate significantly improved functional performance from re-assessment as measured by a 20 ncrease in FOTO function score.        Start:  03/13/25            Patient will achieve left  strength that is 90%  that of the .right dominant hand to improve function with ADLs/work/leisure tasks.        Start:  03/13/25            Patient will report pain 2 out of 10 at worst during use  in IADL's to improve function with ADLs/work/leisure tasks..        Start:  03/13/25            Patient will demo full fa rom  to improve functional grasp for ADLs/work/leisure tasks.         Start:  03/13/25            Pt will demo full wrist arom to improve function with ADLs/work/leisure tasks.        Start:  03/13/25               short term goals       Charlie will demonstrate significantly improved functional performance from re-assessment as measured by a 10 point Increase in FOTO function score.        Start:  03/13/25            Patient will achieve left  strength that is 60 % that of the  right dominant hand to improve function with ADLs/work/leisure tasks.        Start:  03/13/25            Patient will report pain 2 out of 10 at worst during use in light ADL's to improve function with ADLs/work/leisure tasks. .        Start:  03/13/25            Patient will demo 10 to 120 degrees elbow arom for ADLs/work/leisure tasks.         Start:  03/13/25            Pt will demo full arom FA  to improve function with ADLs/work/leisure tasks.        Start:  03/13/25                Mariola Durand, OT   no

## 2025-03-28 ENCOUNTER — OUTPATIENT (OUTPATIENT)
Dept: OUTPATIENT SERVICES | Facility: HOSPITAL | Age: 50
LOS: 1 days | End: 2025-03-28
Payer: COMMERCIAL

## 2025-03-28 ENCOUNTER — APPOINTMENT (OUTPATIENT)
Dept: MRI IMAGING | Facility: CLINIC | Age: 50
End: 2025-03-28
Payer: COMMERCIAL

## 2025-03-28 DIAGNOSIS — C50.919 MALIGNANT NEOPLASM OF UNSPECIFIED SITE OF UNSPECIFIED FEMALE BREAST: ICD-10-CM

## 2025-03-28 DIAGNOSIS — Z98.891 HISTORY OF UTERINE SCAR FROM PREVIOUS SURGERY: Chronic | ICD-10-CM

## 2025-03-28 DIAGNOSIS — Z98.890 OTHER SPECIFIED POSTPROCEDURAL STATES: Chronic | ICD-10-CM

## 2025-03-28 DIAGNOSIS — Z95.828 PRESENCE OF OTHER VASCULAR IMPLANTS AND GRAFTS: Chronic | ICD-10-CM

## 2025-03-28 DIAGNOSIS — Z90.13 ACQUIRED ABSENCE OF BILATERAL BREASTS AND NIPPLES: Chronic | ICD-10-CM

## 2025-03-28 PROCEDURE — 77049 MRI BREAST C-+ W/CAD BI: CPT | Mod: 26

## 2025-03-28 PROCEDURE — C8908: CPT

## 2025-03-28 PROCEDURE — C8937: CPT

## 2025-03-28 PROCEDURE — A9585: CPT

## 2025-03-31 ENCOUNTER — RX RENEWAL (OUTPATIENT)
Age: 50
End: 2025-03-31

## 2025-04-07 ENCOUNTER — OUTPATIENT (OUTPATIENT)
Dept: OUTPATIENT SERVICES | Facility: HOSPITAL | Age: 50
LOS: 1 days | End: 2025-04-07
Payer: COMMERCIAL

## 2025-04-07 ENCOUNTER — APPOINTMENT (OUTPATIENT)
Dept: ULTRASOUND IMAGING | Facility: CLINIC | Age: 50
End: 2025-04-07
Payer: COMMERCIAL

## 2025-04-07 ENCOUNTER — RESULT REVIEW (OUTPATIENT)
Age: 50
End: 2025-04-07

## 2025-04-07 DIAGNOSIS — Z95.828 PRESENCE OF OTHER VASCULAR IMPLANTS AND GRAFTS: Chronic | ICD-10-CM

## 2025-04-07 DIAGNOSIS — R92.8 OTHER ABNORMAL AND INCONCLUSIVE FINDINGS ON DIAGNOSTIC IMAGING OF BREAST: ICD-10-CM

## 2025-04-07 DIAGNOSIS — Z98.890 OTHER SPECIFIED POSTPROCEDURAL STATES: Chronic | ICD-10-CM

## 2025-04-07 DIAGNOSIS — Z90.13 ACQUIRED ABSENCE OF BILATERAL BREASTS AND NIPPLES: Chronic | ICD-10-CM

## 2025-04-07 DIAGNOSIS — Z98.891 HISTORY OF UTERINE SCAR FROM PREVIOUS SURGERY: Chronic | ICD-10-CM

## 2025-04-07 PROCEDURE — A4648: CPT

## 2025-04-07 PROCEDURE — 88305 TISSUE EXAM BY PATHOLOGIST: CPT | Mod: 26

## 2025-04-07 PROCEDURE — 19083 BX BREAST 1ST LESION US IMAG: CPT | Mod: LT

## 2025-04-07 PROCEDURE — 88305 TISSUE EXAM BY PATHOLOGIST: CPT

## 2025-04-07 PROCEDURE — 19083 BX BREAST 1ST LESION US IMAG: CPT

## 2025-04-09 ENCOUNTER — APPOINTMENT (OUTPATIENT)
Dept: INTERNAL MEDICINE | Facility: CLINIC | Age: 50
End: 2025-04-09

## 2025-04-10 LAB — SURGICAL PATHOLOGY STUDY: SIGNIFICANT CHANGE UP

## 2025-04-29 ENCOUNTER — OUTPATIENT (OUTPATIENT)
Dept: OUTPATIENT SERVICES | Facility: HOSPITAL | Age: 50
LOS: 1 days | End: 2025-04-29
Payer: COMMERCIAL

## 2025-04-29 ENCOUNTER — APPOINTMENT (OUTPATIENT)
Dept: MRI IMAGING | Facility: CLINIC | Age: 50
End: 2025-04-29
Payer: COMMERCIAL

## 2025-04-29 DIAGNOSIS — Z90.13 ACQUIRED ABSENCE OF BILATERAL BREASTS AND NIPPLES: Chronic | ICD-10-CM

## 2025-04-29 DIAGNOSIS — Z98.891 HISTORY OF UTERINE SCAR FROM PREVIOUS SURGERY: Chronic | ICD-10-CM

## 2025-04-29 DIAGNOSIS — Z00.8 ENCOUNTER FOR OTHER GENERAL EXAMINATION: ICD-10-CM

## 2025-04-29 DIAGNOSIS — Z95.828 PRESENCE OF OTHER VASCULAR IMPLANTS AND GRAFTS: Chronic | ICD-10-CM

## 2025-04-29 DIAGNOSIS — Z98.890 OTHER SPECIFIED POSTPROCEDURAL STATES: Chronic | ICD-10-CM

## 2025-04-29 DIAGNOSIS — C50.919 MALIGNANT NEOPLASM OF UNSPECIFIED SITE OF UNSPECIFIED FEMALE BREAST: ICD-10-CM

## 2025-04-29 PROCEDURE — C8937: CPT

## 2025-04-29 PROCEDURE — A9585: CPT

## 2025-04-29 PROCEDURE — C8908: CPT

## 2025-04-29 PROCEDURE — 77049 MRI BREAST C-+ W/CAD BI: CPT | Mod: 26

## 2025-06-20 ENCOUNTER — OUTPATIENT (OUTPATIENT)
Dept: OUTPATIENT SERVICES | Facility: HOSPITAL | Age: 50
LOS: 1 days | Discharge: HOME | End: 2025-06-20

## 2025-06-20 DIAGNOSIS — Z98.890 OTHER SPECIFIED POSTPROCEDURAL STATES: Chronic | ICD-10-CM

## 2025-06-20 DIAGNOSIS — Z90.13 ACQUIRED ABSENCE OF BILATERAL BREASTS AND NIPPLES: Chronic | ICD-10-CM

## 2025-06-20 DIAGNOSIS — Z98.891 HISTORY OF UTERINE SCAR FROM PREVIOUS SURGERY: Chronic | ICD-10-CM

## 2025-06-20 DIAGNOSIS — Z95.828 PRESENCE OF OTHER VASCULAR IMPLANTS AND GRAFTS: Chronic | ICD-10-CM

## 2025-06-23 ENCOUNTER — NON-APPOINTMENT (OUTPATIENT)
Age: 50
End: 2025-06-23

## 2025-06-25 ENCOUNTER — RESULT REVIEW (OUTPATIENT)
Age: 50
End: 2025-06-25

## 2025-06-25 ENCOUNTER — APPOINTMENT (OUTPATIENT)
Dept: HEMATOLOGY ONCOLOGY | Facility: CLINIC | Age: 50
End: 2025-06-25
Payer: COMMERCIAL

## 2025-06-25 VITALS
HEIGHT: 69 IN | BODY MASS INDEX: 23.61 KG/M2 | SYSTOLIC BLOOD PRESSURE: 115 MMHG | HEART RATE: 75 BPM | WEIGHT: 159.39 LBS | RESPIRATION RATE: 16 BRPM | TEMPERATURE: 97.1 F | OXYGEN SATURATION: 98 % | DIASTOLIC BLOOD PRESSURE: 80 MMHG

## 2025-06-25 LAB
BASOPHILS # BLD AUTO: 0.02 K/UL — SIGNIFICANT CHANGE UP (ref 0–0.2)
BASOPHILS NFR BLD AUTO: 0.5 % — SIGNIFICANT CHANGE UP (ref 0–2)
EOSINOPHIL # BLD AUTO: 0.08 K/UL — SIGNIFICANT CHANGE UP (ref 0–0.5)
EOSINOPHIL NFR BLD AUTO: 2.2 % — SIGNIFICANT CHANGE UP (ref 0–6)
HCT VFR BLD CALC: 41.1 % — SIGNIFICANT CHANGE UP (ref 34.5–45)
HGB BLD-MCNC: 13.6 G/DL — SIGNIFICANT CHANGE UP (ref 11.5–15.5)
IMM GRANULOCYTES NFR BLD AUTO: 0 % — SIGNIFICANT CHANGE UP (ref 0–0.9)
LYMPHOCYTES # BLD AUTO: 1.45 K/UL — SIGNIFICANT CHANGE UP (ref 1–3.3)
LYMPHOCYTES # BLD AUTO: 39.3 % — SIGNIFICANT CHANGE UP (ref 13–44)
MCHC RBC-ENTMCNC: 27.5 PG — SIGNIFICANT CHANGE UP (ref 27–34)
MCHC RBC-ENTMCNC: 33.1 G/DL — SIGNIFICANT CHANGE UP (ref 32–36)
MCV RBC AUTO: 83 FL — SIGNIFICANT CHANGE UP (ref 80–100)
MONOCYTES # BLD AUTO: 0.21 K/UL — SIGNIFICANT CHANGE UP (ref 0–0.9)
MONOCYTES NFR BLD AUTO: 5.7 % — SIGNIFICANT CHANGE UP (ref 2–14)
NEUTROPHILS # BLD AUTO: 1.93 K/UL — SIGNIFICANT CHANGE UP (ref 1.8–7.4)
NEUTROPHILS NFR BLD AUTO: 52.3 % — SIGNIFICANT CHANGE UP (ref 43–77)
NRBC BLD AUTO-RTO: 0 /100 WBCS — SIGNIFICANT CHANGE UP (ref 0–0)
PLATELET # BLD AUTO: 156 K/UL — SIGNIFICANT CHANGE UP (ref 150–400)
RBC # BLD: 4.95 M/UL — SIGNIFICANT CHANGE UP (ref 3.8–5.2)
RBC # FLD: 13.4 % — SIGNIFICANT CHANGE UP (ref 10.3–14.5)
WBC # BLD: 3.69 K/UL — LOW (ref 3.8–10.5)
WBC # FLD AUTO: 3.69 K/UL — LOW (ref 3.8–10.5)

## 2025-06-25 PROCEDURE — 99213 OFFICE O/P EST LOW 20 MIN: CPT

## 2025-06-27 LAB
ALBUMIN SERPL ELPH-MCNC: 4.5 G/DL
ALP BLD-CCNC: 114 U/L
ALT SERPL-CCNC: 16 U/L
ANION GAP SERPL CALC-SCNC: 12 MMOL/L
AST SERPL-CCNC: 20 U/L
BILIRUB SERPL-MCNC: 0.4 MG/DL
BUN SERPL-MCNC: 11 MG/DL
CALCIUM SERPL-MCNC: 9.7 MG/DL
CHLORIDE SERPL-SCNC: 103 MMOL/L
CO2 SERPL-SCNC: 26 MMOL/L
CREAT SERPL-MCNC: 0.63 MG/DL
EGFRCR SERPLBLD CKD-EPI 2021: 108 ML/MIN/1.73M2
GLUCOSE SERPL-MCNC: 97 MG/DL
POTASSIUM SERPL-SCNC: 4.3 MMOL/L
PROT SERPL-MCNC: 7.4 G/DL
SODIUM SERPL-SCNC: 141 MMOL/L

## 2025-07-09 ENCOUNTER — EMERGENCY (EMERGENCY)
Facility: HOSPITAL | Age: 50
LOS: 1 days | End: 2025-07-09
Attending: STUDENT IN AN ORGANIZED HEALTH CARE EDUCATION/TRAINING PROGRAM | Admitting: STUDENT IN AN ORGANIZED HEALTH CARE EDUCATION/TRAINING PROGRAM
Payer: COMMERCIAL

## 2025-07-09 VITALS
SYSTOLIC BLOOD PRESSURE: 103 MMHG | OXYGEN SATURATION: 100 % | DIASTOLIC BLOOD PRESSURE: 70 MMHG | HEART RATE: 70 BPM | RESPIRATION RATE: 16 BRPM

## 2025-07-09 VITALS
SYSTOLIC BLOOD PRESSURE: 122 MMHG | OXYGEN SATURATION: 97 % | HEIGHT: 69 IN | WEIGHT: 158.07 LBS | TEMPERATURE: 98 F | RESPIRATION RATE: 16 BRPM | DIASTOLIC BLOOD PRESSURE: 77 MMHG | HEART RATE: 83 BPM

## 2025-07-09 DIAGNOSIS — Z90.13 ACQUIRED ABSENCE OF BILATERAL BREASTS AND NIPPLES: Chronic | ICD-10-CM

## 2025-07-09 DIAGNOSIS — Z98.890 OTHER SPECIFIED POSTPROCEDURAL STATES: Chronic | ICD-10-CM

## 2025-07-09 DIAGNOSIS — E87.1 HYPO-OSMOLALITY AND HYPONATREMIA: ICD-10-CM

## 2025-07-09 DIAGNOSIS — Z95.828 PRESENCE OF OTHER VASCULAR IMPLANTS AND GRAFTS: Chronic | ICD-10-CM

## 2025-07-09 DIAGNOSIS — Z98.891 HISTORY OF UTERINE SCAR FROM PREVIOUS SURGERY: Chronic | ICD-10-CM

## 2025-07-09 LAB
ADD ON TEST-SPECIMEN IN LAB: SIGNIFICANT CHANGE UP
ALBUMIN SERPL ELPH-MCNC: 4.2 G/DL — SIGNIFICANT CHANGE UP (ref 3.3–5)
ALP SERPL-CCNC: 97 U/L — SIGNIFICANT CHANGE UP (ref 40–120)
ALT FLD-CCNC: 13 U/L — SIGNIFICANT CHANGE UP (ref 4–33)
ANION GAP SERPL CALC-SCNC: 11 MMOL/L — SIGNIFICANT CHANGE UP (ref 7–14)
ANION GAP SERPL CALC-SCNC: 12 MMOL/L — SIGNIFICANT CHANGE UP (ref 7–14)
APPEARANCE UR: CLEAR — SIGNIFICANT CHANGE UP
AST SERPL-CCNC: 20 U/L — SIGNIFICANT CHANGE UP (ref 4–32)
BACTERIA # UR AUTO: ABNORMAL /HPF
BASOPHILS # BLD AUTO: 0.01 K/UL — SIGNIFICANT CHANGE UP (ref 0–0.2)
BASOPHILS NFR BLD AUTO: 0.3 % — SIGNIFICANT CHANGE UP (ref 0–2)
BILIRUB SERPL-MCNC: 0.7 MG/DL — SIGNIFICANT CHANGE UP (ref 0.2–1.2)
BILIRUB UR-MCNC: NEGATIVE — SIGNIFICANT CHANGE UP
BLOOD GAS VENOUS COMPREHENSIVE RESULT: SIGNIFICANT CHANGE UP
BUN SERPL-MCNC: 6 MG/DL — LOW (ref 7–23)
BUN SERPL-MCNC: 6 MG/DL — LOW (ref 7–23)
CALCIUM SERPL-MCNC: 8.7 MG/DL — SIGNIFICANT CHANGE UP (ref 8.4–10.5)
CALCIUM SERPL-MCNC: 9 MG/DL — SIGNIFICANT CHANGE UP (ref 8.4–10.5)
CAST: 1 /LPF — SIGNIFICANT CHANGE UP (ref 0–4)
CHLORIDE SERPL-SCNC: 95 MMOL/L — LOW (ref 98–107)
CHLORIDE SERPL-SCNC: 98 MMOL/L — SIGNIFICANT CHANGE UP (ref 98–107)
CK SERPL-CCNC: 61 U/L — SIGNIFICANT CHANGE UP (ref 25–170)
CO2 SERPL-SCNC: 22 MMOL/L — SIGNIFICANT CHANGE UP (ref 22–31)
CO2 SERPL-SCNC: 23 MMOL/L — SIGNIFICANT CHANGE UP (ref 22–31)
COLOR SPEC: SIGNIFICANT CHANGE UP
CREAT SERPL-MCNC: 0.52 MG/DL — SIGNIFICANT CHANGE UP (ref 0.5–1.3)
CREAT SERPL-MCNC: 0.54 MG/DL — SIGNIFICANT CHANGE UP (ref 0.5–1.3)
DIFF PNL FLD: NEGATIVE — SIGNIFICANT CHANGE UP
EGFR: 112 ML/MIN/1.73M2 — SIGNIFICANT CHANGE UP
EGFR: 112 ML/MIN/1.73M2 — SIGNIFICANT CHANGE UP
EGFR: 113 ML/MIN/1.73M2 — SIGNIFICANT CHANGE UP
EGFR: 113 ML/MIN/1.73M2 — SIGNIFICANT CHANGE UP
EOSINOPHIL # BLD AUTO: 0.08 K/UL — SIGNIFICANT CHANGE UP (ref 0–0.5)
EOSINOPHIL NFR BLD AUTO: 2.5 % — SIGNIFICANT CHANGE UP (ref 0–6)
GLUCOSE SERPL-MCNC: 91 MG/DL — SIGNIFICANT CHANGE UP (ref 70–99)
GLUCOSE SERPL-MCNC: 92 MG/DL — SIGNIFICANT CHANGE UP (ref 70–99)
GLUCOSE UR QL: NEGATIVE MG/DL — SIGNIFICANT CHANGE UP
HCT VFR BLD CALC: 39.8 % — SIGNIFICANT CHANGE UP (ref 34.5–45)
HGB BLD-MCNC: 13.5 G/DL — SIGNIFICANT CHANGE UP (ref 11.5–15.5)
IMM GRANULOCYTES # BLD AUTO: 0 K/UL — SIGNIFICANT CHANGE UP (ref 0–0.07)
IMM GRANULOCYTES NFR BLD AUTO: 0 % — SIGNIFICANT CHANGE UP (ref 0–0.9)
KETONES UR QL: 15 MG/DL
LEUKOCYTE ESTERASE UR-ACNC: ABNORMAL
LIDOCAIN IGE QN: 38 U/L — SIGNIFICANT CHANGE UP (ref 7–60)
LYMPHOCYTES # BLD AUTO: 0.94 K/UL — LOW (ref 1–3.3)
LYMPHOCYTES NFR BLD AUTO: 28.8 % — SIGNIFICANT CHANGE UP (ref 13–44)
MAGNESIUM SERPL-MCNC: 1.7 MG/DL — SIGNIFICANT CHANGE UP (ref 1.6–2.6)
MCHC RBC-ENTMCNC: 27.3 PG — SIGNIFICANT CHANGE UP (ref 27–34)
MCHC RBC-ENTMCNC: 33.9 G/DL — SIGNIFICANT CHANGE UP (ref 32–36)
MCV RBC AUTO: 80.4 FL — SIGNIFICANT CHANGE UP (ref 80–100)
MONOCYTES # BLD AUTO: 0.22 K/UL — SIGNIFICANT CHANGE UP (ref 0–0.9)
MONOCYTES NFR BLD AUTO: 6.7 % — SIGNIFICANT CHANGE UP (ref 2–14)
NEUTROPHILS # BLD AUTO: 2.01 K/UL — SIGNIFICANT CHANGE UP (ref 1.8–7.4)
NEUTROPHILS NFR BLD AUTO: 61.7 % — SIGNIFICANT CHANGE UP (ref 43–77)
NITRITE UR-MCNC: NEGATIVE — SIGNIFICANT CHANGE UP
NRBC # BLD AUTO: 0 K/UL — SIGNIFICANT CHANGE UP (ref 0–0)
NRBC # FLD: 0 K/UL — SIGNIFICANT CHANGE UP (ref 0–0)
NRBC BLD AUTO-RTO: 0 /100 WBCS — SIGNIFICANT CHANGE UP (ref 0–0)
PH UR: 5.5 — SIGNIFICANT CHANGE UP (ref 5–8)
PHOSPHATE SERPL-MCNC: 3.5 MG/DL — SIGNIFICANT CHANGE UP (ref 2.5–4.5)
PLATELET # BLD AUTO: 163 K/UL — SIGNIFICANT CHANGE UP (ref 150–400)
PMV BLD: 9.2 FL — SIGNIFICANT CHANGE UP (ref 7–13)
POTASSIUM SERPL-MCNC: 3.9 MMOL/L — SIGNIFICANT CHANGE UP (ref 3.5–5.3)
POTASSIUM SERPL-MCNC: 5.2 MMOL/L — SIGNIFICANT CHANGE UP (ref 3.5–5.3)
POTASSIUM SERPL-SCNC: 3.9 MMOL/L — SIGNIFICANT CHANGE UP (ref 3.5–5.3)
POTASSIUM SERPL-SCNC: 5.2 MMOL/L — SIGNIFICANT CHANGE UP (ref 3.5–5.3)
PROT SERPL-MCNC: 6.8 G/DL — SIGNIFICANT CHANGE UP (ref 6–8.3)
PROT UR-MCNC: SIGNIFICANT CHANGE UP MG/DL
RBC # BLD: 4.95 M/UL — SIGNIFICANT CHANGE UP (ref 3.8–5.2)
RBC # FLD: 13.2 % — SIGNIFICANT CHANGE UP (ref 10.3–14.5)
RBC CASTS # UR COMP ASSIST: 5 /HPF — HIGH (ref 0–4)
SODIUM SERPL-SCNC: 130 MMOL/L — LOW (ref 135–145)
SODIUM SERPL-SCNC: 131 MMOL/L — LOW (ref 135–145)
SP GR SPEC: 1.03 — SIGNIFICANT CHANGE UP (ref 1–1.03)
SQUAMOUS # UR AUTO: 1 /HPF — SIGNIFICANT CHANGE UP (ref 0–5)
UROBILINOGEN FLD QL: 1 MG/DL — SIGNIFICANT CHANGE UP (ref 0.2–1)
WBC # BLD: 3.26 K/UL — LOW (ref 3.8–10.5)
WBC # FLD AUTO: 3.26 K/UL — LOW (ref 3.8–10.5)
WBC UR QL: 1 /HPF — SIGNIFICANT CHANGE UP (ref 0–5)

## 2025-07-09 PROCEDURE — 99215 OFFICE O/P EST HI 40 MIN: CPT | Mod: GC

## 2025-07-09 PROCEDURE — 99285 EMERGENCY DEPT VISIT HI MDM: CPT

## 2025-07-09 PROCEDURE — 93010 ELECTROCARDIOGRAM REPORT: CPT

## 2025-07-09 RX ORDER — HYDROCORTISONE 20 MG
100 TABLET ORAL
Qty: 1 | Refills: 0
Start: 2025-07-09

## 2025-07-09 RX ORDER — METOCLOPRAMIDE HCL 10 MG
10 TABLET ORAL ONCE
Refills: 0 | Status: COMPLETED | OUTPATIENT
Start: 2025-07-09 | End: 2025-07-09

## 2025-07-09 RX ORDER — HYDROCORTISONE 20 MG
10 TABLET ORAL ONCE
Refills: 0 | Status: COMPLETED | OUTPATIENT
Start: 2025-07-09 | End: 2025-07-09

## 2025-07-09 RX ORDER — ACETAMINOPHEN 500 MG/5ML
975 LIQUID (ML) ORAL ONCE
Refills: 0 | Status: COMPLETED | OUTPATIENT
Start: 2025-07-09 | End: 2025-07-09

## 2025-07-09 RX ADMIN — Medication 10 MILLIGRAM(S): at 15:07

## 2025-07-09 RX ADMIN — Medication 975 MILLIGRAM(S): at 10:16

## 2025-07-09 RX ADMIN — Medication 1000 MILLILITER(S): at 10:16

## 2025-07-09 RX ADMIN — Medication 20 MILLIGRAM(S): at 10:16

## 2025-07-09 RX ADMIN — Medication 10 MILLIGRAM(S): at 10:16

## 2025-07-09 NOTE — CONSULT NOTE ADULT - SUBJECTIVE AND OBJECTIVE BOX
HPI:      ENDOCRINE HX:    PAST MEDICAL & SURGICAL HISTORY:  Breast CA  left breast      History of chemotherapy  completed 23      Breast cancer      Adrenal insufficiency      Acquired hypothyroidism      Anemia      S/P   X1       H/O bilateral breast biopsy  markers both breasts      Port-A-Cath in place  right chest      S/P bilateral mastectomy  JOHN flap/ bilateral implants          FAMILY HISTORY:  No pertinent family history in first degree relatives        HOME MEDS:  hydrocortisone 5 mg oral tablet: 2 tab(s) orally once a day Day before surgery 3 pm  Synthroid 50 mcg (0.05 mg) oral tablet: 1 tab(s) orally once a day Please take this 6am on an empty stomach, do not eat/drink or take additional medications for 1hr      MEDICATIONS  (STANDING):    MEDICATIONS  (PRN):      Allergies    Keytruda (Other)  unknown chemo medication (Anaphylaxis)  Taxol (Anaphylaxis)    Intolerances      Review of Systems:  Constitutional: No fever  Eyes: No blurry vision  Neuro: No tremors  HEENT: No pain  Cardiovascular: No chest pain, palpitations  Respiratory: No SOB, no cough  GI: No nausea, vomiting, abdominal pain  : No dysuria  Skin: no rash  Psych: no depression  Endocrine: no polyuria, polydipsia  Hem/lymph: no swelling  Osteoporosis: no fractures    ALL OTHER SYSTEMS REVIEWED AND NEGATIVE    PHYSICAL EXAM:  VITALS: T(C): 36.6 (25 @ 08:34)  T(F): 97.9 (25 @ 08:34), Max: 97.9 (25 @ 08:34)  HR: 70 (25 @ 08:34) (70 - 83)  BP: 128/86 (25 @ 08:34) (122/77 - 128/86)  RR:  (16 - 16)  SpO2:  (97% - 100%)  Wt(kg): --  GENERAL: NAD, well-groomed, well-developed  EYES: No proptosis, no lid lag, anicteric  HEENT:  Atraumatic, Normocephalic, moist mucous membranes  RESPIRATORY: Normal respiratory effort; no audible wheezing  SKIN: Dry, intact, No rashes or lesions  MUSCULOSKELETAL: Full range of motion, normal strength  NEURO: sensation intact, extraocular movements intact, no tremor  PSYCH: Alert and oriented x 3, normal affect, normal mood  CUSHING'S SIGNS: no striae      CAPILLARY BLOOD GLUCOSE                                13.5   3.26  )-----------( 163      ( 2025 09:33 )             39.8           130[L]  |  95[L]  |  6[L]  ----------------------------<  91  3.9   |  23  |  0.54    eGFR: 112    Ca    9.0        Mg     1.70       Phos  3.5         TPro  6.8  /  Alb  4.2  /  TBili  0.7  /  DBili  x   /  AST  20  /  ALT  13  /  AlkPhos  97        Thyroid Function Tests:              Radiology:                  HPI: 49 years old female with PMHx of breast cancer s/p chemo/radiation/immunotherapy, hx of secondary AI, hypothyroidism who presented for nausea and fatigue. Endocrinology was consulted for management of AI. Patient was seen and examined in the ED and reported that she was in [place] for her son's wedding. She was stressed and did not drink enough water. Patient had multiple episodes of vomiting and started taking double the dose of her hydrocortisone.       ENDOCRINE HX:  Patient was on Keytruda in  and reportedly developed secondary AI likely from Keytruda or steroids use. Patient had workup completed where her ACTH was 2.3. Patient was then discharged on AI dose of steroids and saw Dr. Rivero. Patient also has a history of hypothyroidism. Patient denied history of DM or pituitary disorder.      PAST MEDICAL & SURGICAL HISTORY:  Breast CA  left breast      History of chemotherapy  completed 23      Breast cancer      Adrenal insufficiency      Acquired hypothyroidism      Anemia      S/P   X1       H/O bilateral breast biopsy  markers both breasts      Port-A-Cath in place  right chest      S/P bilateral mastectomy  JOHN flap/ bilateral implants          FAMILY HISTORY:  No pertinent family history in first degree relatives    Social History:  Manages her medications herself. Independent with ADLs.    HOME MEDS:  hydrocortisone 5 mg oral tablet: 2 tab(s) orally once a day Day before surgery 3 pm  Synthroid 50 mcg (0.05 mg) oral tablet: 1 tab(s) orally once a day Please take this 6am on an empty stomach, do not eat/drink or take additional medications for 1hr      MEDICATIONS  (STANDING):    MEDICATIONS  (PRN):      Allergies    Keytruda (Other)  unknown chemo medication (Anaphylaxis)  Taxol (Anaphylaxis)    Intolerances      Review of Systems:  Constitutional: No fever  Eyes: No blurry vision  Neuro: No tremors  HEENT: No pain  Cardiovascular: No chest pain, palpitations  Respiratory: No SOB, no cough  GI: No nausea, vomiting, abdominal pain  : No dysuria  Skin: no rash  Psych: no depression  Endocrine: no polyuria, polydipsia  Hem/lymph: no swelling  Osteoporosis: no fractures    ALL OTHER SYSTEMS REVIEWED AND NEGATIVE    PHYSICAL EXAM:  VITALS: T(C): 36.6 (25 @ 08:34)  T(F): 97.9 (25 @ 08:34), Max: 97.9 (25 @ 08:34)  HR: 70 (25 @ 08:34) (70 - 83)  BP: 128/86 (25 @ 08:34) (122/77 - 128/86)  RR:  (16 - 16)  SpO2:  (97% - 100%)  Wt(kg): --  GENERAL: NAD, well-groomed, well-developed  EYES: No proptosis, no lid lag, anicteric  HEENT:  Atraumatic, Normocephalic, moist mucous membranes  RESPIRATORY: Normal respiratory effort; no audible wheezing  SKIN: Dry, intact, No rashes or lesions  MUSCULOSKELETAL: Full range of motion, normal strength  NEURO: sensation intact, extraocular movements intact, no tremor  PSYCH: Alert and oriented x 3, normal affect, normal mood  CUSHING'S SIGNS: no striae      CAPILLARY BLOOD GLUCOSE                                13.5   3.26  )-----------( 163      ( 2025 09:33 )             39.8       07-    130[L]  |  95[L]  |  6[L]  ----------------------------<  91  3.9   |  23  |  0.54    eGFR: 112    Ca    9.0      -  Mg     1.70       Phos  3.5     -    TPro  6.8  /  Alb  4.2  /  TBili  0.7  /  DBili  x   /  AST  20  /  ALT  13  /  AlkPhos  97        Thyroid Function Tests:              Radiology:                  HPI: 49 years old female with PMHx of breast cancer s/p chemo/radiation/immunotherapy, hx of secondary AI, hypothyroidism who presented for nausea and fatigue.     Endocrinology was consulted for management of AI. Patient was seen and examined in the ED and reported that she was in [place] for her son's wedding. She was stressed and did not drink enough water. Patient had multiple episodes of vomiting and started taking double the dose of her hydrocortisone.       ENDOCRINE HX:  Patient was on Keytruda in  and reportedly developed secondary AI likely from Keytruda vs steroids use.   She used to followup with endocrine Dr. Rivero at [facility], last seen in dec 2024, has not followed up since and is to see a new provider Dr. Parrish at the end of this month in 2025  per review of outpt notes, in the past 7am cortisol 0.5 no ACTH, she was started on higher dose 20/10 -> 15mg morning, 7.5mg 3PM (had hallucinations on higher doses) -> 10/5mg   Repeat testing in end of May 2024 (approx 5 months post diagnosis)Cortisol remained <1 with low ACTH    Patient also has a history of primary  hypothyroidism when her TSH was 8.35 and free t4 0.9 and was started on Levothyroxine       pt reports not feeling well while in [place] where she felt it was hot and she was dehydrated   she also endorses multiple episoides of vomitting including episodes of unable to keep down HC  she reports she doubled up HC starting yesterday and therefore took HC 20/10 (instead of 10/5).    vitals signs in the ER notable for HD stability   pt reporting that she is going to trial PO as well     Labs notable for hyponatremia  she does not report any dizziness, nausea or abdominal pain  no sick contacts  believes she could have had food poisoning         PAST MEDICAL & SURGICAL HISTORY:  Breast CA  left breast      History of chemotherapy  completed 23      Breast cancer      Adrenal insufficiency      Acquired hypothyroidism      Anemia      S/P   X1       H/O bilateral breast biopsy  markers both breasts      Port-A-Cath in place  right chest      S/P bilateral mastectomy  JOHN flap/ bilateral implants          FAMILY HISTORY:  No pertinent family history in first degree relatives    Social History:  Manages her medications herself. Independent with ADLs.    HOME MEDS:  hydrocortisone 5 mg oral tablet: 2 tab(s) orally once a day Day before surgery 3 pm  Synthroid 50 mcg (0.05 mg) oral tablet: 1 tab(s) orally once a day Please take this 6am on an empty stomach, do not eat/drink or take additional medications for 1hr      MEDICATIONS  (STANDING):    MEDICATIONS  (PRN):      Allergies    Keytruda (Other)  unknown chemo medication (Anaphylaxis)  Taxol (Anaphylaxis)    Intolerances      Review of Systems:  Constitutional: No fever  Eyes: No blurry vision  Neuro: No tremors  HEENT: No pain  Cardiovascular: No chest pain, palpitations  Respiratory: No SOB, no cough  GI: No nausea, vomiting, abdominal pain  : No dysuria  Skin: no rash  Psych: no depression  Endocrine: no polyuria, polydipsia  Hem/lymph: no swelling  Osteoporosis: no fractures    ALL OTHER SYSTEMS REVIEWED AND NEGATIVE    PHYSICAL EXAM:  VITALS: T(C): 36.6 (25 @ 08:34)  T(F): 97.9 (25 @ 08:34), Max: 97.9 (25 @ 08:34)  HR: 70 (25 @ 08:34) (70 - 83)  BP: 128/86 (25 @ 08:34) (122/77 - 128/86)  RR:  (16 - 16)  SpO2:  (97% - 100%)  Wt(kg): --  GENERAL: NAD, well-groomed, well-developed  EYES: No proptosis, no lid lag, anicteric  HEENT:  Atraumatic, Normocephalic, moist mucous membranes  RESPIRATORY: Normal respiratory effort; no audible wheezing  SKIN: Dry, intact, No rashes or lesions  MUSCULOSKELETAL: Full range of motion, normal strength  NEURO: sensation intact, extraocular movements intact, no tremor  PSYCH: Alert and oriented x 3, normal affect, normal mood  CUSHING'S SIGNS: no striae      CAPILLARY BLOOD GLUCOSE                                13.5   3.26  )-----------( 163      ( 2025 09:33 )             39.8       -    130[L]  |  95[L]  |  6[L]  ----------------------------<  91  3.9   |  23  |  0.54    eGFR: 112    Ca    9.0      07-  Mg     1.70     -  Phos  3.5     07-    TPro  6.8  /  Alb  4.2  /  TBili  0.7  /  DBili  x   /  AST  20  /  ALT  13  /  AlkPhos  97        Thyroid Function Tests:              Radiology:

## 2025-07-09 NOTE — ED PROVIDER NOTE - PHYSICAL EXAMINATION
GEN: no acute respiratory distress. nontoxic, speaking comfortably in full sentences, ambulating with steady gait.  HEENT: NCAT. face symmetrical. PERRL 4mm, EOMI, dry MM, oropharynx wnl.  Neck: no JVD, trachea midline, no lymphadenopathy, FROM  CV: RRR. +S1S2, no murmur. 2+ pulses in 4 extremities, cap refill <2 sec  Chest: CTA B/l. no wheezing, rales, rhonchi. no retractions. good air movement.   ABD: +BS, soft, non distended, non tender.   : no cva or suprapubic tenderness  MSK: No clubbing, cyanosis, edema. FROM of all extremities. Compartment soft and lower extremities. no tenderness to palpation. No midline or paraspinal tenderness.   Neuro: AAOX3. CN 2-12 intact; Sensation intact, motor 5/5 throughout.   SKIN: No rash

## 2025-07-09 NOTE — ED PROVIDER NOTE - CLINICAL SUMMARY MEDICAL DECISION MAKING FREE TEXT BOX
Patient with fluid dehydration.  Generalized weakness, nausea vomiting, mild headache for the past 2 days.  Last vomit was 2 days ago.  Patient yesterday able to tolerate liquids and eat banana and small amount of rice.  Has been tolerating medicine orally without issue.  Did double her hydrocortisone starting yesterday and is planning to double for the next 3 days total.  Headache not sudden or maximal onset.  No focal neurodeficit on exam.  Patient reports lower extremity fatigue and weakness, no ecchymosis, compartments soft, sensation intact in bilateral remedies, normal pulse in bilateral remedies.  5/5 strength bilateral extremities.  Patient afebrile, normotensive in ED.  Does have dry oral mucous membrane.  Plan: Labs, IV fluids, symptom relief, reassess.  Will discuss with endocrinology as needed.

## 2025-07-09 NOTE — CONSULT NOTE ADULT - ASSESSMENT
#Adrenal Insufficiency    #Chronic Systemic Steroid Use  patient has been on chronic __  - please do not abruptly discontinue steroids as he likely has some degree of secondary AI from chronic steroid use and this can precipitate an adrenal crisis  To help with discharge planning, Please print and give the pt info section for patients under adrenal insufficiency (this will educate him regarding the warning signs of adrenal crisis)  - pt should receive stress dosing (hydrocortisone 50mg q8) with any major illness or surgical procedure  - Should pt be unable to tolerate PO and is unable to take decadron/steroids, he will need an emergency injection. Please discharge with a prescription for 100 mg Solu-Cortef Act-O-Vial and the following instructions: [email or web address]-[email or web address]-injection-[email or web address]-[email or web address]-solu-cortef-act-o-vial-two-chamber-ampul/  - Patient should also monitor BP closely at home      - To help with discharge planning, Please print the patient info section from UpToDate section on adrenal insufficiency (this will educate the patient regarding the warning signs of adrenal crisis )   -  patient that they should take 2-3x her home dose in cases of illness, fever, accidents, and surgery  - pt should receive stress dosing (hydrocortisone 50mg q8) with any major illness or surgical procedure  - Should pt be unable to tolerate PO and is unable to take hydrocortisone, they will need an emergency injection. Please discharge with a prescription for 100 mg Solu-Cortef Act-O-Vial and the following instructions:  [email or web address]-[email or web address]-injection-cortico-[email or web address]-solu-cortef-act-o-vial-two-chamber-ampul/  - pt should obtain a medical alert bracelet or necklace to inform emergency providers that they have adrenal insufficiency  [email or web address]  - Patient should also monitor BP closely at home      #Hypothyroidism #Adrenal Insufficiency  - Please check 8 AM cortisol (order "cortisol AM, serum" and make sure labs are drawn at 8 AM, not 6 AM morning labs. Do not order "free" or "ESO" cortisol)      #Chronic Systemic Steroid Use  patient has been on chronic __  - please do not abruptly discontinue steroids as he likely has some degree of secondary AI from chronic steroid use and this can precipitate an adrenal crisis  To help with discharge planning, Please print and give the pt info section for patients under adrenal insufficiency (this will educate him regarding the warning signs of adrenal crisis)  - pt should receive stress dosing (hydrocortisone 50mg q8) with any major illness or surgical procedure  - Should pt be unable to tolerate PO and is unable to take decadron/steroids, he will need an emergency injection. Please discharge with a prescription for 100 mg Solu-Cortef Act-O-Vial and the following instructions: [email or web address]-[email or web address]-injection-cortico-steroids-solu-cortef-act-o-vial-two-chamber-ampul/  - Patient should also monitor BP closely at home      - To help with discharge planning, Please print the patient info section from UpToDate section on adrenal insufficiency (this will educate the patient regarding the warning signs of adrenal crisis )   -  patient that they should take 2-3x her home dose in cases of illness, fever, accidents, and surgery  - pt should receive stress dosing (hydrocortisone 50mg q8) with any major illness or surgical procedure  - Should pt be unable to tolerate PO and is unable to take hydrocortisone, they will need an emergency injection. Please discharge with a prescription for 100 mg Solu-Cortef Act-O-Vial and the following instructions:  [email or web address]-injection/emergency-injection-cortico-steroids-solu-cortef-act-o-vial-two-chamber-ampul/  - pt should obtain a medical alert bracelet or necklace to inform emergency providers that they have adrenal insufficiency  [email or web address]  - Patient should also monitor BP closely at home      #Hypothyroidism 49 years old female with PMHx of breast cancer s/p chemo/radiation/immunotherapy, hx of secondary AI, hypothyroidism who presented for nausea and fatigue. Endocrinology was consulted for management of AI.     #Secondary Adrenal Insufficiency  #Hyponatremia  #Hx of immunotherapy  - patient with history of secondary AI after treatment with Keytruda for breast cancer and being on steroids  - Currently off chemo/immunotherapy - in remission  - takes hydrocortisone 10mg at 8 am and 5mg at 3 pm daily  - patient increased the hydrocortisone dose to 20 mg at 8 am and 10 mg at 3 pm after feeling unwell    PLAN  - given patient's symptoms have improved recommend continuing hydrocortisone 20 mg at 8 am and 10 mg at 3 pm for 2 more days and then going back to her regular dose of 10 mg at 8 am and 5 mg at 3 pm  - if patient becomes HD unstable, can start stress dose hydrocortisone 50mg IV q8h  - check TSH and free thyroxine given hyponatremia    Discharge Planning:  - Continue hydrocortisone 20 mg at 8 am and 10 mg at 3 pm for 2 more days and then taper down to her regular dose of 10 mg at 8 am and 5 mg at 3 pm  - Patient is scheduled to follow up with Dr. Parrish. Her previous endo was Dr. Rivero  - Patient should call the office on 7/11 before reducing her dose    #Chronic Systemic Steroid Use  - patient has been on chronic hydrocortisone for >1 year  - please do not abruptly discontinue steroids as she has secondary AI  - To help with discharge planning, Please print the patient info section from UpToDate section on adrenal insufficiency (this will educate the patient regarding the warning signs of adrenal crisis)   - pt should receive stress dosing (hydrocortisone 50mg q8) with any major illness or surgical procedure  - Should pt be unable to tolerate PO and is unable to take decadron/steroids, she will need an emergency injection. Please discharge with a prescription for 100 mg Solu-Cortef Act-O-Vial and the following instructions: [email or web address]-[email or web address]-injection-cortico-steroids-solu-cortef-act-o-vial-two-chamber-ampul/  - Patient should also monitor BP closely at home  -  patient that they should take 2-3x her home dose in cases of illness, fever, accidents, and surgery    #Hypothyroidism  - per patient her last TFT were 6 months ago and were WNL  - continue levothyroxine 50 mcg daily  - check TSH and free thyroxine given hyponatremia 49 years old female with PMHx of breast cancer s/p chemo/radiation/immunotherapy, hx of secondary AI, hypothyroidism who presented for nausea and fatigue. Endocrinology was consulted for management of AI.     #Secondary Adrenal Insufficiency  #Hyponatremia  #Hx of immunotherapy  - patient with history of secondary AI after treatment with Keytruda for breast cancer vs from long term steroid use   - Currently off chemo/immunotherapy - in remission  - takes hydrocortisone 10mg at 8 am and 5mg at 3 pm daily  - patient increased the hydrocortisone dose to 20 mg at 8 am and 10 mg at 3 pm after feeling unwell starting 7/8    PLAN  - given patient's symptoms have improved recommend continuing hydrocortisone 20 mg at 8 am and 10 mg at 3 pm for 2 more days and then going back to her regular dose of 10 mg at 8 am and 5 mg at 3 pm if she feels well  she is to call endocrine office this friday to inform Dr. Parrish how she is feeling and update   - if patient becomes HD unstable, can start stress dose hydrocortisone 50mg IV q8h  -check orthostatics   - if feeling well and able to tolerate PO without n/v and orthostatics are negative than she can be dc from an endocrine perspective  hyponatremia workup if required per the ED (can obtain TSH), otherwise should follow [facility] outpt if dc home today as this is not her baseline     Discharge Planning:  - Continue hydrocortisone 20 mg at 8 am and 10 mg at 3 pm for 2 more days and then taper down to her regular dose of 10 mg at 8 am and 5 mg at 3 pm  - Patient is scheduled to follow up with Dr. Parrish. Her previous endo was Dr. Rivero  - Patient should call the office on 7/11 before reducing her dose and give clinical update   - we will also email Dr. Parrish to give update on the pt so can be followed up this friday     Sick day rules reviewed with pt  - patient has been on chronic hydrocortisone for >1 year  - please do not abruptly discontinue steroids as she has secondary AI  - To help with discharge planning, Please print the patient info section from UpToDate section on adrenal insufficiency (this will educate the patient regarding the warning signs of adrenal crisis)   - pt should receive stress dosing (hydrocortisone 50mg q8) with any major illness or surgical procedure  - Should pt be unable to tolerate PO and is unable to take decadron/steroids, she will need an emergency injection. Please discharge with a prescription for 100 mg Solu-Cortef Act-O-Vial and the following instructions: [email or web address]-[email or web address]-injection-cortico-steroids-solu-cortef-act-o-vial-two-chamber-ampul/  - Patient should also monitor BP closely at home  -  patient that they should take 2-3x her home dose in cases of illness, fever, accidents, and surgery  - also needs medic ID bracelet denoting AI        #Hypothyroidism  - per patient her last TFT were 6 months ago and were WNL  - continue levothyroxine 50 mcg daily  - check TSH and free thyroxine given hyponatremia

## 2025-07-09 NOTE — ED ADULT NURSE NOTE - NSFALLUNIVINTERV_ED_ALL_ED
Bed/Stretcher in lowest position, wheels locked, appropriate side rails in place/Call bell, personal items and telephone in reach/Instruct patient to call for assistance before getting out of bed/chair/stretcher/Non-slip footwear applied when patient is off stretcher/[place] to call system/Physically safe environment - no spills, clutter or unnecessary equipment/Purposeful proactive rounding/Room/bathroom lighting operational, light cord in reach

## 2025-07-09 NOTE — CONSULT NOTE ADULT - ATTENDING COMMENTS
49 years old female with PMHx of breast cancer s/p chemo/radiation/immunotherapy, hx of secondary AI, hypothyroidism who presented for nausea and fatigue. Endocrinology was consulted for management of AI.   given not feeling well, potentially due to heat, overexertion, food poisoning per pt, would double up steroids today and tomorrow (20/10)  to call endocrine office Friday and give clinical update regarding how she is feeling and tapering down to home dose if feeling well  long d/w pt regarding plan and need to closely fu outpt   if she feels unwell at home and unable to take PO needs to come in for IV steroids  please obtain orthostatics prior to dc         Renée Duffy MD  Attending Physician   Department of Endocrinology, Diabetes and Metabolism     weekdays: 9am to 5pm: [facility] or [facility] and or TEAMS     Nights and weekends: [phone]  Please note that this patient may be followed by a different provider tomorrow.   If no answer or after hours, please contact [phone].  For final dc reccomendations, please call [phone] or page the endocrine fellow on call.      complex pt high level decision making   Spent over 78 minutes providing face to face education as well as assessing  pt/labs/meds and discussing plan with primary team and pt   this included review of Vitals, meds, new results/radiology, interdisciplinary charting reviewed, Patient seen and examined and plan discussed, all questions were answered to the best of my ability  Charting/documentation and orders.

## 2025-07-09 NOTE — ED PROVIDER NOTE - PROGRESS NOTE DETAILS
Patient received as a signout pending endocrine recommendations.  Endocrine recommendations are reviewed–TSH and free T3 are added to labs.  Discussed with patient to continue hydrocortisone at high doses for 2 more days and then taper down on day 3.  Patient is asymptomatic at this time.  Does not have any complaints.  Stable for discharge.

## 2025-07-09 NOTE — ED PROVIDER NOTE - OBJECTIVE STATEMENT
49-year-old female past medical history of breast cancer status postmastectomy, chemotherapy/monotherapy 2 years ago in surveillance/remission , adrenal insufficiency on hydrocortisone (10 mg a.m., 5 mg p.m.) levothyroxine 50 mcg presents to ED for mild occipital headache, nausea, 2 episodes of vomiting 2 days ago, fatigue, feel she is dehydrated.  Patient reports was in [place] for her son's wedding.  Patient reports feeling very warm the next morning and felt nauseous and vomited twice.  No vomiting since.  Patient denies chest pain, abdominal pain, fevers, chills, diarrhea.  Patient denies dysuria hematuria but does report increased urinary frequency.  Believes urinary frequency related to increased hydration but still feels dehydrated.  Headache started approximately same time as nausea vomiting.  Not maximal or sudden in onset.  Took Tylenol approximately 2 AM.     Patient reports some bilateral lower extremity Weakness/achiness.   Patient doubled her hydrocortisone, as previously instructed, starting yesterday took 20 mg / 10 mg a.m./p.m.  Patient took 20 mg hydrocortisone this morning already.  Patient adherent with levothyroxine.

## 2025-07-09 NOTE — ED PROVIDER NOTE - NSFOLLOWUPINSTRUCTIONS_ED_ALL_ED_FT
You were seen today for headache, nausea, vomiting, generalized fatigue.    Endocrinology team evaluated you while being in the emergency department.  Following are their recommendations:  1.  Continue taking high-dose hydrocortisone for next 2 days.  On day 3 please call your endocrinologist before taking a lower dose.  2.  TSH and free T4 are pending.  Please follow-up with your endocrinologist to discuss the results.  3.  If you start feeling unwell at any time please return to the emergency department immediately for assessment.  4.  If you are unable to tolerate eating or drinking, unable to take your steroids -you will need an emergency injection of 100 mg Solu-Cortef Act-O-Vial (please see the attached printed instructions on how to use it).

## 2025-07-09 NOTE — ED PROVIDER NOTE - PATIENT PORTAL LINK FT
You can access the FollowMyHealth Patient Portal offered by [facility] by registering at the following website: [email or web address]. By joining [facility]’s FollowMyHealth portal, you will also be able to view your health information using other applications (apps) compatible with our system.

## 2025-07-09 NOTE — ED ADULT NURSE NOTE - OBJECTIVE STATEMENT
Received pt to Rm 1 from home with c/o generalized weakness and fatigue x 3 days. Pt reports recent travel from [facility] after son's wedding, developed nausea, fatigue and generalized weakness worsening x 3 days since returning. Pt is A&OX4, skin warm dry unremarkable, + strong regular radial pulses bi laterally. Pt reports hx of breast CA, last chemo 2 years ago and [place]'s disease. Pt reports compliance with medication, denies chest pain, difficulty breathing, numbness or tingling. Pt endorses throbbing headache to occipital area. Pt changed into gown, vital signs assessed, pending MD drummond, will continue to monitor.

## 2025-07-09 NOTE — ED ADULT NURSE REASSESSMENT NOTE - NS ED NURSE REASSESS COMMENT FT1
Pt resting comfortably in bed, pending endocrine consult and repeat lab work. Pt updated on plan of care, verbalized understanding. Pt denies physical complaints, will continue to monitor.
#20g IV placed to RAC, lab work collected as ordered. Pt tolerated procedure well will continue to monitor.

## 2025-07-10 ENCOUNTER — APPOINTMENT (OUTPATIENT)
Dept: INTERNAL MEDICINE | Facility: CLINIC | Age: 50
End: 2025-07-10

## 2025-07-16 ENCOUNTER — APPOINTMENT (OUTPATIENT)
Dept: SURGICAL ONCOLOGY | Facility: CLINIC | Age: 50
End: 2025-07-16

## 2025-07-16 VITALS
HEIGHT: 69 IN | WEIGHT: 158 LBS | OXYGEN SATURATION: 98 % | DIASTOLIC BLOOD PRESSURE: 85 MMHG | SYSTOLIC BLOOD PRESSURE: 122 MMHG | RESPIRATION RATE: 16 BRPM | HEART RATE: 76 BPM | BODY MASS INDEX: 23.4 KG/M2

## 2025-07-16 PROCEDURE — 99215 OFFICE O/P EST HI 40 MIN: CPT

## 2025-07-16 RX ORDER — SYRINGE W-NEEDLE,DISPOSAB,3 ML 22GX1"
22G X 1" SYRINGE, EMPTY DISPOSABLE MISCELLANEOUS
Qty: 1 | Refills: 1 | Status: ACTIVE | COMMUNITY
Start: 2025-07-15 | End: 1900-01-01

## 2025-07-16 RX ORDER — HYDROCORTISONE SODIUM SUCCINATE 100 MG/2ML
100 INJECTION INTRAMUSCULAR; INTRAVENOUS
Qty: 1 | Refills: 2 | Status: ACTIVE | COMMUNITY
Start: 2025-07-15 | End: 1900-01-01

## 2025-07-21 ENCOUNTER — APPOINTMENT (OUTPATIENT)
Dept: ENDOCRINOLOGY | Facility: CLINIC | Age: 50
End: 2025-07-21
Payer: COMMERCIAL

## 2025-07-21 VITALS
BODY MASS INDEX: 23.4 KG/M2 | SYSTOLIC BLOOD PRESSURE: 114 MMHG | HEART RATE: 78 BPM | WEIGHT: 158 LBS | DIASTOLIC BLOOD PRESSURE: 79 MMHG | HEIGHT: 69 IN | OXYGEN SATURATION: 98 %

## 2025-07-21 DIAGNOSIS — Z92.89 PERSONAL HISTORY OF OTHER MEDICAL TREATMENT: ICD-10-CM

## 2025-07-21 DIAGNOSIS — E27.40 UNSPECIFIED ADRENOCORTICAL INSUFFICIENCY: ICD-10-CM

## 2025-07-21 DIAGNOSIS — E03.9 HYPOTHYROIDISM, UNSPECIFIED: ICD-10-CM

## 2025-07-21 PROCEDURE — 99215 OFFICE O/P EST HI 40 MIN: CPT

## 2025-07-25 LAB
ANION GAP SERPL CALC-SCNC: 14 MMOL/L
BUN SERPL-MCNC: 16 MG/DL
CALCIUM SERPL-MCNC: 9.5 MG/DL
CHLORIDE SERPL-SCNC: 103 MMOL/L
CO2 SERPL-SCNC: 23 MMOL/L
CREAT SERPL-MCNC: 0.58 MG/DL
EGFRCR SERPLBLD CKD-EPI 2021: 110 ML/MIN/1.73M2
ESTIMATED AVERAGE GLUCOSE: 105 MG/DL
GLUCOSE SERPL-MCNC: 87 MG/DL
HBA1C MFR BLD HPLC: 5.3 %
POTASSIUM SERPL-SCNC: 4.3 MMOL/L
SODIUM SERPL-SCNC: 141 MMOL/L
T3 SERPL-MCNC: 107 NG/DL
T4 FREE SERPL-MCNC: 1.5 NG/DL
TSH SERPL-ACNC: 0.45 UIU/ML

## 2025-08-05 NOTE — REVIEW OF SYSTEMS
Pt transferred to [facility], hospitalist service will sign off. Please page [facility] when pt ready to transfer out of [facility].     Yvonne WATTS Hospitalist    [Negative] : Allergic/Immunologic

## 2025-09-04 ENCOUNTER — APPOINTMENT (OUTPATIENT)
Dept: INTERNAL MEDICINE | Facility: CLINIC | Age: 50
End: 2025-09-04
Payer: COMMERCIAL

## 2025-09-04 VITALS
OXYGEN SATURATION: 97 % | HEART RATE: 70 BPM | WEIGHT: 157 LBS | BODY MASS INDEX: 23.25 KG/M2 | HEIGHT: 69 IN | SYSTOLIC BLOOD PRESSURE: 111 MMHG | DIASTOLIC BLOOD PRESSURE: 75 MMHG

## 2025-09-04 DIAGNOSIS — C50.919 MALIGNANT NEOPLASM OF UNSPECIFIED SITE OF UNSPECIFIED FEMALE BREAST: ICD-10-CM

## 2025-09-04 DIAGNOSIS — Z00.00 ENCOUNTER FOR GENERAL ADULT MEDICAL EXAMINATION W/OUT ABNORMAL FINDINGS: ICD-10-CM

## 2025-09-04 DIAGNOSIS — H61.21 IMPACTED CERUMEN, RIGHT EAR: ICD-10-CM

## 2025-09-04 DIAGNOSIS — D64.9 ANEMIA, UNSPECIFIED: ICD-10-CM

## 2025-09-04 DIAGNOSIS — E03.9 HYPOTHYROIDISM, UNSPECIFIED: ICD-10-CM

## 2025-09-04 DIAGNOSIS — E27.40 UNSPECIFIED ADRENOCORTICAL INSUFFICIENCY: ICD-10-CM

## 2025-09-04 PROCEDURE — 36415 COLL VENOUS BLD VENIPUNCTURE: CPT

## 2025-09-04 PROCEDURE — 99396 PREV VISIT EST AGE 40-64: CPT

## 2025-09-04 PROCEDURE — 99213 OFFICE O/P EST LOW 20 MIN: CPT | Mod: 25

## 2025-09-05 ENCOUNTER — TRANSCRIPTION ENCOUNTER (OUTPATIENT)
Age: 50
End: 2025-09-05

## 2025-09-05 LAB
25(OH)D3 SERPL-MCNC: 29.6 NG/ML
ALBUMIN SERPL ELPH-MCNC: 4.8 G/DL
ALP BLD-CCNC: 102 U/L
ALT SERPL-CCNC: 13 U/L
ANION GAP SERPL CALC-SCNC: 13 MMOL/L
AST SERPL-CCNC: 21 U/L
BASOPHILS # BLD AUTO: 0.02 K/UL
BASOPHILS NFR BLD AUTO: 0.5 %
BILIRUB SERPL-MCNC: 0.5 MG/DL
BUN SERPL-MCNC: 13 MG/DL
CALCIUM SERPL-MCNC: 10 MG/DL
CHLORIDE SERPL-SCNC: 102 MMOL/L
CHOLEST SERPL-MCNC: 195 MG/DL
CO2 SERPL-SCNC: 25 MMOL/L
CREAT SERPL-MCNC: 0.63 MG/DL
EGFRCR SERPLBLD CKD-EPI 2021: 108 ML/MIN/1.73M2
EOSINOPHIL # BLD AUTO: 0.11 K/UL
EOSINOPHIL NFR BLD AUTO: 2.8 %
ESTIMATED AVERAGE GLUCOSE: 111 MG/DL
GLUCOSE SERPL-MCNC: 91 MG/DL
HBA1C MFR BLD HPLC: 5.5 %
HCT VFR BLD CALC: 41.9 %
HDLC SERPL-MCNC: 63 MG/DL
HGB BLD-MCNC: 13.8 G/DL
IMM GRANULOCYTES NFR BLD AUTO: 0 %
LDLC SERPL-MCNC: 112 MG/DL
LYMPHOCYTES # BLD AUTO: 1.19 K/UL
LYMPHOCYTES NFR BLD AUTO: 30.4 %
MAN DIFF?: NORMAL
MCHC RBC-ENTMCNC: 28.2 PG
MCHC RBC-ENTMCNC: 32.9 G/DL
MCV RBC AUTO: 85.7 FL
MONOCYTES # BLD AUTO: 0.22 K/UL
MONOCYTES NFR BLD AUTO: 5.6 %
NEUTROPHILS # BLD AUTO: 2.38 K/UL
NEUTROPHILS NFR BLD AUTO: 60.7 %
NONHDLC SERPL-MCNC: 132 MG/DL
PLATELET # BLD AUTO: 175 K/UL
POTASSIUM SERPL-SCNC: 4.7 MMOL/L
PROT SERPL-MCNC: 7.3 G/DL
RBC # BLD: 4.89 M/UL
RBC # FLD: 13.9 %
SODIUM SERPL-SCNC: 140 MMOL/L
TRIGL SERPL-MCNC: 115 MG/DL
TSH SERPL-ACNC: 0.56 UIU/ML
WBC # FLD AUTO: 3.92 K/UL

## 2025-09-08 ENCOUNTER — TRANSCRIPTION ENCOUNTER (OUTPATIENT)
Age: 50
End: 2025-09-08

## 2025-09-17 ENCOUNTER — NON-APPOINTMENT (OUTPATIENT)
Age: 50
End: 2025-09-17

## 2025-09-23 PROBLEM — H93.8X3 CLOGGED EAR, BILATERAL: Status: ACTIVE | Noted: 2025-09-23

## 2025-09-23 PROBLEM — H61.23 BILATERAL IMPACTED CERUMEN: Status: ACTIVE | Noted: 2025-09-23

## 2025-09-26 PROBLEM — Z12.11 ENCOUNTER FOR COLORECTAL CANCER SCREENING: Status: ACTIVE | Noted: 2025-09-26 | Resolved: 2025-10-10

## 2025-09-26 PROBLEM — Z85.3 HISTORY OF MALIGNANT NEOPLASM OF BREAST: Status: RESOLVED | Noted: 2025-09-26 | Resolved: 2025-09-26

## 2025-09-26 PROBLEM — Z78.9 SOCIAL ALCOHOL USE: Status: ACTIVE | Noted: 2025-09-26

## (undated) DEVICE — DRAPE 3/4 SHEET 52X76"

## (undated) DEVICE — SOL IRR POUR H2O 1500ML

## (undated) DEVICE — SAFETY PIN

## (undated) DEVICE — BLADE SURGICAL #15 CARBON

## (undated) DEVICE — COTTONBALL LG

## (undated) DEVICE — LONE STAR RETRACTOR RING 12MM BLUNT DISP

## (undated) DEVICE — MERCIAN VISABILITY BACKROUND YELLOW

## (undated) DEVICE — ELCTR GROUNDING PAD ADULT COVIDIEN

## (undated) DEVICE — CLAMP MICROVASCULAR DOUBLE  1-2MM

## (undated) DEVICE — DRSG TEGADERM 3.5X6"

## (undated) DEVICE — DRAPE TOWEL BLUE 17" X 24"

## (undated) DEVICE — ELCTR BOVIE TIP BLADE INSULATED 2.75" EDGE

## (undated) DEVICE — DOPPLER PROBE  CABLE

## (undated) DEVICE — Device

## (undated) DEVICE — ELCTR BOVIE PENCIL BLADE 10FT

## (undated) DEVICE — NDL HYPO REGULAR BEVEL 25G X 1.5" (BLUE)

## (undated) DEVICE — SUT MONOCRYL 4-0 27" PS-2 UNDYED

## (undated) DEVICE — SOL IRR POUR H2O 500ML

## (undated) DEVICE — SUT VICRYL 0 36" CT-1 UNDYED

## (undated) DEVICE — SUT VICRYL 2-0 27" CT-1 UNDYED

## (undated) DEVICE — SYR LUER LOK 3CC

## (undated) DEVICE — VENODYNE/SCD SLEEVE CALF MEDIUM

## (undated) DEVICE — SUT QUILL MONODERM 2-0 14CM 26MM UNDYED

## (undated) DEVICE — SPEAR SURG EYE WECK-CELL CELOS

## (undated) DEVICE — LABELS BLANK W PEN

## (undated) DEVICE — DRAPE INSTRUMENT POUCH 6.75" X 11"

## (undated) DEVICE — DRSG STERISTRIPS 0.5 X 4"

## (undated) DEVICE — BASIN SET DOUBLE

## (undated) DEVICE — SYR LUER LOK 20CC

## (undated) DEVICE — FOLEY TRAY 16FR 5CC LF UMETER CLOSED

## (undated) DEVICE — DRSG XEROFORM 5 X 9"

## (undated) DEVICE — LAP PAD W RING 18 X 18"

## (undated) DEVICE — SUT MONOCRYL 3-0 27" PS-2 UNDYED

## (undated) DEVICE — LIGASURE SMALL JAW

## (undated) DEVICE — BIPOLAR FORCEP KIRWAN JEWELERS STR 4" X 0.4MM W 12FT CORD (GREEN)

## (undated) DEVICE — TUBING IV EXTENSION MACRO W CLAVE 7"

## (undated) DEVICE — PACK MAJOR ABDOMINAL WITH LAP

## (undated) DEVICE — DRAPE LAPAROTOMY TRANSVERSE

## (undated) DEVICE — ELCTR BOVIE TIP BLADE MEGADYNE E-Z CLEAN 6.5" (LONG)

## (undated) DEVICE — SOL IRR POUR NS 0.9% 500ML

## (undated) DEVICE — DRSG CURITY GAUZE SPONGE 4 X 4" 12-PLY

## (undated) DEVICE — DRSG MAMMARY SUPPORT XL SIZE 5

## (undated) DEVICE — DRSG DERMABOND PRINEO 60CM

## (undated) DEVICE — PREP CHLORAPREP HI-LITE ORANGE 26ML

## (undated) DEVICE — TRAP SPECIMEN SPUTUM 40CC

## (undated) DEVICE — SENTIMAG MAGTRACE TRACER INJECT MAGNETIC 2ML VIAL

## (undated) DEVICE — SUT VICRYL 3-0 27" SH UNDYED

## (undated) DEVICE — SOL IRR POUR NS 0.9% 1500ML

## (undated) DEVICE — TRAY ANES SPINAL EPI COMBO W DRUGS

## (undated) DEVICE — ELCTR BOVIE TIP NEEDLE INSULATED 2.8" EDGE

## (undated) DEVICE — SPECIMEN TRAP 70ML

## (undated) DEVICE — GLV 7 PROTEXIS (WHITE)

## (undated) DEVICE — TUBING INFILTRATION

## (undated) DEVICE — DRAPE IOBAN 33" X 23"

## (undated) DEVICE — POSITIONER STRAP ARMBOARD VELCRO TS-30

## (undated) DEVICE — SUT SILK 2-0 18" FS

## (undated) DEVICE — ELCTR BOVIE TIP BLADE INSULATED 2.8" EDGE WITH SAFETY

## (undated) DEVICE — SUT VICRYL 2-0 27" SH UNDYED

## (undated) DEVICE — ONETRAC LIGHTED RETRACTOR 135 X 30MM DISP

## (undated) DEVICE — TUBING TRUWAVE PRESSURE MALE/FEMALE 12"

## (undated) DEVICE — ELCTR BOVIE TIP BLADE INSULATED 6.5" EDGE

## (undated) DEVICE — CATH IV SAFE INSYTE 14G X 1.75" (ORANGE)

## (undated) DEVICE — NDL COUNTER FOAM AND MAGNET 20-40

## (undated) DEVICE — SYR LUER LOK 50CC

## (undated) DEVICE — SUT ETHILON 8-0 5" BV130-5

## (undated) DEVICE — MARKING PEN W RULER

## (undated) DEVICE — DRAIN RESERVOIR FOR JACKSON PRATT 100CC CARDINAL

## (undated) DEVICE — SUT NYLON 2-0 18" FS

## (undated) DEVICE — TUBING HI-VAC PSI-TEC STERILE

## (undated) DEVICE — GOWN LG

## (undated) DEVICE — GLV 7.5 PROTEXIS (CREAM) MICRO

## (undated) DEVICE — CLAMP MICROVASCULAR SINGLE  1-2MM

## (undated) DEVICE — SUT PROLENE 5-0 36" RB-1

## (undated) DEVICE — DRSG MASTISOL

## (undated) DEVICE — DRSG BENZOIN 0.6CC

## (undated) DEVICE — WARMING BLANKET LOWER ADULT

## (undated) DEVICE — GEL ULTRASOUND 0.25L

## (undated) DEVICE — SUT PLAIN GUT 5-0 12" S-14

## (undated) DEVICE — STAPLER SKIN MULTI DIRECTION W35

## (undated) DEVICE — DRSG COMBINE 5X9"

## (undated) DEVICE — PACK MINOR WITH LAP

## (undated) DEVICE — DRAIN JACKSON PRATT 10MM FLAT FULL 15FR TROCAR

## (undated) DEVICE — FRAZIER CONNECTING TUBE 2FT 5MM

## (undated) DEVICE — BIPOLAR FORCEP CORD WECK STANDARD 12FT

## (undated) DEVICE — CANISTER DISPOSABLE THIN WALL 3000CC

## (undated) DEVICE — SYR LUER LOK 10CC

## (undated) DEVICE — DRSG DERMABOND MINI

## (undated) DEVICE — DRAPE ARMATEC HANDLE 5" X 10"

## (undated) DEVICE — SUT PDS II 3-0 27" SH UNDYED

## (undated) DEVICE — FRAZIER SUCTION TIP 8FR

## (undated) DEVICE — DRAPE SPLIT SHEET 77" X 120"

## (undated) DEVICE — WARMING BLANKET FULL ADULT

## (undated) DEVICE — STAPLER SKIN VISI-STAT 35 WIDE

## (undated) DEVICE — PACK FREE FLAP

## (undated) DEVICE — DRAPE FLUID WARMER 44 X 44"

## (undated) DEVICE — SUT VICRYL 2-0 18" TIES UNDYED